# Patient Record
Sex: MALE | Race: WHITE | NOT HISPANIC OR LATINO | Employment: OTHER | URBAN - METROPOLITAN AREA
[De-identification: names, ages, dates, MRNs, and addresses within clinical notes are randomized per-mention and may not be internally consistent; named-entity substitution may affect disease eponyms.]

---

## 2017-01-03 ENCOUNTER — ANESTHESIA (OUTPATIENT)
Dept: SURGERY | Facility: CLINIC | Age: 72
End: 2017-01-03
Payer: MEDICARE

## 2017-01-03 PROCEDURE — 25000125 ZZHC RX 250: Performed by: ANESTHESIOLOGY

## 2017-01-03 PROCEDURE — 25800025 ZZH RX 258: Performed by: ANESTHESIOLOGY

## 2017-01-03 PROCEDURE — 25000125 ZZHC RX 250: Performed by: NEUROLOGICAL SURGERY

## 2017-01-03 PROCEDURE — 25000125 ZZHC RX 250: Performed by: NURSE ANESTHETIST, CERTIFIED REGISTERED

## 2017-01-03 RX ORDER — PROPOFOL 10 MG/ML
INJECTION, EMULSION INTRAVENOUS CONTINUOUS PRN
Status: DISCONTINUED | OUTPATIENT
Start: 2017-01-03 | End: 2017-01-03

## 2017-01-03 RX ORDER — FENTANYL CITRATE 50 UG/ML
INJECTION, SOLUTION INTRAMUSCULAR; INTRAVENOUS PRN
Status: DISCONTINUED | OUTPATIENT
Start: 2017-01-03 | End: 2017-01-03

## 2017-01-03 RX ORDER — ONDANSETRON 2 MG/ML
INJECTION INTRAMUSCULAR; INTRAVENOUS PRN
Status: DISCONTINUED | OUTPATIENT
Start: 2017-01-03 | End: 2017-01-03

## 2017-01-03 RX ORDER — PROPOFOL 10 MG/ML
INJECTION, EMULSION INTRAVENOUS PRN
Status: DISCONTINUED | OUTPATIENT
Start: 2017-01-03 | End: 2017-01-03

## 2017-01-03 RX ORDER — SODIUM CHLORIDE, SODIUM LACTATE, POTASSIUM CHLORIDE, CALCIUM CHLORIDE 600; 310; 30; 20 MG/100ML; MG/100ML; MG/100ML; MG/100ML
INJECTION, SOLUTION INTRAVENOUS CONTINUOUS PRN
Status: DISCONTINUED | OUTPATIENT
Start: 2017-01-03 | End: 2017-01-03

## 2017-01-03 RX ADMIN — ONDANSETRON 4 MG: 2 INJECTION INTRAMUSCULAR; INTRAVENOUS at 15:26

## 2017-01-03 RX ADMIN — SODIUM CHLORIDE, POTASSIUM CHLORIDE, SODIUM LACTATE AND CALCIUM CHLORIDE: 600; 310; 30; 20 INJECTION, SOLUTION INTRAVENOUS at 14:23

## 2017-01-03 RX ADMIN — PROPOFOL 30 MG: 10 INJECTION, EMULSION INTRAVENOUS at 14:43

## 2017-01-03 RX ADMIN — CEFAZOLIN SODIUM 2 G: 2 INJECTION, SOLUTION INTRAVENOUS at 14:41

## 2017-01-03 RX ADMIN — FENTANYL CITRATE 25 MCG: 50 INJECTION, SOLUTION INTRAMUSCULAR; INTRAVENOUS at 14:46

## 2017-01-03 RX ADMIN — PROPOFOL 75 MCG/KG/MIN: 10 INJECTION, EMULSION INTRAVENOUS at 14:38

## 2017-01-06 ENCOUNTER — CARE COORDINATION (OUTPATIENT)
Dept: NEUROSURGERY | Facility: CLINIC | Age: 72
End: 2017-01-06

## 2017-01-06 NOTE — PROGRESS NOTES
Called pt s/p DBS battery replacement on 1/3/2016 to check on his status. Left VM with my contact information, and asked him to return call at his earliest convenience. Also left the number to reach the neurosurgery resident on call should he have questions/issues over the weekend/after normal business hours.

## 2017-01-09 NOTE — PROGRESS NOTES
Neurosurgery Discharge Coordination Note     Attending physician: Dr. Fair  Surgery performed: DBS battery replacement  Date of Discharge: 1/3/2017  Discharge to: Home     Current status: Patient states he is feeling fine. Had minor discomfort after surgery but feels he is back to his baseline. Denies redness, swelling,increased tenderness or elevated temp. Reports Incision CDI without signs of infection.  Denies current bowel or bladder issues    Discharge instructions and medications reviewed with patient.  Follow up appointments/imaging/tests needed:  will call pt to schedule f/u wound check.     RN triage/on call number given: [phone]/ [phone]

## 2017-02-06 DIAGNOSIS — E78.00 HYPERCHOLESTEREMIA: Primary | ICD-10-CM

## 2017-02-06 RX ORDER — ATORVASTATIN CALCIUM 10 MG/1
10 TABLET, FILM COATED ORAL DAILY
Qty: 30 TABLET | Refills: 0 | Status: SHIPPED
Start: 2017-02-06 | End: 2017-02-28

## 2017-02-06 NOTE — Clinical Note
February 6, 2017      TO: Norberto Wu  [address]   [place] [address]         Dear Mr. Norberto Wu,  This letter is a reminder that you are overdue to see your Primary Care Provider for an Annual Visit and needed labs. You must be seen by your Primary Care Provider on a yearly basis and have appropriate labs drawn for continued care and prescription refills. Please call [phone] to schedule an appointment for an Annual Visit with  NORBERTO VIGIL MD.   LAST SEEN  8/10/15       You have been given a 30 day supply/refill of your  ATORVASTATIN 10 MG  while you get your clinic visit/labs completed.    Regards,  [facility]

## 2017-02-06 NOTE — TELEPHONE ENCOUNTER
atorvastatin (LIPITOR) 10 MG       Last Written Prescription Date: 11/14/16  Last Fill Quantity: 90,   # refills: 0  Last Office Visit : 8/10/15  Future Office visit:  NONE    OVER DUE MD APPT. REMINDER LETTER SENT + 30DAY RF

## 2017-02-28 DIAGNOSIS — E78.00 HYPERCHOLESTEREMIA: ICD-10-CM

## 2017-03-07 ENCOUNTER — TELEPHONE (OUTPATIENT)
Dept: INTERNAL MEDICINE | Facility: CLINIC | Age: 72
End: 2017-03-07

## 2017-03-07 DIAGNOSIS — E05.90 HYPERTHYROIDISM: ICD-10-CM

## 2017-03-07 DIAGNOSIS — C61 PROSTATE CANCER (H): ICD-10-CM

## 2017-03-07 DIAGNOSIS — E05.90 HYPERTHYROIDISM: Primary | ICD-10-CM

## 2017-03-07 DIAGNOSIS — E78.00 HIGH BLOOD CHOLESTEROL: ICD-10-CM

## 2017-03-07 LAB
ALBUMIN SERPL-MCNC: 3.7 G/DL (ref 3.4–5)
ALP SERPL-CCNC: 176 U/L (ref 40–150)
ALT SERPL W P-5'-P-CCNC: 11 U/L (ref 0–70)
ANION GAP SERPL CALCULATED.3IONS-SCNC: 5 MMOL/L (ref 3–14)
AST SERPL W P-5'-P-CCNC: 12 U/L (ref 0–45)
BASOPHILS # BLD AUTO: 0.1 10E9/L (ref 0–0.2)
BASOPHILS NFR BLD AUTO: 1 %
BILIRUB SERPL-MCNC: 0.6 MG/DL (ref 0.2–1.3)
BUN SERPL-MCNC: 16 MG/DL (ref 7–30)
CALCIUM SERPL-MCNC: 8.3 MG/DL (ref 8.5–10.1)
CHLORIDE SERPL-SCNC: 110 MMOL/L (ref 94–109)
CHOLEST SERPL-MCNC: 125 MG/DL
CO2 SERPL-SCNC: 28 MMOL/L (ref 20–32)
CREAT SERPL-MCNC: 0.6 MG/DL (ref 0.66–1.25)
DIFFERENTIAL METHOD BLD: NORMAL
EOSINOPHIL # BLD AUTO: 0.1 10E9/L (ref 0–0.7)
EOSINOPHIL NFR BLD AUTO: 2.1 %
ERYTHROCYTE [DISTWIDTH] IN BLOOD BY AUTOMATED COUNT: 13.2 % (ref 10–15)
GFR SERPL CREATININE-BSD FRML MDRD: ABNORMAL ML/MIN/1.7M2
GLUCOSE SERPL-MCNC: 111 MG/DL (ref 70–99)
HCT VFR BLD AUTO: 46.5 % (ref 40–53)
HDLC SERPL-MCNC: 53 MG/DL
HGB BLD-MCNC: 15.4 G/DL (ref 13.3–17.7)
IMM GRANULOCYTES # BLD: 0 10E9/L (ref 0–0.4)
IMM GRANULOCYTES NFR BLD: 0.4 %
LDLC SERPL CALC-MCNC: 59 MG/DL
LYMPHOCYTES # BLD AUTO: 2 10E9/L (ref 0.8–5.3)
LYMPHOCYTES NFR BLD AUTO: 28.7 %
MCH RBC QN AUTO: 29.5 PG (ref 26.5–33)
MCHC RBC AUTO-ENTMCNC: 33.1 G/DL (ref 31.5–36.5)
MCV RBC AUTO: 89 FL (ref 78–100)
MONOCYTES # BLD AUTO: 0.6 10E9/L (ref 0–1.3)
MONOCYTES NFR BLD AUTO: 8.5 %
NEUTROPHILS # BLD AUTO: 4 10E9/L (ref 1.6–8.3)
NEUTROPHILS NFR BLD AUTO: 59.3 %
NONHDLC SERPL-MCNC: 72 MG/DL
NRBC # BLD AUTO: 0 10*3/UL
NRBC BLD AUTO-RTO: 0 /100
PLATELET # BLD AUTO: 266 10E9/L (ref 150–450)
POTASSIUM SERPL-SCNC: 4.5 MMOL/L (ref 3.4–5.3)
PROT SERPL-MCNC: 7.1 G/DL (ref 6.8–8.8)
PSA SERPL-MCNC: 0.12 UG/L (ref 0–4)
RBC # BLD AUTO: 5.22 10E12/L (ref 4.4–5.9)
SODIUM SERPL-SCNC: 143 MMOL/L (ref 133–144)
TRIGL SERPL-MCNC: 65 MG/DL
WBC # BLD AUTO: 6.8 10E9/L (ref 4–11)

## 2017-03-08 ENCOUNTER — OFFICE VISIT (OUTPATIENT)
Dept: INTERNAL MEDICINE | Facility: CLINIC | Age: 72
End: 2017-03-08

## 2017-03-08 VITALS
SYSTOLIC BLOOD PRESSURE: 111 MMHG | WEIGHT: 202.3 LBS | DIASTOLIC BLOOD PRESSURE: 75 MMHG | HEART RATE: 72 BPM | HEIGHT: 72 IN | OXYGEN SATURATION: 98 % | BODY MASS INDEX: 27.4 KG/M2 | RESPIRATION RATE: 16 BRPM | TEMPERATURE: 97.7 F

## 2017-03-08 DIAGNOSIS — Z12.11 ENCOUNTER FOR SCREENING COLONOSCOPY: ICD-10-CM

## 2017-03-08 DIAGNOSIS — E78.00 HIGH CHOLESTEROL: Primary | ICD-10-CM

## 2017-03-08 DIAGNOSIS — R21 RASH AND NONSPECIFIC SKIN ERUPTION: ICD-10-CM

## 2017-03-08 LAB — CK SERPL-CCNC: 89 U/L (ref 30–300)

## 2017-03-08 RX ORDER — TRIAMCINOLONE ACETONIDE 1 MG/G
CREAM TOPICAL 2 TIMES DAILY
Qty: 15 G | Refills: 3 | Status: SHIPPED | OUTPATIENT
Start: 2017-03-08 | End: 2018-06-20

## 2017-03-08 ASSESSMENT — ENCOUNTER SYMPTOMS
DECREASED APPETITE: 0
WEIGHT GAIN: 0
POLYDIPSIA: 0
TASTE DISTURBANCE: 0
WEIGHT LOSS: 0
SINUS CONGESTION: 0
FATIGUE: 1
NIGHT SWEATS: 0
INCREASED ENERGY: 1
NAIL CHANGES: 0
TROUBLE SWALLOWING: 0
SINUS PAIN: 0
POLYPHAGIA: 0
SMELL DISTURBANCE: 0
POOR WOUND HEALING: 0
HOARSE VOICE: 0
CHILLS: 0
ALTERED TEMPERATURE REGULATION: 0
SKIN CHANGES: 0
NECK MASS: 0
SORE THROAT: 0
FEVER: 0
HALLUCINATIONS: 0

## 2017-03-08 ASSESSMENT — PAIN SCALES - GENERAL: PAINLEVEL: MILD PAIN (2)

## 2017-03-08 NOTE — PATIENT INSTRUCTIONS
[facility] Medication Refill Request Information:  * Please contact your pharmacy regarding ANY request for medication refills.  ** [facility] Prescription Fax = [phone]  * Please allow 3 business days for routine medication refills.  * Please allow 5 business days for controlled substance medication refills.     [facility] Test Result notification information:  *You will be notified with in 7-10 days of your appointment day regarding the results of your test.  If you are on MyChart you will be notified as soon as the provider has reviewed the results and signed off on them.    [facility] [phone]     Back Exercises: Back Release  Do this exercise on your hands and knees. Keep your knees under your hips and your hands under your shoulders.    Relax your abdominal and buttocks muscles, lift your head, and let your back sag. Be sure to keep your weight evenly distributed. Don t sit back on your hips.     Hold for 5 seconds.    Return to starting position.    Tuck your head and lift (arch) your back.    Hold for 5 seconds    Return to starting position.    Repeat 5 times.    9106-3421 [employer]. [address] [place] [address]. All rights reserved. This information is not intended as a substitute for professional medical care. Always follow your healthcare professional's instructions.        Back Exercises: Leg Pull        To start, lie on your back with your knees bent and feet flat on the floor. Don t press your neck or lower back to the floor. Breathe deeply. You should feel comfortable and relaxed in this position.    Pull one knee to your chest.    Hold for 30 to 60 seconds. Return to starting position.    Repeat 2 times.    Switch legs.    For a double leg pull, pull both legs to your chest at the same time. Repeat 2 times.  For your safety, check with your healthcare provider before starting an exercise program.     0134-7917 [employer] [address]. All rights reserved. This information is not intended as a substitute for professional medical care. Always follow your healthcare professional's instructions.        Back Exercises: Lower Back Rotation    To start, lie on your back with your knees bent and feet flat on the floor. Don t press your neck or lower back to the floor. Breathe deeply. You should feel comfortable and relaxed in this position.    Drop both knees to one side. Turn your head to the other side. Keep your shoulders flat on the floor.    Do not push through pain.    Hold for 20 seconds.    Slowly switch sides.    Repeat 2 to 5 times.    5019-7476 [employer]. [address] [place] [address]. All rights reserved. This information is not intended as a substitute for professional medical care. Always follow your healthcare professional's instructions.        Back Exercises: Lower Back Stretch                        To start, sit in a chair with your feet flat on the floor. Shift your weight slightly forward. Relax, and keep your ears, shoulders, and hips aligned.    Sit with your feet well apart.    Bend forward and touch the floor with the backs of your hands. Relax and let your body drop.    Hold for 20 seconds. Return to starting position.    Repeat 2 times.     5165-4014 [employer]. [address] [place] [address]. All rights reserved. This information is not intended as a substitute for professional medical care. Always follow your healthcare professional's instructions.        Back Exercises: Leg Reach        Do this exercise on your hands and knees. Keep your knees under your hips and your hands under your shoulders. Keep your spine in a neutral position (not arched or sagging). Be sure to maintain your neck s natural curve:    Extend one leg straight back. Don t arch your back or let your head or body sag.    Hold for 5 seconds. Return to starting  position.    Repeat 5 times.    Switch legs.     0692-0589 [employer]. [address] [place] [address]. All rights reserved. This information is not intended as a substitute for professional medical care. Always follow your healthcare professional's instructions.        Back Exercises: Back Extension with Elbow Press    To start, lie face down on your stomach, feet slightly apart, forehead on the floor. Breathe deeply. You should feel comfortable and relaxed in this position.    Press up on your forearms. Keep your stomach and hips on the floor. Stay within your painfree range.    Hold for 20 seconds. Lower slowly.    Repeat 2 times.    Return to starting position.    6172-8296 [employer]. [address] [place] [address]. All rights reserved. This information is not intended as a substitute for professional medical care. Always follow your healthcare professional's instructions.        Back Exercises: Pelvic Tilt    To start, lie on your back with your knees bent and feet flat on the floor. Don t press your neck or lower back to the floor. Breathe deeply. You should feel comfortable and relaxed in this position:    Tighten your stomach and buttocks, and press your lower back toward the floor. This should be a small, subtle movement. This should not increase your pain.    Hold for 5 to 15 seconds. Release.    Repeat 2 to 5 times.    7340-8888 [employer]. [address] [place] [address]. All rights reserved. This information is not intended as a substitute for professional medical care. Always follow your healthcare professional's instructions.

## 2017-03-08 NOTE — PROGRESS NOTES
"Chief complaint:  Norberto Wu is a 71 year old male presents for   Chief Complaint   Patient presents with     Physical     Here for annual physical        SUBJECTIVE:  Patient is here for a physical exam today. His other concerns area pruritic rash on right ankle, bilateral low back weakness/pain, and left sided hearing decrease. Patient has a dry pruritic rash on right medial ankle that has been present for 1 year. It is not painful, no drainage or erythema. He has tried lotions at home with some relief, thinks he has tried hydrocortisone without relief. No hx of eczema.     Patient has multi-year hx of bilateral back weakness/pain. It is not bothersome at rest. It is aggravated when he bends over or stands up. He has tried stretching with relief.  No hx of injury/trauma/MVA. Previous lumbar XR in 2015. Patient is on statin with no previous complications.    Patient also has extensive hx of left sided hearing decreasem associated with \"stuffiness\". He has seen ENT in the past who has placed a tube which helped. In addition they typically clean out his ear that provides relief. Has an appointment on 3/14 with Dr. Nissen in ENT.    Medications and allergies were reviewed by me today.     SocHx:   History   Smoking Status     Never Smoker   Smokeless Tobacco     Never Used        Patient Active Problem List   Diagnosis     Parkinson disease (H)     Peripheral neuropathy (H)     Somnolence     Family history of Parkinson's disease     Snores     REM sleep behavior disorder     Prostate cancer (H)     Wears glasses     Insomnia     Constipation     History of MRI of brain and brain stem     PFO (patent foramen ovale)     Cerebellar stroke syndrome     H/O echocardiogram     Anxiety     Restless leg syndrome     S/P brain surgery     Hamstring tendonitis at origin     Subclinical hyperthyroidism     Parkinson's disease (H)     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       Review Of Systems   5 point ROS completed " "and negative except noted above, including Gen, CV, Resp, GI, MS    PE:  /75 (BP Location: Right arm, Patient Position: Chair, Cuff Size: Adult Regular)  Pulse 72  Temp 97.7  F (36.5  C) (Oral)  Resp 16  Ht 1.816 m (5' 11.5\")  Wt 91.8 kg (202 lb 4.8 oz)  SpO2 98%  BMI 27.82 kg/m2  Gen: no distress, comfortable, pleasant   Eyes: anicteric, normal extra-ocular movements   Cardiovascular: regular rate and rhythm, normal S1 and S2, no murmurs, rubs or gallops, peripheral pulses full and symmetric   Respiratory: clear to auscultation, no wheezes or crackles, normal breath sounds   Gastrointestinal: positive bowel sounds, nontender,   Skin: Eczematous type rash on medial right LE, no jaundice   Psychological: appropriate mood       ASSESSMENT/PLAN:  High cholesterol  Bilateral muscular back pain on a statin.  - CK total  - CK total    Rash and nonspecific skin eruption  1 year of pruritic rash on medial right ankle. Tried lotion and hydrocortisone with mimimal relief.  -Triamcinolone 0.1% cream  -Dermatology Referral - if cream does not resolved rash    Encounter for screening colonoscopy  10 years since last colonoscopy    HM:  Colonoscopy    RTC: Follow-up as needed    Scribe Disclosure:   I, Thai Bronson, am serving as a scribe; to document services personally performed by Norberto Howe MD  - -based on data collection and the provider's statements to me.     Provider Disclosure:  I agree with above History, Review of Systems, Physical exam and Plan.  I have reviewed the content of the documentation and have edited it as needed. I have personally performed the services documented here and the documentation accurately represents those services and the decisions I have made.      Staff note;    I personally interviewed pt. And conducted physical examination    I agree with above assessment and plan.    MOHIT Howe MD    Total time spent 25 minutes.  More than 50% of the time spent with Mr. Wu on " counseling / coordinating his care

## 2017-03-08 NOTE — NURSING NOTE
Chief Complaint   Patient presents with     Physical     Here for annual physical     Christopher Tan CMA at 9:55 AM on 3/8/2017

## 2017-03-08 NOTE — MR AVS SNAPSHOT
After Visit Summary   3/8/2017    Norberto Wu    MRN: [id number]           Patient Information     Date Of Birth          1945        Visit Information        Provider Department      3/8/2017 10:05 AM Norberto Howe MD [facility]        Today's Diagnoses     High cholesterol    -  1      Care Instructions    [facility] Medication Refill Request Information:  * Please contact your pharmacy regarding ANY request for medication refills.  ** [facility] Prescription Fax = [phone]  * Please allow 3 business days for routine medication refills.  * Please allow 5 business days for controlled substance medication refills.     [facility] Test Result notification information:  *You will be notified with in 7-10 days of your appointment day regarding the results of your test.  If you are on MyChart you will be notified as soon as the provider has reviewed the results and signed off on them.    [facility] [phone]     Back Exercises: Back Release  Do this exercise on your hands and knees. Keep your knees under your hips and your hands under your shoulders.    Relax your abdominal and buttocks muscles, lift your head, and let your back sag. Be sure to keep your weight evenly distributed. Don t sit back on your hips.     Hold for 5 seconds.    Return to starting position.    Tuck your head and lift (arch) your back.    Hold for 5 seconds    Return to starting position.    Repeat 5 times.    9935-0021 [employer]. [address] [place] [address]. All rights reserved. This information is not intended as a substitute for professional medical care. Always follow your healthcare professional's instructions.        Back Exercises: Leg Pull        To start, lie on your back with your knees bent and feet flat on the floor. Don t press your neck or lower back to the floor. Breathe deeply. You should feel comfortable and relaxed in this  position.    Pull one knee to your chest.    Hold for 30 to 60 seconds. Return to starting position.    Repeat 2 times.    Switch legs.    For a double leg pull, pull both legs to your chest at the same time. Repeat 2 times.  For your safety, check with your healthcare provider before starting an exercise program.     6979-1934 [employer]. [address] [place] [address]. All rights reserved. This information is not intended as a substitute for professional medical care. Always follow your healthcare professional's instructions.        Back Exercises: Lower Back Rotation    To start, lie on your back with your knees bent and feet flat on the floor. Don t press your neck or lower back to the floor. Breathe deeply. You should feel comfortable and relaxed in this position.    Drop both knees to one side. Turn your head to the other side. Keep your shoulders flat on the floor.    Do not push through pain.    Hold for 20 seconds.    Slowly switch sides.    Repeat 2 to 5 times.    0548-9929 [employer]. [address] [place] [address]. All rights reserved. This information is not intended as a substitute for professional medical care. Always follow your healthcare professional's instructions.        Back Exercises: Lower Back Stretch                        To start, sit in a chair with your feet flat on the floor. Shift your weight slightly forward. Relax, and keep your ears, shoulders, and hips aligned.    Sit with your feet well apart.    Bend forward and touch the floor with the backs of your hands. Relax and let your body drop.    Hold for 20 seconds. Return to starting position.    Repeat 2 times.     9583-7413 [employer]. [address] [place] [address]. All rights reserved. This information is not intended as a substitute for professional medical care. Always follow your healthcare professional's instructions.        Back Exercises: Leg  Reach        Do this exercise on your hands and knees. Keep your knees under your hips and your hands under your shoulders. Keep your spine in a neutral position (not arched or sagging). Be sure to maintain your neck s natural curve:    Extend one leg straight back. Don t arch your back or let your head or body sag.    Hold for 5 seconds. Return to starting position.    Repeat 5 times.    Switch legs.     8648-8406 [employer]. [address] [place] [address]. All rights reserved. This information is not intended as a substitute for professional medical care. Always follow your healthcare professional's instructions.        Back Exercises: Back Extension with Elbow Press    To start, lie face down on your stomach, feet slightly apart, forehead on the floor. Breathe deeply. You should feel comfortable and relaxed in this position.    Press up on your forearms. Keep your stomach and hips on the floor. Stay within your painfree range.    Hold for 20 seconds. Lower slowly.    Repeat 2 times.    Return to starting position.    6675-7179 [employer]. [address] [place] [address]. All rights reserved. This information is not intended as a substitute for professional medical care. Always follow your healthcare professional's instructions.        Back Exercises: Pelvic Tilt    To start, lie on your back with your knees bent and feet flat on the floor. Don t press your neck or lower back to the floor. Breathe deeply. You should feel comfortable and relaxed in this position:    Tighten your stomach and buttocks, and press your lower back toward the floor. This should be a small, subtle movement. This should not increase your pain.    Hold for 5 to 15 seconds. Release.    Repeat 2 to 5 times.    0656-1741 [employer]. [address] [place] [address]. All rights reserved. This information is not intended as a substitute for professional medical care.  Always follow your healthcare professional's instructions.              Follow-ups after your visit        Your next 10 appointments already scheduled     Mar 14, 2017  8:00 AM CDT   Walk In From ENT with Araseli Marina [facility] Audiology ([facility])    [address] [place] [id number] [phone]            Mar 14, 2017  1:15 PM CDT   (Arrive by 1:00 PM)   Return Visit with Rick L Nissen, MD   [facility] Ear Nose and Throat ([facility])    [address] [place] [address] [id number] [phone]            Jun 14, 2017 12:30 PM CDT   (Arrive by 12:15 PM)   Return Movement Disorder with FRANKIE Brandon [facility] Neurology ([facility])    [address] [place] [address] [id number] [phone]            Jun 16, 2017 10:50 AM CDT   (Arrive by 10:35 AM)   RETURN ENDOCRINE with Brandin Mccarty MD   [facility] Endocrinology ([facility])    [address] [place] [address] [id number] [phone]              Future tests that were ordered for you today     Open Future Orders        Priority Expected Expires Ordered    CK total Routine 3/8/2017 3/8/2018 3/8/2017            Who to contact     Please call your clinic at [phone] to:    Ask questions about your health    Make or cancel appointments    Discuss your medicines    Learn about your test results    Speak to your doctor   If you have compliments or concerns about an experience at your clinic, or if you wish to file a complaint, please contact [facility] Patient Relations at [phone] or email us at [email or web address]         Additional Information About Your Visit        MyChart Information     [employer] gives you secure access to your electronic health record. If you see a primary care provider, you can also send  "messages to your care team and make appointments. If you have questions, please call your primary care clinic.  If you do not have a primary care provider, please call [phone] and they will assist you.      [employer] is an electronic gateway that provides easy, online access to your medical records. With [employer], you can request a clinic appointment, read your test results, renew a prescription or communicate with your care team.     To access your existing account, please contact your [facility] or call [phone] for assistance.        Care EveryWhere ID     This is your Care EveryWhere ID. This could be used by other organizations to access your [place] medical records  [id number]        Your Vitals Were     Pulse Temperature Respirations Height Pulse Oximetry BMI (Body Mass Index)    72 97.7  F (36.5  C) (Oral) 16 1.816 m (5' 11.5\") 98% 27.82 kg/m2       Blood Pressure from Last 3 Encounters:   03/08/17 111/75   01/03/17 138/86   12/26/16 115/68    Weight from Last 3 Encounters:   03/08/17 91.8 kg (202 lb 4.8 oz)   01/03/17 90.9 kg (200 lb 6.4 oz)   12/26/16 93.9 kg (207 lb)               Primary Care Provider Office Phone # Fax #    Norberto Howe [phone]       [address] [address]        Thank you!     Thank you for choosing [facility]  for your care. Our goal is always to provide you with excellent care. Hearing back from our patients is one way we can continue to improve our services. Please take a few minutes to complete the written survey that you may receive in the mail after your visit with us. Thank you!             Your Updated Medication List - Protect others around you: Learn how to safely use, store and throw away your medicines at www.disposemymeds.org.          This list is accurate as of: 3/8/17 10:49 AM.  Always use your most recent med list.                   Brand Name " Dispense Instructions for use    aspirin 81 MG tablet     30 tablet    Take 1 tablet (81 mg) by mouth daily       atorvastatin 10 MG tablet    LIPITOR    30 tablet    Take 1 tablet (10 mg) by mouth daily *MD APPT. NEEDED FOR RF       carbidopa-levodopa  MG per tablet    SINEMET    1080 tablet    TAKE 2 TABLETS 6 TIMES DAILY (3AM, 7AM, 11AM, 2PM, 6PM, AND 10PM)       cephalexin 250 MG capsule    KEFLEX    40 capsule    Take 1 capsule (250 mg) by mouth 4 times daily       fluticasone 50 MCG/ACT spray    FLONASE    3 Package    Spray 1-2 sprays into both nostrils daily       gabapentin 300 MG capsule    NEURONTIN    720 capsule    TAKE 2 CAPSULES 3 TIMES A DAY (TAKE LAST DOSE 2 HOURS BEFORE BEDTIME)       methimazole 5 MG tablet    TAPAZOLE    90 tablet    Take 1 tablet (5 mg) by mouth daily       polyethylene glycol Packet    MIRALAX/GLYCOLAX     Take by mouth At Bedtime       selegiline 5 MG capsule    ELDEPRYL    60 capsule    Take 1 capsule (5 mg) by mouth 2 times daily (before meals)

## 2017-03-09 ENCOUNTER — TELEPHONE (OUTPATIENT)
Dept: INTERNAL MEDICINE | Facility: CLINIC | Age: 72
End: 2017-03-09

## 2017-03-09 DIAGNOSIS — Z12.11 SPECIAL SCREENING FOR MALIGNANT NEOPLASMS, COLON: ICD-10-CM

## 2017-03-09 DIAGNOSIS — R94.5 ABNORMAL RESULTS OF LIVER FUNCTION STUDIES: ICD-10-CM

## 2017-03-09 DIAGNOSIS — C61 PROSTATE CANCER (H): ICD-10-CM

## 2017-03-09 DIAGNOSIS — M54.89 OTHER BACK PAIN: Primary | ICD-10-CM

## 2017-03-09 RX ORDER — ATORVASTATIN CALCIUM 10 MG/1
10 TABLET, FILM COATED ORAL DAILY
Qty: 90 TABLET | Refills: 3 | Status: SHIPPED | OUTPATIENT
Start: 2017-03-09 | End: 2018-03-28

## 2017-03-09 NOTE — TELEPHONE ENCOUNTER
atorvastatin (LIPITOR) 10 MG      Last Written Prescription Date:  2/6/17  Last Fill Quantity: 30,   # refills: 0  Last Office Visit : 3/8/17  Future Office visit:  none

## 2017-03-09 NOTE — TELEPHONE ENCOUNTER
Sent pt. Results note and separate letter regarding recommendation and orders today    MOHIT Howe           Dear Norberto;    I have reviewed your laboratory tests and I have some recommendations:    (1) One of your liver tests is slightly elevated and I recommend we repeat this in about 3 weeks and I have placed a future lab order for this today    (2) You PSA is stable but detectable. Given your history of prostate cancer, I recommend you see our Urology doctors. I have placed a referral today    (3) I recommend that you see our orthopedic doctors for your back pain and I placed a referral today    (4) I recommend you get a colonoscopy and I have ordered this today    (5) Finally, I would like to see you back in about 6 weeks to follow up on all the above issues.    You can call [phone] to schedule ALL these appointments.       MOHIT Howe MD

## 2017-03-13 DIAGNOSIS — H69.90 DYSFUNCTION OF EUSTACHIAN TUBE: Primary | ICD-10-CM

## 2017-03-14 ENCOUNTER — OFFICE VISIT (OUTPATIENT)
Dept: OTOLARYNGOLOGY | Facility: CLINIC | Age: 72
End: 2017-03-14

## 2017-03-14 ENCOUNTER — OFFICE VISIT (OUTPATIENT)
Dept: AUDIOLOGY | Facility: CLINIC | Age: 72
End: 2017-03-14

## 2017-03-14 VITALS — WEIGHT: 202 LBS | HEIGHT: 71 IN | BODY MASS INDEX: 28.28 KG/M2

## 2017-03-14 DIAGNOSIS — H90.12 CONDUCTIVE HEARING LOSS IN LEFT EAR: ICD-10-CM

## 2017-03-14 DIAGNOSIS — H90.72 MIXED HEARING LOSS OF LEFT EAR: ICD-10-CM

## 2017-03-14 DIAGNOSIS — H90.5 SENSORINEURAL HEARING LOSS OF RIGHT EAR: ICD-10-CM

## 2017-03-14 DIAGNOSIS — H61.21 EXCESSIVE CERUMEN IN RIGHT EAR CANAL: ICD-10-CM

## 2017-03-14 DIAGNOSIS — H69.92 DYSFUNCTION OF EUSTACHIAN TUBE, LEFT: Primary | ICD-10-CM

## 2017-03-14 RX ORDER — OFLOXACIN 3 MG/ML
SOLUTION AURICULAR (OTIC)
Qty: 10 ML | Refills: 0 | Status: SHIPPED | OUTPATIENT
Start: 2017-03-14 | End: 2018-05-09

## 2017-03-14 ASSESSMENT — PAIN SCALES - GENERAL: PAINLEVEL: NO PAIN (0)

## 2017-03-14 NOTE — MR AVS SNAPSHOT
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 It is easily accessible from virtually any point in the [facility] area, via [address] and [address].            Jun 14, 2017 12:30 PM CDT   (Arrive by 12:15 PM)   Return Movement Disorder with FRANKIE Brandon CNP   [facility] Neurology ([facility])    [address] [place] [address] [phone]            Jun 16, 2017 10:50 AM CDT   (Arrive by 10:35 AM)   RETURN ENDOCRINE with Brandin Mccarty MD   [facility] Endocrinology ([facility])    [address] [place] [address] [phone]              Who to contact     Please call your clinic at [phone] to:    Ask questions about your health    Make or cancel appointments    Discuss your medicines    Learn about your test results    Speak to your doctor   If you have compliments or concerns about an experience at your clinic, or if you wish to file a complaint, please contact [facility] Patient Relations at [phone] or email us at [email or web address]          "Additional Information About Your Visit        AppNetahart Information     [email or web address] gives you secure access to your electronic health record. If you see a primary care provider, you can also send messages to your care team and make appointments. If you have questions, please call your primary care clinic.  If you do not have a primary care provider, please call [phone] and they will assist you.      [email or web address] is an electronic gateway that provides easy, online access to your medical records. With [email or web address], you can request a clinic appointment, read your test results, renew a prescription or communicate with your care team.     To access your existing account, please contact your [facility] or call [phone] for assistance.        Care EveryWhere ID     This is your Care EveryWhere ID. This could be used by other organizations to access your [place] medical records  [id number]        Your Vitals Were     Height BMI (Body Mass Index)                1.816 m (5' 11.5\") 27.78 kg/m2           Blood Pressure from Last 3 Encounters:   03/08/17 111/75   01/03/17 138/86   12/26/16 115/68    Weight from Last 3 Encounters:   03/14/17 91.6 kg (202 lb)   03/08/17 91.8 kg (202 lb 4.8 oz)   01/03/17 90.9 kg (200 lb 6.4 oz)              We Performed the Following     BINOCULAR MICROSCOPY     NASOPHARYNGOSCOPY W/ ENDOSCOPE     TYMPANOSTOMY W LOCAL/TOPICAL ANESTH          Today's Medication Changes          These changes are accurate as of: 3/14/17 11:59 PM.  If you have any questions, ask your nurse or doctor.               Start taking these medicines.        Dose/Directions    ofloxacin 0.3 % otic solution   Commonly known as:  FLOXIN   Used for:  Dysfunction of eustachian tube, left   Started by:  Nissen, Rick L, MD        Place 3 gtts in left ear BID x 3 days   Quantity:  10 mL   Refills:  0            Where to get your medicines      These medications were sent to [id number] - " [place], [place] - [address]  [address], [facility] [address]     Phone:  [phone]     ofloxacin 0.3 % otic solution                Primary Care Provider Office Phone # Fax #    Norberto Howe [phone]       [facility] [address] [facility] [address]        Thank you!     Thank you for choosing [facility]  for your care. Our goal is always to provide you with excellent care. Hearing back from our patients is one way we can continue to improve our services. Please take a few minutes to complete the written survey that you may receive in the mail after your visit with us. Thank you!             Your Updated Medication List - Protect others around you: Learn how to safely use, store and throw away your medicines at www.disposemymeds.org.          This list is accurate as of: 3/14/17 11:59 PM.  Always use your most recent med list.                   Brand Name Dispense Instructions for use    aspirin 81 MG tablet     30 tablet    Take 1 tablet (81 mg) by mouth daily       atorvastatin 10 MG tablet    LIPITOR    90 tablet    Take 1 tablet (10 mg) by mouth daily       carbidopa-levodopa  MG per tablet    SINEMET    1080 tablet    TAKE 2 TABLETS 6 TIMES DAILY (3AM, 7AM, 11AM, 2PM, 6PM, AND 10PM)       cephalexin 250 MG capsule    KEFLEX    40 capsule    Take 1 capsule (250 mg) by mouth 4 times daily       fluticasone 50 MCG/ACT spray    FLONASE    3 Package    Spray 1-2 sprays into both nostrils daily       gabapentin 300 MG capsule    NEURONTIN    720 capsule    TAKE 2 CAPSULES 3 TIMES A DAY (TAKE LAST DOSE 2 HOURS BEFORE BEDTIME)       methimazole 5 MG tablet    TAPAZOLE    90 tablet    Take 1 tablet (5 mg) by mouth daily       ofloxacin 0.3 % otic solution    FLOXIN    10 mL    Place 3 gtts in left ear BID x 3 days       polyethylene glycol Packet    MIRALAX/GLYCOLAX     Take by mouth At Bedtime       selegiline 5 MG capsule    ELDEPRYL    60  capsule    Take 1 capsule (5 mg) by mouth 2 times daily (before meals)       triamcinolone 0.1 % cream    KENALOG    15 g    Apply topically 2 times daily

## 2017-03-14 NOTE — NURSING NOTE
Chief Complaint   Patient presents with     RECHECK     1 year f/u     Cristy Matamoros Medical Assistant

## 2017-03-14 NOTE — PROGRESS NOTES
AUDIOLOGY REPORT    SUMMARY: Audiology visit completed. See audiogram for results.      RECOMMENDATIONS: Follow-up with ENT.      Soila Brown, CCC-A  Licensed Audiologist  [place] #[id number]

## 2017-03-14 NOTE — MR AVS SNAPSHOT
After Visit Summary   3/14/2017    Norberto Wu    MRN: [id number]           Patient Information     Date Of Birth          1945        Visit Information        Provider Department      3/14/2017 8:00 AM Katerina Scales AuD M [facility] Aud[facility]        Today's Diagnoses     Sensorineural hearing loss of right ear        Mixed hearing loss of left ear           Follow-ups after your visit        Your next 10 appointments already scheduled     Mar 14, 2017  1:15 PM CDT   (Arrive by 1:00 PM)   Return Visit with Rick L Nissen, MD   [facility] ([facility] [place])    [address] [place] [address]   [phone]            Mar 23, 2017   Procedure with Salbador Mccracken MD   [facility] [place], Endoscopy ([facility])    [address] [facility] [address] [phone]           The [facility] is located on the corner of [address] [facility] on the [facility]. It is easily accessible from virtually any point in the [facility] area, via [address] and [address].            Jun 14, 2017 12:30 PM CDT   (Arrive by 12:15 PM)   Return Movement Disorder with FRANKIE Brandon CNP   [facility] Neu[facility] ([facility])    [address] [place] [id number] [phone]            Jun 16, 2017 10:50 AM CDT   (Arrive by 10:35 AM)   RETURN ENDOCRINE with Brandin Mccarty MD   [facility] Endocrinology ([facility])    [address] [place] [id number] [phone]              Who to contact     Please call your clinic at [phone] to:    Ask questions about your health    Make or cancel appointments    Discuss your medicines    Learn about your test results    Speak to your doctor   If you have compliments or concerns about an experience at your  clinic, or if you wish to file a complaint, please contact [facility] Patient Relations at [phone] or email us at [email or web address]         Additional Information About Your Visit        S[employer] Bear Studioshart Information     Vurbt gives you secure access to your electronic health record. If you see a primary care provider, you can also send messages to your care team and make appointments. If you have questions, please call your primary care clinic.  If you do not have a primary care provider, please call [phone] and they will assist you.      YiBai-shopping is an electronic gateway that provides easy, online access to your medical records. With YiBai-shopping, you can request a clinic appointment, read your test results, renew a prescription or communicate with your care team.     To access your existing account, please contact your [facility] Clinic or call [phone] for assistance.        Care EveryWhere ID     This is your Care EveryWhere ID. This could be used by other organizations to access your [place] medical records  [id number]         Blood Pressure from Last 3 Encounters:   03/08/17 111/75   01/03/17 138/86   12/26/16 115/68    Weight from Last 3 Encounters:   03/08/17 91.8 kg (202 lb 4.8 oz)   01/03/17 90.9 kg (200 lb 6.4 oz)   12/26/16 93.9 kg (207 lb)              We Performed the Following     AUDIOGRAM/TYMPANOGRAM - INTERFACE     [facility]og ([address])     Reduced 52 - Tymps / Reflex   ([address])        Primary Care Provider Office Phone # Fax #    Norberto Howe [phone]       [address] [address]        Thank you!     Thank you for choosing [facility] AUDIOLOGY  for your care. Our goal is always to provide you with excellent care. Hearing back from our patients is one way we can continue to improve our services. Please take a few minutes to complete the  written survey that you may receive in the mail after your visit with us. Thank you!             Your Updated Medication List - Protect others around you: Learn how to safely use, store and throw away your medicines at www.disposemymeds.org.          This list is accurate as of: 3/14/17  8:30 AM.  Always use your most recent med list.                   Brand Name Dispense Instructions for use    aspirin 81 MG tablet     30 tablet    Take 1 tablet (81 mg) by mouth daily       atorvastatin 10 MG tablet    LIPITOR    90 tablet    Take 1 tablet (10 mg) by mouth daily       carbidopa-levodopa  MG per tablet    SINEMET    1080 tablet    TAKE 2 TABLETS 6 TIMES DAILY (3AM, 7AM, 11AM, 2PM, 6PM, AND 10PM)       cephalexin 250 MG capsule    KEFLEX    40 capsule    Take 1 capsule (250 mg) by mouth 4 times daily       fluticasone 50 MCG/ACT spray    FLONASE    3 Package    Spray 1-2 sprays into both nostrils daily       gabapentin 300 MG capsule    NEURONTIN    720 capsule    TAKE 2 CAPSULES 3 TIMES A DAY (TAKE LAST DOSE 2 HOURS BEFORE BEDTIME)       methimazole 5 MG tablet    TAPAZOLE    90 tablet    Take 1 tablet (5 mg) by mouth daily       polyethylene glycol Packet    MIRALAX/GLYCOLAX     Take by mouth At Bedtime       selegiline 5 MG capsule    ELDEPRYL    60 capsule    Take 1 capsule (5 mg) by mouth 2 times daily (before meals)       triamcinolone 0.1 % cream    KENALOG    15 g    Apply topically 2 times daily

## 2017-03-14 NOTE — LETTER
"3/14/2017       RE: Norberto Wu  [address]   [place] [address]     Dear Colleague,    Thank you for referring your patient, Norberto Wu, to the [facility] at [facility]. Please see a copy of my visit note below.    Dear Norberto Barrientos:    I had the pleasure of seeing Norberto Wu in followup today at the [facility].    HISTORY OF PRESENT ILLNESS: The patient is a 71-year-old in today for followup.  Last visit was in March of 2016.  He had a left tube placed at that time.  Diagnosis has been for eustachian tube dysfunction.  He is doing well after that tube.  Left ear now has felt plugged for about a month.  There has been no pain or drainage.  Hearing is muffled.  He denies any dysphagia, hoarseness or facial paresthesias.         MEDICATIONS: Please refer to the detailed medication reconciliation performed by my nurse today, which I have reviewed and signed.     ALLERGIES:    Allergies   Allergen Reactions     Hydromorphone Nausea and Nausea and Vomiting     Used post DBS surgery.  Stayed in the hospital 3 days due to severe nausea & \"retching\" .      Comtan Diarrhea     Camphor      Camphor HELENA     Hydrocodone      Other reaction(s): Headache, Nausea Only     Hydrocodone-Acetaminophen Hives and Nausea     Melatonin Other (See Comments)     No benefit.     Ropinirole Fatigue     Fatigue no benefit.      Seroquel [Quetiapine]      Groggy and aggravated rls  No benefit     Adhesive Tape Rash     Camphor Rash     Other reaction(s): Rash     Liquid Adhesive Rash       HABITS:   Alcohol use Yes   Comment: Rare    History   Smoking Status     Never Smoker   Smokeless Tobacco     Never Used         PAST MEDICAL HISTORY:  Please see today's intake form (for the remainder of the PMH) which I reviewed and signed.  Past Medical History   Diagnosis Date     Cerebellar stroke syndrome 8/21/2013 "     Few small foci of chronic infarct in the right cerebellar hemisphere, unchanged as well as a new focus of now chronic infarct in the left cerebellar hemisphere since the last study.      Dyslipidemia      Hyperthyroidism      Insomnia 6/17/2011     Parkinson disease (H) 6/15/2011     Peripheral neuropathy (H) 6/15/2011     PFO (patent foramen ovale) 8/21/2013     Interpretation Summary 1. Normal LV size and systolic function. Normal diastolic function 2.  Normal RV size and function 3. No significant valvular abnormalities 4.  Small right-to-left shunt visualized with intravenous agitated saline  contrast study, suggestive of PFO. PatientHeight: 72 in PatientWeight: 205 lbs SystolicPressure: 100 mmHg DiastolicPressure: 63 mmHg BSA 2.2 m^2   Procedure Echoc     PONV (postoperative nausea and vomiting)      Prostate cancer (H) 6/17/2011     s/p radical prostectomy       FAMILY HISTORY/SOCIAL HISTORY:    Family History   Problem Relation Age of Onset     Parkinsonism Father      Skin Cancer No family hx of      no skin cancer    Social History     Social History     Marital status:      Spouse name: N/A     Number of children: N/A     Years of education: N/A     Occupational History     Not on file.     Social History Main Topics     Smoking status: Never Smoker     Smokeless tobacco: Never Used     Alcohol use Yes      Comment: Rare     Drug use: No     Sexual activity: No     Other Topics Concern     Not on file     Social History Narrative    Son lives in [place] and he has 2 kids    Wife Mayelin           REVIEW OF SYSTEMS: Please see today's intake form (for the remainder of the ROS) which I have reviewed and signed.    PHYSICIAL EXAMINATION:  Constitutional: The patient was well-groomed and in no acute distress.   Skin: Warm and pink.  Psychiatric: The patient's affect was calm, cooperative, and appropriate.   Respiratory: Breathing comfortably without stridor or exertion of accessory muscles.  Eyes:  Pupils were equal and reactive. Extraocular movement intact.   Head: Normocephalic and atraumatic. No lesions or scars.  Ears: Both ears are examined under the microscope with the finding of cerumen.  The right side was cleaned with curettes.  Following cleaning, tympanic membrane looks to be in good position with good color and air filled middle ear space.  The opposite ear was cleaned of cerumen and wax and there was a tube within the wax.  Following cleaning, tympanic membrane is intact.  It looks like he has a middle ear full of fluid.     Nasopharyngeal exam:  The left nostril was vasoconstricted and anesthetized with lidocaine.  The flexible endoscope was advanced through the left nostril into the nasopharynx.  The left eustachian tube orifice appeared open and free of any obstruction or masses.  No nasopharyngeal mass was found.   Nose: Sinuses were nontender. Anterior rhinoscopy revealed midline septum and absence of purulence or polyps.  Oral Cavity: Normal tongue, floor of moth, buccal mucosa, and palate. No abnormal lymph tissue in the oropharynx.   Neck: The parotid is soft without masses. Supple with normal laryngeal and tracheal landmarks.   Lymphatic: There is no palpable lymphadenopathy or other masses in the neck.   Neurologic: Alert and oriented x 3. Cranial nerves III-XI within normal limits. Voice quality normal.  Cerebellar Function Tests:  Grossly normal    Audiogram:  AUDIOGRAM:  Audiogram performed shows a bilateral high-frequency sensorineural hearing loss beginning above 1000 Hz.  Maintains discrimination at 88% on the right, 68% on the left.  On the left side, he has a moderate conductive loss through the speech frequencies as well.         IMPRESSION AND PLAN: I talked with the patient for some time and went over eustachian tubes dysfunction causing the fluid-filled left middle ear and hearing loss.  Again, his nasopharynx looks clear via endoscopic exam.  Discussed the option of placing  a tube or a trial with nasal steroid spray.  He preferred to just go ahead and proceed with a tube.  Discussed the T-tube  with slight risk of increased perforation after extrusion.  He desires to proceed with this.  T-tube was placed without incident.  He will use drops for three days and follow up in one year, sooner with problems or concerns.      PROCEDURE NOTE:  The patient was placed supine under the microscope.  Under high-powered magnification, a drop of phenol was placed on the left inferior quadrant of the tympanic membrane.  A myringotomy was made with a myringotomy blade.  A large amount of fluid was found and suctioned free.  A T-tube was cut to size, placed through the myringotomy site followed by drops.  The procedure was terminated.  The patient was released to his own care.           Thank you very much for the opportunity to participate in the care of your patient.    Rick L Nissen MD

## 2017-03-14 NOTE — PROGRESS NOTES
"Dear Norberto Barrientos:    I had the pleasure of seeing Norberto Wu in followup today at the [facility].    HISTORY OF PRESENT ILLNESS: The patient is a 71-year-old in today for followup.  Last visit was in March of 2016.  He had a left tube placed at that time.  Diagnosis has been for eustachian tube dysfunction.  He is doing well after that tube.  Left ear now has felt plugged for about a month.  There has been no pain or drainage.  Hearing is muffled.  He denies any dysphagia, hoarseness or facial paresthesias.         MEDICATIONS: Please refer to the detailed medication reconciliation performed by my nurse today, which I have reviewed and signed.     ALLERGIES:    Allergies   Allergen Reactions     Hydromorphone Nausea and Nausea and Vomiting     Used post DBS surgery.  Stayed in the hospital 3 days due to severe nausea & \"retching\" .      Comtan Diarrhea     Camphor      Camphor HELENA     Hydrocodone      Other reaction(s): Headache, Nausea Only     Hydrocodone-Acetaminophen Hives and Nausea     Melatonin Other (See Comments)     No benefit.     Ropinirole Fatigue     Fatigue no benefit.      Seroquel [Quetiapine]      Groggy and aggravated rls  No benefit     Adhesive Tape Rash     Camphor Rash     Other reaction(s): Rash     Liquid Adhesive Rash       HABITS:   Alcohol use Yes   Comment: Rare    History   Smoking Status     Never Smoker   Smokeless Tobacco     Never Used         PAST MEDICAL HISTORY:  Please see today's intake form (for the remainder of the PMH) which I reviewed and signed.  Past Medical History   Diagnosis Date     Cerebellar stroke syndrome 8/21/2013     Few small foci of chronic infarct in the right cerebellar hemisphere, unchanged as well as a new focus of now chronic infarct in the left cerebellar hemisphere since the last study.      Dyslipidemia      Hyperthyroidism      Insomnia 6/17/2011     Parkinson disease (H) 6/15/2011     Peripheral " neuropathy (H) 6/15/2011     PFO (patent foramen ovale) 8/21/2013     Interpretation Summary 1. Normal LV size and systolic function. Normal diastolic function 2.  Normal RV size and function 3. No significant valvular abnormalities 4.  Small right-to-left shunt visualized with intravenous agitated saline  contrast study, suggestive of PFO. PatientHeight: 72 in PatientWeight: 205 lbs SystolicPressure: 100 mmHg DiastolicPressure: 63 mmHg BSA 2.2 m^2   Procedure Echoc     PONV (postoperative nausea and vomiting)      Prostate cancer (H) 6/17/2011     s/p radical prostectomy       FAMILY HISTORY/SOCIAL HISTORY:    Family History   Problem Relation Age of Onset     Parkinsonism Father      Skin Cancer No family hx of      no skin cancer    Social History     Social History     Marital status:      Spouse name: N/A     Number of children: N/A     Years of education: N/A     Occupational History     Not on file.     Social History Main Topics     Smoking status: Never Smoker     Smokeless tobacco: Never Used     Alcohol use Yes      Comment: Rare     Drug use: No     Sexual activity: No     Other Topics Concern     Not on file     Social History Narrative    Son lives in [place] and he has 2 kids    Wife Mayelin           REVIEW OF SYSTEMS: Please see today's intake form (for the remainder of the ROS) which I have reviewed and signed.    PHYSICIAL EXAMINATION:  Constitutional: The patient was well-groomed and in no acute distress.   Skin: Warm and pink.  Psychiatric: The patient's affect was calm, cooperative, and appropriate.   Respiratory: Breathing comfortably without stridor or exertion of accessory muscles.  Eyes: Pupils were equal and reactive. Extraocular movement intact.   Head: Normocephalic and atraumatic. No lesions or scars.  Ears: Both ears are examined under the microscope with the finding of cerumen.  The right side was cleaned with curettes.  Following cleaning, tympanic membrane looks to be in good  position with good color and air filled middle ear space.  The opposite ear was cleaned of cerumen and wax and there was a tube within the wax.  Following cleaning, tympanic membrane is intact.  It looks like he has a middle ear full of fluid.     Nasopharyngeal exam:  The left nostril was vasoconstricted and anesthetized with lidocaine.  The flexible endoscope was advanced through the left nostril into the nasopharynx.  The left eustachian tube orifice appeared open and free of any obstruction or masses.  No nasopharyngeal mass was found.   Nose: Sinuses were nontender. Anterior rhinoscopy revealed midline septum and absence of purulence or polyps.  Oral Cavity: Normal tongue, floor of moth, buccal mucosa, and palate. No abnormal lymph tissue in the oropharynx.   Neck: The parotid is soft without masses. Supple with normal laryngeal and tracheal landmarks.   Lymphatic: There is no palpable lymphadenopathy or other masses in the neck.   Neurologic: Alert and oriented x 3. Cranial nerves III-XI within normal limits. Voice quality normal.  Cerebellar Function Tests:  Grossly normal    Audiogram:  AUDIOGRAM:  Audiogram performed shows a bilateral high-frequency sensorineural hearing loss beginning above 1000 Hz.  Maintains discrimination at 88% on the right, 68% on the left.  On the left side, he has a moderate conductive loss through the speech frequencies as well.         IMPRESSION AND PLAN: I talked with the patient for some time and went over eustachian tubes dysfunction causing the fluid-filled left middle ear and hearing loss.  Again, his nasopharynx looks clear via endoscopic exam.  Discussed the option of placing a tube or a trial with nasal steroid spray.  He preferred to just go ahead and proceed with a tube.  Discussed the T-tube  with slight risk of increased perforation after extrusion.  He desires to proceed with this.  T-tube was placed without incident.  He will use drops for three days and follow up  in one year, sooner with problems or concerns.      PROCEDURE NOTE:  The patient was placed supine under the microscope.  Under high-powered magnification, a drop of phenol was placed on the left inferior quadrant of the tympanic membrane.  A myringotomy was made with a myringotomy blade.  A large amount of fluid was found and suctioned free.  A T-tube was cut to size, placed through the myringotomy site followed by drops.  The procedure was terminated.  The patient was released to his own care.           Thank you very much for the opportunity to participate in the care of your patient.    Rick L Nissen MD

## 2017-03-20 ENCOUNTER — TELEPHONE (OUTPATIENT)
Dept: GASTROENTEROLOGY | Facility: CLINIC | Age: 72
End: 2017-03-20

## 2017-03-20 DIAGNOSIS — Z12.11 ENCOUNTER FOR SCREENING COLONOSCOPY: Primary | ICD-10-CM

## 2017-03-20 NOTE — TELEPHONE ENCOUNTER
Patient scheduled for Colonoscopy    Indication for procedure. Special screening for malignant neoplasms, colon     Referring Provider. Dr. Howe    ? Not Needed    Arrival time verified? Yes, 1100    Facility location verified? Yes, [address]     Instructions given regarding prep and procedure    Prep Type Golytely    Are you taking any anticoagulants or blood thinners? Aspirin    Instructions given? Stopped    Electronic implanted devices? Deep Brain Stimulator, Patient will bring device to turn off unit    Barriers to learning? No    Pre procedure teaching completed? Yes    Transportation from procedure? Wife, Wife will stay with patient after procedure    H&P / Pre op physical completed? N/A

## 2017-03-23 ENCOUNTER — HOSPITAL ENCOUNTER (OUTPATIENT)
Facility: CLINIC | Age: 72
Discharge: HOME | End: 2017-03-23
Attending: INTERNAL MEDICINE | Admitting: INTERNAL MEDICINE
Payer: MEDICARE

## 2017-03-23 ENCOUNTER — SURGERY (OUTPATIENT)
Age: 72
End: 2017-03-23

## 2017-03-23 VITALS
WEIGHT: 202 LBS | HEIGHT: 72 IN | RESPIRATION RATE: 20 BRPM | SYSTOLIC BLOOD PRESSURE: 135 MMHG | BODY MASS INDEX: 27.36 KG/M2 | DIASTOLIC BLOOD PRESSURE: 101 MMHG | OXYGEN SATURATION: 95 %

## 2017-03-23 LAB — COLONOSCOPY: NORMAL

## 2017-03-23 PROCEDURE — 25000125 ZZHC RX 250: Performed by: INTERNAL MEDICINE

## 2017-03-23 PROCEDURE — G0500 MOD SEDAT ENDO SERVICE >5YRS: HCPCS | Performed by: INTERNAL MEDICINE

## 2017-03-23 PROCEDURE — G0121 COLON CA SCRN NOT HI RSK IND: HCPCS | Performed by: INTERNAL MEDICINE

## 2017-03-23 PROCEDURE — 45378 DIAGNOSTIC COLONOSCOPY: CPT | Performed by: INTERNAL MEDICINE

## 2017-03-23 PROCEDURE — 99153 MOD SED SAME PHYS/QHP EA: CPT | Performed by: INTERNAL MEDICINE

## 2017-03-23 PROCEDURE — 25000128 H RX IP 250 OP 636: Performed by: INTERNAL MEDICINE

## 2017-03-23 RX ORDER — FENTANYL CITRATE 50 UG/ML
INJECTION, SOLUTION INTRAMUSCULAR; INTRAVENOUS PRN
Status: DISCONTINUED | OUTPATIENT
Start: 2017-03-23 | End: 2017-03-23 | Stop reason: HOSPADM

## 2017-03-23 RX ADMIN — MIDAZOLAM HYDROCHLORIDE 1 MG: 1 INJECTION, SOLUTION INTRAMUSCULAR; INTRAVENOUS at 12:10

## 2017-03-23 RX ADMIN — FENTANYL CITRATE 50 MCG: 50 INJECTION, SOLUTION INTRAMUSCULAR; INTRAVENOUS at 11:55

## 2017-03-23 RX ADMIN — MIDAZOLAM HYDROCHLORIDE 2 MG: 1 INJECTION, SOLUTION INTRAMUSCULAR; INTRAVENOUS at 11:48

## 2017-03-23 RX ADMIN — FENTANYL CITRATE 50 MCG: 50 INJECTION, SOLUTION INTRAMUSCULAR; INTRAVENOUS at 12:10

## 2017-03-23 RX ADMIN — FENTANYL CITRATE 100 MCG: 50 INJECTION, SOLUTION INTRAMUSCULAR; INTRAVENOUS at 11:48

## 2017-03-23 RX ADMIN — MIDAZOLAM HYDROCHLORIDE 1 MG: 1 INJECTION, SOLUTION INTRAMUSCULAR; INTRAVENOUS at 11:54

## 2017-03-23 NOTE — IP AVS SNAPSHOT
MRN:[id number]                      After Visit Summary   3/23/2017    Norberto Wu    MRN: [id number]           Thank you!     Thank you for choosing [facility] for your care. Our goal is always to provide you with excellent care. Hearing back from our patients is one way we can continue to improve our services. Please take a few minutes to complete the written survey that you may receive in the mail after you visit with us. Thank you!        Patient Information     Date Of Birth          1945        About your hospital stay     You were admitted on:  March 23, 2017 You last received care in the:  [facility], Endoscopy    You were discharged on:  March 23, 2017       Who to Call     For medical emergencies, please call 911.  For non-urgent questions about your medical care, please call your primary care provider or clinic, [phone]  For questions related to your surgery, please call your surgery clinic        Attending Provider     Provider Specialty    Salbador Paulson MD Gastroenterology       Primary Care Provider Office Phone # Fax #    Norberto Howe [phone]       [address] [address]        Your next 10 appointments already scheduled     Jun 14, 2017 12:30 PM CDT   (Arrive by 12:15 PM)   Return Movement Disorder with FRANKIE Brandon [facility] ([facility])    [address] [place] [id number] [phone]            Jun 16, 2017 10:50 AM CDT   (Arrive by 10:35 AM)   RETURN ENDOCRINE with Brandin Mccarty MD   [facility] Endocrinology ([facility])    [address] [place] [address] [phone]              Further instructions from your care team       Discharge Instructions after Colonoscopy  or Sigmoidoscopy    Today you had a __x__ Colonoscopy _  Activity and Diet  You  were given medicine for pain. You may be dizzy or sleepy.  For 24 hours:    Do not drive or use heavy equipment.    Do not make important decisions.    Do not drink any alcohol.  You may return to your normal diet and medicines.    Discomfort    Air was placed in your colon during the exam in order to see it. Walking helps to pass the air.     Follow-up  _      When to call:    Call right away if you have:    Unusual pain in belly or chest pain not relieved with passing air.    More than 1 to 2 Tablespoons of bleeding from your rectum.    Fever above 100.6  F (37.5  C).    If you have severe pain, bleeding, or shortness of breath, go to an emergency room.    If you have questions, call:  Monday to Friday, 7 a.m. to 4:30 p.m.  Endoscopy: [phone] (We may have to call you back)    After hours  Hospital: [phone] (Ask for the GI fellow on call)    Pending Results     No orders found from 3/21/2017 to 3/24/2017.            Admission Information     Date & Time Provider Department Dept. Phone    3/23/2017 Salbador Paulson MD [facility], [place], Endoscopy [phone]      Your Vitals Were     Blood Pressure Respirations Height Weight Pulse Oximetry BMI (Body Mass Index)    122/91 19 1.829 m (6') 91.6 kg (202 lb) 96% 27.4 kg/m2      MyChart Information     Secured Mail gives you secure access to your electronic health record. If you see a primary care provider, you can also send messages to your care team and make appointments. If you have questions, please call your primary care clinic.  If you do not have a primary care provider, please call [phone] and they will assist you.        Care EveryWhere ID     This is your Care EveryWhere ID. This could be used by other organizations to access your [place] medical records  [id number]           Review of your medicines      UNREVIEWED medicines. Ask your doctor about these medicines        Dose / Directions    aspirin 81 MG tablet        Dose:  81 mg   Take 1  tablet (81 mg) by mouth daily   Quantity:  30 tablet   Refills:  0       atorvastatin 10 MG tablet   Commonly known as:  LIPITOR   Used for:  Hypercholesteremia        Dose:  10 mg   Take 1 tablet (10 mg) by mouth daily   Quantity:  90 tablet   Refills:  3       carbidopa-levodopa  MG per tablet   Commonly known as:  SINEMET   Used for:  Parkinson disease (H)        TAKE 2 TABLETS 6 TIMES DAILY (3AM, 7AM, 11AM, 2PM, 6PM, AND 10PM)   Quantity:  1080 tablet   Refills:  3       fluticasone 50 MCG/ACT spray   Commonly known as:  FLONASE   Used for:  Rhinitis        Dose:  1-2 spray   Spray 1-2 sprays into both nostrils daily   Quantity:  3 Package   Refills:  3       gabapentin 300 MG capsule   Commonly known as:  NEURONTIN   Used for:  Restless legs syndrome (RLS)        TAKE 2 CAPSULES 3 TIMES A DAY (TAKE LAST DOSE 2 HOURS BEFORE BEDTIME)   Quantity:  720 capsule   Refills:  3       methimazole 5 MG tablet   Commonly known as:  TAPAZOLE   Used for:  Warm thyroid nodule, Subclinical hyperthyroidism        Dose:  5 mg   Take 1 tablet (5 mg) by mouth daily   Quantity:  90 tablet   Refills:  3       ofloxacin 0.3 % otic solution   Commonly known as:  FLOXIN   Used for:  Dysfunction of eustachian tube, left        Place 3 gtts in left ear BID x 3 days   Quantity:  10 mL   Refills:  0       polyethylene glycol Packet   Commonly known as:  MIRALAX/GLYCOLAX        Take by mouth At Bedtime   Refills:  0       selegiline 5 MG capsule   Commonly known as:  ELDEPRYL   Used for:  Parkinson disease (H)        Dose:  5 mg   Take 1 capsule (5 mg) by mouth 2 times daily (before meals)   Quantity:  60 capsule   Refills:  11       triamcinolone 0.1 % cream   Commonly known as:  KENALOG   Used for:  Rash and nonspecific skin eruption        Apply topically 2 times daily   Quantity:  15 g   Refills:  3                Protect others around you: Learn how to safely use, store and throw away your medicines at www.disposemymeds.org.              Medication List: This is a list of all your medications and when to take them. Check marks below indicate your daily home schedule. Keep this list as a reference.      Medications           Morning Afternoon Evening Bedtime As Needed    aspirin 81 MG tablet   Take 1 tablet (81 mg) by mouth daily                                atorvastatin 10 MG tablet   Commonly known as:  LIPITOR   Take 1 tablet (10 mg) by mouth daily                                carbidopa-levodopa  MG per tablet   Commonly known as:  SINEMET   TAKE 2 TABLETS 6 TIMES DAILY (3AM, 7AM, 11AM, 2PM, 6PM, AND 10PM)                                fluticasone 50 MCG/ACT spray   Commonly known as:  FLONASE   Spray 1-2 sprays into both nostrils daily                                gabapentin 300 MG capsule   Commonly known as:  NEURONTIN   TAKE 2 CAPSULES 3 TIMES A DAY (TAKE LAST DOSE 2 HOURS BEFORE BEDTIME)                                methimazole 5 MG tablet   Commonly known as:  TAPAZOLE   Take 1 tablet (5 mg) by mouth daily                                ofloxacin 0.3 % otic solution   Commonly known as:  FLOXIN   Place 3 gtts in left ear BID x 3 days                                polyethylene glycol Packet   Commonly known as:  MIRALAX/GLYCOLAX   Take by mouth At Bedtime                                selegiline 5 MG capsule   Commonly known as:  ELDEPRYL   Take 1 capsule (5 mg) by mouth 2 times daily (before meals)                                triamcinolone 0.1 % cream   Commonly known as:  KENALOG   Apply topically 2 times daily

## 2017-03-23 NOTE — DISCHARGE INSTRUCTIONS
Discharge Instructions after Colonoscopy  or Sigmoidoscopy    Today you had a __x__ Colonoscopy _  Activity and Diet  You were given medicine for pain. You may be dizzy or sleepy.  For 24 hours:    Do not drive or use heavy equipment.    Do not make important decisions.    Do not drink any alcohol.  You may return to your normal diet and medicines.    Discomfort    Air was placed in your colon during the exam in order to see it. Walking helps to pass the air.     Follow-up  _      When to call:    Call right away if you have:    Unusual pain in belly or chest pain not relieved with passing air.    More than 1 to 2 Tablespoons of bleeding from your rectum.    Fever above 100.6  F (37.5  C).    If you have severe pain, bleeding, or shortness of breath, go to an emergency room.    If you have questions, call:  Monday to Friday, 7 a.m. to 4:30 p.m.  Endoscopy: [phone] (We may have to call you back)    After hours  Hospital: [phone] (Ask for the GI fellow on call)

## 2017-03-23 NOTE — IP AVS SNAPSHOT
[facility], [place], Endoscopy    [address] [address]    Phone:  [phone]                                       After Visit Summary   3/23/2017    Norberto Wu    MRN: [id number]           After Visit Summary Signature Page     I have received my discharge instructions, and my questions have been answered. I have discussed any challenges I see with this plan with the nurse or doctor.    ..........................................................................................................................................  Patient/Patient Representative Signature      ..........................................................................................................................................  Patient Representative Print Name and Relationship to Patient    ..................................................               ................................................  Date                                            Time    ..........................................................................................................................................  Reviewed by Signature/Title    ...................................................              ..............................................  Date                                                            Time

## 2017-06-14 ENCOUNTER — OFFICE VISIT (OUTPATIENT)
Dept: NEUROLOGY | Facility: CLINIC | Age: 72
End: 2017-06-14

## 2017-06-14 VITALS
OXYGEN SATURATION: 100 % | HEIGHT: 72 IN | HEART RATE: 78 BPM | TEMPERATURE: 97.8 F | RESPIRATION RATE: 20 BRPM | DIASTOLIC BLOOD PRESSURE: 82 MMHG | BODY MASS INDEX: 28.04 KG/M2 | WEIGHT: 207 LBS | SYSTOLIC BLOOD PRESSURE: 144 MMHG

## 2017-06-14 DIAGNOSIS — G20.A1 PARKINSON'S DISEASE (H): Primary | ICD-10-CM

## 2017-06-14 RX ORDER — CARBIDOPA AND LEVODOPA 25; 100 MG/1; MG/1
TABLET ORAL
Qty: 1080 TABLET | Refills: 3 | Status: SHIPPED | OUTPATIENT
Start: 2017-06-14 | End: 2018-06-20

## 2017-06-14 RX ORDER — SELEGILINE HYDROCHLORIDE 5 MG/1
5 CAPSULE ORAL
Qty: 60 CAPSULE | Refills: 11 | Status: SHIPPED | OUTPATIENT
Start: 2017-06-14 | End: 2017-12-14

## 2017-06-14 ASSESSMENT — ENCOUNTER SYMPTOMS
DIARRHEA: 0
NAUSEA: 0
JAUNDICE: 0
ABDOMINAL PAIN: 0
RECTAL BLEEDING: 0
BLOATING: 1
CONSTIPATION: 1
BOWEL INCONTINENCE: 0
VOMITING: 0
HEARTBURN: 0

## 2017-06-14 ASSESSMENT — PAIN SCALES - GENERAL: PAINLEVEL: NO PAIN (0)

## 2017-06-14 NOTE — PATIENT INSTRUCTIONS
June 14, 2017    Dear  Norberto ARCHIBALD Bryce,    Thank you for coming today.  During your visit, we have discussed the following:     __  Consider going back PT & Speech Therapy.  If not, do the Big [employer] Loud Therapy exercises.   __  Stay on the same dose of Sinemet.   __  You may take Sinemet with carbonated beverage.   __  Your DBS is working well.   __  Return in 6 months. You may return sooner as needed.      For questions, you may send us a [employer] message or call [phone]    Fax number: [phone]    FRANKIE Ramos, CNP  [facility]

## 2017-06-14 NOTE — MR AVS SNAPSHOT
After Visit Summary   6/14/2017    Norberto Wu    MRN: [id number]           Patient Information     Date Of Birth          1945        Visit Information        Provider Department      6/14/2017 12:30 PM Cristal Becker APRN CNP [facility] Neurology        Care Instructions    June 14, 2017    Dear Mr. Norberto Wu,    Thank you for coming today.  During your visit, we have discussed the following:     __  Consider going back PT & Speech Therapy.  If not, do the Big & Loud Therapy exercises.   __  Stay on the same dose of Sinemet.   __  You may take Sinemet with carbonated beverage.   __  Your DBS is working well.   __  Return in 6 months. You may return sooner as needed.      For questions, you may send us a [employer] message or call [phone]    Fax number: [phone]    Lucy HOLLAND. FRANKIE Becker, CNP  [facility]            Follow-ups after your visit        Your next 10 appointments already scheduled     Jun 16, 2017 10:50 AM CDT   (Arrive by 10:35 AM)   RETURN ENDOCRINE with Brandin Mccarty MD   [facility] Endocrinology ([facility])    [address] [place] [address] [phone]            Dec 14, 2017 12:10 PM CST   (Arrive by 11:55 AM)   Return Movement Disorder with Blaine Boo MD   [facility] Neurology ([facility])    [address] [place] [address] [phone]              Who to contact     Please call your clinic at [phone] to:    Ask questions about your health    Make or cancel appointments    Discuss your medicines    Learn about your test results    Speak to your doctor   If you have compliments or concerns about an experience at your clinic, or if you wish to file a complaint, please contact [facility] Patient Relations at [phone] or email us at [email or web address]         Additional Information  "About Your Visit        [email or web address] Information     AdmitSee gives you secure access to your electronic health record. If you see a primary care provider, you can also send messages to your care team and make appointments. If you have questions, please call your primary care clinic.  If you do not have a primary care provider, please call [phone] and they will assist you.      AdmitSee is an electronic gateway that provides easy, online access to your medical records. With AdmitSee, you can request a clinic appointment, read your test results, renew a prescription or communicate with your care team.     To access your existing account, please contact your [facility] or call [phone] for assistance.        Care EveryWhere ID     This is your Care EveryWhere ID. This could be used by other organizations to access your [place] medical records  [id number]        Your Vitals Were     Pulse Temperature Respirations Height Pulse Oximetry BMI (Body Mass Index)    78 97.8  F (36.6  C) 20 1.816 m (5' 11.5\") 100% 28.47 kg/m2       Blood Pressure from Last 3 Encounters:   06/14/17 144/82   03/23/17 (!) 135/101   03/08/17 111/75    Weight from Last 3 Encounters:   06/14/17 93.9 kg (207 lb)   03/23/17 91.6 kg (202 lb)   03/14/17 91.6 kg (202 lb)              Today, you had the following     No orders found for display       Primary Care Provider Office Phone # Fax #    Norberto Howe [phone]       [address] [address]        Thank you!     Thank you for choosing [facility] NEUR[facility]  for your care. Our goal is always to provide you with excellent care. Hearing back from our patients is one way we can continue to improve our services. Please take a few minutes to complete the written survey that you may receive in the mail after your visit with us. Thank you!             Your Updated Medication List - Protect others around you: " Learn how to safely use, store and throw away your medicines at www.disposemymeds.org.          This list is accurate as of: 6/14/17  1:14 PM.  Always use your most recent med list.                   Brand Name Dispense Instructions for use    aspirin 81 MG tablet     30 tablet    Take 1 tablet (81 mg) by mouth daily       atorvastatin 10 MG tablet    LIPITOR    90 tablet    Take 1 tablet (10 mg) by mouth daily       carbidopa-levodopa  MG per tablet    SINEMET    1080 tablet    TAKE 2 TABLETS 6 TIMES DAILY (3AM, 7AM, 11AM, 2PM, 6PM, AND 10PM)       fluticasone 50 MCG/ACT spray    FLONASE    3 Package    Spray 1-2 sprays into both nostrils daily       gabapentin 300 MG capsule    NEURONTIN    720 capsule    TAKE 2 CAPSULES 3 TIMES A DAY (TAKE LAST DOSE 2 HOURS BEFORE BEDTIME)       methimazole 5 MG tablet    TAPAZOLE    90 tablet    Take 1 tablet (5 mg) by mouth daily       ofloxacin 0.3 % otic solution    FLOXIN    10 mL    Place 3 gtts in left ear BID x 3 days       polyethylene glycol Packet    MIRALAX/GLYCOLAX     Take by mouth At Bedtime       selegiline 5 MG capsule    ELDEPRYL    60 capsule    Take 1 capsule (5 mg) by mouth 2 times daily (before meals)       triamcinolone 0.1 % cream    KENALOG    15 g    Apply topically 2 times daily

## 2017-06-14 NOTE — PROGRESS NOTES
"PATIENT: Norberto Wu    : 1945    GAMA: 2017    REASON FOR VISIT: Parkinson's disease (PD) follow up & DBS interrogation & analysis.    HPI: Mr. Norberto Wu is a 71 year old  male who came to the [facility] neurology clinic accompanied by his wife for a follow up visit.  He was last seen in the clinic on 2016 by Dr. Boo for a routine PD f/u visit.    Since then, his DBS generator was replaced by Dr. Fair on .      He is exercises, but not as he did before.   He is doing back pain & does back exercises.     Constipation has been an issue but now stable.  He has cut down MiraLax from daily to 2 - 3 x a week due to diarrhea.     Freezing of gait is an issue when initiating gait & in tight spaces.  Freezing of gait can occur any time of the day regardless of his Levodopa dose.  Voice is \"disappearing.\"  He hasn't been doing the Big & Loud therapy exercises.  He reports more Off symptoms than ON.  He is taking Sinemet 25/100 mg 2 tabs every 3 - 4 x daily.  Today he forgot to take his 11 am dose & came to clinic & took it at 1 pm & is doing well.  At times his legs bother him & keep him from falling asleep.     He wanted to know if people die from PD & what it would look like; What to expect as the disease progresses; Asked about new treatments available . . .       Answers for HPI/ROS submitted by the patient on 2017   General Symptoms: No  Skin Symptoms: No  HENT Symptoms: No  EYE SYMPTOMS: No  HEART SYMPTOMS: No  LUNG SYMPTOMS: No  INTESTINAL SYMPTOMS: Yes  URINARY SYMPTOMS: No  REPRODUCTIVE SYMPTOMS: No  SKELETAL SYMPTOMS: No  BLOOD SYMPTOMS: No  NERVOUS SYSTEM SYMPTOMS: No  MENTAL HEALTH SYMPTOMS: No  Heart burn or indigestion: No  Nausea: No  Vomiting: No  Abdominal pain: No  Bloating: Yes  Constipation: Yes  Diarrhea: No  Black stools: No  Fecal incontinence: No  Rectal bleeding: No  Yellowing of skin or eyes: No  Vomit with blood: No  Change in stools: " "No    MEDICATIONS:   Medication Sig     ofloxacin (FLOXIN) 0.3 % otic solution Place 3 gtts in left ear BID x 3 days     atorvastatin (LIPITOR) 10 MG tablet Take 1 tablet (10 mg) by mouth daily     triamcinolone (KENALOG) 0.1 % cream Apply topically 2 times daily     aspirin 81 MG tablet Take 1 tablet (81 mg) by mouth daily     polyethylene glycol (MIRALAX/GLYCOLAX) Packet Take by mouth At Bedtime      carbidopa-levodopa (SINEMET)  MG per tablet TAKE 2 TABLETS 6 TIMES DAILY (3AM, 7AM, 11AM, 2PM, 6PM, AND 10PM)     methimazole (TAPAZOLE) 5 MG  Take 1 tablet (5 mg) by mouth daily     selegiline (ELDEPRYL) 5 MG capsule Take 1 capsule (5 mg) by mouth 2 times daily (before meals)     gabapentin (NEURONTIN) 300 MG capsule TAKE 2 CAPSULES 3 TIMES A DAY (TAKE LAST DOSE 2 HOURS BEFORE BEDTIME)     fluticasone (FLONASE) 50 MCG/ACT nasal spray Spray 1-2 sprays into both nostrils daily       ALLERGIES: Hydromorphone; Comtan; Camphor; Hydrocodone; Hydrocodone-acetaminophen; Melatonin; Ropinirole; Seroquel; Adhesive tape; Camphor; and Liquid adhesive.     PHYSICAL EXAM:    VITAL SIGNS:  Blood pressure 144/82, pulse 78, temperature 97.8  F (36.6  C), resp. rate 20, height 1.816 m (5' 11.5\"), weight 93.9 kg (207 lb), SpO2 100 %.  Body mass index is 28.47 kg/(m^2).    GENERAL:  Mr. Wu is a pleasant  male who is well-groomed and well-developed sitting comfortably in the exam room without any distress.  Affect is appropriate.    MOVEMENT DISORDERS ASSESSMENT: (Last Sinemet was about 6 hrs ago)  Speech: Slight loss of expression and volume.  Facial Expression: Slight, abnormal diminution of facial expression.  Rest Tremor: Absent.   Action/Postural Tremor: Absent.   Rigidity: Slight in RUE; Absent in all extremities.   Finger Taps: Mild reduction in amplitude bilaterally.  Hand opening & closing: Mild slowing and reduction in amplitude Rt hand; Moderately impaired; occasional arrests in movement in Lt.   Hand " pronation & supination: Mild slowing and reduction in amplitude Rt hand; Moderately impaired; occasional arrests in movement in Lt.   Leg Agility: Normal. and in Rt leg; Mild reduction in amplitude; occasional arrests in movement.  Arising from chair - arms folded across chest: Slow; or may need more than one attempt.  Posture: Slightly stooped posture.  Gait: Walks slowly, shuffles with short steps. No festination or propulsion.  Postural Stability: Retropulsion, but recovers unaided.  Body Bradykinesia: Mild degree of slowness and poverty of movement.     DBS Interrogation & Analysis:     Implanted generator: Right Activa-SC.  Lead placement: Right Globus Pallidus Internus (Gpi).     Impedance Check:      Therapy On: 100%  Battery Service Life: OK. 2.95 volts.     Right Gpi  C +, 1 -  Volts: 4.0 volts  PW: 90 msec  Rate: 185 Hz     Electrode Impedance Check: (Amplitude 3.0 volts: PW 80 msec: Rate 100 Hz)    Right Lead: No Problems Found.     See scanned report for impedance details.    ASSESSMENT/PLAN:    Parkinson's Disease:  He has a Hx of PD since 2008, s/p Right Gpi DBS therapy.  Tremors are controlled.  He has freezing of gait that is not responsive to Levodopa.  Voice is soft.    __  Discussing a refresher & going back to PT & Speech Therapy.  He opted to wait & promised to do the Big & Loud Therapy exercises at home.   __  Discussed progression of PD.   __  Will stay on the same dose of Sinemet.   __  Encouraged to take Sinemet with carbonated beverage.   __  DBS interrogation & analysis is normal.    __  Will return to our clinic in 6 months or sooner as needed.    The total amount of time spent with the patient was 45 minutes; 20 min in DBS interrogation & analysis and 25 min in addressing PD, Gait & balance problems, & speech issues.  50% of the time was spent in counseling & coordination of care.       FRANKIE Ramos,  CNP  [facility]

## 2017-06-14 NOTE — NURSING NOTE
Chief Complaint   Patient presents with     RECHECK     UMP- MOVEMENT DISORDER, 6 MONTHS F/U     Tab Bean, CMA

## 2017-06-16 ENCOUNTER — OFFICE VISIT (OUTPATIENT)
Dept: ENDOCRINOLOGY | Facility: CLINIC | Age: 72
End: 2017-06-16

## 2017-06-16 VITALS
HEIGHT: 72 IN | SYSTOLIC BLOOD PRESSURE: 134 MMHG | DIASTOLIC BLOOD PRESSURE: 84 MMHG | HEART RATE: 84 BPM | BODY MASS INDEX: 28.44 KG/M2 | WEIGHT: 210 LBS

## 2017-06-16 DIAGNOSIS — E05.90 SUBCLINICAL HYPERTHYROIDISM: Primary | ICD-10-CM

## 2017-06-16 ASSESSMENT — PAIN SCALES - GENERAL: PAINLEVEL: NO PAIN (0)

## 2017-06-16 NOTE — MR AVS SNAPSHOT
After Visit Summary   6/16/2017    Norberto Wu    MRN: [id number]           Patient Information     Date Of Birth          1945        Visit Information        Provider Department      6/16/2017 10:50 AM Brandin Mccarty MD [facility] Endocrinology        Today's Diagnoses     Subclinical hyperthyroidism    -  1       Follow-ups after your visit        Your next 10 appointments already scheduled     Jun 16, 2017 11:30 AM CDT   LAB with [facility] ([facility])    [address] [place] [id number] [phone]           Patient must bring picture ID.  Patient should be prepared to give a urine specimen  Please do not eat 10-12 hours before your appointment if you are coming in fasting for labs on lipids, cholesterol, or glucose (sugar).  Pregnant women should follow their Care Team instructions. Water with medications is okay. Do not drink coffee or other fluids.   If you have concerns about taking  your medications, please ask at office or if scheduling via [employer], send a message by clicking on Secure Messaging, Message Your Care Team.            Dec 14, 2017 11:00 AM CST   LAB with  LAB   [facility] ([facility])    [address] [place] [id number] [phone]           Patient must bring picture ID.  Patient should be prepared to give a urine specimen  Please do not eat 10-12 hours before your appointment if you are coming in fasting for labs on lipids, cholesterol, or glucose (sugar).  Pregnant women should follow their Care Team instructions. Water with medications is okay. Do not drink coffee or other fluids.   If you have concerns about taking  your medications, please ask at office or if scheduling via [employer], send a message by clicking on Secure Messaging, Message Your Care Team.            Dec 14, 2017 12:10 PM CST   (Arrive by 11:55 AM)   Return Movement  Disorder with Blaine Boo MD   [facility] Neurology ([facility])    [address]  [address] [place] [facility] [address] [phone]            Dec 15, 2017  9:20 AM CST   (Arrive by 9:05 AM)   RETURN ENDOCRINE with Brandin Mccarty MD   [facility] Endocrinology ([facility])    [address] [id number] [phone]              Future tests that were ordered for you today     Open Future Orders        Priority Expected Expires Ordered    T4 free Routine 6/16/2017 6/16/2018 6/16/2017    TSH Routine 6/16/2017 6/16/2018 6/16/2017            Who to contact     Please call your clinic at [phone] to:    Ask questions about your health    Make or cancel appointments    Discuss your medicines    Learn about your test results    Speak to your doctor   If you have compliments or concerns about an experience at your clinic, or if you wish to file a complaint, please contact [facility] Patient Relations at [phone] or email us at [email or web address]         Additional Information About Your Visit        [employer] Information     [employer] gives you secure access to your electronic health record. If you see a primary care provider, you can also send messages to your care team and make appointments. If you have questions, please call your primary care clinic.  If you do not have a primary care provider, please call [phone] and they will assist you.      [employer] is an electronic gateway that provides easy, online access to your medical records. With [employer], you can request a clinic appointment, read your test results, renew a prescription or communicate with your care team.     To access your existing account, please contact your [facility] Clinic or call [phone] for assistance.        Care EveryWhere ID     This is your Care EveryWhere ID. This could  "be used by other organizations to access your [place] medical records  [id number]        Your Vitals Were     Pulse Height BMI (Body Mass Index)             84 1.816 m (5' 11.5\") 28.88 kg/m2          Blood Pressure from Last 3 Encounters:   06/16/17 134/84   06/14/17 144/82   03/23/17 (!) 135/101    Weight from Last 3 Encounters:   06/16/17 95.3 kg (210 lb)   06/14/17 93.9 kg (207 lb)   03/23/17 91.6 kg (202 lb)               Primary Care Provider Office Phone # Fax #    Norberto Howe [phone]       [address] [address]        Thank you!     Thank you for choosing [facility]  for your care. Our goal is always to provide you with excellent care. Hearing back from our patients is one way we can continue to improve our services. Please take a few minutes to complete the written survey that you may receive in the mail after your visit with us. Thank you!             Your Updated Medication List - Protect others around you: Learn how to safely use, store and throw away your medicines at www.disposemymeds.org.          This list is accurate as of: 6/16/17 11:15 AM.  Always use your most recent med list.                   Brand Name Dispense Instructions for use    aspirin 81 MG tablet     30 tablet    Take 1 tablet (81 mg) by mouth daily       atorvastatin 10 MG tablet    LIPITOR    90 tablet    Take 1 tablet (10 mg) by mouth daily       carbidopa-levodopa  MG per tablet    SINEMET    1080 tablet    TAKE 2 TABLETS 6 TIMES DAILY (3AM, 7AM, 11AM, 2PM, 6PM, AND 10PM)       fluticasone 50 MCG/ACT spray    FLONASE    3 Package    Spray 1-2 sprays into both nostrils daily       gabapentin 300 MG capsule    NEURONTIN    720 capsule    TAKE 2 CAPSULES 3 TIMES A DAY (TAKE LAST DOSE 2 HOURS BEFORE BEDTIME)       methimazole 5 MG tablet    TAPAZOLE    90 tablet    Take 1 tablet (5 mg) by mouth daily       ofloxacin 0.3 % otic solution    FLOXIN    " 10 mL    Place 3 gtts in left ear BID x 3 days       polyethylene glycol Packet    MIRALAX/GLYCOLAX     Take by mouth At Bedtime       selegiline 5 MG capsule    ELDEPRYL    60 capsule    Take 1 capsule (5 mg) by mouth 2 times daily (before meals)       triamcinolone 0.1 % cream    KENALOG    15 g    Apply topically 2 times daily

## 2017-06-16 NOTE — PROGRESS NOTES
Endocrinology Note    Chief Complaint: Subclinical Hyperthyroidism follow-up   Information obtained from:Patient    HPI: Norberto Wu is a 71 year old year old man with history of Parkinson's disease s/p DBS who presents for follow-up of subclinical hyperthyroidism. He has been taking methimazole 5mg daily without side effects. He continues to feel well.    He has recently had deep brain stimulation generator exchange. Parkinson's disease seems to be stable. He has intermittent difficulty walking when muscles freezing up in his legs when he tries to ambulate.     Mr. Wu has chronic constipation. He also notes warm feet, but no paresthesias, burning sensations, or general heat intolerance. He has gained several pounds over the past year. He does not sleep well and wakes up often at night. He does not endorse any new neck swelling, dysphagia, palpitations, dyspnea, chest pain, diaphoresis, abdominal pain, diarrhea, irritability/agitation.    Mr. Wu exercises daily with treadmill. He has not had any recent changes in functional activity.       Past Medical History:   Diagnosis Date     Cerebellar stroke syndrome 8/21/2013    Few small foci of chronic infarct in the right cerebellar hemisphere, unchanged as well as a new focus of now chronic infarct in the left cerebellar hemisphere since the last study.      Dyslipidemia      Hyperthyroidism      Insomnia 6/17/2011     Parkinson disease (H) 6/15/2011     Peripheral neuropathy (H) 6/15/2011     PFO (patent foramen ovale) 8/21/2013    Interpretation Summary 1. Normal LV size and systolic function. Normal diastolic function 2.  Normal RV size and function 3. No significant valvular abnormalities 4.  Small right-to-left shunt visualized with intravenous agitated saline  contrast study, suggestive of PFO. PatientHeight: 72 in PatientWeight: 205 lbs SystolicPressure: 100 mmHg DiastolicPressure: 63 mmHg BSA 2.2 m^2   Procedure Echoc     PONV (postoperative nausea  "and vomiting)      Prostate cancer (H) 6/17/2011    s/p radical prostectomy       Past Surgical History:   Procedure Laterality Date     COLONOSCOPY N/A 3/23/2017    Procedure: COLONOSCOPY;  Surgeon: Salbador Paulson MD;  Location: [facility] GI     PROSTATE SURGERY  3 yrs ago    prostate cancer; Dr. Valladares     REPLACE DEEP BRAIN STIMULATION GENERATOR / BATTERY Right 1/3/2017    Procedure: REPLACE DEEP BRAIN STIMULATION GENERATOR / BATTERY;  Surgeon: Anupam Fair MD;  Location: [facility] OR     Right Gpi DBS Lead Implantation  5/20/2014     Rt Chest DBS Generator Placement Activa SC  5/29/2014       Current Outpatient Prescriptions   Medication Sig Dispense Refill     carbidopa-levodopa (SINEMET)  MG per tablet TAKE 2 TABLETS 6 TIMES DAILY (3AM, 7AM, 11AM, 2PM, 6PM, AND 10PM) 1080 tablet 3     selegiline (ELDEPRYL) 5 MG capsule Take 1 capsule (5 mg) by mouth 2 times daily (before meals) 60 capsule 11     ofloxacin (FLOXIN) 0.3 % otic solution Place 3 gtts in left ear BID x 3 days 10 mL 0     atorvastatin (LIPITOR) 10 MG tablet Take 1 tablet (10 mg) by mouth daily 90 tablet 3     triamcinolone (KENALOG) 0.1 % cream Apply topically 2 times daily 15 g 3     aspirin 81 MG tablet Take 1 tablet (81 mg) by mouth daily 30 tablet 0     polyethylene glycol (MIRALAX/GLYCOLAX) Packet Take by mouth At Bedtime        methimazole (TAPAZOLE) 5 MG tablet Take 1 tablet (5 mg) by mouth daily 90 tablet 3     gabapentin (NEURONTIN) 300 MG capsule TAKE 2 CAPSULES 3 TIMES A DAY (TAKE LAST DOSE 2 HOURS BEFORE BEDTIME) 720 capsule 3     fluticasone (FLONASE) 50 MCG/ACT nasal spray Spray 1-2 sprays into both nostrils daily 3 Package 3          Allergies   Allergen Reactions     Hydromorphone Nausea and Nausea and Vomiting     Used post DBS surgery.  Stayed in the hospital 3 days due to severe nausea & \"retching\" .      Comtan Diarrhea     Camphor      Camphor HELENA     Hydrocodone      Other reaction(s): Headache, Nausea Only     " "Hydrocodone-Acetaminophen Hives and Nausea     Melatonin Other (See Comments)     No benefit.     Ropinirole Fatigue     Fatigue no benefit.      Seroquel [Quetiapine]      Groggy and aggravated rls  No benefit     Adhesive Tape Rash     Camphor Rash     Other reaction(s): Rash     Liquid Adhesive Rash       Family History   Problem Relation Age of Onset     Parkinsonism Father      Skin Cancer No family hx of      no skin cancer       Social History     Social History     Marital Status:      Spouse Name: N/A     Number of Children: N/A     Years of Education: N/A     Social History Main Topics     Smoking status: Never Smoker      Smokeless tobacco: Never Used     Alcohol Use: Yes      Comment: Rare     Drug Use: No     Sexual Activity: Not Asked     Other Topics Concern     None     Social History Narrative    Son lives in [place] and he has 2 kids    Wife Mayelin           Review of Systems   Constitutional: no fevers, night sweats, or chills; no weight or appetite changes; no recent illnesses, increased diaphoresis, or fatigue  HEENT: no headaches, hearing changes, or vision changes; no voice hoarseness    Neck: no new neck swelling or masses  Cardiac: no chest pain, palpitations, lightheadedness, or exertional dyspnea  Resp: no dyspnea  GI: +constipation; no dysphagia, nausea, vomiting, diarrhea, or abdominal pain  : no difficulty urinating or dysuria  MSK: no new myalgias or arthralgias  Extremities: +feet feel warm  Endo: no polydipsia or polyuria; no heat or cold intolerance intolerance  Neuro: some difficulty with walking when starting to ambulate, numbness; no paresthesias of extremities  Derm: no skin or nail changes; no hair loss or changes  Psych: +slowed/foggy cognitive function; no depressed mood or increased irritibility    Objective:   /84  Pulse 84  Ht 1.816 m (5' 11.5\")  Wt 95.3 kg (210 lb)  BMI 28.88 kg/m2    Physical Exam  General: pt appears mildly slowed but well; no acute " distress  HEENT: hair appears normal; mucous membranes moist; speech is clear; normocephalic   Neck:no thyroid enlargement, no tenderness to palpation  Cardiac: normal S1/S2 and rhythm; no S3/S4, murmurs, clicks, or rubs  Resp: appropriate effort with air entry to lung bases; no increased work of breathing; breath sounds are symmetric; no crackles, wheezes, or rhonchi - lungs clear to auscultation bilaterally   Extremities: no edema or finger clubbing   Derm: no rash, skin lesions, or dryness on face, neck, hands or lower legs  Neuro: symmetric +2/4 biceps reflexes, +1 symmetric patellar reflexes; +minimal facial expression; short narrow shuffling gait   Psych: pt is appropriately conversational with restricted affect    Lab    Assessment and Plan:    Norberto Wu is a 71 year old year old man with a PMHx of Parkinson's disease s/p DBS who presents for follow-up of subclinical hyperthyroidism.     #Subclinical Hyperthyroidism: diagnosed in 2015 with TSH suppression with normal free T4 with negative anti-TPO and TSI. Previous thyroid uptake and scan showed warm nodule on left thyroid. -   - pt has been consistent with methimazole 5 mg daily and TSH was normalized   - last thyroid test last year was normal.  - no symptoms of overt hyperthyroidism   Plan:  Repeat TSH and free T4 and will contact patient with result.      #Parkinson's Disease  - pt appears to be doing well on selegiline, helping visual symtoms  - minimal tremor with DBS  Continue to follow-up with neurologist      Brandin Mccarty MD    Division of Diabetes and Endocrinology  Department of Medicine  [phone]

## 2017-06-16 NOTE — LETTER
6/16/2017       RE: Norberto Wu  [address]     [place] [address]     Dear Colleague,    Thank you for referring your patient, Norberto Wu, to the [facility] ENDOCRINOLOGY at [facility]. Please see a copy of my visit note below.    Endocrinology Note    Chief Complaint: Subclinical Hyperthyroidism follow-up   Information obtained from:Patient    HPI: Norberto Wu is a 71 year old year old man with history of Parkinson's disease s/p DBS who presents for follow-up of subclinical hyperthyroidism. He has been taking methimazole 5mg daily without side effects. He continues to feel well.    He has recently had deep brain stimulation generator exchange. Parkinson's disease seems to be stable. He has intermittent difficulty walking when muscles freezing up in his legs when he tries to ambulate.     Mr. Wu has chronic constipation. He also notes warm feet, but no paresthesias, burning sensations, or general heat intolerance. He has gained several pounds over the past year. He does not sleep well and wakes up often at night. He does not endorse any new neck swelling, dysphagia, palpitations, dyspnea, chest pain, diaphoresis, abdominal pain, diarrhea, irritability/agitation.    Mr. Wu exercises daily with treadmill. He has not had any recent changes in functional activity.       Past Medical History:   Diagnosis Date     Cerebellar stroke syndrome 8/21/2013    Few small foci of chronic infarct in the right cerebellar hemisphere, unchanged as well as a new focus of now chronic infarct in the left cerebellar hemisphere since the last study.      Dyslipidemia      Hyperthyroidism      Insomnia 6/17/2011     Parkinson disease (H) 6/15/2011     Peripheral neuropathy (H) 6/15/2011     PFO (patent foramen ovale) 8/21/2013    Interpretation Summary 1. Normal LV size and systolic function. Normal diastolic function 2.  Normal RV size and function 3.  No significant valvular abnormalities 4.  Small right-to-left shunt visualized with intravenous agitated saline  contrast study, suggestive of PFO. PatientHeight: 72 in PatientWeight: 205 lbs SystolicPressure: 100 mmHg DiastolicPressure: 63 mmHg BSA 2.2 m^2   Procedure Echoc     PONV (postoperative nausea and vomiting)      Prostate cancer (H) 6/17/2011    s/p radical prostectomy       Past Surgical History:   Procedure Laterality Date     COLONOSCOPY N/A 3/23/2017    Procedure: COLONOSCOPY;  Surgeon: Salbador Paulson MD;  Location: [facility]     PROSTATE SURGERY  3 yrs ago    prostate cancer; Dr. Valladares     REPLACE DEEP BRAIN STIMULATION GENERATOR / BATTERY Right 1/3/2017    Procedure: REPLACE DEEP BRAIN STIMULATION GENERATOR / BATTERY;  Surgeon: Anupam Fair MD;  Location: [facility] OR     Right Gpi DBS Lead Implantation  5/20/2014     Rt Chest DBS Generator Placement Activa SC  5/29/2014       Current Outpatient Prescriptions   Medication Sig Dispense Refill     carbidopa-levodopa (SINEMET)  MG per tablet TAKE 2 TABLETS 6 TIMES DAILY (3AM, 7AM, 11AM, 2PM, 6PM, AND 10PM) 1080 tablet 3     selegiline (ELDEPRYL) 5 MG capsule Take 1 capsule (5 mg) by mouth 2 times daily (before meals) 60 capsule 11     ofloxacin (FLOXIN) 0.3 % otic solution Place 3 gtts in left ear BID x 3 days 10 mL 0     atorvastatin (LIPITOR) 10 MG tablet Take 1 tablet (10 mg) by mouth daily 90 tablet 3     triamcinolone (KENALOG) 0.1 % cream Apply topically 2 times daily 15 g 3     aspirin 81 MG tablet Take 1 tablet (81 mg) by mouth daily 30 tablet 0     polyethylene glycol (MIRALAX/GLYCOLAX) Packet Take by mouth At Bedtime        methimazole (TAPAZOLE) 5 MG tablet Take 1 tablet (5 mg) by mouth daily 90 tablet 3     gabapentin (NEURONTIN) 300 MG capsule TAKE 2 CAPSULES 3 TIMES A DAY (TAKE LAST DOSE 2 HOURS BEFORE BEDTIME) 720 capsule 3     fluticasone (FLONASE) 50 MCG/ACT nasal spray Spray 1-2 sprays into both nostrils daily 3 Package  "3          Allergies   Allergen Reactions     Hydromorphone Nausea and Nausea and Vomiting     Used post DBS surgery.  Stayed in the hospital 3 days due to severe nausea & \"retching\" .      Comtan Diarrhea     Camphor      Camphor HELENA     Hydrocodone      Other reaction(s): Headache, Nausea Only     Hydrocodone-Acetaminophen Hives and Nausea     Melatonin Other (See Comments)     No benefit.     Ropinirole Fatigue     Fatigue no benefit.      Seroquel [Quetiapine]      Groggy and aggravated rls  No benefit     Adhesive Tape Rash     Camphor Rash     Other reaction(s): Rash     Liquid Adhesive Rash       Family History   Problem Relation Age of Onset     Parkinsonism Father      Skin Cancer No family hx of      no skin cancer       Social History     Social History     Marital Status:      Spouse Name: N/A     Number of Children: N/A     Years of Education: N/A     Social History Main Topics     Smoking status: Never Smoker      Smokeless tobacco: Never Used     Alcohol Use: Yes      Comment: Rare     Drug Use: No     Sexual Activity: Not Asked     Other Topics Concern     None     Social History Narrative    Son lives in [place] and he has 2 kids    Wife Mayelin           Review of Systems   Constitutional: no fevers, night sweats, or chills; no weight or appetite changes; no recent illnesses, increased diaphoresis, or fatigue  HEENT: no headaches, hearing changes, or vision changes; no voice hoarseness    Neck: no new neck swelling or masses  Cardiac: no chest pain, palpitations, lightheadedness, or exertional dyspnea  Resp: no dyspnea  GI: +constipation; no dysphagia, nausea, vomiting, diarrhea, or abdominal pain  : no difficulty urinating or dysuria  MSK: no new myalgias or arthralgias  Extremities: +feet feel warm  Endo: no polydipsia or polyuria; no heat or cold intolerance intolerance  Neuro: some difficulty with walking when starting to ambulate, numbness; no paresthesias of extremities  Derm: no skin " "or nail changes; no hair loss or changes  Psych: +slowed/foggy cognitive function; no depressed mood or increased irritibility    Objective:   /84  Pulse 84  Ht 1.816 m (5' 11.5\")  Wt 95.3 kg (210 lb)  BMI 28.88 kg/m2    Physical Exam  General: pt appears mildly slowed but well; no acute distress  HEENT: hair appears normal; mucous membranes moist; speech is clear; normocephalic   Neck:no thyroid enlargement, no tenderness to palpation  Cardiac: normal S1/S2 and rhythm; no S3/S4, murmurs, clicks, or rubs  Resp: appropriate effort with air entry to lung bases; no increased work of breathing; breath sounds are symmetric; no crackles, wheezes, or rhonchi - lungs clear to auscultation bilaterally   Extremities: no edema or finger clubbing   Derm: no rash, skin lesions, or dryness on face, neck, hands or lower legs  Neuro: symmetric +2/4 biceps reflexes, +1 symmetric patellar reflexes; +minimal facial expression; short narrow shuffling gait   Psych: pt is appropriately conversational with restricted affect    Lab    Assessment and Plan:    Norberto Wu is a 71 year old year old man with a PMHx of Parkinson's disease s/p DBS who presents for follow-up of subclinical hyperthyroidism.     #Subclinical Hyperthyroidism: diagnosed in 2015 with TSH suppression with normal free T4 with negative anti-TPO and TSI. Previous thyroid uptake and scan showed warm nodule on left thyroid. -   - pt has been consistent with methimazole 5 mg daily and TSH was normalized   - last thyroid test last year was normal.  - no symptoms of overt hyperthyroidism   Plan:  Repeat TSH and free T4 and will contact patient with result.      #Parkinson's Disease  - pt appears to be doing well on selegiline, helping visual symtoms  - minimal tremor with DBS  Continue to follow-up with neurologist      Brandin Mccarty MD    Division of Diabetes and Endocrinology  Department of Medicine  [phone]          "

## 2017-06-29 ENCOUNTER — TELEPHONE (OUTPATIENT)
Dept: ENDOCRINOLOGY | Facility: CLINIC | Age: 72
End: 2017-06-29

## 2017-06-29 DIAGNOSIS — E05.90 SUBCLINICAL HYPERTHYROIDISM: ICD-10-CM

## 2017-06-29 LAB
T4 FREE SERPL-MCNC: 0.96 NG/DL (ref 0.76–1.46)
TSH SERPL DL<=0.05 MIU/L-ACNC: 0.86 MU/L (ref 0.4–4)

## 2017-06-29 NOTE — TELEPHONE ENCOUNTER
ENDO THYROID LABS-[facility] Latest Ref Rng & Units 6/29/2017   TSH 0.40 - 4.00 mU/L 0.86   T4 FREE 0.76 - 1.46 ng/dL 0.96     Left message and letter sent  Will continue with methimazole 5 mg daily    Brandin Mccarty MD    Division of Diabetes and Endocrinology  Department of Medicine  [phone]

## 2017-09-01 DIAGNOSIS — G25.81 RESTLESS LEGS SYNDROME (RLS): ICD-10-CM

## 2017-09-01 RX ORDER — GABAPENTIN 300 MG/1
CAPSULE ORAL
Qty: 630 CAPSULE | Refills: 3 | Status: SHIPPED | OUTPATIENT
Start: 2017-09-01 | End: 2017-12-14

## 2017-09-05 ENCOUNTER — TELEPHONE (OUTPATIENT)
Dept: INTERNAL MEDICINE | Facility: CLINIC | Age: 72
End: 2017-09-05

## 2017-09-05 DIAGNOSIS — R74.01 TRANSAMINITIS: Primary | ICD-10-CM

## 2017-09-05 NOTE — TELEPHONE ENCOUNTER
Telephone Encounter    Caller: Patient.     Reason for Call/Caller's Request: Patient was told to repeat labs - specifically liver tests. Patient has an upcoming appointment on the 11th and would like to have the lab order for the appointment.     Nursing Action or Plan: Chart reviewed. Verified need. Order placed.

## 2017-09-07 DIAGNOSIS — R74.01 TRANSAMINITIS: ICD-10-CM

## 2017-09-07 LAB
ALBUMIN SERPL-MCNC: 3.6 G/DL (ref 3.4–5)
ALP SERPL-CCNC: 135 U/L (ref 40–150)
ALT SERPL W P-5'-P-CCNC: 9 U/L (ref 0–70)
AST SERPL W P-5'-P-CCNC: 11 U/L (ref 0–45)
BILIRUB DIRECT SERPL-MCNC: 0.2 MG/DL (ref 0–0.2)
BILIRUB SERPL-MCNC: 0.9 MG/DL (ref 0.2–1.3)
PROT SERPL-MCNC: 7.1 G/DL (ref 6.8–8.8)

## 2017-09-11 ENCOUNTER — OFFICE VISIT (OUTPATIENT)
Dept: INTERNAL MEDICINE | Facility: CLINIC | Age: 72
End: 2017-09-11

## 2017-09-11 VITALS
WEIGHT: 209 LBS | SYSTOLIC BLOOD PRESSURE: 158 MMHG | HEART RATE: 93 BPM | RESPIRATION RATE: 16 BRPM | BODY MASS INDEX: 28.74 KG/M2 | DIASTOLIC BLOOD PRESSURE: 95 MMHG

## 2017-09-11 DIAGNOSIS — E78.00 HIGH BLOOD CHOLESTEROL: ICD-10-CM

## 2017-09-11 DIAGNOSIS — Z12.5 ENCOUNTER FOR SCREENING FOR MALIGNANT NEOPLASM OF PROSTATE: ICD-10-CM

## 2017-09-11 DIAGNOSIS — Z11.59 NEED FOR HEPATITIS C SCREENING TEST: Primary | ICD-10-CM

## 2017-09-11 DIAGNOSIS — I10 BENIGN ESSENTIAL HYPERTENSION: ICD-10-CM

## 2017-09-11 DIAGNOSIS — C61 MALIGNANT NEOPLASM OF PROSTATE (H): ICD-10-CM

## 2017-09-11 DIAGNOSIS — R03.0 ELEVATED BLOOD PRESSURE READING WITHOUT DIAGNOSIS OF HYPERTENSION: ICD-10-CM

## 2017-09-11 DIAGNOSIS — Z12.83 SKIN CANCER SCREENING: ICD-10-CM

## 2017-09-11 ASSESSMENT — PAIN SCALES - GENERAL: PAINLEVEL: NO PAIN (0)

## 2017-09-11 NOTE — MR AVS SNAPSHOT
After Visit Summary   9/11/2017    Norberto Wu    MRN: [id number]           Patient Information     Date Of Birth          1945        Visit Information        Provider Department      9/11/2017 12:35 PM Norberto Howe MD [facility]        Today's Diagnoses     Need for hepatitis C screening test    -  1    Malignant neoplasm of prostate (H)        Encounter for screening for malignant neoplasm of prostate         High blood cholesterol        Skin cancer screening        Benign essential hypertension        Elevated blood pressure reading without diagnosis of hypertension           Care Instructions    Primary Care Center Medication Refill Request Information:  * Please contact your pharmacy regarding ANY request for medication refills.  ** [facility] Prescription Fax = [phone]  * Please allow 3 business days for routine medication refills.  * Please allow 5 business days for controlled substance medication refills.     Primary Care Center Test Result notification information:  *You will be notified with in 7-10 days of your appointment day regarding the results of your test.  If you are on MyChart you will be notified as soon as the provider has reviewed the results and signed off on them.    Primary Care Center [phone]             Follow-ups after your visit        Additional Services     DERMATOLOGY REFERRAL       Skin screen                  Your next 10 appointments already scheduled     Oct 09, 2017  9:30 AM CDT   LAB with  LAB    Health Lab ([facility])    [address] [place] [id number] [phone]           Patient must bring picture ID. Patient should be prepared to give a urine specimen  Please do not eat 10-12 hours before your appointment if you are coming in fasting for labs on lipids, cholesterol, or glucose (sugar). Pregnant women should follow their Care Team instructions. Water with  medications is okay. Do not drink coffee or other fluids. If you have concerns about taking  your medications, please ask at office or if scheduling via GÃ¼venRehberi, send a message by clicking on Secure Messaging, Message Your Care Team.            Oct 09, 2017 10:00 AM CDT   24 Hour Blood Pressure Monitor with [facility]   [facility] ([facility] [place])    [address]  [place] [address]               Oct 11, 2017 12:35 PM CDT   (Arrive by 12:20 PM)   Return Visit with Norberto Howe MD   [facility] ([facility])    [address] [place] [address]  4th [facility] [id number] [phone]            Dec 14, 2017 11:00 AM CST   LAB with  LAB   [facility] Lab ([facility])    [address] [place] [address]  1st [facility] [id number] [phone]           Patient must bring picture ID. Patient should be prepared to give a urine specimen  Please do not eat 10-12 hours before your appointment if you are coming in fasting for labs on lipids, cholesterol, or glucose (sugar). Pregnant women should follow their Care Team instructions. Water with medications is okay. Do not drink coffee or other fluids. If you have concerns about taking  your medications, please ask at office or if scheduling via GÃ¼venRehberi, send a message by clicking on Secure Messaging, Message Your Care Team.            Dec 14, 2017 12:10 PM CST   (Arrive by 11:55 AM)   Return Movement Disorder with Blaine Boo MD   [facility] Neurology ([facility])    [address] [place] [address]  3rd [facility] [id number] [phone]            Dec 22, 2017  9:20 AM CST   (Arrive by 9:05 AM)   RETURN ENDOCRINE with Brandin Mccarty MD   [facility] Endocrinology ([facility])    [address] [place] [id number] [phone]              Future tests that were ordered  for you today     Open Future Orders        Priority Expected Expires Ordered    24 Hour Blood Pressure Monitor - Adult Routine  10/26/2017 9/11/2017    Lipid panel reflex to direct LDL Routine  9/11/2018 9/11/2017    Comprehensive metabolic panel Routine 9/11/2017 9/11/2018 9/11/2017    PSA screen Routine 9/11/2017 9/11/2018 9/11/2017    CBC with platelets differential Routine 9/11/2017 9/25/2017 9/11/2017    Hepatitis C Screen Reflex to HCV RNA Quant and Genotype Routine 9/11/2017 9/11/2018 9/11/2017            Who to contact     Please call your clinic at [phone] to:    Ask questions about your health    Make or cancel appointments    Discuss your medicines    Learn about your test results    Speak to your doctor   If you have compliments or concerns about an experience at your clinic, or if you wish to file a complaint, please contact [facility] Patient Relations at [phone] or email us at [email or web address]         Additional Information About Your Visit        GemharDelivered Information     DramaFever gives you secure access to your electronic health record. If you see a primary care provider, you can also send messages to your care team and make appointments. If you have questions, please call your primary care clinic.  If you do not have a primary care provider, please call [phone] and they will assist you.      DramaFever is an electronic gateway that provides easy, online access to your medical records. With DramaFever, you can request a clinic appointment, read your test results, renew a prescription or communicate with your care team.     To access your existing account, please contact your [facility] or call [phone] for assistance.        Care EveryWhere ID     This is your Care EveryWhere ID. This could be used by other organizations to access your [facility] medical records  [id number]        Your Vitals Were     Pulse  Respirations BMI (Body Mass Index)             93 16 28.74 kg/m2          Blood Pressure from Last 3 Encounters:   09/11/17 (!) 158/95   06/16/17 134/84   06/14/17 144/82    Weight from Last 3 Encounters:   09/11/17 94.8 kg (209 lb)   06/16/17 95.3 kg (210 lb)   06/14/17 93.9 kg (207 lb)              We Performed the Following     DERMATOLOGY REFERRAL        Primary Care Provider Office Phone # Fax #    Norberto Howe [phone] [address] [address]        Equal Access to Services     [facility]: Hadii anita Bose, wajosé miguelda bryanna, qaybta kaalmada brandon, bharti cazares. So [facility] [phone].    ATENCIÓN: Si habla español, tiene a benito disposición servicios gratuitos de asistencia lingüística. [facility] [phone].    We comply with applicable federal civil rights laws and [place] laws. We do not discriminate on the basis of race, color, national origin, age, disability sex, sexual orientation or gender identity.            Thank you!     Thank you for choosing [facility]  for your care. Our goal is always to provide you with excellent care. Hearing back from our patients is one way we can continue to improve our services. Please take a few minutes to complete the written survey that you may receive in the mail after your visit with us. Thank you!             Your Updated Medication List - Protect others around you: Learn how to safely use, store and throw away your medicines at www.disposemymeds.org.          This list is accurate as of: 9/11/17 12:37 PM.  Always use your most recent med list.                   Brand Name Dispense Instructions for use Diagnosis    aspirin 81 MG tablet     30 tablet    Take 1 tablet (81 mg) by mouth daily        atorvastatin 10 MG tablet    LIPITOR    90 tablet    Take 1 tablet (10 mg) by mouth daily    Hypercholesteremia       carbidopa-levodopa  MG per tablet     SINEMET    1080 tablet    TAKE 2 TABLETS 6 TIMES DAILY (3AM, 7AM, 11AM, 2PM, 6PM, AND 10PM)    Parkinson's disease (H)       fluticasone 50 MCG/ACT spray    FLONASE    3 Package    Spray 1-2 sprays into both nostrils daily    Rhinitis       gabapentin 300 MG capsule    NEURONTIN    630 capsule    Take 2 cap in am & noon; and 3 cap 2 hrs before HS= 7 cap/day.    Restless legs syndrome (RLS)       methimazole 5 MG tablet    TAPAZOLE    90 tablet    Take 1 tablet (5 mg) by mouth daily    Warm thyroid nodule, Subclinical hyperthyroidism       ofloxacin 0.3 % otic solution    FLOXIN    10 mL    Place 3 gtts in left ear BID x 3 days    Dysfunction of eustachian tube, left       polyethylene glycol Packet    MIRALAX/GLYCOLAX     Take by mouth At Bedtime        selegiline 5 MG capsule    ELDEPRYL    60 capsule    Take 1 capsule (5 mg) by mouth 2 times daily (before meals)    Parkinson's disease (H)       triamcinolone 0.1 % cream    KENALOG    15 g    Apply topically 2 times daily    Rash and nonspecific skin eruption

## 2017-09-11 NOTE — NURSING NOTE
Chief Complaint   Patient presents with     Recheck Medication     Patient is here to follow up with medication     Magali Rocha CMA 12:02 PM on 9/11/2017.

## 2017-09-11 NOTE — PROGRESS NOTES
HPI:    Pt. Comes in for follow up today. He has followed with Dr. Boo, Neurology for Parkinson's disease. He has h/o prostate CA and is followed at [facility].  Colonoscopy 3/17 possibly repeat in 5 years. No other HEENT, cardiopulmonary, abdominal, , neurological, systemic complaints. He does follow with outside dermatology. He has followed up with Dr. Claire ENT 9/29/14 for his ear complaint and has follow up with Dr. Luna 4/21/15 for with  PE tube placement. He is also following in Sleep Medicine. He states he had this in about 2006 and got injections. His dx. Prostate cancer was about 2008. He states gabapentin and Tramadol help his restless legs. He was seen by earline Cast 1/29/15 and recommend PT. He also stated gabapentin helps with his fatigue sxs. He was seen in endo by Dr. Mccarty 6/17 for abnormal thyroid tests and is on methimazole.    PMH:    1. Parkinson's followed by Dr. Boo  2. Prostate cancer followed at [facility]; s/p prostatectomy 2008; but possible slight increase in PSA; followed every 6 months.  3. Increased cholesterol  4. Deep Brain Stimulator; see neurology note from 7/7/14.  5. Restless leg sxs.  6. PFO see Dr. Vela's cardiology note from 9/13     PE:    Vitals noted gen nad cooperative alert, HEENT; Oropharynx clear no exudate neck supple nl rom no adenopathy, LCTA, B, normal resp. System exam, RRR, S1, S2, no MRG, abdomen, nt, nd, resting tremor in hands.  No Paraspinous tenderness to palpation, no skin rash.      A/P:    1. Parkinson's he follows here in Neurology and was last seen 6/17.He has sleep issues and will discuss with Dr. Boo.  2. Up to date on immunizations and he will get hep C screening today.  3. Increased cholesterol; will check fasting lipids and liver tests; he remains on Lipitor.  4. He will continue to follow outside Urology for h/o prostate CA. Will check PSA today and follow.   5. He can follow up in Health Psychology for anxiety  issues.   6. Low TSH checked 9/14; not on replacement checked  TPO and TSI labs done. He has followed up with Dr. Mccarty 6/17.  7. Placed future dermatology referral for skin check today 9/11/17.  8. Increased BP; labs today and 24 hr BP monitor and follow up    Total time spent 25  minutes.  More than 50% of the time spent with Mr. Wu on counseling / coordinating his care

## 2017-09-11 NOTE — PATIENT INSTRUCTIONS
[facility] Medication Refill Request Information:  * Please contact your pharmacy regarding ANY request for medication refills.  ** [facility] Prescription Fax = [phone]  * Please allow 3 business days for routine medication refills.  * Please allow 5 business days for controlled substance medication refills.     [facility] Test Result notification information:  *You will be notified with in 7-10 days of your appointment day regarding the results of your test.  If you are on MyChart you will be notified as soon as the provider has reviewed the results and signed off on them.    [facility] [phone]

## 2017-09-22 DIAGNOSIS — E05.90 SUBCLINICAL HYPERTHYROIDISM: ICD-10-CM

## 2017-09-22 DIAGNOSIS — E04.1 WARM THYROID NODULE: ICD-10-CM

## 2017-09-22 RX ORDER — METHIMAZOLE 5 MG/1
5 TABLET ORAL DAILY
Qty: 90 TABLET | Refills: 1 | Status: SHIPPED | OUTPATIENT
Start: 2017-09-22 | End: 2018-03-28

## 2017-09-22 NOTE — TELEPHONE ENCOUNTER
methimazole      Last Written Prescription Date:  8/24/16  Last Fill Quantity: 90,   # refills: 3  Last Office Visit : 6/16/17  Future Office visit:  12/22/17    Routing refill request to provider for review/approval because:  Drug not on the [facility], P or [facility] refill protocol or controlled substance

## 2017-10-09 ENCOUNTER — HOSPITAL ENCOUNTER (OUTPATIENT)
Dept: CARDIOLOGY | Facility: CLINIC | Age: 72
Discharge: HOME | End: 2017-10-09
Attending: INTERNAL MEDICINE | Admitting: INTERNAL MEDICINE
Payer: MEDICARE

## 2017-10-09 DIAGNOSIS — G20.A1 PARALYSIS AGITANS (H): ICD-10-CM

## 2017-10-09 DIAGNOSIS — G20.A1 PARALYSIS AGITANS (H): Primary | ICD-10-CM

## 2017-10-09 DIAGNOSIS — E78.00 HIGH BLOOD CHOLESTEROL: ICD-10-CM

## 2017-10-09 DIAGNOSIS — R03.0 ELEVATED BLOOD PRESSURE READING WITHOUT DIAGNOSIS OF HYPERTENSION: ICD-10-CM

## 2017-10-09 DIAGNOSIS — C61 MALIGNANT NEOPLASM OF PROSTATE (H): ICD-10-CM

## 2017-10-09 DIAGNOSIS — Z11.59 NEED FOR HEPATITIS C SCREENING TEST: ICD-10-CM

## 2017-10-09 DIAGNOSIS — Z12.5 ENCOUNTER FOR SCREENING FOR MALIGNANT NEOPLASM OF PROSTATE: ICD-10-CM

## 2017-10-09 DIAGNOSIS — I10 BENIGN ESSENTIAL HYPERTENSION: ICD-10-CM

## 2017-10-09 LAB
ALBUMIN SERPL-MCNC: 3.7 G/DL (ref 3.4–5)
ALP SERPL-CCNC: 139 U/L (ref 40–150)
ALT SERPL W P-5'-P-CCNC: 8 U/L (ref 0–70)
ANION GAP SERPL CALCULATED.3IONS-SCNC: 5 MMOL/L (ref 3–14)
AST SERPL W P-5'-P-CCNC: 12 U/L (ref 0–45)
BASOPHILS # BLD AUTO: 0.1 10E9/L (ref 0–0.2)
BASOPHILS NFR BLD AUTO: 1.4 %
BILIRUB SERPL-MCNC: 1 MG/DL (ref 0.2–1.3)
BUN SERPL-MCNC: 23 MG/DL (ref 7–30)
CALCIUM SERPL-MCNC: 8.5 MG/DL (ref 8.5–10.1)
CHLORIDE SERPL-SCNC: 106 MMOL/L (ref 94–109)
CHOLEST SERPL-MCNC: 136 MG/DL
CO2 SERPL-SCNC: 27 MMOL/L (ref 20–32)
CREAT SERPL-MCNC: 0.71 MG/DL (ref 0.66–1.25)
DIFFERENTIAL METHOD BLD: NORMAL
EOSINOPHIL # BLD AUTO: 0.2 10E9/L (ref 0–0.7)
EOSINOPHIL NFR BLD AUTO: 2.7 %
ERYTHROCYTE [DISTWIDTH] IN BLOOD BY AUTOMATED COUNT: 13.4 % (ref 10–15)
GFR SERPL CREATININE-BSD FRML MDRD: >90 ML/MIN/1.7M2
GLUCOSE SERPL-MCNC: 95 MG/DL (ref 70–99)
HCT VFR BLD AUTO: 47.6 % (ref 40–53)
HCV AB SERPL QL IA: NONREACTIVE
HDLC SERPL-MCNC: 59 MG/DL
HGB BLD-MCNC: 15.5 G/DL (ref 13.3–17.7)
IMM GRANULOCYTES # BLD: 0 10E9/L (ref 0–0.4)
IMM GRANULOCYTES NFR BLD: 0.3 %
LDLC SERPL CALC-MCNC: 65 MG/DL
LYMPHOCYTES # BLD AUTO: 1.8 10E9/L (ref 0.8–5.3)
LYMPHOCYTES NFR BLD AUTO: 26.5 %
MCH RBC QN AUTO: 29.1 PG (ref 26.5–33)
MCHC RBC AUTO-ENTMCNC: 32.6 G/DL (ref 31.5–36.5)
MCV RBC AUTO: 90 FL (ref 78–100)
MONOCYTES # BLD AUTO: 0.6 10E9/L (ref 0–1.3)
MONOCYTES NFR BLD AUTO: 8.6 %
NEUTROPHILS # BLD AUTO: 4 10E9/L (ref 1.6–8.3)
NEUTROPHILS NFR BLD AUTO: 60.5 %
NONHDLC SERPL-MCNC: 77 MG/DL
NRBC # BLD AUTO: 0 10*3/UL
NRBC BLD AUTO-RTO: 0 /100
PLATELET # BLD AUTO: 205 10E9/L (ref 150–450)
POTASSIUM SERPL-SCNC: 4.8 MMOL/L (ref 3.4–5.3)
PROT SERPL-MCNC: 7.2 G/DL (ref 6.8–8.8)
PSA SERPL-ACNC: 0.13 UG/L (ref 0–4)
RBC # BLD AUTO: 5.32 10E12/L (ref 4.4–5.9)
SODIUM SERPL-SCNC: 139 MMOL/L (ref 133–144)
TRIGL SERPL-MCNC: 62 MG/DL
WBC # BLD AUTO: 6.6 10E9/L (ref 4–11)

## 2017-10-09 PROCEDURE — 93790 AMBL BP MNTR W/SW I&R: CPT | Performed by: INTERNAL MEDICINE

## 2017-10-09 PROCEDURE — 93788 AMBL BP MNTR W/SW A/R: CPT

## 2017-10-11 ENCOUNTER — OFFICE VISIT (OUTPATIENT)
Dept: INTERNAL MEDICINE | Facility: CLINIC | Age: 72
End: 2017-10-11

## 2017-10-11 VITALS
SYSTOLIC BLOOD PRESSURE: 111 MMHG | BODY MASS INDEX: 28.84 KG/M2 | WEIGHT: 209.7 LBS | DIASTOLIC BLOOD PRESSURE: 74 MMHG | HEART RATE: 90 BPM

## 2017-10-11 DIAGNOSIS — Z23 NEED FOR PROPHYLACTIC VACCINATION AND INOCULATION AGAINST INFLUENZA: Primary | ICD-10-CM

## 2017-10-11 ASSESSMENT — PAIN SCALES - GENERAL: PAINLEVEL: NO PAIN (0)

## 2017-10-11 NOTE — MR AVS SNAPSHOT
After Visit Summary   10/11/2017    Norberto Wu    MRN: [id number]           Patient Information     Date Of Birth          1945        Visit Information        Provider Department      10/11/2017 12:35 PM Norberto Howe MD [facility]         Follow-ups after your visit        Your next 10 appointments already scheduled     Dec 14, 2017 11:00 AM CST   LAB with  LAB   [facility] Lab ([facility])    [address] [place] [id number] [phone]           Patient must bring picture ID. Patient should be prepared to give a urine specimen  Please do not eat 10-12 hours before your appointment if you are coming in fasting for labs on lipids, cholesterol, or glucose (sugar). Pregnant women should follow their Care Team instructions. Water with medications is okay. Do not drink coffee or other fluids. If you have concerns about taking  your medications, please ask at office or if scheduling via First30Dayshart, send a message by clicking on Secure Messaging, Message Your Care Team.            Dec 14, 2017 12:10 PM CST   (Arrive by 11:55 AM)   Return Movement Disorder with Blaine Boo MD   [facility] Neurology ([facility])    [address] [place] [address] [id number] [phone]            Dec 22, 2017  9:20 AM CST   (Arrive by 9:05 AM)   RETURN ENDOCRINE with Brandin Mccarty MD   [facility] Endocrinology ([facility])    [address] [place] [address] [id number] [phone]              Who to contact     Please call your clinic at [phone] to:    Ask questions about your health    Make or cancel appointments    Discuss your medicines    Learn about your test results    Speak to your doctor   If you have compliments or concerns about an experience at your clinic, or if you wish to file a complaint, please contact [facility]  [place] [facility] Patient Relations at [phone] or email us at [email or web address]         Additional Information About Your Visit        MyChart Information     roomlinxhart gives you secure access to your electronic health record. If you see a primary care provider, you can also send messages to your care team and make appointments. If you have questions, please call your primary care clinic.  If you do not have a primary care provider, please call [phone] and they will assist you.      FreedomPop is an electronic gateway that provides easy, online access to your medical records. With FreedomPop, you can request a clinic appointment, read your test results, renew a prescription or communicate with your care team.     To access your existing account, please contact your [facility] or call [phone] for assistance.        Care EveryWhere ID     This is your Care EveryWhere ID. This could be used by other organizations to access your [place] medical records  [id number]        Your Vitals Were     Pulse BMI (Body Mass Index)                90 28.84 kg/m2           Blood Pressure from Last 3 Encounters:   10/11/17 111/74   09/11/17 (!) 158/95   06/16/17 134/84    Weight from Last 3 Encounters:   10/11/17 95.1 kg (209 lb 11.2 oz)   09/11/17 94.8 kg (209 lb)   06/16/17 95.3 kg (210 lb)              Today, you had the following     No orders found for display       Primary Care Provider Office Phone # Fax #    Norberto Howe [phone]       [address] [address]        Equal Access to Services     [facility] SHRUTHI : Hadii aad ku hadasho Soomaali, waaxda luqadaha, qaybta kaalmada adeegyaerika, bharti cazares. So [facility] [phone].    ATENCIÓN: Si habla español, tiene a benito disposición servicios gratuitos de asistencia lingüística. Llame al [phone].    We comply with applicable federal civil rights laws  and [place] laws. We do not discriminate on the basis of race, color, national origin, age, disability, sex, sexual orientation, or gender identity.            Thank you!     Thank you for choosing [facility]  for your care. Our goal is always to provide you with excellent care. Hearing back from our patients is one way we can continue to improve our services. Please take a few minutes to complete the written survey that you may receive in the mail after your visit with us. Thank you!             Your Updated Medication List - Protect others around you: Learn how to safely use, store and throw away your medicines at www.disposemymeds.org.          This list is accurate as of: 10/11/17 12:43 PM.  Always use your most recent med list.                   Brand Name Dispense Instructions for use Diagnosis    aspirin 81 MG tablet     30 tablet    Take 1 tablet (81 mg) by mouth daily        atorvastatin 10 MG tablet    LIPITOR    90 tablet    Take 1 tablet (10 mg) by mouth daily    Hypercholesteremia       carbidopa-levodopa  MG per tablet    SINEMET    1080 tablet    TAKE 2 TABLETS 6 TIMES DAILY (3AM, 7AM, 11AM, 2PM, 6PM, AND 10PM)    Parkinson's disease (H)       fluticasone 50 MCG/ACT spray    FLONASE    3 Package    Spray 1-2 sprays into both nostrils daily    Rhinitis       gabapentin 300 MG capsule    NEURONTIN    630 capsule    Take 2 cap in am & noon; and 3 cap 2 hrs before HS= 7 cap/day.    Restless legs syndrome (RLS)       methimazole 5 MG tablet    TAPAZOLE    90 tablet    Take 1 tablet (5 mg) by mouth daily    Warm thyroid nodule, Subclinical hyperthyroidism       ofloxacin 0.3 % otic solution    FLOXIN    10 mL    Place 3 gtts in left ear BID x 3 days    Dysfunction of Eustachian tube, left       polyethylene glycol Packet    MIRALAX/GLYCOLAX     Take by mouth At Bedtime        selegiline 5 MG capsule    ELDEPRYL    60 capsule    Take 1 capsule (5 mg) by mouth 2 times daily (before  meals)    Parkinson's disease (H)       triamcinolone 0.1 % cream    KENALOG    15 g    Apply topically 2 times daily    Rash and nonspecific skin eruption

## 2017-10-11 NOTE — PROGRESS NOTES
HPI:    Pt. Comes in for follow up today. He has followed with Dr. Boo, Neurology for Parkinson's disease. He has h/o prostate CA and is followed at [facility].  Colonoscopy 3/17 possibly repeat in 5 years. No other HEENT, cardiopulmonary, abdominal, , neurological, systemic complaints. He does follow with outside dermatology. He has followed up with Dr. Claire ENT 9/29/14 for his ear complaint and has follow up with Dr. Luna 4/21/15 for with  PE tube placement. He is also following in Sleep Medicine. He states he had this in about 2006 and got injections. His dx. Prostate cancer was about 2008. He states gabapentin and Tramadol help his restless legs. He was seen by earline Cast 1/29/15 and recommend PT. He also stated gabapentin helps with his fatigue sxs. He was seen in endo by Dr. Mccarty 6/17 for abnormal thyroid tests and is on methimazole.    PMH:    1. Parkinson's followed by Dr. Boo  2. Prostate cancer followed at [facility]; s/p prostatectomy 2008; but possible slight increase in PSA; followed every 6 months.  3. Increased cholesterol  4. Deep Brain Stimulator; see neurology note from 7/7/14.  5. Restless leg sxs.  6. PFO see Dr. Vela's cardiology note from 9/13     PE:    Vitals noted gen nad cooperative alert, HEENT; Oropharynx clear no exudate neck supple nl rom no adenopathy, LCTA, B, normal resp. System exam, RRR, S1, S2, no MRG, abdomen, nt, nd, resting tremor in hands.  No Paraspinous tenderness to palpation, no skin rash.      A/P:    1. Parkinson's he follows here in Neurology and was last seen 6/17. He has sleep issues and will discuss with Dr. Boo and has appt. 12/17  2. Flu shot today  3. Increased cholesterol;  fasting lipids and liver tests done 10/9/17 and stable; he remains on Lipitor.  4. He will continue to follow outside Urology for h/o prostate CA. Will check PSA checked 10/9/17 and 0.13; value 3/7/17 was 0.12. He states he follows with Dr. Valladares and  that is PSA usually is in the 0.1 range and stable.   5. He can follow up in Health Psychology for anxiety issues.   6. Low TSH checked 9/14; not on replacement checked  TPO and TSI labs done. He has followed up with Dr. Mccarty 6/17.  7. Placed future dermatology referral for skin check 9/11/17.  8. Increased BP; labs today and 24 hr BP monitor and follow up. He turned in the Monitor yesterday. His BP is quite low today.     Total time spent 25  minutes.  More than 50% of the time spent with Mr. Wu on counseling / coordinating his care

## 2017-10-11 NOTE — NURSING NOTE
"Injectable Influenza Immunization Documentation    1.  Has the patient received the information for the injectable influenza vaccine? YES     2. Is the patient 6 months of age or older? YES     3. Does the patient have any of the following contraindications?         Severe allergy to eggs? No     Severe allergic reaction to previous influenza vaccines? No   Severe allergy to latex? No       History of Guillain-Westerlo syndrome? No     Currently have a temperature greater than 100.4F? No        4.  Severely egg allergic patients should have flu vaccine eligibility assessed by an MD, RN, or pharmacist, and those who received flu vaccine should be observed for 15 min by an MD, RN, Pharmacist, Medical Technician, or member of clinic staff.\": YES    5. Latex-allergic patients should be given latex-free influenza vaccine N/A. Please reference the Vaccine latex table to determine if your clinic s product is latex-containing.       Vaccination given by Jeimy Mendoza LPN at 1:23 PM on 10/11/2017.          "

## 2017-10-11 NOTE — NURSING NOTE
Chief Complaint   Patient presents with     Results     Patient here to go over results.     Jeimy Mendoza LPN at 12:20 PM on 10/11/2017.

## 2017-12-08 PROBLEM — G62.9 PERIPHERAL NERVE DISEASE: Status: ACTIVE | Noted: 2017-12-08

## 2017-12-08 PROBLEM — C61 MALIGNANT NEOPLASM OF PROSTATE (H): Status: ACTIVE | Noted: 2017-12-08

## 2017-12-12 DIAGNOSIS — E05.90 SUBCLINICAL HYPERTHYROIDISM: Primary | ICD-10-CM

## 2017-12-14 ENCOUNTER — OFFICE VISIT (OUTPATIENT)
Dept: NEUROLOGY | Facility: CLINIC | Age: 72
End: 2017-12-14
Payer: COMMERCIAL

## 2017-12-14 VITALS
HEART RATE: 82 BPM | WEIGHT: 208 LBS | SYSTOLIC BLOOD PRESSURE: 133 MMHG | HEIGHT: 72 IN | BODY MASS INDEX: 28.17 KG/M2 | DIASTOLIC BLOOD PRESSURE: 83 MMHG

## 2017-12-14 DIAGNOSIS — E05.90 SUBCLINICAL HYPERTHYROIDISM: ICD-10-CM

## 2017-12-14 DIAGNOSIS — G20.A1 PARKINSON'S DISEASE (H): Primary | ICD-10-CM

## 2017-12-14 DIAGNOSIS — G25.81 RESTLESS LEGS SYNDROME (RLS): ICD-10-CM

## 2017-12-14 LAB
T4 FREE SERPL-MCNC: 1.05 NG/DL (ref 0.76–1.46)
TSH SERPL DL<=0.005 MIU/L-ACNC: 0.64 MU/L (ref 0.4–4)

## 2017-12-14 RX ORDER — GABAPENTIN 300 MG/1
CAPSULE ORAL
Qty: 630 CAPSULE | Refills: 3 | Status: SHIPPED | OUTPATIENT
Start: 2017-12-14 | End: 2018-08-08

## 2017-12-14 ASSESSMENT — ENCOUNTER SYMPTOMS
SEIZURES: 0
HEMOPTYSIS: 0
DIZZINESS: 1
LOSS OF CONSCIOUSNESS: 0
COUGH DISTURBING SLEEP: 0
NECK MASS: 0
DISTURBANCES IN COORDINATION: 0
SORE THROAT: 0
SNORES LOUDLY: 0
TASTE DISTURBANCE: 0
WEAKNESS: 1
SINUS CONGESTION: 0
SMELL DISTURBANCE: 0
SPEECH CHANGE: 1
MEMORY LOSS: 0
HOARSE VOICE: 0
SINUS PAIN: 0
TINGLING: 0
WHEEZING: 0
PARALYSIS: 0
TROUBLE SWALLOWING: 0
NUMBNESS: 0
COUGH: 0
TREMORS: 0
HEADACHES: 0
POSTURAL DYSPNEA: 0
SHORTNESS OF BREATH: 1
SPUTUM PRODUCTION: 0
DYSPNEA ON EXERTION: 0

## 2017-12-14 ASSESSMENT — PAIN SCALES - GENERAL: PAINLEVEL: EXTREME PAIN (9)

## 2017-12-14 NOTE — MR AVS SNAPSHOT
After Visit Summary   2017    Norberto Wu    MRN: [id number]           Patient Information     Date Of Birth          1945        Visit Information        Provider Department      2017 12:10 PM Blaine Boo MD [facility] Neurology        Today's Diagnoses     Parkinson's disease (H)    -  1    Restless legs syndrome (RLS)          Care Instructions    dorita  72 year old male     : 1945    GAMA: 2017    Parkinson    DBS - 2.93 VOLTS AND IS RIGHT GPI AND NO OPEN CIRCUITS    Aspirin 81mg  Atorvastatin 10mg  Sinemet 25/100 2 tabs 6 times per day 3am, 7am, 11am, 2pm, 6pm, 10pm  fluticaseon  Gabapentin neurontin 300mg 2 in the am and noon and 3 caps at night  Methimazole tapazole 5mg  Ofloxacin floxin  Polyethylene floxin  Polyethylene glycol  Selegiline eldepryl 5mg 2/day - STOPPED THIS MEDICATION   triamcinolone    Has more heaviness or fogginess in his head when medications are wearing off.   Mayelin is hospital [facility]nia [facility].    He is having deep dull pain in his legs when he is off.   He may need happy pill  He shakes his legs to relax.  He has symptoms at 5-6 pm and ? Night time    He is napping mid morning and mid afternoon  He has terrible sleep habits    He has been on requip - had fatigue and no benefit.     He is doing weights and exercising  He has not done boxing  He has some freezing  He has more problems in small spaces.      Medications     3am 7am 11am 2pm 6pm 10pm    Sinemet 25/100 2 2 2 2 2 2    Gabapentin 300mg 2 2   3     Selegiline 5mg Not taking         Atorvastatin lipitor 10mg  1        Aspirin 81mg  1        Methimazole (tapazole) 5mg  1                      Over 50% of this visit was spent in patient care and care coordination.     History obtained from patient    Total visit time was 25 minutes    In summary he appears to have fatigue but not clearly depression  He may be having wearing off leg pain but he needs to track his  "dosing and when he has symptoms  He has been on requip but has not been on a low dose rotigotine patch.    PLAN  Discussed pros and cons of gabapentin (sedating and may help pain)  Reduce from 2 to 1 tabs @ 3am and 7am and continue with 3 tabs @ 6pm    Medications     3am 7am 11am 2pm 6pm 10pm    Sinemet 25/100 2 2 2 2 2 2    Gabapentin 300mg 1 1   3     Selegiline 5mg Not taking         Atorvastatin lipitor 10mg  1        Aspirin 81mg  1        Methimazole (tapazole) 5mg  1                    Next option would be to try the rotigotine neupro 1 or 2mg patch    Would recommend therapy    Return in 1-2 months to see a movement disorders fellow to review his function.           Blaine Boo MD               Follow-ups after your visit        Additional Services     PHYSICAL THERAPY REFERRAL       *This therapy referral will be filtered to a centralized scheduling office at [facility] and the patient will receive a call to schedule an appointment at a [facility] location most convenient for them. *     [facility] provides Physical Therapy evaluation and treatment and many specialty services across the [facility] system.  If requesting a specialty program, please choose from the list below.    If you have not heard from the scheduling office within 2 business days, please call [phone] for all locations, with the exception of [place], please call [phone].  Treatment: Evaluation & Treatment  Special Instructions/Modalities: evaluate and treat  Special Programs: None    Please be aware that coverage of these services is subject to the terms and limitations of your health insurance plan.  Call member services at your health plan with any benefit or coverage questions.      **Note to Provider:  If you are referring outside of [facility] for the therapy appointment, please list the name of the location in the \"special instructions\" above, print the referral and give to the patient to " schedule the appointment.                  Follow-up notes from your care team     Return in about 8 weeks (around 2/8/2018).      Your next 10 appointments already scheduled     Dec 22, 2017  9:20 AM CST   (Arrive by 9:05 AM)   RETURN ENDOCRINE with Brandin Mccarty MD   [facility] Endocrinology ([facility])    [address] [place] [id number] [phone]            Feb 21, 2018  2:00 PM CST   (Arrive by 1:45 PM)   New Movement Disorder with  MOVEMENT DISORDER FELLOW   [facility] Neurology ([facility])    [address] [place] [address] [id number] [phone]              Who to contact     Please call your clinic at [phone] to:    Ask questions about your health    Make or cancel appointments    Discuss your medicines    Learn about your test results    Speak to your doctor   If you have compliments or concerns about an experience at your clinic, or if you wish to file a complaint, please contact [facility] Patient Relations at [phone] or email us at [email or web address]         Additional Information About Your Visit        Chemo BeaniesharCutefund Information     Jukelyt gives you secure access to your electronic health record. If you see a primary care provider, you can also send messages to your care team and make appointments. If you have questions, please call your primary care clinic.  If you do not have a primary care provider, please call [phone] and they will assist you.      Groxis is an electronic gateway that provides easy, online access to your medical records. With Groxis, you can request a clinic appointment, read your test results, renew a prescription or communicate with your care team.     To access your existing account, please contact your [facility] or call [phone] for assistance.        Care EveryWhere ID     This is  "your Care EveryWhere ID. This could be used by other organizations to access your [place] medical records  [id number]        Your Vitals Were     Pulse Height BMI (Body Mass Index)             82 1.816 m (5' 11.5\") 28.61 kg/m2          Blood Pressure from Last 3 Encounters:   12/14/17 133/83   10/11/17 111/74   09/11/17 (!) 158/95    Weight from Last 3 Encounters:   12/14/17 94.3 kg (208 lb)   10/11/17 95.1 kg (209 lb 11.2 oz)   09/11/17 94.8 kg (209 lb)              We Performed the Following     PHYSICAL THERAPY REFERRAL          Today's Medication Changes          These changes are accurate as of: 12/14/17 12:42 PM.  If you have any questions, ask your nurse or doctor.               These medicines have changed or have updated prescriptions.        Dose/Directions    gabapentin 300 MG capsule   Commonly known as:  NEURONTIN   This may have changed:  additional instructions   Used for:  Restless legs syndrome (RLS)   Changed by:  Blaine Boo MD        1-2 caps @ 3am and 7am and 3 caps @ 6pm  -- 5-7 cap/day.   Quantity:  630 capsule   Refills:  3         Stop taking these medicines if you haven't already. Please contact your care team if you have questions.     selegiline 5 MG capsule   Commonly known as:  ELDEPRYL   Stopped by:  Blaine Boo MD                Where to get your medicines      These medications were sent to [id number] [place], [place] - [address]  [address] [facility] [address]     Phone:  [phone]     gabapentin 300 MG capsule                Primary Care Provider Office Phone # Fax #    Norberto Howe [phone]       [address] [address]        Equal Access to Services     NICOLE HAWKINS AH: Stefan Bose, miguel gould, samantha taylor, bharti cazares. So [facility] [phone].    ATENCIÓN: Si habla español, tiene a benito disposición servicios gratuitos de asistencia " lingüística. Hemant al [phone].    We comply with applicable federal civil rights laws and [place] laws. We do not discriminate on the basis of race, color, national origin, age, disability, sex, sexual orientation, or gender identity.            Thank you!     Thank you for choosing [facility] NEUROLOGY  for your care. Our goal is always to provide you with excellent care. Hearing back from our patients is one way we can continue to improve our services. Please take a few minutes to complete the written survey that you may receive in the mail after your visit with us. Thank you!             Your Updated Medication List - Protect others around you: Learn how to safely use, store and throw away your medicines at www.disposemymeds.org.          This list is accurate as of: 12/14/17 12:42 PM.  Always use your most recent med list.                   Brand Name Dispense Instructions for use Diagnosis    aspirin 81 MG tablet     30 tablet    Take 1 tablet (81 mg) by mouth daily        atorvastatin 10 MG tablet    LIPITOR    90 tablet    Take 1 tablet (10 mg) by mouth daily    Hypercholesteremia       carbidopa-levodopa  MG per tablet    SINEMET    1080 tablet    TAKE 2 TABLETS 6 TIMES DAILY (3AM, 7AM, 11AM, 2PM, 6PM, AND 10PM)    Parkinson's disease (H)       fluticasone 50 MCG/ACT spray    FLONASE    3 Package    Spray 1-2 sprays into both nostrils daily    Rhinitis       gabapentin 300 MG capsule    NEURONTIN    630 capsule    1-2 caps @ 3am and 7am and 3 caps @ 6pm  -- 5-7 cap/day.    Restless legs syndrome (RLS)       methimazole 5 MG tablet    TAPAZOLE    90 tablet    Take 1 tablet (5 mg) by mouth daily    Warm thyroid nodule, Subclinical hyperthyroidism       ofloxacin 0.3 % otic solution    FLOXIN    10 mL    Place 3 gtts in left ear BID x 3 days    Dysfunction of Eustachian tube, left       polyethylene glycol Packet    MIRALAX/GLYCOLAX     Take by mouth At Bedtime        triamcinolone 0.1 % cream     KENALOG    15 g    Apply topically 2 times daily    Rash and nonspecific skin eruption

## 2017-12-14 NOTE — NURSING NOTE
"[facility]: Nurse (RN) Visit Note  SITUATION/BACKGROUND                                                      Norberto Wu is a 72 year old male, who presents to clinic for follow up of his Parkinson's disease.    Current Status:  He complains of increased freezing when going through small, confined spaces, a foggy/tired feeling at times during the day. He was unable to be specific as to the timing of the \"wearing off\" he was experiencing. One of the questions is whether this \"wearing off\" he describes is wearing off his Parkinson's disease medications or a grogginess from the gabapentin.    ASSESSMENT      Vital Signs:  /83  Pulse 82  Ht 1.816 m (5' 11.5\")  Wt 94.3 kg (208 lb)  BMI 28.61 kg/m2     Exam:  NEURO: mentation intact and speech normal however, at times it was festinating.    Interrogated patient's DBS Activa SC battery. Patient has RGPi. All impedances were checked at 3.0 Volts and were WNL. See [employer] Neuromodulation sheets scanned. Patient informed.    Therapy impedance = 1015 ohms, 4.002 mA    Using contacts = C+/1-    Model 29095    Battery voltage 2.93    RECOMMENDATION/PLAN                                                      Medical Plan:  MEDICATIONS:       - Decrease his gabapentin from 2 tabs at 3 am and 7 am  to 1 tab at 3 am (if he wakes) and 7 am, keep the 3 tabs at 6 pm.  Follow up with Fellow in 1-2 months    Today's visit was:  Completed within the RN scope of practice         "

## 2017-12-14 NOTE — PROGRESS NOTES
Diagnosis/Summary/Recommendations:    PATIENT: Norberto Wu  72 year old male     : 1945    GAMA: 2017    Parkinson    DBS - 2.93 VOLTS AND IS RIGHT GPI AND NO OPEN CIRCUITS    Aspirin 81mg  Atorvastatin 10mg  Sinemet 25/100 2 tabs 6 times per day 3am, 7am, 11am, 2pm, 6pm, 10pm  fluticaseon  Gabapentin neurontin 300mg 2 in the am and noon and 3 caps at night  Methimazole tapazole 5mg  Ofloxacin floxin  Polyethylene floxin  Polyethylene glycol  Selegiline eldepryl 5mg 2/day - STOPPED THIS MEDICATION   triamcinolone    Has more heaviness or fogginess in his head when medications are wearing off.   Mayelin is hospital for hernia surgery at [facility].    He is having deep dull pain in his legs when he is off.   He may need happy pill  He shakes his legs to relax.  He has symptoms at 5-6 pm and ? Night time    He is napping mid morning and mid afternoon  He has terrible sleep habits    He has been on requip - had fatigue and no benefit.     He is doing weights and exercising  He has not done boxing  He has some freezing  He has more problems in small spaces.      Medications     3am 7am 11am 2pm 6pm 10pm    Sinemet 25/100 2 2 2 2 2 2    Gabapentin 300mg 2 2   3     Selegiline 5mg Not taking         Atorvastatin lipitor 10mg  1        Aspirin 81mg  1        Methimazole (tapazole) 5mg  1                      Over 50% of this visit was spent in patient care and care coordination.     History obtained from patient    Total visit time was 25 minutes    In summary he appears to have fatigue but not clearly depression  He may be having wearing off leg pain but he needs to track his dosing and when he has symptoms  He has been on requip but has not been on a low dose rotigotine patch.    PLAN  Discussed pros and cons of gabapentin (sedating and may help pain)  Reduce from 2 to 1 tabs @ 3am and 7am and continue with 3 tabs @ 6pm    Medications     3am 7am 11am 2pm 6pm 10pm    Sinemet 25/100 2 2 2 2 2 2    Gabapentin  300mg 1 1   3     Selegiline 5mg Not taking         Atorvastatin lipitor 10mg  1        Aspirin 81mg  1        Methimazole (tapazole) 5mg  1                    Next option would be to try the rotigotine neupro 1 or 2mg patch    Would recommend therapy    Return in 1-2 months to see a movement disorders fellow to review his function.           Blaine Boo MD     ______________________________________    Last visit date and details:      Parkinson  Needs new ipg  He has had problems with his speech: slurring and finding words  He has had more freezing in small spaces.  This appears to be on related freezing.  He has less wearing off.   Has had problems with problem solving.      Medications reviewed.   He feels better on gabapentin 2 capsules 3 times per day   He is on selegiline 5mg twice per day  He is on sinemet 25/100: 2 tabs 6 times per day      Was tired on requip in the past  Was groggy on seroquel.      He has had a delayed response when driving. He does not brake till he is cued.   He has had some tension in the right leg when he is driving. It happens when he puts his foot on the gas.   His freezing could be both sides of his body.      He has had dyskinesias with dystonic extension of the right arm.   When he is talking he is using large gestures - arm movements.      He is still active on programming on his computer.      He is able to exercise with treadmill and weights.      PLAN  1. New ipg: had device placed 2014 - 2.5 yrs ago. Wondering about rechargeable.   2. Therapy ordered evaluate and treat. Big and loud therapy.   3. Discussed boxing   4. Wait on repeat neuropsychology.   5. Return to see terence in 6 months     Blaine boo mD      Answers for HPI/ROS submitted by the patient on 12/14/2017   General Symptoms: No  Skin Symptoms: No  HENT Symptoms: Yes  EYE SYMPTOMS: No  HEART SYMPTOMS: No  LUNG SYMPTOMS: Yes  INTESTINAL SYMPTOMS: No  URINARY SYMPTOMS: No  REPRODUCTIVE SYMPTOMS: No  SKELETAL SYMPTOMS:  "No  BLOOD SYMPTOMS: No  NERVOUS SYSTEM SYMPTOMS: Yes  MENTAL HEALTH SYMPTOMS: No  Ear pain: No  Ear discharge: Yes  Hearing loss: No  Tinnitus: No  Nosebleeds: No  Congestion: No  Sinus pain: No  Trouble swallowing: No   Voice hoarseness: No  Mouth sores: No  Sore throat: No  Tooth pain: No  Gum tenderness: No  Bleeding gums: No  Change in taste: No  Change in sense of smell: No  Dry mouth: No  Hearing aid used: No  Neck lump: No  Cough: No  Sputum or phlegm: No  Coughing up blood: No  Difficulty breating or shortness of breath: Yes  Snoring: No  Wheezing: No  Difficulty breathing on exertion: No  Nighttime Cough: No  Difficulty breathing when lying flat: No  Trouble with coordination: No  Dizziness or trouble with balance: Yes  Fainting or black-out spells: No  Memory loss: No  Headache: No  Seizures: No  Speech problems: Yes  Tingling: No  Tremor: No  Weakness: Yes  Difficulty walking: Yes  Paralysis: No  Numbness: No      PATIENT: Norberto Wu     : 1945     GAMA: 2017     REASON FOR VISIT: Parkinson's disease (PD) follow up & DBS interrogation & analysis.     HPI: Mr. Norberto Wu is a 71 year old  male who came to the [facility] neurology clinic accompanied by his wife for a follow up visit.  He was last seen in the clinic on 2016 by Dr. Boo for a routine PD f/u visit.     Since then, his DBS generator was replaced by Dr. Fair on .       He is exercises, but not as he did before.   He is doing back pain & does back exercises.      Constipation has been an issue but now stable.  He has cut down MiraLax from daily to 2 - 3 x a week due to diarrhea.      Freezing of gait is an issue when initiating gait & in tight spaces.  Freezing of gait can occur any time of the day regardless of his Levodopa dose.  Voice is \"disappearing.\"  He hasn't been doing the Big & Loud therapy exercises.  He reports more Off symptoms than ON.  He is taking Sinemet 25/100 mg 2 tabs every 3 - 4 x " daily.  Today he forgot to take his 11 am dose & came to clinic & took it at 1 pm & is doing well.  At times his legs bother him & keep him from falling asleep.      He wanted to know if people die from PD & what it would look like; What to expect as the disease progresses; Asked about new treatments available . . .         Answers for HPI/ROS submitted by the patient on 6/14/2017   General Symptoms: No  Skin Symptoms: No  HENT Symptoms: No  EYE SYMPTOMS: No  HEART SYMPTOMS: No  LUNG SYMPTOMS: No  INTESTINAL SYMPTOMS: Yes  URINARY SYMPTOMS: No  REPRODUCTIVE SYMPTOMS: No  SKELETAL SYMPTOMS: No  BLOOD SYMPTOMS: No  NERVOUS SYSTEM SYMPTOMS: No  MENTAL HEALTH SYMPTOMS: No  Heart burn or indigestion: No  Nausea: No  Vomiting: No  Abdominal pain: No  Bloating: Yes  Constipation: Yes  Diarrhea: No  Black stools: No  Fecal incontinence: No  Rectal bleeding: No  Yellowing of skin or eyes: No  Vomit with blood: No  Change in stools: No     MEDICATIONS:        Medication Sig     ofloxacin (FLOXIN) 0.3 % otic solution Place 3 gtts in left ear BID x 3 days     atorvastatin (LIPITOR) 10 MG tablet Take 1 tablet (10 mg) by mouth daily     triamcinolone (KENALOG) 0.1 % cream Apply topically 2 times daily     aspirin 81 MG tablet Take 1 tablet (81 mg) by mouth daily     polyethylene glycol (MIRALAX/GLYCOLAX) Packet Take by mouth At Bedtime      carbidopa-levodopa (SINEMET)  MG per tablet TAKE 2 TABLETS 6 TIMES DAILY (3AM, 7AM, 11AM, 2PM, 6PM, AND 10PM)     methimazole (TAPAZOLE) 5 MG  Take 1 tablet (5 mg) by mouth daily     selegiline (ELDEPRYL) 5 MG capsule Take 1 capsule (5 mg) by mouth 2 times daily (before meals)     gabapentin (NEURONTIN) 300 MG capsule TAKE 2 CAPSULES 3 TIMES A DAY (TAKE LAST DOSE 2 HOURS BEFORE BEDTIME)     fluticasone (FLONASE) 50 MCG/ACT nasal spray Spray 1-2 sprays into both nostrils daily         ALLERGIES: Hydromorphone; Comtan; Camphor; Hydrocodone; Hydrocodone-acetaminophen; Melatonin; Ropinirole;  "Seroquel; Adhesive tape; Camphor; and Liquid adhesive.      PHYSICAL EXAM:     VITAL SIGNS:  Blood pressure 144/82, pulse 78, temperature 97.8  F (36.6  C), resp. rate 20, height 1.816 m (5' 11.5\"), weight 93.9 kg (207 lb), SpO2 100 %.  Body mass index is 28.47 kg/(m^2).     GENERAL:  Mr. Wu is a pleasant  male who is well-groomed and well-developed sitting comfortably in the exam room without any distress.  Affect is appropriate.     MOVEMENT DISORDERS ASSESSMENT: (Last Sinemet was about 6 hrs ago)  Speech: Slight loss of expression and volume.  Facial Expression: Slight, abnormal diminution of facial expression.  Rest Tremor: Absent.   Action/Postural Tremor: Absent.   Rigidity: Slight in RUE; Absent in all extremities.   Finger Taps: Mild reduction in amplitude bilaterally.  Hand opening & closing: Mild slowing and reduction in amplitude Rt hand; Moderately impaired; occasional arrests in movement in Lt.   Hand pronation & supination: Mild slowing and reduction in amplitude Rt hand; Moderately impaired; occasional arrests in movement in Lt.   Leg Agility: Normal. and in Rt leg; Mild reduction in amplitude; occasional arrests in movement.  Arising from chair - arms folded across chest: Slow; or may need more than one attempt.  Posture: Slightly stooped posture.  Gait: Walks slowly, shuffles with short steps. No festination or propulsion.  Postural Stability: Retropulsion, but recovers unaided.  Body Bradykinesia: Mild degree of slowness and poverty of movement.      DBS Interrogation & Analysis:      Implanted generator: Right Activa-SC.  Lead placement: Right Globus Pallidus Internus (Gpi).      Impedance Check:       Therapy On: 100%  Battery Service Life: OK. 2.95 volts.      Right Gpi  C +, 1 -  Volts: 4.0 volts  PW: 90 msec  Rate: 185 Hz      Electrode Impedance Check: (Amplitude 3.0 volts: PW 80 msec: Rate 100 Hz)    Right Lead: No Problems Found.      See scanned report for impedance " details.     ASSESSMENT/PLAN:     Parkinson's Disease:  He has a Hx of PD since 2008, s/p Right Gpi DBS therapy.  Tremors are controlled.  He has freezing of gait that is not responsive to Levodopa.  Voice is soft.     __  Discussing a refresher & going back to PT & Speech Therapy.  He opted to wait & promised to do the Big & Loud Therapy exercises at home.   __  Discussed progression of PD.   __  Will stay on the same dose of Sinemet.   __  Encouraged to take Sinemet with carbonated beverage.   __  DBS interrogation & analysis is normal.    __  Will return to our clinic in 6 months or sooner as needed.     The total amount of time spent with the patient was 45 minutes; 20 min in DBS interrogation & analysis and 25 min in addressing PD, Gait & balance problems, & speech issues.  50% of the time was spent in counseling & coordination of care.         FRANKIE Ramos,  CNP  [facility]        ______________________________________      Patient was asked about 14 Review of systems including changes in vision (dry eyes, double vision), hearing, heart, lungs, musculoskeletal, depression, anxiety, snoring, RBD, insomnia, urinary frequency, urinary urgency, constipation, swallowing problems, hematological, ID, allergies, skin problems: seborrhea, endocrinological: thyroid, diabetes, cholesterol; balance, weight changes, and other neurological problems and these were not significant at this time except for   Patient Active Problem List   Diagnosis     Parkinson disease (H)     Peripheral neuropathy     Somnolence     Family history of Parkinson's disease     Snores     REM sleep behavior disorder     Prostate cancer (H)     Wears glasses     Insomnia     Constipation     History of MRI of brain and brain stem     PFO (patent foramen ovale)     Cerebellar stroke syndrome     H/O echocardiogram     Restless leg syndrome     S/P brain surgery     Hamstring tendonitis at origin     Subclinical hyperthyroidism      "Parkinson's disease (H)     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  Stayed in the hospital 3 days due to severe nausea & \"retching\" .      Comtan Diarrhea     Camphor      Camphor HELENA     Hydrocodone      Other reaction(s): Headache, Nausea Only     Hydrocodone-Acetaminophen Hives and Nausea     Melatonin Other (See Comments)     No benefit.     Ropinirole Fatigue     Fatigue no benefit.      Seroquel [Quetiapine]      Groggy and aggravated rls  No benefit     Adhesive Tape Rash     Camphor Rash     Other reaction(s): Rash     Liquid Adhesive Rash     Past Surgical History:   Procedure Laterality Date     COLONOSCOPY N/A 3/23/2017    Procedure: COLONOSCOPY;  Surgeon: Salbador Paulson MD;  Location: [facility]     PROSTATE SURGERY  3 yrs ago    prostate cancer; Dr. Valladares     REPLACE DEEP BRAIN STIMULATION GENERATOR / BATTERY Right 1/3/2017    Procedure: REPLACE DEEP BRAIN STIMULATION GENERATOR / BATTERY;  Surgeon: Anupam Fair MD;  Location: [facility] OR     Right Gpi DBS Lead Implantation  5/20/2014     Rt Chest DBS Generator Placement Activa SC  5/29/2014     Past Medical History:   Diagnosis Date     Cerebellar stroke syndrome 8/21/2013    Few small foci of chronic infarct in the right cerebellar hemisphere, unchanged as well as a new focus of now chronic infarct in the left cerebellar hemisphere since the last study.      Dyslipidemia      Hyperthyroidism      Insomnia 6/17/2011     Parkinson disease (H) 6/15/2011     Peripheral neuropathy 6/15/2011     PFO (patent foramen ovale) 8/21/2013    Interpretation Summary 1. Normal LV size and systolic function. Normal diastolic " function 2.  Normal RV size and function 3. No significant valvular abnormalities 4.  Small right-to-left shunt visualized with intravenous agitated saline  contrast study, suggestive of PFO. PatientHeight: 72 in PatientWeight: 205 lbs SystolicPressure: 100 mmHg DiastolicPressure: 63 mmHg BSA 2.2 m^2   Procedure Echoc     PONV (postoperative nausea and vomiting)      Prostate cancer (H) 6/17/2011    s/p radical prostectomy     Social History     Social History     Marital status:      Spouse name: N/A     Number of children: N/A     Years of education: N/A     Occupational History     Not on file.     Social History Main Topics     Smoking status: Never Smoker     Smokeless tobacco: Never Used     Alcohol use 0.6 oz/week     1 Glasses of wine per week      Comment: EVERY OTHER DAY     Drug use: No     Sexual activity: No     Other Topics Concern     Not on file     Social History Narrative    Son lives in [place] and he has 2 kids    Wife Mayelin           Drug and lactation database from the [place] [employer]:  http://toxnet.nlm.nih.gov/cgi-bin/sis/htmlgen?LACT                  B/P: Data Unavailable, T: Data Unavailable, P: Data Unavailable, R: Data Unavailable 0 lbs 0 oz  There were no vitals taken for this visit., There is no height or weight on file to calculate BMI.  Medications and Vitals not listed above were documented in the cart and reviewed by me.     Current Outpatient Prescriptions   Medication Sig Dispense Refill     methimazole (TAPAZOLE) 5 MG tablet Take 1 tablet (5 mg) by mouth daily 90 tablet 1     gabapentin (NEURONTIN) 300 MG capsule Take 2 cap in am & noon; and 3 cap 2 hrs before HS= 7 cap/day. 630 capsule 3     carbidopa-levodopa (SINEMET)  MG per tablet TAKE 2 TABLETS 6 TIMES DAILY (3AM, 7AM, 11AM, 2PM, 6PM, AND 10PM) 1080 tablet 3     selegiline (ELDEPRYL) 5 MG capsule Take 1 capsule (5 mg) by mouth 2 times daily (before meals) 60 capsule 11     ofloxacin  (FLOXIN) 0.3 % otic solution Place 3 gtts in left ear BID x 3 days 10 mL 0     atorvastatin (LIPITOR) 10 MG tablet Take 1 tablet (10 mg) by mouth daily 90 tablet 3     triamcinolone (KENALOG) 0.1 % cream Apply topically 2 times daily 15 g 3     aspirin 81 MG tablet Take 1 tablet (81 mg) by mouth daily 30 tablet 0     polyethylene glycol (MIRALAX/GLYCOLAX) Packet Take by mouth At Bedtime        fluticasone (FLONASE) 50 MCG/ACT nasal spray Spray 1-2 sprays into both nostrils daily 3 Package 3         Blaine Boo MD

## 2017-12-14 NOTE — NURSING NOTE
Chief Complaint   Patient presents with     RECHECK     Movement Disorder     Edson Monterroso CMA

## 2017-12-14 NOTE — LETTER
2017      RE: Norberto Wu  [address] [address]   [facility] [id number]       Diagnosis/Summary/Recommendations:    PATIENT: Norberto Wu  72 year old male     : 1945    GAMA: 2017    Parkinson    DBS - 2.93 VOLTS AND IS RIGHT GPI AND NO OPEN CIRCUITS    Aspirin 81mg  Atorvastatin 10mg  Sinemet 25/100 2 tabs 6 times per day 3am, 7am, 11am, 2pm, 6pm, 10pm  fluticaseon  Gabapentin neurontin 300mg 2 in the am and noon and 3 caps at night  Methimazole tapazole 5mg  Ofloxacin floxin  Polyethylene floxin  Polyethylene glycol  Selegiline eldepryl 5mg 2/day - STOPPED THIS MEDICATION   triamcinolone    Has more heaviness or fogginess in his head when medications are wearing off.   Mayelin is hospital for hernia surgery at [facility].    He is having deep dull pain in his legs when he is off.   He may need happy pill  He shakes his legs to relax.  He has symptoms at 5-6 pm and ? Night time    He is napping mid morning and mid afternoon  He has terrible sleep habits    He has been on requip - had fatigue and no benefit.     He is doing weights and exercising  He has not done boxing  He has some freezing  He has more problems in small spaces.      Medications     3am 7am 11am 2pm 6pm 10pm    Sinemet 25/100 2 2 2 2 2 2    Gabapentin 300mg 2 2   3     Selegiline 5mg Not taking         Atorvastatin lipitor 10mg  1        Aspirin 81mg  1        Methimazole (tapazole) 5mg  1                      Over 50% of this visit was spent in patient care and care coordination.     History obtained from patient    Total visit time was 25 minutes    In summary he appears to have fatigue but not clearly depression  He may be having wearing off leg pain but he needs to track his dosing and when he has symptoms  He has been on requip but has not been on a low dose rotigotine patch.    PLAN  Discussed pros and cons of gabapentin (sedating and may help pain)  Reduce from 2 to 1 tabs @ 3am and 7am and continue with 3  tabs @ 6pm    Medications     3am 7am 11am 2pm 6pm 10pm    Sinemet 25/100 2 2 2 2 2 2    Gabapentin 300mg 1 1   3     Selegiline 5mg Not taking         Atorvastatin lipitor 10mg  1        Aspirin 81mg  1        Methimazole (tapazole) 5mg  1                    Next option would be to try the rotigotine neupro 1 or 2mg patch    Would recommend therapy    Return in 1-2 months to see a movement disorders fellow to review his function.           Blaine Boo MD     ______________________________________    Last visit date and details:      Parkinson  Needs new ipg  He has had problems with his speech: slurring and finding words  He has had more freezing in small spaces.  This appears to be on related freezing.  He has less wearing off.   Has had problems with problem solving.      Medications reviewed.   He feels better on gabapentin 2 capsules 3 times per day   He is on selegiline 5mg twice per day  He is on sinemet 25/100: 2 tabs 6 times per day      Was tired on requip in the past  Was groggy on seroquel.      He has had a delayed response when driving. He does not brake till he is cued.   He has had some tension in the right leg when he is driving. It happens when he puts his foot on the gas.   His freezing could be both sides of his body.      He has had dyskinesias with dystonic extension of the right arm.   When he is talking he is using large gestures - arm movements.      He is still active on programming on his computer.      He is able to exercise with treadmill and weights.      PLAN  1. New ipg: had device placed 2014 - 2.5 yrs ago. Wondering about rechargeable.   2. Therapy ordered evaluate and treat. Big and loud therapy.   3. Discussed boxing   4. Wait on repeat neuropsychology.   5. Return to see terence in 6 months     Blaine boo mD      Answers for HPI/ROS submitted by the patient on 12/14/2017   General Symptoms: No  Skin Symptoms: No  HENT Symptoms: Yes  EYE SYMPTOMS: No  HEART SYMPTOMS: No  LUNG  "SYMPTOMS: Yes  INTESTINAL SYMPTOMS: No  URINARY SYMPTOMS: No  REPRODUCTIVE SYMPTOMS: No  SKELETAL SYMPTOMS: No  BLOOD SYMPTOMS: No  NERVOUS SYSTEM SYMPTOMS: Yes  MENTAL HEALTH SYMPTOMS: No  Ear pain: No  Ear discharge: Yes  Hearing loss: No  Tinnitus: No  Nosebleeds: No  Congestion: No  Sinus pain: No  Trouble swallowing: No   Voice hoarseness: No  Mouth sores: No  Sore throat: No  Tooth pain: No  Gum tenderness: No  Bleeding gums: No  Change in taste: No  Change in sense of smell: No  Dry mouth: No  Hearing aid used: No  Neck lump: No  Cough: No  Sputum or phlegm: No  Coughing up blood: No  Difficulty breating or shortness of breath: Yes  Snoring: No  Wheezing: No  Difficulty breathing on exertion: No  Nighttime Cough: No  Difficulty breathing when lying flat: No  Trouble with coordination: No  Dizziness or trouble with balance: Yes  Fainting or black-out spells: No  Memory loss: No  Headache: No  Seizures: No  Speech problems: Yes  Tingling: No  Tremor: No  Weakness: Yes  Difficulty walking: Yes  Paralysis: No  Numbness: No      PATIENT: Norberto Wu     : 1945     GAMA: 2017     REASON FOR VISIT: Parkinson's disease (PD) follow up & DBS interrogation & analysis.     HPI: Mr. Norberto Wu is a 71 year old  male who came to the [facility] neurology clinic accompanied by his wife for a follow up visit.  He was last seen in the clinic on 2016 by Dr. Boo for a routine PD f/u visit.     Since then, his DBS generator was replaced by Dr. Fair on .       He is exercises, but not as he did before.   He is doing back pain & does back exercises.      Constipation has been an issue but now stable.  He has cut down MiraLax from daily to 2 - 3 x a week due to diarrhea.      Freezing of gait is an issue when initiating gait & in tight spaces.  Freezing of gait can occur any time of the day regardless of his Levodopa dose.  Voice is \"disappearing.\"  He hasn't been doing the Big & Loud " therapy exercises.  He reports more Off symptoms than ON.  He is taking Sinemet 25/100 mg 2 tabs every 3 - 4 x daily.  Today he forgot to take his 11 am dose & came to clinic & took it at 1 pm & is doing well.  At times his legs bother him & keep him from falling asleep.      He wanted to know if people die from PD & what it would look like; What to expect as the disease progresses; Asked about new treatments available . . .         Answers for HPI/ROS submitted by the patient on 6/14/2017   General Symptoms: No  Skin Symptoms: No  HENT Symptoms: No  EYE SYMPTOMS: No  HEART SYMPTOMS: No  LUNG SYMPTOMS: No  INTESTINAL SYMPTOMS: Yes  URINARY SYMPTOMS: No  REPRODUCTIVE SYMPTOMS: No  SKELETAL SYMPTOMS: No  BLOOD SYMPTOMS: No  NERVOUS SYSTEM SYMPTOMS: No  MENTAL HEALTH SYMPTOMS: No  Heart burn or indigestion: No  Nausea: No  Vomiting: No  Abdominal pain: No  Bloating: Yes  Constipation: Yes  Diarrhea: No  Black stools: No  Fecal incontinence: No  Rectal bleeding: No  Yellowing of skin or eyes: No  Vomit with blood: No  Change in stools: No     MEDICATIONS:        Medication Sig     ofloxacin (FLOXIN) 0.3 % otic solution Place 3 gtts in left ear BID x 3 days     atorvastatin (LIPITOR) 10 MG tablet Take 1 tablet (10 mg) by mouth daily     triamcinolone (KENALOG) 0.1 % cream Apply topically 2 times daily     aspirin 81 MG tablet Take 1 tablet (81 mg) by mouth daily     polyethylene glycol (MIRALAX/GLYCOLAX) Packet Take by mouth At Bedtime      carbidopa-levodopa (SINEMET)  MG per tablet TAKE 2 TABLETS 6 TIMES DAILY (3AM, 7AM, 11AM, 2PM, 6PM, AND 10PM)     methimazole (TAPAZOLE) 5 MG  Take 1 tablet (5 mg) by mouth daily     selegiline (ELDEPRYL) 5 MG capsule Take 1 capsule (5 mg) by mouth 2 times daily (before meals)     gabapentin (NEURONTIN) 300 MG capsule TAKE 2 CAPSULES 3 TIMES A DAY (TAKE LAST DOSE 2 HOURS BEFORE BEDTIME)     fluticasone (FLONASE) 50 MCG/ACT nasal spray Spray 1-2 sprays into both nostrils daily  "        ALLERGIES: Hydromorphone; Comtan; Camphor; Hydrocodone; Hydrocodone-acetaminophen; Melatonin; Ropinirole; Seroquel; Adhesive tape; Camphor; and Liquid adhesive.      PHYSICAL EXAM:     VITAL SIGNS:  Blood pressure 144/82, pulse 78, temperature 97.8  F (36.6  C), resp. rate 20, height 1.816 m (5' 11.5\"), weight 93.9 kg (207 lb), SpO2 100 %.  Body mass index is 28.47 kg/(m^2).     GENERAL:  Mr. Wu is a pleasant  male who is well-groomed and well-developed sitting comfortably in the exam room without any distress.  Affect is appropriate.     MOVEMENT DISORDERS ASSESSMENT: (Last Sinemet was about 6 hrs ago)  Speech: Slight loss of expression and volume.  Facial Expression: Slight, abnormal diminution of facial expression.  Rest Tremor: Absent.   Action/Postural Tremor: Absent.   Rigidity: Slight in RUE; Absent in all extremities.   Finger Taps: Mild reduction in amplitude bilaterally.  Hand opening & closing: Mild slowing and reduction in amplitude Rt hand; Moderately impaired; occasional arrests in movement in Lt.   Hand pronation & supination: Mild slowing and reduction in amplitude Rt hand; Moderately impaired; occasional arrests in movement in Lt.   Leg Agility: Normal. and in Rt leg; Mild reduction in amplitude; occasional arrests in movement.  Arising from chair - arms folded across chest: Slow; or may need more than one attempt.  Posture: Slightly stooped posture.  Gait: Walks slowly, shuffles with short steps. No festination or propulsion.  Postural Stability: Retropulsion, but recovers unaided.  Body Bradykinesia: Mild degree of slowness and poverty of movement.      DBS Interrogation & Analysis:      Implanted generator: Right Activa-SC.  Lead placement: Right Globus Pallidus Internus (Gpi).      Impedance Check:       Therapy On: 100%  Battery Service Life: OK. 2.95 volts.      Right Gpi  C +, 1 -  Volts: 4.0 volts  PW: 90 msec  Rate: 185 Hz      Electrode Impedance Check: (Amplitude 3.0 " volts: PW 80 msec: Rate 100 Hz)    Right Lead: No Problems Found.      See scanned report for impedance details.     ASSESSMENT/PLAN:     Parkinson's Disease:  He has a Hx of PD since 2008, s/p Right Gpi DBS therapy.  Tremors are controlled.  He has freezing of gait that is not responsive to Levodopa.  Voice is soft.     __  Discussing a refresher & going back to PT & Speech Therapy.  He opted to wait & promised to do the Big & Loud Therapy exercises at home.   __  Discussed progression of PD.   __  Will stay on the same dose of Sinemet.   __  Encouraged to take Sinemet with carbonated beverage.   __  DBS interrogation & analysis is normal.    __  Will return to our clinic in 6 months or sooner as needed.     The total amount of time spent with the patient was 45 minutes; 20 min in DBS interrogation & analysis and 25 min in addressing PD, Gait & balance problems, & speech issues.  50% of the time was spent in counseling & coordination of care.         Lucy Becker APRN,  CNP  [facility]        ______________________________________      Patient was asked about 14 Review of systems including changes in vision (dry eyes, double vision), hearing, heart, lungs, musculoskeletal, depression, anxiety, snoring, RBD, insomnia, urinary frequency, urinary urgency, constipation, swallowing problems, hematological, ID, allergies, skin problems: seborrhea, endocrinological: thyroid, diabetes, cholesterol; balance, weight changes, and other neurological problems and these were not significant at this time except for   Patient Active Problem List   Diagnosis     Parkinson disease (H)     Peripheral neuropathy     Somnolence     Family history of Parkinson's disease     Snores     REM sleep behavior disorder     Prostate cancer (H)     Wears glasses     Insomnia     Constipation     History of MRI of brain and brain stem     PFO (patent foramen ovale)     Cerebellar stroke syndrome     H/O echocardiogram     Restless  "leg syndrome     S/P brain surgery     Hamstring tendonitis at origin     Subclinical hyperthyroidism     Parkinson's disease (H)     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  Stayed in the hospital 3 days due to severe nausea & \"retching\" .      Comtan Diarrhea     Camphor      Camphor HELENA     Hydrocodone      Other reaction(s): Headache, Nausea Only     Hydrocodone-Acetaminophen Hives and Nausea     Melatonin Other (See Comments)     No benefit.     Ropinirole Fatigue     Fatigue no benefit.      Seroquel [Quetiapine]      Groggy and aggravated rls  No benefit     Adhesive Tape Rash     Camphor Rash     Other reaction(s): Rash     Liquid Adhesive Rash     Past Surgical History:   Procedure Laterality Date     COLONOSCOPY N/A 3/23/2017    Procedure: COLONOSCOPY;  Surgeon: Salbador Paulson MD;  Location: [facility]     PROSTATE SURGERY  3 yrs ago    prostate cancer; Dr. Valladares     REPLACE DEEP BRAIN STIMULATION GENERATOR / BATTERY Right 1/3/2017    Procedure: REPLACE DEEP BRAIN STIMULATION GENERATOR / BATTERY;  Surgeon: Anupam Fair MD;  Location: [facility] OR     Right Gpi DBS Lead Implantation  5/20/2014     Rt Chest DBS Generator Placement Activa SC  5/29/2014     Past Medical History:   Diagnosis Date     Cerebellar stroke syndrome 8/21/2013    Few small foci of chronic infarct in the right cerebellar hemisphere, unchanged as well as a new focus of now chronic infarct in the left cerebellar hemisphere since the last study.      Dyslipidemia      Hyperthyroidism      Insomnia 6/17/2011     Parkinson disease (H) 6/15/2011     Peripheral neuropathy 6/15/2011     PFO (patent foramen " bakari) 8/21/2013    Interpretation Summary 1. Normal LV size and systolic function. Normal diastolic function 2.  Normal RV size and function 3. No significant valvular abnormalities 4.  Small right-to-left shunt visualized with intravenous agitated saline  contrast study, suggestive of PFO. PatientHeight: 72 in PatientWeight: 205 lbs SystolicPressure: 100 mmHg DiastolicPressure: 63 mmHg BSA 2.2 m^2   Procedure Echoc     PONV (postoperative nausea and vomiting)      Prostate cancer (H) 6/17/2011    s/p radical prostectomy     Social History     Social History     Marital status:      Spouse name: N/A     Number of children: N/A     Years of education: N/A     Occupational History     Not on file.     Social History Main Topics     Smoking status: Never Smoker     Smokeless tobacco: Never Used     Alcohol use 0.6 oz/week     1 Glasses of wine per week      Comment: EVERY OTHER DAY     Drug use: No     Sexual activity: No     Other Topics Concern     Not on file     Social History Narrative    Son lives in [place] and he has 2 kids    Wife Mayelin           Drug and lactation database from the [place] [employer] of [employer]:  http://toxnet.nlm.nih.gov/cgi-bin/sis/htmlgen?LACT                  B/P: Data Unavailable, T: Data Unavailable, P: Data Unavailable, R: Data Unavailable 0 lbs 0 oz  There were no vitals taken for this visit., There is no height or weight on file to calculate BMI.  Medications and Vitals not listed above were documented in the cart and reviewed by me.     Current Outpatient Prescriptions   Medication Sig Dispense Refill     methimazole (TAPAZOLE) 5 MG tablet Take 1 tablet (5 mg) by mouth daily 90 tablet 1     gabapentin (NEURONTIN) 300 MG capsule Take 2 cap in am & noon; and 3 cap 2 hrs before HS= 7 cap/day. 630 capsule 3     carbidopa-levodopa (SINEMET)  MG per tablet TAKE 2 TABLETS 6 TIMES DAILY (3AM, 7AM, 11AM, 2PM, 6PM, AND 10PM) 1080 tablet 3     selegiline (ELDEPRYL) 5  MG capsule Take 1 capsule (5 mg) by mouth 2 times daily (before meals) 60 capsule 11     ofloxacin (FLOXIN) 0.3 % otic solution Place 3 gtts in left ear BID x 3 days 10 mL 0     atorvastatin (LIPITOR) 10 MG tablet Take 1 tablet (10 mg) by mouth daily 90 tablet 3     triamcinolone (KENALOG) 0.1 % cream Apply topically 2 times daily 15 g 3     aspirin 81 MG tablet Take 1 tablet (81 mg) by mouth daily 30 tablet 0     polyethylene glycol (MIRALAX/GLYCOLAX) Packet Take by mouth At Bedtime        fluticasone (FLONASE) 50 MCG/ACT nasal spray Spray 1-2 sprays into both nostrils daily 3 Package 3         Blaine Boo MD

## 2017-12-14 NOTE — PATIENT INSTRUCTIONS
dorita  72 year old male     : 1945    GAMA: 2017    Parkinson    DBS - 2.93 VOLTS AND IS RIGHT GPI AND NO OPEN CIRCUITS    Aspirin 81mg  Atorvastatin 10mg  Sinemet 25/100 2 tabs 6 times per day 3am, 7am, 11am, 2pm, 6pm, 10pm  fluticaseon  Gabapentin neurontin 300mg 2 in the am and noon and 3 caps at night  Methimazole tapazole 5mg  Ofloxacin floxin  Polyethylene floxin  Polyethylene glycol  Selegiline eldepryl 5mg 2/day - STOPPED THIS MEDICATION   triamcinolone    Has more heaviness or fogginess in his head when medications are wearing off.   Mayelin is hospital for hernia surgery at [facility].    He is having deep dull pain in his legs when he is off.   He may need happy pill  He shakes his legs to relax.  He has symptoms at 5-6 pm and ? Night time    He is napping mid morning and mid afternoon  He has terrible sleep habits    He has been on requip - had fatigue and no benefit.     He is doing weights and exercising  He has not done boxing  He has some freezing  He has more problems in small spaces.      Medications     3am 7am 11am 2pm 6pm 10pm    Sinemet 25/100 2 2 2 2 2 2    Gabapentin 300mg 2 2   3     Selegiline 5mg Not taking         Atorvastatin lipitor 10mg  1        Aspirin 81mg  1        Methimazole (tapazole) 5mg  1                      Over 50% of this visit was spent in patient care and care coordination.     History obtained from patient    Total visit time was 25 minutes    In summary he appears to have fatigue but not clearly depression  He may be having wearing off leg pain but he needs to track his dosing and when he has symptoms  He has been on requip but has not been on a low dose rotigotine patch.    PLAN  Discussed pros and cons of gabapentin (sedating and may help pain)  Reduce from 2 to 1 tabs @ 3am and 7am and continue with 3 tabs @ 6pm    Medications     3am 7am 11am 2pm 6pm 10pm    Sinemet 25/100 2 2 2 2 2 2    Gabapentin 300mg 1 1   3     Selegiline 5mg Not taking          Atorvastatin lipitor 10mg  1        Aspirin 81mg  1        Methimazole (tapazole) 5mg  1                    Next option would be to try the rotigotine neupro 1 or 2mg patch    Would recommend therapy    Return in 1-2 months to see a movement disorders fellow to review his function.           Blaine Boo MD

## 2017-12-14 NOTE — Clinical Note
2017       RE: Norberto Wu  [address]   [place] [facility] [address]     Dear Colleague,    Thank you for referring your patient, Norberto Wu, to the [facility] NEUROLOGY at [facility]. Please see a copy of my visit note below.    Diagnosis/Summary/Recommendations:    PATIENT: Norberto Wu  72 year old male     : 1945    GAMA: 2017    parkinson    Aspirin 81mg  Atorvastatin 10mg  Sinemet 25/100 2 tabs 6 times per day 3am, 7am, 11am, 2pm, 6pm, 10pm  fluticaseon  Gabapentin neurontin 300mg 2 in the am and noon and 3 caps at night  Methimazole tapazole 5mg  Ofloxacin floxin  Polyethylene floxin  Polyethylene glycol  Selegiline eldepryl 5mg 2/day  triamcinolone    Medications     3am 7am 11am 2pm 6pm 10pm    Sinemet 25/100 2 2 2 2 2 2    Gabapentin 300mg 2  2   3    Selegiline 5mg  1 1                     Over 50% of this visit was spent in patient care and care coordination.     History obtained from patient    Total visit time was 25 minutes      Blaine Boo MD     ______________________________________    Last visit date and details:      Parkinson  Needs new ipg  He has had problems with his speech: slurring and finding words  He has had more freezing in small spaces.  This appears to be on related freezing.  He has less wearing off.   Has had problems with problem solving.      Medications reviewed.   He feels better on gabapentin 2 capsules 3 times per day   He is on selegiline 5mg twice per day  He is on sinemet 25/100: 2 tabs 6 times per day      Was tired on requip in the past  Was groggy on seroquel.      He has had a delayed response when driving. He does not brake till he is cued.   He has had some tension in the right leg when he is driving. It happens when he puts his foot on the gas.   His freezing could be both sides of his body.      He has had dyskinesias with dystonic extension of the right arm.   When he is talking  "he is using large gestures - arm movements.      He is still active on programming on his computer.      He is able to exercise with treadmill and weights.      PLAN  1. New ipg: had device placed 2014 - 2.5 yrs ago. Wondering about rechargeable.   2. Therapy ordered evaluate and treat. Big and loud therapy.   3. Discussed boxing   4. Wait on repeat neuropsychology.   5. Return to see terence in 6 months     Blaine boo mD          PATIENT: Norberto Wu     : 1945     GAMA: 2017     REASON FOR VISIT: Parkinson's disease (PD) follow up & DBS interrogation & analysis.     HPI: Mr. Norberto Wu is a 71 year old  male who came to the [facility] neurology clinic accompanied by his wife for a follow up visit.  He was last seen in the clinic on 2016 by Dr. Boo for a routine PD f/u visit.     Since then, his DBS generator was replaced by Dr. Fair on .       He is exercises, but not as he did before.   He is doing back pain & does back exercises.      Constipation has been an issue but now stable.  He has cut down MiraLax from daily to 2 - 3 x a week due to diarrhea.      Freezing of gait is an issue when initiating gait & in tight spaces.  Freezing of gait can occur any time of the day regardless of his Levodopa dose.  Voice is \"disappearing.\"  He hasn't been doing the Big & Loud therapy exercises.  He reports more Off symptoms than ON.  He is taking Sinemet 25/100 mg 2 tabs every 3 - 4 x daily.  Today he forgot to take his 11 am dose & came to clinic & took it at 1 pm & is doing well.  At times his legs bother him & keep him from falling asleep.      He wanted to know if people die from PD & what it would look like; What to expect as the disease progresses; Asked about new treatments available . . .         Answers for HPI/ROS submitted by the patient on 2017   General Symptoms: No  Skin Symptoms: No  HENT Symptoms: No  EYE SYMPTOMS: No  HEART SYMPTOMS: No  LUNG SYMPTOMS: " "No  INTESTINAL SYMPTOMS: Yes  URINARY SYMPTOMS: No  REPRODUCTIVE SYMPTOMS: No  SKELETAL SYMPTOMS: No  BLOOD SYMPTOMS: No  NERVOUS SYSTEM SYMPTOMS: No  MENTAL HEALTH SYMPTOMS: No  Heart burn or indigestion: No  Nausea: No  Vomiting: No  Abdominal pain: No  Bloating: Yes  Constipation: Yes  Diarrhea: No  Black stools: No  Fecal incontinence: No  Rectal bleeding: No  Yellowing of skin or eyes: No  Vomit with blood: No  Change in stools: No     MEDICATIONS:        Medication Sig     ofloxacin (FLOXIN) 0.3 % otic solution Place 3 gtts in left ear BID x 3 days     atorvastatin (LIPITOR) 10 MG tablet Take 1 tablet (10 mg) by mouth daily     triamcinolone (KENALOG) 0.1 % cream Apply topically 2 times daily     aspirin 81 MG tablet Take 1 tablet (81 mg) by mouth daily     polyethylene glycol (MIRALAX/GLYCOLAX) Packet Take by mouth At Bedtime      carbidopa-levodopa (SINEMET)  MG per tablet TAKE 2 TABLETS 6 TIMES DAILY (3AM, 7AM, 11AM, 2PM, 6PM, AND 10PM)     methimazole (TAPAZOLE) 5 MG  Take 1 tablet (5 mg) by mouth daily     selegiline (ELDEPRYL) 5 MG capsule Take 1 capsule (5 mg) by mouth 2 times daily (before meals)     gabapentin (NEURONTIN) 300 MG capsule TAKE 2 CAPSULES 3 TIMES A DAY (TAKE LAST DOSE 2 HOURS BEFORE BEDTIME)     fluticasone (FLONASE) 50 MCG/ACT nasal spray Spray 1-2 sprays into both nostrils daily         ALLERGIES: Hydromorphone; Comtan; Camphor; Hydrocodone; Hydrocodone-acetaminophen; Melatonin; Ropinirole; Seroquel; Adhesive tape; Camphor; and Liquid adhesive.      PHYSICAL EXAM:     VITAL SIGNS:  Blood pressure 144/82, pulse 78, temperature 97.8  F (36.6  C), resp. rate 20, height 1.816 m (5' 11.5\"), weight 93.9 kg (207 lb), SpO2 100 %.  Body mass index is 28.47 kg/(m^2).     GENERAL:  Mr. Wu is a pleasant  male who is well-groomed and well-developed sitting comfortably in the exam room without any distress.  Affect is appropriate.     MOVEMENT DISORDERS ASSESSMENT: (Last Sinemet " was about 6 hrs ago)  Speech: Slight loss of expression and volume.  Facial Expression: Slight, abnormal diminution of facial expression.  Rest Tremor: Absent.   Action/Postural Tremor: Absent.   Rigidity: Slight in RUE; Absent in all extremities.   Finger Taps: Mild reduction in amplitude bilaterally.  Hand opening & closing: Mild slowing and reduction in amplitude Rt hand; Moderately impaired; occasional arrests in movement in Lt.   Hand pronation & supination: Mild slowing and reduction in amplitude Rt hand; Moderately impaired; occasional arrests in movement in Lt.   Leg Agility: Normal. and in Rt leg; Mild reduction in amplitude; occasional arrests in movement.  Arising from chair - arms folded across chest: Slow; or may need more than one attempt.  Posture: Slightly stooped posture.  Gait: Walks slowly, shuffles with short steps. No festination or propulsion.  Postural Stability: Retropulsion, but recovers unaided.  Body Bradykinesia: Mild degree of slowness and poverty of movement.      DBS Interrogation & Analysis:      Implanted generator: Right Activa-SC.  Lead placement: Right Globus Pallidus Internus (Gpi).      Impedance Check:       Therapy On: 100%  Battery Service Life: OK. 2.95 volts.      Right Gpi  C +, 1 -  Volts: 4.0 volts  PW: 90 msec  Rate: 185 Hz      Electrode Impedance Check: (Amplitude 3.0 volts: PW 80 msec: Rate 100 Hz)    Right Lead: No Problems Found.      See scanned report for impedance details.     ASSESSMENT/PLAN:     Parkinson's Disease:  He has a Hx of PD since 2008, s/p Right Gpi DBS therapy.  Tremors are controlled.  He has freezing of gait that is not responsive to Levodopa.  Voice is soft.     __  Discussing a refresher & going back to PT & Speech Therapy.  He opted to wait & promised to do the Big & Loud Therapy exercises at home.   __  Discussed progression of PD.   __  Will stay on the same dose of Sinemet.   __  Encouraged to take Sinemet with carbonated beverage.   __   "DBS interrogation & analysis is normal.    __  Will return to our clinic in 6 months or sooner as needed.     The total amount of time spent with the patient was 45 minutes; 20 min in DBS interrogation & analysis and 25 min in addressing PD, Gait & balance problems, & speech issues.  50% of the time was spent in counseling & coordination of care.         Lucy Becker APRN,  CNP  [facility]        ______________________________________      Patient was asked about 14 Review of systems including changes in vision (dry eyes, double vision), hearing, heart, lungs, musculoskeletal, depression, anxiety, snoring, RBD, insomnia, urinary frequency, urinary urgency, constipation, swallowing problems, hematological, ID, allergies, skin problems: seborrhea, endocrinological: thyroid, diabetes, cholesterol; balance, weight changes, and other neurological problems and these were not significant at this time except for   Patient Active Problem List   Diagnosis     Parkinson disease (H)     Peripheral neuropathy     Somnolence     Family history of Parkinson's disease     Snores     REM sleep behavior disorder     Prostate cancer (H)     Wears glasses     Insomnia     Constipation     History of MRI of brain and brain stem     PFO (patent foramen ovale)     Cerebellar stroke syndrome     H/O echocardiogram     Restless leg syndrome     S/P brain surgery     Hamstring tendonitis at origin     Subclinical hyperthyroidism     Parkinson's disease (H)     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  Stayed in the hospital 3 days due to severe nausea & \"retching\" .      Comtan Diarrhea     Camphor     "  Camphor HELENA     Hydrocodone      Other reaction(s): Headache, Nausea Only     Hydrocodone-Acetaminophen Hives and Nausea     Melatonin Other (See Comments)     No benefit.     Ropinirole Fatigue     Fatigue no benefit.      Seroquel [Quetiapine]      Groggy and aggravated rls  No benefit     Adhesive Tape Rash     Camphor Rash     Other reaction(s): Rash     Liquid Adhesive Rash     Past Surgical History:   Procedure Laterality Date     COLONOSCOPY N/A 3/23/2017    Procedure: COLONOSCOPY;  Surgeon: Salbador Paulson MD;  Location: [facility]     PROSTATE SURGERY  3 yrs ago    prostate cancer; Dr. Valladares     REPLACE DEEP BRAIN STIMULATION GENERATOR / BATTERY Right 1/3/2017    Procedure: REPLACE DEEP BRAIN STIMULATION GENERATOR / BATTERY;  Surgeon: Anupam Fair MD;  Location: [facility] OR     Right Gpi DBS Lead Implantation  5/20/2014     Rt Chest DBS Generator Placement Activa SC  5/29/2014     Past Medical History:   Diagnosis Date     Cerebellar stroke syndrome 8/21/2013    Few small foci of chronic infarct in the right cerebellar hemisphere, unchanged as well as a new focus of now chronic infarct in the left cerebellar hemisphere since the last study.      Dyslipidemia      Hyperthyroidism      Insomnia 6/17/2011     Parkinson disease (H) 6/15/2011     Peripheral neuropathy 6/15/2011     PFO (patent foramen ovale) 8/21/2013    Interpretation Summary 1. Normal LV size and systolic function. Normal diastolic function 2.  Normal RV size and function 3. No significant valvular abnormalities 4.  Small right-to-left shunt visualized with intravenous agitated saline  contrast study, suggestive of PFO. PatientHeight: 72 in PatientWeight: 205 lbs SystolicPressure: 100 mmHg DiastolicPressure: 63 mmHg BSA 2.2 m^2   Procedure Echoc     PONV (postoperative nausea and vomiting)      Prostate cancer (H) 6/17/2011    s/p radical prostectomy     Social History     Social History     Marital status:      Spouse name:  N/A     Number of children: N/A     Years of education: N/A     Occupational History     Not on file.     Social History Main Topics     Smoking status: Never Smoker     Smokeless tobacco: Never Used     Alcohol use 0.6 oz/week     1 Glasses of wine per week      Comment: EVERY OTHER DAY     Drug use: No     Sexual activity: No     Other Topics Concern     Not on file     Social History Narrative    Son lives in [place] and he has 2 kids    Wife Mayelin           Drug and lactation database from the [place] [employer]:  http://toxnet.nlm.nih.gov/cgi-bin/sis/htmlgen?LACT                  B/P: Data Unavailable, T: Data Unavailable, P: Data Unavailable, R: Data Unavailable 0 lbs 0 oz  There were no vitals taken for this visit., There is no height or weight on file to calculate BMI.  Medications and Vitals not listed above were documented in the cart and reviewed by me.     Current Outpatient Prescriptions   Medication Sig Dispense Refill     methimazole (TAPAZOLE) 5 MG tablet Take 1 tablet (5 mg) by mouth daily 90 tablet 1     gabapentin (NEURONTIN) 300 MG capsule Take 2 cap in am & noon; and 3 cap 2 hrs before HS= 7 cap/day. 630 capsule 3     carbidopa-levodopa (SINEMET)  MG per tablet TAKE 2 TABLETS 6 TIMES DAILY (3AM, 7AM, 11AM, 2PM, 6PM, AND 10PM) 1080 tablet 3     selegiline (ELDEPRYL) 5 MG capsule Take 1 capsule (5 mg) by mouth 2 times daily (before meals) 60 capsule 11     ofloxacin (FLOXIN) 0.3 % otic solution Place 3 gtts in left ear BID x 3 days 10 mL 0     atorvastatin (LIPITOR) 10 MG tablet Take 1 tablet (10 mg) by mouth daily 90 tablet 3     triamcinolone (KENALOG) 0.1 % cream Apply topically 2 times daily 15 g 3     aspirin 81 MG tablet Take 1 tablet (81 mg) by mouth daily 30 tablet 0     polyethylene glycol (MIRALAX/GLYCOLAX) Packet Take by mouth At Bedtime        fluticasone (FLONASE) 50 MCG/ACT nasal spray Spray 1-2 sprays into both nostrils daily 3 Package 3         Blaine  Ema ASHLEY    Diagnosis/Summary/Recommendations:    PATIENT: Norberto Wu  72 year old male     : 1945    GAMA: 2017    Parkinson    DBS - 2.93 VOLTS AND IS RIGHT GPI AND NO OPEN CIRCUITS    Aspirin 81mg  Atorvastatin 10mg  Sinemet 25/100 2 tabs 6 times per day 3am, 7am, 11am, 2pm, 6pm, 10pm  fluticaseon  Gabapentin neurontin 300mg 2 in the am and noon and 3 caps at night  Methimazole tapazole 5mg  Ofloxacin floxin  Polyethylene floxin  Polyethylene glycol  Selegiline eldepryl 5mg 2/day - STOPPED THIS MEDICATION   triamcinolone    Has more heaviness or fogginess in his head when medications are wearing off.   Mayelin is hospital for hernia surgery at [facility].    He is having deep dull pain in his legs when he is off.   He may need happy pill  He shakes his legs to relax.  He has symptoms at 5-6 pm and ? Night time    He is napping mid morning and mid afternoon  He has terrible sleep habits    He has been on requp - had fatigue and no     Medications     3am 7am 11am 2pm 6pm 10pm    Sinemet 25/100 2 2 2 2 2 2    Gabapentin 300mg 2 2   3     Selegiline 5mg Not taking                       Over 50% of this visit was spent in patient care and care coordination.     History obtained from patient    Total visit time was 25 minutes      Blaine Boo MD     ______________________________________    Last visit date and details:      Parkinson  Needs new ipg  He has had problems with his speech: slurring and finding words  He has had more freezing in small spaces.  This appears to be on related freezing.  He has less wearing off.   Has had problems with problem solving.      Medications reviewed.   He feels better on gabapentin 2 capsules 3 times per day   He is on selegiline 5mg twice per day  He is on sinemet 25/100: 2 tabs 6 times per day      Was tired on requip in the past  Was groggy on seroquel.      He has had a delayed response when driving. He does not brake till he is cued.   He has had some tension in  the right leg when he is driving. It happens when he puts his foot on the gas.   His freezing could be both sides of his body.      He has had dyskinesias with dystonic extension of the right arm.   When he is talking he is using large gestures - arm movements.      He is still active on programming on his computer.      He is able to exercise with treadmill and weights.      PLAN  1. New ipg: had device placed 2014 - 2.5 yrs ago. Wondering about rechargeable.   2. Therapy ordered evaluate and treat. Big and loud therapy.   3. Discussed boxing   4. Wait on repeat neuropsychology.   5. Return to see terence in 6 months     Blaine skinner mD      Answers for HPI/ROS submitted by the patient on 2017   General Symptoms: No  Skin Symptoms: No  HENT Symptoms: Yes  EYE SYMPTOMS: No  HEART SYMPTOMS: No  LUNG SYMPTOMS: Yes  INTESTINAL SYMPTOMS: No  URINARY SYMPTOMS: No  REPRODUCTIVE SYMPTOMS: No  SKELETAL SYMPTOMS: No  BLOOD SYMPTOMS: No  NERVOUS SYSTEM SYMPTOMS: Yes  MENTAL HEALTH SYMPTOMS: No  Ear pain: No  Ear discharge: Yes  Hearing loss: No  Tinnitus: No  Nosebleeds: No  Congestion: No  Sinus pain: No  Trouble swallowing: No   Voice hoarseness: No  Mouth sores: No  Sore throat: No  Tooth pain: No  Gum tenderness: No  Bleeding gums: No  Change in taste: No  Change in sense of smell: No  Dry mouth: No  Hearing aid used: No  Neck lump: No  Cough: No  Sputum or phlegm: No  Coughing up blood: No  Difficulty breating or shortness of breath: Yes  Snoring: No  Wheezing: No  Difficulty breathing on exertion: No  Nighttime Cough: No  Difficulty breathing when lying flat: No  Trouble with coordination: No  Dizziness or trouble with balance: Yes  Fainting or black-out spells: No  Memory loss: No  Headache: No  Seizures: No  Speech problems: Yes  Tingling: No  Tremor: No  Weakness: Yes  Difficulty walking: Yes  Paralysis: No  Numbness: No      PATIENT: Norberto Wu     : 1945     GAMA: 2017     REASON FOR VISIT:  "Parkinson's disease (PD) follow up & DBS interrogation & analysis.     HPI: Mr. Norberto Wu is a 71 year old  male who came to the [facility] neurology clinic accompanied by his wife for a follow up visit.  He was last seen in the clinic on 12/13/2016 by Dr. Boo for a routine PD f/u visit.     Since then, his DBS generator was replaced by Dr. Fair on Jan 3rd.       He is exercises, but not as he did before.   He is doing back pain & does back exercises.      Constipation has been an issue but now stable.  He has cut down MiraLax from daily to 2 - 3 x a week due to diarrhea.      Freezing of gait is an issue when initiating gait & in tight spaces.  Freezing of gait can occur any time of the day regardless of his Levodopa dose.  Voice is \"disappearing.\"  He hasn't been doing the Big & Loud therapy exercises.  He reports more Off symptoms than ON.  He is taking Sinemet 25/100 mg 2 tabs every 3 - 4 x daily.  Today he forgot to take his 11 am dose & came to clinic & took it at 1 pm & is doing well.  At times his legs bother him & keep him from falling asleep.      He wanted to know if people die from PD & what it would look like; What to expect as the disease progresses; Asked about new treatments available . . .         Answers for HPI/ROS submitted by the patient on 6/14/2017   General Symptoms: No  Skin Symptoms: No  HENT Symptoms: No  EYE SYMPTOMS: No  HEART SYMPTOMS: No  LUNG SYMPTOMS: No  INTESTINAL SYMPTOMS: Yes  URINARY SYMPTOMS: No  REPRODUCTIVE SYMPTOMS: No  SKELETAL SYMPTOMS: No  BLOOD SYMPTOMS: No  NERVOUS SYSTEM SYMPTOMS: No  MENTAL HEALTH SYMPTOMS: No  Heart burn or indigestion: No  Nausea: No  Vomiting: No  Abdominal pain: No  Bloating: Yes  Constipation: Yes  Diarrhea: No  Black stools: No  Fecal incontinence: No  Rectal bleeding: No  Yellowing of skin or eyes: No  Vomit with blood: No  Change in stools: No     MEDICATIONS:        Medication Sig     ofloxacin (FLOXIN) 0.3 % otic solution Place 3 " "gtts in left ear BID x 3 days     atorvastatin (LIPITOR) 10 MG tablet Take 1 tablet (10 mg) by mouth daily     triamcinolone (KENALOG) 0.1 % cream Apply topically 2 times daily     aspirin 81 MG tablet Take 1 tablet (81 mg) by mouth daily     polyethylene glycol (MIRALAX/GLYCOLAX) Packet Take by mouth At Bedtime      carbidopa-levodopa (SINEMET)  MG per tablet TAKE 2 TABLETS 6 TIMES DAILY (3AM, 7AM, 11AM, 2PM, 6PM, AND 10PM)     methimazole (TAPAZOLE) 5 MG  Take 1 tablet (5 mg) by mouth daily     selegiline (ELDEPRYL) 5 MG capsule Take 1 capsule (5 mg) by mouth 2 times daily (before meals)     gabapentin (NEURONTIN) 300 MG capsule TAKE 2 CAPSULES 3 TIMES A DAY (TAKE LAST DOSE 2 HOURS BEFORE BEDTIME)     fluticasone (FLONASE) 50 MCG/ACT nasal spray Spray 1-2 sprays into both nostrils daily         ALLERGIES: Hydromorphone; Comtan; Camphor; Hydrocodone; Hydrocodone-acetaminophen; Melatonin; Ropinirole; Seroquel; Adhesive tape; Camphor; and Liquid adhesive.      PHYSICAL EXAM:     VITAL SIGNS:  Blood pressure 144/82, pulse 78, temperature 97.8  F (36.6  C), resp. rate 20, height 1.816 m (5' 11.5\"), weight 93.9 kg (207 lb), SpO2 100 %.  Body mass index is 28.47 kg/(m^2).     GENERAL:  Mr. Wu is a pleasant  male who is well-groomed and well-developed sitting comfortably in the exam room without any distress.  Affect is appropriate.     MOVEMENT DISORDERS ASSESSMENT: (Last Sinemet was about 6 hrs ago)  Speech: Slight loss of expression and volume.  Facial Expression: Slight, abnormal diminution of facial expression.  Rest Tremor: Absent.   Action/Postural Tremor: Absent.   Rigidity: Slight in RUE; Absent in all extremities.   Finger Taps: Mild reduction in amplitude bilaterally.  Hand opening & closing: Mild slowing and reduction in amplitude Rt hand; Moderately impaired; occasional arrests in movement in Lt.   Hand pronation & supination: Mild slowing and reduction in amplitude Rt hand; Moderately " impaired; occasional arrests in movement in Lt.   Leg Agility: Normal. and in Rt leg; Mild reduction in amplitude; occasional arrests in movement.  Arising from chair - arms folded across chest: Slow; or may need more than one attempt.  Posture: Slightly stooped posture.  Gait: Walks slowly, shuffles with short steps. No festination or propulsion.  Postural Stability: Retropulsion, but recovers unaided.  Body Bradykinesia: Mild degree of slowness and poverty of movement.      DBS Interrogation & Analysis:      Implanted generator: Right Activa-SC.  Lead placement: Right Globus Pallidus Internus (Gpi).      Impedance Check:       Therapy On: 100%  Battery Service Life: OK. 2.95 volts.      Right Gpi  C +, 1 -  Volts: 4.0 volts  PW: 90 msec  Rate: 185 Hz      Electrode Impedance Check: (Amplitude 3.0 volts: PW 80 msec: Rate 100 Hz)    Right Lead: No Problems Found.      See scanned report for impedance details.     ASSESSMENT/PLAN:     Parkinson's Disease:  He has a Hx of PD since 2008, s/p Right Gpi DBS therapy.  Tremors are controlled.  He has freezing of gait that is not responsive to Levodopa.  Voice is soft.     __  Discussing a refresher & going back to PT & Speech Therapy.  He opted to wait & promised to do the Big & Loud Therapy exercises at home.   __  Discussed progression of PD.   __  Will stay on the same dose of Sinemet.   __  Encouraged to take Sinemet with carbonated beverage.   __  DBS interrogation & analysis is normal.    __  Will return to our clinic in 6 months or sooner as needed.     The total amount of time spent with the patient was 45 minutes; 20 min in DBS interrogation & analysis and 25 min in addressing PD, Gait & balance problems, & speech issues.  50% of the time was spent in counseling & coordination of care.         FRANKIE Ramos,  CNP  [facility]        ______________________________________      Patient was asked about 14 Review of systems including changes in vision  "(dry eyes, double vision), hearing, heart, lungs, musculoskeletal, depression, anxiety, snoring, RBD, insomnia, urinary frequency, urinary urgency, constipation, swallowing problems, hematological, ID, allergies, skin problems: seborrhea, endocrinological: thyroid, diabetes, cholesterol; balance, weight changes, and other neurological problems and these were not significant at this time except for   Patient Active Problem List   Diagnosis     Parkinson disease (H)     Peripheral neuropathy     Somnolence     Family history of Parkinson's disease     Snores     REM sleep behavior disorder     Prostate cancer (H)     Wears glasses     Insomnia     Constipation     History of MRI of brain and brain stem     PFO (patent foramen ovale)     Cerebellar stroke syndrome     H/O echocardiogram     Restless leg syndrome     S/P brain surgery     Hamstring tendonitis at origin     Subclinical hyperthyroidism     Parkinson's disease (H)     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  Stayed in the hospital 3 days due to severe nausea & \"retching\" .      Comtan Diarrhea     Camphor      Camphor HELENA     Hydrocodone      Other reaction(s): Headache, Nausea Only     Hydrocodone-Acetaminophen Hives and Nausea     Melatonin Other (See Comments)     No benefit.     Ropinirole Fatigue     Fatigue no benefit.      Seroquel [Quetiapine]      Groggy and aggravated rls  No benefit     Adhesive Tape Rash     Camphor Rash     Other reaction(s): Rash     Liquid Adhesive Rash     Past Surgical History:   Procedure Laterality Date     COLONOSCOPY N/A 3/23/2017    Procedure: COLONOSCOPY;  Surgeon: Salbador Paulson, " MD;  Location: [facility]     PROSTATE SURGERY  3 yrs ago    prostate cancer; Dr. Valladares     REPLACE DEEP BRAIN STIMULATION GENERATOR / BATTERY Right 1/3/2017    Procedure: REPLACE DEEP BRAIN STIMULATION GENERATOR / BATTERY;  Surgeon: Anupam Fair MD;  Location: [facility]     Right Gpi DBS Lead Implantation  5/20/2014     Rt Chest DBS Generator Placement Activa SC  5/29/2014     Past Medical History:   Diagnosis Date     Cerebellar stroke syndrome 8/21/2013    Few small foci of chronic infarct in the right cerebellar hemisphere, unchanged as well as a new focus of now chronic infarct in the left cerebellar hemisphere since the last study.      Dyslipidemia      Hyperthyroidism      Insomnia 6/17/2011     Parkinson disease (H) 6/15/2011     Peripheral neuropathy 6/15/2011     PFO (patent foramen ovale) 8/21/2013    Interpretation Summary 1. Normal LV size and systolic function. Normal diastolic function 2.  Normal RV size and function 3. No significant valvular abnormalities 4.  Small right-to-left shunt visualized with intravenous agitated saline  contrast study, suggestive of PFO. PatientHeight: 72 in PatientWeight: 205 lbs SystolicPressure: 100 mmHg DiastolicPressure: 63 mmHg BSA 2.2 m^2   Procedure Echoc     PONV (postoperative nausea and vomiting)      Prostate cancer (H) 6/17/2011    s/p radical prostectomy     Social History     Social History     Marital status:      Spouse name: N/A     Number of children: N/A     Years of education: N/A     Occupational History     Not on file.     Social History Main Topics     Smoking status: Never Smoker     Smokeless tobacco: Never Used     Alcohol use 0.6 oz/week     1 Glasses of wine per week      Comment: EVERY OTHER DAY     Drug use: No     Sexual activity: No     Other Topics Concern     Not on file     Social History Narrative    Son lives in [place] and he has 2 kids    Wife Mayelin           Drug and lactation database from the [place] National  Library of Medicine:  http://toxnet.nlm.nih.gov/cgi-bin/sis/htmlgen?LACT                  B/P: Data Unavailable, T: Data Unavailable, P: Data Unavailable, R: Data Unavailable 0 lbs 0 oz  There were no vitals taken for this visit., There is no height or weight on file to calculate BMI.  Medications and Vitals not listed above were documented in the cart and reviewed by me.     Current Outpatient Prescriptions   Medication Sig Dispense Refill     methimazole (TAPAZOLE) 5 MG tablet Take 1 tablet (5 mg) by mouth daily 90 tablet 1     gabapentin (NEURONTIN) 300 MG capsule Take 2 cap in am & noon; and 3 cap 2 hrs before HS= 7 cap/day. 630 capsule 3     carbidopa-levodopa (SINEMET)  MG per tablet TAKE 2 TABLETS 6 TIMES DAILY (3AM, 7AM, 11AM, 2PM, 6PM, AND 10PM) 1080 tablet 3     selegiline (ELDEPRYL) 5 MG capsule Take 1 capsule (5 mg) by mouth 2 times daily (before meals) 60 capsule 11     ofloxacin (FLOXIN) 0.3 % otic solution Place 3 gtts in left ear BID x 3 days 10 mL 0     atorvastatin (LIPITOR) 10 MG tablet Take 1 tablet (10 mg) by mouth daily 90 tablet 3     triamcinolone (KENALOG) 0.1 % cream Apply topically 2 times daily 15 g 3     aspirin 81 MG tablet Take 1 tablet (81 mg) by mouth daily 30 tablet 0     polyethylene glycol (MIRALAX/GLYCOLAX) Packet Take by mouth At Bedtime        fluticasone (FLONASE) 50 MCG/ACT nasal spray Spray 1-2 sprays into both nostrils daily 3 Package 3         Blaine Boo MD    Again, thank you for allowing me to participate in the care of your patient.      Sincerely,    Blaine Boo MD

## 2017-12-18 ENCOUNTER — TELEPHONE (OUTPATIENT)
Dept: ENDOCRINOLOGY | Facility: CLINIC | Age: 72
End: 2017-12-18

## 2018-02-12 ENCOUNTER — MYC MEDICAL ADVICE (OUTPATIENT)
Dept: INTERNAL MEDICINE | Facility: CLINIC | Age: 73
End: 2018-02-12

## 2018-02-12 NOTE — TELEPHONE ENCOUNTER
OK for dulcolax suppositories; however, I recommend that I see him if sxs. Do not resolve or get worse.    MOHIT Howe

## 2018-03-28 DIAGNOSIS — E78.00 HYPERCHOLESTEREMIA: ICD-10-CM

## 2018-03-28 DIAGNOSIS — E04.1 WARM THYROID NODULE: ICD-10-CM

## 2018-03-28 DIAGNOSIS — E05.90 SUBCLINICAL HYPERTHYROIDISM: ICD-10-CM

## 2018-03-29 RX ORDER — ATORVASTATIN CALCIUM 10 MG/1
10 TABLET, FILM COATED ORAL DAILY
Qty: 90 TABLET | Refills: 2 | Status: SHIPPED | OUTPATIENT
Start: 2018-03-29 | End: 2018-10-23

## 2018-03-29 RX ORDER — METHIMAZOLE 5 MG/1
5 TABLET ORAL DAILY
Qty: 90 TABLET | Refills: 0 | Status: SHIPPED | OUTPATIENT
Start: 2018-03-29 | End: 2018-06-21

## 2018-03-29 NOTE — TELEPHONE ENCOUNTER
methimazole (TAPAZOLE) 5 MG tablet  Last Written Prescription Date:  9/22/17  Last Fill Quantity: 90,   # refills: 1  Last Office Visit : 10/11/17  Future Office visit: 6/8/18      Routing  Because: not on protocol

## 2018-05-09 ENCOUNTER — OFFICE VISIT (OUTPATIENT)
Dept: INTERNAL MEDICINE | Facility: CLINIC | Age: 73
End: 2018-05-09
Payer: COMMERCIAL

## 2018-05-09 VITALS
HEIGHT: 71 IN | SYSTOLIC BLOOD PRESSURE: 177 MMHG | DIASTOLIC BLOOD PRESSURE: 99 MMHG | BODY MASS INDEX: 28 KG/M2 | HEART RATE: 61 BPM | WEIGHT: 200 LBS

## 2018-05-09 DIAGNOSIS — R53.83 OTHER FATIGUE: Primary | ICD-10-CM

## 2018-05-09 DIAGNOSIS — R53.83 OTHER FATIGUE: ICD-10-CM

## 2018-05-09 LAB
ALBUMIN SERPL-MCNC: 3.9 G/DL (ref 3.4–5)
ALP SERPL-CCNC: 122 U/L (ref 40–150)
ALT SERPL W P-5'-P-CCNC: <6 U/L (ref 0–70)
ANION GAP SERPL CALCULATED.3IONS-SCNC: 7 MMOL/L (ref 3–14)
AST SERPL W P-5'-P-CCNC: 13 U/L (ref 0–45)
BASOPHILS # BLD AUTO: 0.1 10E9/L (ref 0–0.2)
BASOPHILS NFR BLD AUTO: 1.1 %
BILIRUB SERPL-MCNC: 1.3 MG/DL (ref 0.2–1.3)
BUN SERPL-MCNC: 18 MG/DL (ref 7–30)
CALCIUM SERPL-MCNC: 8.6 MG/DL (ref 8.5–10.1)
CHLORIDE SERPL-SCNC: 107 MMOL/L (ref 94–109)
CO2 SERPL-SCNC: 26 MMOL/L (ref 20–32)
CREAT SERPL-MCNC: 0.7 MG/DL (ref 0.66–1.25)
DIFFERENTIAL METHOD BLD: NORMAL
EOSINOPHIL # BLD AUTO: 0.1 10E9/L (ref 0–0.7)
EOSINOPHIL NFR BLD AUTO: 0.8 %
ERYTHROCYTE [DISTWIDTH] IN BLOOD BY AUTOMATED COUNT: 13.1 % (ref 10–15)
GFR SERPL CREATININE-BSD FRML MDRD: >90 ML/MIN/1.7M2
GLUCOSE SERPL-MCNC: 95 MG/DL (ref 70–99)
HCT VFR BLD AUTO: 47.8 % (ref 40–53)
HGB BLD-MCNC: 15.8 G/DL (ref 13.3–17.7)
IMM GRANULOCYTES # BLD: 0 10E9/L (ref 0–0.4)
IMM GRANULOCYTES NFR BLD: 0.3 %
LYMPHOCYTES # BLD AUTO: 1.6 10E9/L (ref 0.8–5.3)
LYMPHOCYTES NFR BLD AUTO: 24.2 %
MCH RBC QN AUTO: 29.3 PG (ref 26.5–33)
MCHC RBC AUTO-ENTMCNC: 33.1 G/DL (ref 31.5–36.5)
MCV RBC AUTO: 89 FL (ref 78–100)
MONOCYTES # BLD AUTO: 0.4 10E9/L (ref 0–1.3)
MONOCYTES NFR BLD AUTO: 6.8 %
NEUTROPHILS # BLD AUTO: 4.4 10E9/L (ref 1.6–8.3)
NEUTROPHILS NFR BLD AUTO: 66.8 %
NRBC # BLD AUTO: 0 10*3/UL
NRBC BLD AUTO-RTO: 0 /100
PLATELET # BLD AUTO: 203 10E9/L (ref 150–450)
POTASSIUM SERPL-SCNC: 4.2 MMOL/L (ref 3.4–5.3)
PROT SERPL-MCNC: 7.2 G/DL (ref 6.8–8.8)
RBC # BLD AUTO: 5.4 10E12/L (ref 4.4–5.9)
SODIUM SERPL-SCNC: 140 MMOL/L (ref 133–144)
TSH SERPL DL<=0.005 MIU/L-ACNC: 1.83 MU/L (ref 0.4–4)
WBC # BLD AUTO: 6.5 10E9/L (ref 4–11)

## 2018-05-09 RX ORDER — BISACODYL 10 MG
10 SUPPOSITORY, RECTAL RECTAL DAILY PRN
COMMUNITY
End: 2018-10-23

## 2018-05-09 ASSESSMENT — ENCOUNTER SYMPTOMS
TINGLING: 0
DIZZINESS: 0
MYALGIAS: 1
ABDOMINAL PAIN: 0
WEAKNESS: 1
NAUSEA: 0
INCREASED ENERGY: 1
BLOATING: 0
EYE REDNESS: 1
MUSCLE WEAKNESS: 1
NUMBNESS: 1
SEIZURES: 0
LOSS OF CONSCIOUSNESS: 0
DOUBLE VISION: 0
MUSCLE CRAMPS: 0
EYE PAIN: 0
POLYDIPSIA: 0
ARTHRALGIAS: 0
RECTAL PAIN: 0
MEMORY LOSS: 0
FATIGUE: 1
HALLUCINATIONS: 0
ALTERED TEMPERATURE REGULATION: 0
CHILLS: 1
BOWEL INCONTINENCE: 0
DIARRHEA: 0
BACK PAIN: 1
VOMITING: 0
HEARTBURN: 0
SPEECH CHANGE: 1
EYE IRRITATION: 1
CONSTIPATION: 1
JOINT SWELLING: 0
EYE WATERING: 1
HEADACHES: 0
NECK PAIN: 0
STIFFNESS: 1

## 2018-05-09 ASSESSMENT — PAIN SCALES - GENERAL: PAINLEVEL: NO PAIN (0)

## 2018-05-09 NOTE — PROGRESS NOTES
HPI:    Pt. Comes in for follow up today. He has followed with Dr. Boo, Neurology for Parkinson's disease. He has h/o prostate CA and is followed at [facility].  Colonoscopy 3/17 possibly repeat in 5 years. No other HEENT, cardiopulmonary, abdominal, , neurological, systemic complaints. He does follow with outside dermatology. He has followed up with Dr. Claire ENT 9/29/14 for his ear complaint and has follow up with Dr. Luna 4/21/15 for with  PE tube placement. He is also following in Sleep Medicine. He states he had this in about 2006 and got injections. His dx. Prostate cancer was about 2008. He states gabapentin and Tramadol help his restless legs. He was seen by earline Cast 1/29/15 and recommend PT. He also stated gabapentin helps with his fatigue sxs. He was seen in endo by Dr. Mccarty 6/17 for abnormal thyroid tests and is on methimazole.    PMH:    1. Parkinson's followed by Dr. Boo  2. Prostate cancer followed at [facility]; s/p prostatectomy 2008; but possible slight increase in PSA; followed every 6 months.  3. Increased cholesterol  4. Deep Brain Stimulator; see neurology note from 7/7/14.  5. Restless leg sxs.  6. PFO see Dr. Vela's cardiology note from 9/13     PE:    Vitals noted gen nad cooperative alert, HEENT; Oropharynx clear no exudate neck supple nl rom no adenopathy, LCTA, B, normal resp. System exam, RRR, S1, S2, no MRG, abdomen, nt, nd, resting tremor in hands.  No Paraspinous tenderness to palpation, no skin rash.      A/P:    1. Parkinson's he follows here in Neurology and was last seen 12/2017.  Dr. Boo He has follow up 6/2018.   2. Check with insurance for new Shingles vaccine  3. Increased cholesterol;  fasting lipids and liver tests done 10/9/17 and stable; he remains on Lipitor.  4. He will continue to follow outside Urology for h/o prostate CA. Will check PSA checked 10/9/17 and 0.13; value 3/7/17 was 0.12. He states he follows with Dr. Valladares and  that is PSA usually is in the 0.1 range and stable.   5. He can follow up in Health Psychology for anxiety issues.   6. Low TSH checked 9/14; not on replacement checked  TPO and TSI labs done. He has followed up with Dr. Mccarty 6/17. He has follow up 6/8/2018.   He is on methimazole.   7. Placed future dermatology referral for skin check 9/11/17.  8. Colonoscopy done 3/2017 repeat in 5 years.   9. BP monitor from 10/2017 normal BP  10. Constipation; he states last two weeks with Dulcolax is almost back to normal. He states Miralax became ineffective. Recommend more fiber diet (benefiber, etc.) and fluids. Exercise is difficult. Recent colonoscopy as above.     I will see him back in 3 months.     Total time spent 25  minutes.  More than 50% of the time spent with Mr. Wu on counseling / coordinating his care

## 2018-05-09 NOTE — MR AVS SNAPSHOT
After Visit Summary   5/9/2018    Norberto Wu    MRN: [id number]           Patient Information     Date Of Birth          1945        Visit Information        Provider Department      5/9/2018 9:35 AM Norberto Howe MD [facility]        Today's Diagnoses     Other fatigue    -  1      Care Instructions    Please go to the lab on the first floor before you leave today.             Follow-ups after your visit        Your next 10 appointments already scheduled     May 09, 2018  9:35 AM CDT   (Arrive by 9:20 AM)   PHYSICAL with Norberto Howe MD   [facility] ([facility])    [address] [place] [id number]   [phone]            May 09, 2018 10:15 AM CDT   LAB with  LAB   [facility] ([facility])    [address] [place] [id number] [phone]           Please do not eat 10-12 hours before your appointment if you are coming in fasting for labs on lipids, cholesterol, or glucose (sugar). This does not apply to pregnant women. Water, hot tea and black coffee (with nothing added) are okay. Do not drink other fluids, diet soda or chew gum.            Jun 08, 2018 10:50 AM CDT   (Arrive by 10:35 AM)   RETURN ENDOCRINE with Brandin Mccarty MD   [facility] ([facility])    [address] [place] [id number]   [phone]            Jun 20, 2018  1:00 PM CDT   (Arrive by 12:45 PM)   New Movement Disorder with  MOVEMENT DISORDER FELLOW   [facility] ([facility])    [address] [place] [address]   [phone]            Aug 08, 2018 10:35 AM CDT   (Arrive by 10:20 AM)   Return Visit with Norberto Howe MD   [facility] ([facility])    [address] [place] [address]  "[phone]   [phone]              Future tests that were ordered for you today     Open Future Orders        Priority Expected Expires Ordered    TSH with free T4 reflex Routine 5/9/2018 5/9/2019 5/9/2018    CBC with platelets differential Routine 5/9/2018 5/23/2018 5/9/2018    Comprehensive metabolic panel Routine 5/9/2018 5/23/2018 5/9/2018            Who to contact     Please call your clinic at [phone] to:    Ask questions about your health    Make or cancel appointments    Discuss your medicines    Learn about your test results    Speak to your doctor            Additional Information About Your Visit        [facility] Information     [facility] gives you secure access to your electronic health record. If you see a primary care provider, you can also send messages to your care team and make appointments. If you have questions, please call your primary care clinic.  If you do not have a primary care provider, please call [phone] and they will assist you.      [facility] is an electronic gateway that provides easy, online access to your medical records. With [facility], you can request a clinic appointment, read your test results, renew a prescription or communicate with your care team.     To access your existing account, please contact your [facility] or call [phone] for assistance.        Care EveryWhere ID     This is your Care EveryWhere ID. This could be used by other organizations to access your [facility] medical records  [id number]        Your Vitals Were     Pulse Height BMI (Body Mass Index)             61 1.791 m (5' 10.5\") 28.29 kg/m2          Blood Pressure from Last 3 Encounters:   05/09/18 (!) 177/99   12/14/17 133/83   10/11/17 111/74    Weight from Last 3 Encounters:   05/09/18 90.7 kg (200 lb)   12/14/17 94.3 kg (208 lb)   10/11/17 95.1 kg (209 lb 11.2 oz)                 Today's Medication Changes          These changes are accurate as of 5/9/18  9:34 AM. "  If you have any questions, ask your nurse or doctor.               These medicines have changed or have updated prescriptions.        Dose/Directions    triamcinolone 0.1 % cream   Commonly known as:  KENALOG   This may have changed:    - when to take this  - reasons to take this   Used for:  Rash and nonspecific skin eruption        Apply topically 2 times daily   Quantity:  15 g   Refills:  3         Stop taking these medicines if you haven't already. Please contact your care team if you have questions.     ofloxacin 0.3 % otic solution   Commonly known as:  FLOXIN   Stopped by:  Norberto Howe MD           polyethylene glycol Packet   Commonly known as:  MIRALAX/GLYCOLAX   Stopped by:  Norberto Howe MD                    Primary Care Provider Office Phone # Fax #    Norberto Howe [phone] [address] [address]        Equal Access to Services     [facility]: Hadii aad ku hadasho Soomaali, waaxda luqadaha, qaybta kaalmada adeegyada, waxay idiin hayrodrigon stoney lópez . So [place] [phone].    ATENCIÓN: Si habla español, tiene a benito disposición servicios gratuitos de asistencia lingüística. LlFulton County Health Center [phone].    We comply with applicable federal civil rights laws and [place] laws. We do not discriminate on the basis of race, color, national origin, age, disability, sex, sexual orientation, or gender identity.            Thank you!     Thank you for choosing [facility]  for your care. Our goal is always to provide you with excellent care. Hearing back from our patients is one way we can continue to improve our services. Please take a few minutes to complete the written survey that you may receive in the mail after your visit with us. Thank you!             Your Updated Medication List - Protect others around you: Learn how to safely use, store and throw away your medicines at www.disposemymeds.org.          This list is  accurate as of 5/9/18  9:34 AM.  Always use your most recent med list.                   Brand Name Dispense Instructions for use Diagnosis    aspirin 81 MG tablet     30 tablet    Take 1 tablet (81 mg) by mouth daily        atorvastatin 10 MG tablet    LIPITOR    90 tablet    Take 1 tablet (10 mg) by mouth daily    Hypercholesteremia       carbidopa-levodopa  MG per tablet    SINEMET    1080 tablet    TAKE 2 TABLETS 6 TIMES DAILY (3AM, 7AM, 11AM, 2PM, 6PM, AND 10PM)    Parkinson's disease (H)       DULCOLAX 10 MG Suppository   Generic drug:  bisacodyl      Place 10 mg rectally daily as needed for constipation    Other fatigue       fluticasone 50 MCG/ACT spray    FLONASE    3 Package    Spray 1-2 sprays into both nostrils daily    Rhinitis       gabapentin 300 MG capsule    NEURONTIN    630 capsule    1-2 caps @ 3am and 7am and 3 caps @ 6pm  -- 5-7 cap/day.    Restless legs syndrome (RLS)       methimazole 5 MG tablet    TAPAZOLE    90 tablet    Take 1 tablet (5 mg) by mouth daily    Warm thyroid nodule, Subclinical hyperthyroidism       triamcinolone 0.1 % cream    KENALOG    15 g    Apply topically 2 times daily    Rash and nonspecific skin eruption

## 2018-05-09 NOTE — NURSING NOTE
Chief Complaint   Patient presents with     Physical     Hoarse     Loosing voice.      Rooming Note  Blood Pressure   BP Readings from Last 1 Encounters:   05/09/18 (!) 180/96    Single BP recheck started, 9:01 AM (2 minutes)

## 2018-05-18 ENCOUNTER — TELEPHONE (OUTPATIENT)
Dept: OTOLARYNGOLOGY | Facility: CLINIC | Age: 73
End: 2018-05-18

## 2018-05-28 ENCOUNTER — HEALTH MAINTENANCE LETTER (OUTPATIENT)
Age: 73
End: 2018-05-28

## 2018-06-08 ENCOUNTER — OFFICE VISIT (OUTPATIENT)
Dept: ENDOCRINOLOGY | Facility: CLINIC | Age: 73
End: 2018-06-08
Payer: COMMERCIAL

## 2018-06-08 VITALS
BODY MASS INDEX: 28.35 KG/M2 | HEIGHT: 71 IN | SYSTOLIC BLOOD PRESSURE: 152 MMHG | HEART RATE: 67 BPM | WEIGHT: 202.5 LBS | DIASTOLIC BLOOD PRESSURE: 68 MMHG

## 2018-06-08 DIAGNOSIS — E05.90 SUBCLINICAL HYPERTHYROIDISM: Primary | ICD-10-CM

## 2018-06-08 NOTE — PROGRESS NOTES
Endocrinology Note    Chief Complaint: follow up subclinical Hyperthyroidism    HPI: Norberto Wu is a 72 year old year old man with history of Parkinson's disease s/p DBS who presents for follow-up of subclinical hyperthyroidism.     He has been taking methimazole 5mg daily without side effects. He continues to feel well from thyroid standpoint.    His Parkinson's disease seems to be getting worse particularly chronic constipation, frozen feet and deepening of his voice. He said that all of stool softener does not work anymore. He has more frequent difficulty walking when muscles freezing up in his legs when he tries to ambulate. He has no paresthesias, burning sensations, or heat intolerance.    He does not sleep well and wakes up often at night due to leg discomfort. He does not endorse any new neck swelling, dysphagia, palpitations, dyspnea, chest pain, diaphoresis, abdominal pain, diarrhea, irritability/agitation.    Mr. Wu exercises daily with treadmill.     Past Medical History:   Diagnosis Date     Cerebellar stroke syndrome 8/21/2013    Few small foci of chronic infarct in the right cerebellar hemisphere, unchanged as well as a new focus of now chronic infarct in the left cerebellar hemisphere since the last study.      Dyslipidemia      Hyperthyroidism      Insomnia 6/17/2011     Parkinson disease (H) 6/15/2011     Peripheral neuropathy 6/15/2011     PFO (patent foramen ovale) 8/21/2013    Interpretation Summary 1. Normal LV size and systolic function. Normal diastolic function 2.  Normal RV size and function 3. No significant valvular abnormalities 4.  Small right-to-left shunt visualized with intravenous agitated saline  contrast study, suggestive of PFO. PatientHeight: 72 in PatientWeight: 205 lbs SystolicPressure: 100 mmHg DiastolicPressure: 63 mmHg BSA 2.2 m^2   Procedure Echoc     PONV (postoperative nausea and vomiting)      Prostate cancer (H) 6/17/2011    s/p radical prostectomy       Past  "Surgical History:   Procedure Laterality Date     COLONOSCOPY N/A 3/23/2017    Procedure: COLONOSCOPY;  Surgeon: Salbador Paulson MD;  Location: [facility]     PROSTATE SURGERY  3 yrs ago    prostate cancer; Dr. Valladares     REPLACE DEEP BRAIN STIMULATION GENERATOR / BATTERY Right 1/3/2017    Procedure: REPLACE DEEP BRAIN STIMULATION GENERATOR / BATTERY;  Surgeon: Anupam Fair MD;  Location: [facility]     Right Gpi DBS Lead Implantation  5/20/2014     Rt Chest DBS Generator Placement Activa SC  5/29/2014       Current Outpatient Prescriptions   Medication Sig Dispense Refill     aspirin 81 MG tablet Take 1 tablet (81 mg) by mouth daily 30 tablet 0     atorvastatin (LIPITOR) 10 MG tablet Take 1 tablet (10 mg) by mouth daily 90 tablet 2     bisacodyl (DULCOLAX) 10 MG Suppository Place 10 mg rectally daily as needed for constipation       carbidopa-levodopa (SINEMET)  MG per tablet TAKE 2 TABLETS 6 TIMES DAILY (3AM, 7AM, 11AM, 2PM, 6PM, AND 10PM) 1080 tablet 3     fluticasone (FLONASE) 50 MCG/ACT nasal spray Spray 1-2 sprays into both nostrils daily 3 Package 3     gabapentin (NEURONTIN) 300 MG capsule 1-2 caps @ 3am and 7am and 3 caps @ 6pm  -- 5-7 cap/day. 630 capsule 3     methimazole (TAPAZOLE) 5 MG tablet Take 1 tablet (5 mg) by mouth daily 90 tablet 0     triamcinolone (KENALOG) 0.1 % cream Apply topically 2 times daily (Patient not taking: Reported on 6/8/2018) 15 g 3          Allergies   Allergen Reactions     Hydromorphone Nausea and Nausea and Vomiting     Used post DBS surgery.  Stayed in the hospital 3 days due to severe nausea & \"retching\" .      Comtan Diarrhea     Camphor      Camphor HELENA     Hydrocodone      Other reaction(s): Headache, Nausea Only     Hydrocodone-Acetaminophen Hives and Nausea     Melatonin Other (See Comments)     No benefit.     Ropinirole Fatigue     Fatigue no benefit.      Seroquel [Quetiapine]      Groggy and aggravated rls  No benefit     Adhesive Tape Rash     " "Camphor Rash     Other reaction(s): Rash     Liquid Adhesive Rash       Family History   Problem Relation Age of Onset     Parkinsonism Father      Skin Cancer No family hx of      no skin cancer       Social History     Social History     Marital Status:      Spouse Name: N/A     Number of Children: N/A     Years of Education: N/A     Social History Main Topics     Smoking status: Never Smoker      Smokeless tobacco: Never Used     Alcohol Use: Yes      Comment: Rare     Drug Use: No     Sexual Activity: Not Asked     Other Topics Concern     None     Social History Narrative    Son lives in [place] and he has 2 kids    Wife Mayelin           Review of Systems   Constitutional: no fevers, night sweats, or chills; no weight or appetite changes; no recent illnesses, increased diaphoresis, or fatigue  HEENT: no headaches, hearing changes, +deepening of the voice    Neck: no new neck swelling or masses  Cardiac: no chest pain, palpitations, lightheadedness, or exertional dyspnea  Resp: no dyspnea  GI: +severe constipation; no dysphagia, nausea, vomiting, diarrhea, or abdominal pain  : no difficulty urinating or dysuria  MSK: no new myalgias or arthralgias  Extremities: +frozen feet  Endo: no polydipsia or polyuria; no heat or cold intolerance intolerance  Neuro: some difficulty with walking when starting to ambulate, numbness; no paresthesias of extremities  Derm: no skin or nail changes; no hair loss or changes  Psych: +slow cognitive function; no depressed mood or increased irritibility    Objective:   /68  Pulse 67  Ht 1.791 m (5' 10.5\")  Wt 91.9 kg (202 lb 8 oz)  BMI 28.65 kg/m2    Physical Exam  General: pt appears mildly slowed but well; no acute distress  HEENT: hair appears normal; mucous membranes moist; speech is clear; normocephalic   Neck:no thyroid enlargement, no tenderness to palpation  Cardiac: normal S1/S2 and rhythm; no S3/S4, murmurs, clicks, or rubs  Resp: appropriate effort with " air entry to lung bases; no increased work of breathing; breath sounds are symmetric; no crackles, wheezes, or rhonchi - lungs clear to auscultation bilaterally   Extremities: no edema or finger clubbing   Derm: no rash, skin lesions, or dryness on face, neck, hands or lower legs  Neuro: symmetric +2/4 biceps reflexes, +1 symmetric patellar reflexes; +minimal facial expression; short narrow shuffling gait   Psych: pt is appropriately conversational with restricted affect    Lab      Assessment and Plan:    Norberto Wu is a 72 year old year old man with a PMHx of Parkinson's disease s/p DBS who presents for follow-up of subclinical hyperthyroidism.     #Subclinical Hyperthyroidism: diagnosed in 2015 with TSH suppression with normal free T4 with negative anti-TPO and TSI. Previous thyroid uptake and scan showed warm nodule on left thyroid. -   - pt has been consistent with methimazole 5 mg daily and TSH was normalized   - recent lab is normal  - no symptoms of overt hyperthyroidism   Plan:  Continue with methimazole 5 mg and repeat lab in 6-8 months.    #Parkinson's Disease s/p deep brain stimulation  - appears to suffer from constipation, frozen feet and deepening voice  - Continue to follow-up with neurologist    RTC 6-8 months for subclinical hyperthyroidism    Brandin Mccarty MD    Division of Diabetes and Endocrinology  Department of Medicine  [phone]

## 2018-06-08 NOTE — PROGRESS NOTES
Endocrinology Note    Chief Complaint: Subclinical Hyperthyroidism follow-up   Information obtained from:Patient    HPI: Norberto Wu is a 71 year old year old man with history of Parkinson's disease s/p DBS who presents for follow-up of subclinical hyperthyroidism. He has been taking methimazole 5mg daily without side effects. He continues to feel well from thyroid standpoint.    Parkinson's disease seems to be stable except chronic  He has intermittent difficulty walking when muscles freezing up in his legs when he tries to ambulate.     Mr. Wu has chronic constipation. He also notes warm feet, but no paresthesias, burning sensations, or general heat intolerance. He has gained several pounds over the past year. He does not sleep well and wakes up often at night. He does not endorse any new neck swelling, dysphagia, palpitations, dyspnea, chest pain, diaphoresis, abdominal pain, diarrhea, irritability/agitation.    Mr. Wu exercises daily with treadmill. He has not had any recent changes in functional activity.       Past Medical History:   Diagnosis Date     Cerebellar stroke syndrome 8/21/2013    Few small foci of chronic infarct in the right cerebellar hemisphere, unchanged as well as a new focus of now chronic infarct in the left cerebellar hemisphere since the last study.      Dyslipidemia      Hyperthyroidism      Insomnia 6/17/2011     Parkinson disease (H) 6/15/2011     Peripheral neuropathy 6/15/2011     PFO (patent foramen ovale) 8/21/2013    Interpretation Summary 1. Normal LV size and systolic function. Normal diastolic function 2.  Normal RV size and function 3. No significant valvular abnormalities 4.  Small right-to-left shunt visualized with intravenous agitated saline  contrast study, suggestive of PFO. PatientHeight: 72 in PatientWeight: 205 lbs SystolicPressure: 100 mmHg DiastolicPressure: 63 mmHg BSA 2.2 m^2   Procedure Echoc     PONV (postoperative nausea and vomiting)      Prostate  "cancer (H) 6/17/2011    s/p radical prostectomy       Past Surgical History:   Procedure Laterality Date     COLONOSCOPY N/A 3/23/2017    Procedure: COLONOSCOPY;  Surgeon: Salbador Paulson MD;  Location: [facility]     PROSTATE SURGERY  3 yrs ago    prostate cancer; Dr. Valladares     REPLACE DEEP BRAIN STIMULATION GENERATOR / BATTERY Right 1/3/2017    Procedure: REPLACE DEEP BRAIN STIMULATION GENERATOR / BATTERY;  Surgeon: Anupam Fair MD;  Location: [facility]     Right Gpi DBS Lead Implantation  5/20/2014     Rt Chest DBS Generator Placement Activa SC  5/29/2014       Current Outpatient Prescriptions   Medication Sig Dispense Refill     aspirin 81 MG tablet Take 1 tablet (81 mg) by mouth daily 30 tablet 0     atorvastatin (LIPITOR) 10 MG tablet Take 1 tablet (10 mg) by mouth daily 90 tablet 2     bisacodyl (DULCOLAX) 10 MG Suppository Place 10 mg rectally daily as needed for constipation       carbidopa-levodopa (SINEMET)  MG per tablet TAKE 2 TABLETS 6 TIMES DAILY (3AM, 7AM, 11AM, 2PM, 6PM, AND 10PM) 1080 tablet 3     fluticasone (FLONASE) 50 MCG/ACT nasal spray Spray 1-2 sprays into both nostrils daily 3 Package 3     gabapentin (NEURONTIN) 300 MG capsule 1-2 caps @ 3am and 7am and 3 caps @ 6pm  -- 5-7 cap/day. 630 capsule 3     methimazole (TAPAZOLE) 5 MG tablet Take 1 tablet (5 mg) by mouth daily 90 tablet 0     triamcinolone (KENALOG) 0.1 % cream Apply topically 2 times daily (Patient not taking: Reported on 6/8/2018) 15 g 3          Allergies   Allergen Reactions     Hydromorphone Nausea and Nausea and Vomiting     Used post DBS surgery.  Stayed in the hospital 3 days due to severe nausea & \"retching\" .      Comtan Diarrhea     Camphor      Camphor HELENA     Hydrocodone      Other reaction(s): Headache, Nausea Only     Hydrocodone-Acetaminophen Hives and Nausea     Melatonin Other (See Comments)     No benefit.     Ropinirole Fatigue     Fatigue no benefit.      Seroquel [Quetiapine]      Groggy and " "aggravated rls  No benefit     Adhesive Tape Rash     Camphor Rash     Other reaction(s): Rash     Liquid Adhesive Rash       Family History   Problem Relation Age of Onset     Parkinsonism Father      Skin Cancer No family hx of      no skin cancer       Social History     Social History     Marital Status:      Spouse Name: N/A     Number of Children: N/A     Years of Education: N/A     Social History Main Topics     Smoking status: Never Smoker      Smokeless tobacco: Never Used     Alcohol Use: Yes      Comment: Rare     Drug Use: No     Sexual Activity: Not Asked     Other Topics Concern     None     Social History Narrative    Son lives in [place] and he has 2 kids    Wife Mayelin           Review of Systems   Constitutional: no fevers, night sweats, or chills; no weight or appetite changes; no recent illnesses, increased diaphoresis, or fatigue  HEENT: no headaches, hearing changes, or vision changes; no voice hoarseness    Neck: no new neck swelling or masses  Cardiac: no chest pain, palpitations, lightheadedness, or exertional dyspnea  Resp: no dyspnea  GI: +constipation; no dysphagia, nausea, vomiting, diarrhea, or abdominal pain  : no difficulty urinating or dysuria  MSK: no new myalgias or arthralgias  Extremities: +feet feel warm  Endo: no polydipsia or polyuria; no heat or cold intolerance intolerance  Neuro: some difficulty with walking when starting to ambulate, numbness; no paresthesias of extremities  Derm: no skin or nail changes; no hair loss or changes  Psych: +slowed/foggy cognitive function; no depressed mood or increased irritibility    Objective:   /68  Pulse 67  Ht 1.791 m (5' 10.5\")  Wt 91.9 kg (202 lb 8 oz)  BMI 28.65 kg/m2    Physical Exam  General: pt appears mildly slowed but well; no acute distress  HEENT: hair appears normal; mucous membranes moist; speech is clear; normocephalic   Neck:no thyroid enlargement, no tenderness to palpation  Cardiac: normal S1/S2 and " rhythm; no S3/S4, murmurs, clicks, or rubs  Resp: appropriate effort with air entry to lung bases; no increased work of breathing; breath sounds are symmetric; no crackles, wheezes, or rhonchi - lungs clear to auscultation bilaterally   Extremities: no edema or finger clubbing   Derm: no rash, skin lesions, or dryness on face, neck, hands or lower legs  Neuro: symmetric +2/4 biceps reflexes, +1 symmetric patellar reflexes; +minimal facial expression; short narrow shuffling gait   Psych: pt is appropriately conversational with restricted affect    Lab    Assessment and Plan:    Norberto Wu is a 71 year old year old man with a PMHx of Parkinson's disease s/p DBS who presents for follow-up of subclinical hyperthyroidism.     #Subclinical Hyperthyroidism: diagnosed in 2015 with TSH suppression with normal free T4 with negative anti-TPO and TSI. Previous thyroid uptake and scan showed warm nodule on left thyroid. -   - pt has been consistent with methimazole 5 mg daily and TSH was normalized   - last thyroid test last year was normal.  - no symptoms of overt hyperthyroidism   Plan:  Repeat TSH and free T4 and will contact patient with result.      #Parkinson's Disease  - pt appears to be doing well on selegiline, helping visual symtoms  - minimal tremor with DBS  Continue to follow-up with neurologist      Brandin Mccarty MD    Division of Diabetes and Endocrinology  Department of Medicine  [phone]

## 2018-06-08 NOTE — NURSING NOTE
Chief Complaint   Patient presents with     RECHECK     Abnormal thyroid f/u      Ramona Ocampo, CMA

## 2018-06-08 NOTE — LETTER
6/8/2018       RE: Norberto Wu  [address]  [place] [address]     Dear Colleague,    Thank you for referring your patient, Norberto Wu, to the [facility] ENDOCRINOLOGY at [facility]. Please see a copy of my visit note below.    Endocrinology Note    Chief Complaint: follow up subclinical Hyperthyroidism    HPI: Norberto Wu is a 72 year old year old man with history of Parkinson's disease s/p DBS who presents for follow-up of subclinical hyperthyroidism.     He has been taking methimazole 5mg daily without side effects. He continues to feel well from thyroid standpoint.    His Parkinson's disease seems to be getting worse particularly chronic constipation, frozen feet and deepening of his voice. He said that all of stool softener does not work anymore. He has more frequent difficulty walking when muscles freezing up in his legs when he tries to ambulate. He has no paresthesias, burning sensations, or heat intolerance.    He does not sleep well and wakes up often at night due to leg discomfort. He does not endorse any new neck swelling, dysphagia, palpitations, dyspnea, chest pain, diaphoresis, abdominal pain, diarrhea, irritability/agitation.    Mr. Wu exercises daily with treadmill.     Past Medical History:   Diagnosis Date     Cerebellar stroke syndrome 8/21/2013    Few small foci of chronic infarct in the right cerebellar hemisphere, unchanged as well as a new focus of now chronic infarct in the left cerebellar hemisphere since the last study.      Dyslipidemia      Hyperthyroidism      Insomnia 6/17/2011     Parkinson disease (H) 6/15/2011     Peripheral neuropathy 6/15/2011     PFO (patent foramen ovale) 8/21/2013    Interpretation Summary 1. Normal LV size and systolic function. Normal diastolic function 2.  Normal RV size and function 3. No significant valvular abnormalities 4.  Small right-to-left shunt visualized with intravenous  "agitated saline  contrast study, suggestive of PFO. PatientHeight: 72 in PatientWeight: 205 lbs SystolicPressure: 100 mmHg DiastolicPressure: 63 mmHg BSA 2.2 m^2   Procedure Echoc     PONV (postoperative nausea and vomiting)      Prostate cancer (H) 6/17/2011    s/p radical prostectomy       Past Surgical History:   Procedure Laterality Date     COLONOSCOPY N/A 3/23/2017    Procedure: COLONOSCOPY;  Surgeon: Salbador Paulson MD;  Location: [facility]     PROSTATE SURGERY  3 yrs ago    prostate cancer; Dr. Valladares     REPLACE DEEP BRAIN STIMULATION GENERATOR / BATTERY Right 1/3/2017    Procedure: REPLACE DEEP BRAIN STIMULATION GENERATOR / BATTERY;  Surgeon: Anupam Fair MD;  Location: [facility]     Right Gpi DBS Lead Implantation  5/20/2014     Rt Chest DBS Generator Placement Activa SC  5/29/2014       Current Outpatient Prescriptions   Medication Sig Dispense Refill     aspirin 81 MG tablet Take 1 tablet (81 mg) by mouth daily 30 tablet 0     atorvastatin (LIPITOR) 10 MG tablet Take 1 tablet (10 mg) by mouth daily 90 tablet 2     bisacodyl (DULCOLAX) 10 MG Suppository Place 10 mg rectally daily as needed for constipation       carbidopa-levodopa (SINEMET)  MG per tablet TAKE 2 TABLETS 6 TIMES DAILY (3AM, 7AM, 11AM, 2PM, 6PM, AND 10PM) 1080 tablet 3     fluticasone (FLONASE) 50 MCG/ACT nasal spray Spray 1-2 sprays into both nostrils daily 3 Package 3     gabapentin (NEURONTIN) 300 MG capsule 1-2 caps @ 3am and 7am and 3 caps @ 6pm  -- 5-7 cap/day. 630 capsule 3     methimazole (TAPAZOLE) 5 MG tablet Take 1 tablet (5 mg) by mouth daily 90 tablet 0     triamcinolone (KENALOG) 0.1 % cream Apply topically 2 times daily (Patient not taking: Reported on 6/8/2018) 15 g 3          Allergies   Allergen Reactions     Hydromorphone Nausea and Nausea and Vomiting     Used post DBS surgery.  Stayed in the hospital 3 days due to severe nausea & \"retching\" .      Comtan Diarrhea     Camphor      Camphor HELENA     " "Hydrocodone      Other reaction(s): Headache, Nausea Only     Hydrocodone-Acetaminophen Hives and Nausea     Melatonin Other (See Comments)     No benefit.     Ropinirole Fatigue     Fatigue no benefit.      Seroquel [Quetiapine]      Groggy and aggravated rls  No benefit     Adhesive Tape Rash     Camphor Rash     Other reaction(s): Rash     Liquid Adhesive Rash       Family History   Problem Relation Age of Onset     Parkinsonism Father      Skin Cancer No family hx of      no skin cancer       Social History     Social History     Marital Status:      Spouse Name: N/A     Number of Children: N/A     Years of Education: N/A     Social History Main Topics     Smoking status: Never Smoker      Smokeless tobacco: Never Used     Alcohol Use: Yes      Comment: Rare     Drug Use: No     Sexual Activity: Not Asked     Other Topics Concern     None     Social History Narrative    Son lives in [place] and he has 2 kids    Wife Mayelin           Review of Systems   Constitutional: no fevers, night sweats, or chills; no weight or appetite changes; no recent illnesses, increased diaphoresis, or fatigue  HEENT: no headaches, hearing changes, +deepening of the voice    Neck: no new neck swelling or masses  Cardiac: no chest pain, palpitations, lightheadedness, or exertional dyspnea  Resp: no dyspnea  GI: +severe constipation; no dysphagia, nausea, vomiting, diarrhea, or abdominal pain  : no difficulty urinating or dysuria  MSK: no new myalgias or arthralgias  Extremities: +frozen feet  Endo: no polydipsia or polyuria; no heat or cold intolerance intolerance  Neuro: some difficulty with walking when starting to ambulate, numbness; no paresthesias of extremities  Derm: no skin or nail changes; no hair loss or changes  Psych: +slow cognitive function; no depressed mood or increased irritibility    Objective:   /68  Pulse 67  Ht 1.791 m (5' 10.5\")  Wt 91.9 kg (202 lb 8 oz)  BMI 28.65 kg/m2    Physical " Exam  General: pt appears mildly slowed but well; no acute distress  HEENT: hair appears normal; mucous membranes moist; speech is clear; normocephalic   Neck:no thyroid enlargement, no tenderness to palpation  Cardiac: normal S1/S2 and rhythm; no S3/S4, murmurs, clicks, or rubs  Resp: appropriate effort with air entry to lung bases; no increased work of breathing; breath sounds are symmetric; no crackles, wheezes, or rhonchi - lungs clear to auscultation bilaterally   Extremities: no edema or finger clubbing   Derm: no rash, skin lesions, or dryness on face, neck, hands or lower legs  Neuro: symmetric +2/4 biceps reflexes, +1 symmetric patellar reflexes; +minimal facial expression; short narrow shuffling gait   Psych: pt is appropriately conversational with restricted affect    Lab      Assessment and Plan:    Norberto Wu is a 72 year old year old man with a PMHx of Parkinson's disease s/p DBS who presents for follow-up of subclinical hyperthyroidism.     #Subclinical Hyperthyroidism: diagnosed in 2015 with TSH suppression with normal free T4 with negative anti-TPO and TSI. Previous thyroid uptake and scan showed warm nodule on left thyroid. -   - pt has been consistent with methimazole 5 mg daily and TSH was normalized   - recent lab is normal  - no symptoms of overt hyperthyroidism   Plan:  Continue with methimazole 5 mg and repeat lab in 6-8 months.    #Parkinson's Disease s/p deep brain stimulation  - appears to suffer from constipation, frozen feet and deepening voice  - Continue to follow-up with neurologist    RTC 6-8 months for subclinical hyperthyroidism    Brandin Mccarty MD    Division of Diabetes and Endocrinology  Department of Medicine  [phone]

## 2018-06-08 NOTE — MR AVS SNAPSHOT
After Visit Summary   6/8/2018    Norberto Wu    MRN: [id number]           Patient Information     Date Of Birth          1945        Visit Information        Provider Department      6/8/2018 10:50 AM Brandin Mccarty MD [facility]         Follow-ups after your visit        Your next 10 appointments already scheduled     Jun 20, 2018  1:00 PM CDT   (Arrive by 12:45 PM)   New Movement Disorder with  MOVEMENT DISORDER FELLOW   [facility] ([facility])    [address]  [address] [facility] [address]   [phone]            Jul 03, 2018 11:00 AM CDT   (Arrive by 10:45 AM)   New Patient Visit with Madonna Angel MD   [facility] ([facility])    [address] [place] [address]  [address]  [facility] [address]   [phone]            Aug 08, 2018 10:35 AM CDT   (Arrive by 10:20 AM)   Return Visit with Norberto Howe MD   [facility] ([facility])    [address] [place] [address]  [address] [facility] [address]   [phone]            Dec 14, 2018  7:50 AM CST   (Arrive by 7:35 AM)   RETURN ENDOCRINE with Brandin Mccarty MD   [facility] ([facility])    [address] [place] [address]  [address] [facility] [address] [phone]              Who to contact     Please call your clinic at [phone] to:    Ask questions about your health    Make or cancel appointments    Discuss your medicines    Learn about your test results    Speak to your doctor            Additional Information About Your Visit        MyChart Information     [employer] gives you secure access to your electronic health record. If you see a primary care provider, you can also send messages to your care team and make appointments. If you have questions, please call your primary care clinic.  If you do not have a primary care provider,  "please call [phone] and they will assist you.      [employer] is an electronic gateway that provides easy, online access to your medical records. With [employer], you can request a clinic appointment, read your test results, renew a prescription or communicate with your care team.     To access your existing account, please contact your [facility] or call [phone] for assistance.        Care EveryWhere ID     This is your Care EveryWhere ID. This could be used by other organizations to access your [facility] medical records  [id number]        Your Vitals Were     Pulse Height BMI (Body Mass Index)             67 1.791 m (5' 10.5\") 28.65 kg/m2          Blood Pressure from Last 3 Encounters:   06/08/18 152/68   05/09/18 (!) 177/99   12/14/17 133/83    Weight from Last 3 Encounters:   06/08/18 91.9 kg (202 lb 8 oz)   05/09/18 90.7 kg (200 lb)   12/14/17 94.3 kg (208 lb)              Today, you had the following     No orders found for display       Primary Care Provider Office Phone # Fax #    Norberto Howe [phone]       [address] [address]        Equal Access to Services     NICOLE HAWKINS : Hadii aad ku hadasho Soomaali, waaxda luqadaha, qaybta kaalmada adeegyada, waxay idiin hayrodrigon stoney carr lasantiago cazares. So [facility] [phone].    ATENCIÓN: Si habla español, tiene a benito disposición servicios gratuitos de asistencia lingüística. Llame al [phone].    We comply with applicable federal civil rights laws and [place] laws. We do not discriminate on the basis of race, color, national origin, age, disability, sex, sexual orientation, or gender identity.            Thank you!     Thank you for choosing [facility]  for your care. Our goal is always to provide you with excellent care. Hearing back from our patients is one way we can continue to improve our services. Please take a few minutes to complete the written survey that " you may receive in the mail after your visit with us. Thank you!             Your Updated Medication List - Protect others around you: Learn how to safely use, store and throw away your medicines at www.disposemymeds.org.          This list is accurate as of 6/8/18 11:14 AM.  Always use your most recent med list.                   Brand Name Dispense Instructions for use Diagnosis    aspirin 81 MG tablet     30 tablet    Take 1 tablet (81 mg) by mouth daily        atorvastatin 10 MG tablet    LIPITOR    90 tablet    Take 1 tablet (10 mg) by mouth daily    Hypercholesteremia       carbidopa-levodopa  MG per tablet    SINEMET    1080 tablet    TAKE 2 TABLETS 6 TIMES DAILY (3AM, 7AM, 11AM, 2PM, 6PM, AND 10PM)    Parkinson's disease (H)       DULCOLAX 10 MG Suppository   Generic drug:  bisacodyl      Place 10 mg rectally daily as needed for constipation    Other fatigue       fluticasone 50 MCG/ACT spray    FLONASE    3 Package    Spray 1-2 sprays into both nostrils daily    Rhinitis       gabapentin 300 MG capsule    NEURONTIN    630 capsule    1-2 caps @ 3am and 7am and 3 caps @ 6pm  -- 5-7 cap/day.    Restless legs syndrome (RLS)       methimazole 5 MG tablet    TAPAZOLE    90 tablet    Take 1 tablet (5 mg) by mouth daily    Warm thyroid nodule, Subclinical hyperthyroidism       triamcinolone 0.1 % cream    KENALOG    15 g    Apply topically 2 times daily    Rash and nonspecific skin eruption

## 2018-06-14 NOTE — TELEPHONE ENCOUNTER
FUTURE VISIT INFORMATION      FUTURE VISIT INFORMATION:    Date: 06/20/2018    Time:     Location:   REFERRAL INFORMATION:    Referring provider:  Blaine Boo,     Referring providers clinic:      Reason for visit/diagnosis          RECORDS STATUS      Internal Records; Epic, Care Everywhere and PACS.

## 2018-06-20 ENCOUNTER — OFFICE VISIT (OUTPATIENT)
Dept: NEUROLOGY | Facility: CLINIC | Age: 73
End: 2018-06-20
Payer: COMMERCIAL

## 2018-06-20 ENCOUNTER — PRE VISIT (OUTPATIENT)
Dept: NEUROLOGY | Facility: CLINIC | Age: 73
End: 2018-06-20

## 2018-06-20 VITALS
RESPIRATION RATE: 20 BRPM | WEIGHT: 199.3 LBS | TEMPERATURE: 98 F | HEART RATE: 82 BPM | OXYGEN SATURATION: 98 % | SYSTOLIC BLOOD PRESSURE: 164 MMHG | HEIGHT: 71 IN | BODY MASS INDEX: 27.9 KG/M2 | DIASTOLIC BLOOD PRESSURE: 83 MMHG

## 2018-06-20 DIAGNOSIS — G20.A1 PARKINSON'S DISEASE (H): ICD-10-CM

## 2018-06-20 RX ORDER — CARBIDOPA AND LEVODOPA 25; 100 MG/1; MG/1
TABLET ORAL
Qty: 82 TABLET | Refills: 5 | Status: SHIPPED | OUTPATIENT
Start: 2018-06-20 | End: 2018-06-21

## 2018-06-20 ASSESSMENT — PAIN SCALES - GENERAL: PAINLEVEL: NO PAIN (0)

## 2018-06-20 NOTE — NURSING NOTE
Chief Complaint   Patient presents with     Consult     P NEW - MOVEMENT DISORDER     Cristiane Webb MA

## 2018-06-20 NOTE — PROGRESS NOTES
"[facility] Movement Disorders [facility] Follow-up    CC: PD follow-up    HPI: Norberto Wu is a 72 year-old male with a history of PD s/p R GPI DBS (5/2014) who presents in follow-up. He was last seen by Dr. Boo 12/14/17. At that time it was unknown if he was experiencing wearing-off or grogginess from gabapentin, but he did not decrease this.    He repots he is wearing-off early, and \"deeper.\" When wearing-off symptoms are discomfort in legs/aching that improves with ambulation and movement. Also has some generalized stiffness. Tends to be more before 11am or 6pm doses. Does not always take meds apart from meals.     He also reports grogginess and difficulty sleeping due to leg symptoms. Gabapentin helps leg symptoms so does not want to decrease further. He takes 1-2 naps per day, sometiems sleeps the whole day.    No dyskinesias, orthostasis, hallucinations. His wife reports he has difficulty locating the correct object when it is right in front of him in a cabinet, for example. Will also incorrectly note colors of things.    Tried Requip before and had fatigue. Tried Neupro and was ineffective and too expensive.    Also reports his speech is getting worse. Constipation is worse. Freezing is worse. No falls. No clear timing of freezing in relation to wearing-off and medication doses.      PD Medications                   3 7 11 2 6 10   Carbidopa/levodopa 25/100                                 2 2 2 2 2 2    gabapentin 300mg                         2 2  3      Medications:  Current Outpatient Prescriptions   Medication     aspirin 81 MG tablet     atorvastatin (LIPITOR) 10 MG tablet     bisacodyl (DULCOLAX) 10 MG Suppository     carbidopa-levodopa (SINEMET)  MG per tablet     fluticasone (FLONASE) 50 MCG/ACT nasal spray     gabapentin (NEURONTIN) 300 MG capsule     methimazole (TAPAZOLE) 5 MG tablet     No current facility-administered medications for this visit.        Exam:  Ht 1.791 m (5' " "10.5\")  Wt 90.4 kg (199 lb 4.8 oz)  BMI 28.19 kg/m2    Neurological Examination (R/L):  Mental Status: Awake, alert. Fluent. Affect normal.  Cranial Nerves: Versions full. Saccades intact. Mild/moderate hypomimia. Mild/moderate hypophonia. No torticollis.  Motor: Mild bradykinesia with finger tapping left more than right. Also with grasps. Moderate bradykinesia with left foot toe tapping. Mild on right foot. Tone mildly increased in right arm, slight in left arm. Normal in legs. Rare right arm tremor, moderately frequent right leg tremor.  Coordination: Finger to nose without dysmetria.  Gait: Slow to rise from chair. Freezing on gait initiation and going through doorway. Otherwise gait with good stride length. Retropulsion normal.    DBS  Activa SC  2.90V  Electrode impedances OK    RGPI  C+1-  4V 90us 185Hz  Therapeutic impedance: 1068 ohms, 3.808mA    Assessment: 72 year-old male with a history of PD s/p R GPI DBS (5/2014). Currently reporting frequent wearing-off, possibly resulting in symptoms of RLS. We discussed that his grogginess is likely due to gabapentin which he reports is helpful for these symptoms and he does not want reduced. Will thus increase levodopa frequency. Have also instructed him to keep a strict diary of meds, meals, wearing-off symptoms. If freezing improves with this it may suggest that there is some levodopa responsiveness.    Plan:   -change Sinemet to q3 hour dosing  -keep symptom/medication diary  -neuro-ophthalmology referral  -will send [employer] message in 1-2 weeks    Follow-up 3 months with Dr. Ema Tillman MD  Movement Disorders Fellow    I saw the patient with Dr. Tillman, a senior movement disorders fellow, and I was present for key portions of his history and physical examination; however, I took an additional history from the patient, performed my own movement-disorders examination, and supervised Dr. Tillman's analysis of the patient's IPG parameters.  I agree " with Dr. Tillman's assessment and treatment plan.    I spent 20 minutes with the patient as described above.    KARLA Guidry MD  Professor of Neurology and [facility] Physician

## 2018-06-20 NOTE — MR AVS SNAPSHOT
After Visit Summary   6/20/2018    Norberto Wu    MRN: [id number]           Patient Information     Date Of Birth          1945        Visit Information        Provider Department      6/20/2018 1:00 PM  MOVEMENT DISORDER FELLOW [facility] Neurology        Today's Diagnoses     Parkinson's disease (H)          Care Instructions    Keep a thorough diary that includes:  1) Time of medication doses  2) Time of meals  3) Time of wearing off    After one week increase your dose frequency to every three hours:  3, 6, 9, 12, 3, 6, 9      Also pay attention to whether you are freezing more or less frequently when you are wearing-off and if the higher dose frequency helps.    Do your exercises from LOUD therapy.          Follow-ups after your visit        Additional Services     OPHTHALMOLOGY ADULT REFERRAL       Your provider has referred you to: [facility]: Eye Clinic - [place] [phone]   [email or web address]  Referral to see Dr. Lepe in neuro-ophthalmology  Having difficulty recognizing colors and objects    Please be aware that coverage of these services is subject to the terms and limitations of your health insurance plan.  Call member services at your health plan with any benefit or coverage questions.      Please bring the following with you to your appointment:    (1) Any X-Rays, CTs or MRIs which have been performed.  Contact the facility where they were done to arrange for  prior to your scheduled appointment.    (2) List of current medications  (3) This referral request   (4) Any documents/labs given to you for this referral                  Your next 10 appointments already scheduled     Jul 10, 2018  8:30 AM CDT   NEW NEURO with Anupam Lepe MD   Eye Clinic ([facility])    [place] [address]  [facility] [address]   [phone]            Oct 23, 2018 10:10 AM CDT   (Arrive by 9:55 AM)  "  Return Movement Disorder with Blaine Boo MD   [facility] Neurology ([facility])    [address] [address] [phone]            Dec 14, 2018  7:50 AM CST   (Arrive by 7:35 AM)   RETURN ENDOCRINE with Brandin Mccarty MD   [facility] Endocrinology ([facility])    [address] [address] [phone]              Who to contact     Please call your clinic at [phone] to:    Ask questions about your health    Make or cancel appointments    Discuss your medicines    Learn about your test results    Speak to your doctor            Additional Information About Your Visit        [employer] Information     [employer] gives you secure access to your electronic health record. If you see a primary care provider, you can also send messages to your care team and make appointments. If you have questions, please call your primary care clinic.  If you do not have a primary care provider, please call [phone] and they will assist you.      [employer] is an electronic gateway that provides easy, online access to your medical records. With [employer], you can request a clinic appointment, read your test results, renew a prescription or communicate with your care team.     To access your existing account, please contact your [facility] or call [phone] for assistance.        Care EveryWhere ID     This is your Care EveryWhere ID. This could be used by other organizations to access your [facility] medical records  [id number]        Your Vitals Were     Pulse Temperature Respirations Height Pulse Oximetry BMI (Body Mass Index)    82 98  F (36.7  C) (Oral) 20 1.791 m (5' 10.5\") 98% 28.19 kg/m2       Blood Pressure from Last 3 Encounters:   06/20/18 164/83   06/08/18 152/68   05/09/18 (!) 177/99    Weight from Last 3 Encounters:   06/20/18 90.4 kg (199 lb 4.8 oz) "   06/08/18 91.9 kg (202 lb 8 oz)   05/09/18 90.7 kg (200 lb)              We Performed the Following     OPHTHALMOLOGY ADULT REFERRAL          Today's Medication Changes          These changes are accurate as of 6/20/18  2:02 PM.  If you have any questions, ask your nurse or doctor.               These medicines have changed or have updated prescriptions.        Dose/Directions    carbidopa-levodopa  MG per tablet   Commonly known as:  SINEMET   This may have changed:  additional instructions   Used for:  Parkinson's disease (H)        TAKE 2 TABLETS 7 TIMES DAILY (3AM, 6AM, 9AM, noon, 3PM, AND 6PM)   Quantity:  82 tablet   Refills:  5            Where to get your medicines      These medications were sent to [id number] - [place], [place] - [address]  [address] [facility] [facility] [address]     Phone:  [phone]     carbidopa-levodopa  MG per tablet                Primary Care Provider Office Phone # Fax #    Norberto Howe [phone]       [address] [address]        Equal Access to Services     [facility]: Hadii anita ku hadasho Soyolie, waaxda luqadaha, qaybta kaalmaerika taylor, bharti cazares. So [facility] [phone].    ATENCIÓN: Si habla español, tiene a benito disposición servicios gratuitos de asistencia lingüística. [facility] [phone].    We comply with applicable federal civil rights laws and [place] laws. We do not discriminate on the basis of race, color, national origin, age, disability, sex, sexual orientation, or gender identity.            Thank you!     Thank you for choosing [facility] NEUROLOGY  for your care. Our goal is always to provide you with excellent care. Hearing back from our patients is one way we can continue to improve our services. Please take a few minutes to complete the written survey that you may receive in the mail after your visit with us. Thank you!             Your Updated Medication  List - Protect others around you: Learn how to safely use, store and throw away your medicines at www.disposemymeds.org.          This list is accurate as of 6/20/18  2:02 PM.  Always use your most recent med list.                   Brand Name Dispense Instructions for use Diagnosis    aspirin 81 MG tablet     30 tablet    Take 1 tablet (81 mg) by mouth daily        atorvastatin 10 MG tablet    LIPITOR    90 tablet    Take 1 tablet (10 mg) by mouth daily    Hypercholesteremia       carbidopa-levodopa  MG per tablet    SINEMET    82 tablet    TAKE 2 TABLETS 7 TIMES DAILY (3AM, 6AM, 9AM, noon, 3PM, AND 6PM)    Parkinson's disease (H)       DULCOLAX 10 MG Suppository   Generic drug:  bisacodyl      Place 10 mg rectally daily as needed for constipation    Other fatigue       fluticasone 50 MCG/ACT spray    FLONASE    3 Package    Spray 1-2 sprays into both nostrils daily    Rhinitis       gabapentin 300 MG capsule    NEURONTIN    630 capsule    1-2 caps @ 3am and 7am and 3 caps @ 6pm  -- 5-7 cap/day.    Restless legs syndrome (RLS)       methimazole 5 MG tablet    TAPAZOLE    90 tablet    Take 1 tablet (5 mg) by mouth daily    Warm thyroid nodule, Subclinical hyperthyroidism

## 2018-06-20 NOTE — PATIENT INSTRUCTIONS
Keep a thorough diary that includes:  1) Time of medication doses  2) Time of meals  3) Time of wearing off    After one week increase your dose frequency to every three hours:  3, 6, 9, 12, 3, 6, 9      Also pay attention to whether you are freezing more or less frequently when you are wearing-off and if the higher dose frequency helps.    Do your exercises from LOUD therapy.

## 2018-06-21 DIAGNOSIS — E05.90 SUBCLINICAL HYPERTHYROIDISM: ICD-10-CM

## 2018-06-21 DIAGNOSIS — E04.1 WARM THYROID NODULE: ICD-10-CM

## 2018-06-21 DIAGNOSIS — G20.A1 PARKINSON'S DISEASE (H): ICD-10-CM

## 2018-06-21 RX ORDER — CARBIDOPA AND LEVODOPA 25; 100 MG/1; MG/1
TABLET ORAL
Qty: 420 TABLET | Refills: 3 | Status: SHIPPED | OUTPATIENT
Start: 2018-06-21 | End: 2018-10-23

## 2018-06-21 NOTE — TELEPHONE ENCOUNTER
Received fax stating patient's Carbidopa-Levodopa prescription had an incorrect quantity.  I recent an order for quantity of #420 rather than #82.

## 2018-06-24 NOTE — TELEPHONE ENCOUNTER
methimazole (TAPAZOLE) 5 MG tablet  Last Written Prescription Date:  3/29/18  Last Fill Quantity: 90,   # refills: 0  Last Office Visit : 6/8/18  Future Office visit: 12/14/18    Routing refill request to provider for review/approval because:   Not on protocol

## 2018-06-25 RX ORDER — METHIMAZOLE 5 MG/1
5 TABLET ORAL DAILY
Qty: 90 TABLET | Refills: 0 | Status: SHIPPED | OUTPATIENT
Start: 2018-06-25 | End: 2018-10-23

## 2018-07-05 DIAGNOSIS — H53.10 SUBJECTIVE VISUAL DISTURBANCE: Primary | ICD-10-CM

## 2018-07-10 ENCOUNTER — OFFICE VISIT (OUTPATIENT)
Dept: OPHTHALMOLOGY | Facility: CLINIC | Age: 73
End: 2018-07-10
Attending: OPHTHALMOLOGY
Payer: MEDICARE

## 2018-07-10 DIAGNOSIS — H04.123 INSUFFICIENCY OF TEAR FILM OF BOTH EYES: ICD-10-CM

## 2018-07-10 DIAGNOSIS — H53.10 SUBJECTIVE VISUAL DISTURBANCE: ICD-10-CM

## 2018-07-10 DIAGNOSIS — H51.9 CONVERGENCE INSUFFICIENCY OR PALSY IN BINOCULAR EYE MOVEMENT: Primary | ICD-10-CM

## 2018-07-10 DIAGNOSIS — H53.40 VISUAL FIELD DEFECT: ICD-10-CM

## 2018-07-10 PROCEDURE — G0463 HOSPITAL OUTPT CLINIC VISIT: HCPCS | Mod: 25,ZF

## 2018-07-10 PROCEDURE — 92133 CPTRZD OPH DX IMG PST SGM ON: CPT | Mod: ZF | Performed by: OPHTHALMOLOGY

## 2018-07-10 PROCEDURE — 92083 EXTENDED VISUAL FIELD XM: CPT | Mod: ZF | Performed by: OPHTHALMOLOGY

## 2018-07-10 PROCEDURE — 92060 SENSORIMOTOR EXAMINATION: CPT | Mod: ZF | Performed by: OPHTHALMOLOGY

## 2018-07-10 ASSESSMENT — CONF VISUAL FIELD
OS_NORMAL: 1
OD_NORMAL: 1

## 2018-07-10 ASSESSMENT — VISUAL ACUITY
OS_PH_SC: 20/25
OS_SC: 20/50
METHOD: SNELLEN - LINEAR
OS_SC+: -2
OD_SC: 20/30
OD_PH_SC: 20/25

## 2018-07-10 ASSESSMENT — REFRACTION_FINALRX
OS_HBASE: IN
OS_HPRISM: 3
OD_HBASE: IN
OD_HPRISM: 3

## 2018-07-10 ASSESSMENT — CUP TO DISC RATIO
OD_RATIO: 0.3
OS_RATIO: 0.2

## 2018-07-10 ASSESSMENT — TONOMETRY
IOP_METHOD: TONOPEN
OD_IOP_MMHG: 12
OS_IOP_MMHG: 10

## 2018-07-10 ASSESSMENT — EXTERNAL EXAM - RIGHT EYE: OD_EXAM: NORMAL

## 2018-07-10 ASSESSMENT — EXTERNAL EXAM - LEFT EYE: OS_EXAM: NORMAL

## 2018-07-10 NOTE — PROGRESS NOTES
Assessment & Plan     Norberto Wu is a 72 year old male with the following diagnoses:   1. Convergence insufficiency or palsy in binocular eye movement    2. Subjective visual disturbance    3. Insufficiency of tear film of both eyes       A 73 yo male with history of parkinson's disease diagnosed around 10 years ago currently on Sinemet Q3 hours and gabapentin 2100 mg per day (morning, lunch, and dinner). Over the past 1-2 years, he has noticed more difficulty with his vision, both in terms of blurry vision and in terms of distinguishing similarly colored objects at near. He notes normal vision at distance. He denies any ocular history. He wears glasses for reading +2.50. He denies diplopia. He is able to read with readers.     His visual acuity pinholes to 20/25 in both eyes. His pupillary exam is normal. His color vision, confrontation fields, and extraocular movements are decreased in upgaze. His strabismus exam is notable for 14 prism diopter intermittent exotropia at near. On worth four dot at near, he sees 5 dots.  His near point of convergence is about 6 inches. He has interrupted gaze pursuit. His anterior exam is notable for blepharitis and decreased blink related to Parkinson's.  Cornea with some inferior PEK both eyes. His posterior exam shows normal optic nerves. OCT of the optic nerve head was normal in both eyes. His visual fields had very high false negatives and showed nonspecific defects in both eyes. His macula OCT was grossly normal in both eyes.  The abnormal visual fields are likely artifact.     It is my impression that he has convergence insufficiency likely related to parkinson's disease.  I would recommend pencil push ups to strengthen his musles of convergence, or we can try prisms in his reading glasses for near.  Will give him a 3 BI prism in reading glasses only.      It is my impression that patient's intermittent blurred vision is related to dry eyes.  I asked the patient  to use artificial tears as well as warm compresses to the eyelid margin  on a regular basis.  I asked the patient to take fish oil capsules 2x/day for a month and then 1x/d thereafter.   If that is not beneficial we could consider punctal plugs, corticosteroid eye drops and Restasis.           Attending Physician Attestation:  Complete documentation of historical and exam elements from today's encounter can be found in the full encounter summary report (not reduplicated in this progress note).  I personally obtained the chief complaint(s) and history of present illness.  I confirmed and edited as necessary the review of systems, past medical/surgical history, family history, social history, and examination findings as documented by others; and I examined the patient myself.  I personally reviewed the relevant tests, images, and reports as documented above.  I formulated and edited as necessary the assessment and plan and discussed the findings and management plan with the patient and family. - Anupam Molina MD  PGY-3 Ophthalmology

## 2018-07-10 NOTE — LETTER
7/10/2018         RE:  :  MRN: Norberto Wu  1945  [id number]     Dear Dr Howe,    Thank you for asking me to see your very pleasant patient, Norberto Wu, in neuro-ophthalmic consultation.  I would like to thank you for sending your records and I have summarized them in the history of present illness. He presented with his spouse who provided additional history.  My assessment and plan are below.  For further details, please see my attached clinic note.      Assessment & Plan   Norberto Wu is a 72 year old male with the following diagnoses:   1. Convergence insufficiency or palsy in binocular eye movement    2. Subjective visual disturbance    3. Insufficiency of tear film of both eyes       A 73 yo male with history of parkinson's disease diagnosed around 10 years ago currently on Sinemet Q3 hours and gabapentin 2100 mg per day (morning, lunch, and dinner). Over the past 1-2 years, he has noticed more difficulty with his vision, both in terms of blurry vision and in terms of distinguishing similarly colored objects at near. He notes normal vision at distance. He denies any ocular history. He wears glasses for reading +2.50. He denies diplopia. He is able to read with readers.     His visual acuity pinholes to 20/25 in both eyes. His pupillary exam is normal. His color vision, confrontation fields, and extraocular movements are decreased in upgaze. His strabismus exam is notable for 14 prism diopter intermittent exotropia at near. On worth four dot at near, he sees 5 dots.  His near point of convergence is about 6 inches. He has interrupted gaze pursuit. His anterior exam is notable for blepharitis and decreased blink related to Parkinson's.  Cornea with some inferior PEK both eyes. His posterior exam shows normal optic nerves. OCT of the optic nerve head was normal in both eyes. His visual fields had very high false negatives and showed nonspecific defects in both eyes. His macula OCT was  grossly normal in both eyes.  The abnormal visual fields are likely artifact.     It is my impression that he has convergence insufficiency likely related to parkinson's disease.  I would recommend pencil push ups to strengthen his musles of convergence, or we can try prisms in his reading glasses for near.  Will give him a 3 BI prism in reading glasses only.      It is my impression that patient's intermittent blurred vision is related to dry eyes.  I asked the patient to use artificial tears as well as warm compresses to the eyelid margin  on a regular basis.  I asked the patient to take fish oil capsules 2x/day for a month and then 1x/d thereafter. If that is not beneficial we could consider punctal plugs, corticosteroid eye drops and Restasis.    Again, thank you for allowing me to participate in the care of your patient.      Sincerely,    nAupam Lepe MD  Professor, Neuro-Ophthalmology  Department of Ophthalmology and Visual Neurosciences  [facility]    CC: Norberto Hoew MD  VIA In [employer]    Jose Armando Valladares MD  [facility]  [address]  [facility] [address]  VIA Facsimile: [phone]     Christina Dinh RN  VIA In Basket     Litzy Quiñones, PhD LP  VIA In Basket     Francesca Gómez, APRN CNP  VIA In Basket     Brandin Mccarty MD  VIA In Basket     Blaine Boo MD  VIA In Basket     Norberto Guidry MD  VIA In Basket

## 2018-07-10 NOTE — MR AVS SNAPSHOT
After Visit Summary   7/10/2018    Norberto Wu    MRN: [id number]           Patient Information     Date Of Birth          1945        Visit Information        Provider Department      7/10/2018 8:30 AM Anupam Lepe [facility]        Today's Diagnoses     Convergence insufficiency or palsy in binocular eye movement    -  1    Subjective visual disturbance        Insufficiency of tear film of both eyes          Care Instructions    You have been diagnosed with Dry eye syndrome.  Symptoms of Dry eye syndrome can include double vision, eye pain, blurry vision, stinging, burning, tearing, eye redness.      Some useful instructions are listed below:     1.  Use artificial tears on a regular basis.  If you use artificial tear drops with preservative, you can use them up to 4-6 times per day. If you use artificial tear drops without preservatives, then you can use them more often.      2.  Wash your eyelashes with hot water.  Do not make the water so hot that you burn yourself, but the water should be more than just warm.  Often patients will wash their eyelashes in the shower under the hot water.  You should rub gently along the base of your eyelashes.      3.  Taking fish oil capsules twice a day for a month and then once a day after that can help improve Dry eye symptoms.      4.  Drink a lot of water.  Hydration can be helpful.      5.  Humidify your environment.      6.  If this is not beneficial, other treatments can include punctal plugs or cautery, corticosteroid eye drops, Restasis, Lipiflow, or autologous serum.  If you are still struggling with dry eye symptoms, call Dr. Lepe's office for other therapies or a referral to a dry eye specialist.                 Follow-ups after your visit        Your next 10 appointments already scheduled     Oct 23, 2018 10:10 AM CDT   (Arrive by 9:55 AM)   Return Movement Disorder with Blaine Boo MD   [facility] Neurology ([facility]  [facility])    [address] [id number] [phone]            Dec 14, 2018  7:50 AM CST   (Arrive by 7:35 AM)   RETURN ENDOCRINE with Brandin Mccarty MD   [facility] Endocrinology ([facility])    [address] [place] [id number] [phone]              Who to contact     Please call your clinic at [phone] to:    Ask questions about your health    Make or cancel appointments    Discuss your medicines    Learn about your test results    Speak to your doctor            Additional Information About Your Visit        [employer] Information     [employer] gives you secure access to your electronic health record. If you see a primary care provider, you can also send messages to your care team and make appointments. If you have questions, please call your primary care clinic.  If you do not have a primary care provider, please call [phone] and they will assist you.      [employer] is an electronic gateway that provides easy, online access to your medical records. With [employer], you can request a clinic appointment, read your test results, renew a prescription or communicate with your care team.     To access your existing account, please contact your [facility] or call [phone] for assistance.        Care EveryWhere ID     This is your Care EveryWhere ID. This could be used by other organizations to access your [facility] medical records  [id number]         Blood Pressure from Last 3 Encounters:   06/20/18 164/83   06/08/18 152/68   05/09/18 (!) 177/99    Weight from Last 3 Encounters:   06/20/18 90.4 kg (199 lb 4.8 oz)   06/08/18 91.9 kg (202 lb 8 oz)   05/09/18 90.7 kg (200 lb)              We Performed the Following     IOP Measurement     Low Vision Central OU     OCT Optic Nerve RNFL Spectralis OU (both eyes)     Sensorimotor        Primary Care Provider Office Phone # Xpt  #    Norberto W MD Shyam [phone]       [address] [address]        Equal Access to Services     NICOLE HAWKINS : Stefan anita magana jose c Bose, wajosé miguelda luqadaha, qajose angelta kaalmada adehellen, bharti neillatha sage. So [facility] [phone].    ATENCIÓN: Si habla español, tiene a benito disposición servicios gratuitos de asistencia lingüística. Llame al [phone].    We comply with applicable federal civil rights laws and [place] laws. We do not discriminate on the basis of race, color, national origin, age, disability, sex, sexual orientation, or gender identity.            Thank you!     Thank you for choosing [facility]  for your care. Our goal is always to provide you with excellent care. Hearing back from our patients is one way we can continue to improve our services. Please take a few minutes to complete the written survey that you may receive in the mail after your visit with us. Thank you!             Your Updated Medication List - Protect others around you: Learn how to safely use, store and throw away your medicines at www.disposemymeds.org.          This list is accurate as of 7/10/18 11:00 AM.  Always use your most recent med list.                   Brand Name Dispense Instructions for use Diagnosis    aspirin 81 MG tablet     30 tablet    Take 1 tablet (81 mg) by mouth daily        atorvastatin 10 MG tablet    LIPITOR    90 tablet    Take 1 tablet (10 mg) by mouth daily    Hypercholesteremia       carbidopa-levodopa  MG per tablet    SINEMET    420 tablet    TAKE 2 TABLETS 7 TIMES DAILY (3AM, 6AM, 9AM, noon, 3PM, AND 6PM)    Parkinson's disease (H)       DULCOLAX 10 MG Suppository   Generic drug:  bisacodyl      Place 10 mg rectally daily as needed for constipation    Other fatigue       fluticasone 50 MCG/ACT spray    FLONASE    3 Package    Spray 1-2 sprays into both nostrils daily    Rhinitis       gabapentin 300 MG capsule    NEURONTIN     630 capsule    1-2 caps @ 3am and 7am and 3 caps @ 6pm  -- 5-7 cap/day.    Restless legs syndrome (RLS)       methimazole 5 MG tablet    TAPAZOLE    90 tablet    Take 1 tablet (5 mg) by mouth daily    Warm thyroid nodule, Subclinical hyperthyroidism

## 2018-07-10 NOTE — NURSING NOTE
Chief Complaints and History of Present Illnesses   Patient presents with     Neurologic Problem     PARKINSON'S     HPI    Symptoms:              Comments:  Norberto is a 72 year old male presenting with a history of:    1. Parkinson's disease.   Diagnosed 10 years ago.   Currently on carbidopa-levodopa  C/o blurred vision, but no diplopia.   No trouble reading.  Recently had eye prescription (two weeks ago). Given 2.50 readers. Declined mrx today.     Hoda BERRY 9:08 AM July 10, 2018

## 2018-08-08 DIAGNOSIS — G25.81 RESTLESS LEGS SYNDROME (RLS): ICD-10-CM

## 2018-08-10 RX ORDER — GABAPENTIN 300 MG/1
CAPSULE ORAL
Qty: 630 CAPSULE | Refills: 0 | Status: SHIPPED | OUTPATIENT
Start: 2018-08-10 | End: 2018-10-23

## 2018-10-04 ENCOUNTER — PATIENT OUTREACH (OUTPATIENT)
Dept: CARE COORDINATION | Facility: CLINIC | Age: 73
End: 2018-10-04

## 2018-10-05 ENCOUNTER — OFFICE VISIT (OUTPATIENT)
Dept: INTERNAL MEDICINE | Facility: CLINIC | Age: 73
End: 2018-10-05
Payer: COMMERCIAL

## 2018-10-05 VITALS
WEIGHT: 185.1 LBS | SYSTOLIC BLOOD PRESSURE: 93 MMHG | DIASTOLIC BLOOD PRESSURE: 61 MMHG | BODY MASS INDEX: 26.18 KG/M2 | HEART RATE: 81 BPM

## 2018-10-05 DIAGNOSIS — Z23 NEED FOR PROPHYLACTIC VACCINATION AND INOCULATION AGAINST INFLUENZA: ICD-10-CM

## 2018-10-05 DIAGNOSIS — R94.6 ABNORMAL FINDING ON THYROID FUNCTION TEST: Primary | ICD-10-CM

## 2018-10-05 DIAGNOSIS — R94.6 ABNORMAL FINDING ON THYROID FUNCTION TEST: ICD-10-CM

## 2018-10-05 LAB
ALBUMIN SERPL-MCNC: 3.7 G/DL (ref 3.4–5)
ALP SERPL-CCNC: 116 U/L (ref 40–150)
ALT SERPL W P-5'-P-CCNC: 9 U/L (ref 0–70)
ANION GAP SERPL CALCULATED.3IONS-SCNC: 6 MMOL/L (ref 3–14)
AST SERPL W P-5'-P-CCNC: 12 U/L (ref 0–45)
BASOPHILS # BLD AUTO: 0 10E9/L (ref 0–0.2)
BASOPHILS NFR BLD AUTO: 0.5 %
BILIRUB SERPL-MCNC: 1.2 MG/DL (ref 0.2–1.3)
BUN SERPL-MCNC: 16 MG/DL (ref 7–30)
CALCIUM SERPL-MCNC: 8.3 MG/DL (ref 8.5–10.1)
CHLORIDE SERPL-SCNC: 106 MMOL/L (ref 94–109)
CO2 SERPL-SCNC: 28 MMOL/L (ref 20–32)
CREAT SERPL-MCNC: 0.72 MG/DL (ref 0.66–1.25)
DIFFERENTIAL METHOD BLD: NORMAL
EOSINOPHIL # BLD AUTO: 0.1 10E9/L (ref 0–0.7)
EOSINOPHIL NFR BLD AUTO: 0.9 %
ERYTHROCYTE [DISTWIDTH] IN BLOOD BY AUTOMATED COUNT: 13.8 % (ref 10–15)
GFR SERPL CREATININE-BSD FRML MDRD: >90 ML/MIN/1.7M2
GLUCOSE SERPL-MCNC: 100 MG/DL (ref 70–99)
HCT VFR BLD AUTO: 45.3 % (ref 40–53)
HGB BLD-MCNC: 14.9 G/DL (ref 13.3–17.7)
IMM GRANULOCYTES # BLD: 0 10E9/L (ref 0–0.4)
IMM GRANULOCYTES NFR BLD: 0.3 %
LYMPHOCYTES # BLD AUTO: 1.5 10E9/L (ref 0.8–5.3)
LYMPHOCYTES NFR BLD AUTO: 19.5 %
MAGNESIUM SERPL-MCNC: 2.4 MG/DL (ref 1.6–2.3)
MCH RBC QN AUTO: 29.9 PG (ref 26.5–33)
MCHC RBC AUTO-ENTMCNC: 32.9 G/DL (ref 31.5–36.5)
MCV RBC AUTO: 91 FL (ref 78–100)
MONOCYTES # BLD AUTO: 0.7 10E9/L (ref 0–1.3)
MONOCYTES NFR BLD AUTO: 9.1 %
NEUTROPHILS # BLD AUTO: 5.2 10E9/L (ref 1.6–8.3)
NEUTROPHILS NFR BLD AUTO: 69.7 %
NRBC # BLD AUTO: 0 10*3/UL
NRBC BLD AUTO-RTO: 0 /100
PLATELET # BLD AUTO: 233 10E9/L (ref 150–450)
POTASSIUM SERPL-SCNC: 3.9 MMOL/L (ref 3.4–5.3)
PROT SERPL-MCNC: 7 G/DL (ref 6.8–8.8)
RBC # BLD AUTO: 4.98 10E12/L (ref 4.4–5.9)
SODIUM SERPL-SCNC: 140 MMOL/L (ref 133–144)
TSH SERPL DL<=0.005 MIU/L-ACNC: 0.95 MU/L (ref 0.4–4)
WBC # BLD AUTO: 7.5 10E9/L (ref 4–11)

## 2018-10-05 ASSESSMENT — PAIN SCALES - GENERAL: PAINLEVEL: NO PAIN (0)

## 2018-10-05 NOTE — MR AVS SNAPSHOT
After Visit Summary   10/5/2018    Norberto Wu    MRN: [id number]           Patient Information     Date Of Birth          1945        Visit Information        Provider Department      10/5/2018 11:35 AM Norberto Howe MD [facility]        Today's Diagnoses     Abnormal finding on thyroid function test    -  1    Need for prophylactic vaccination and inoculation against influenza          Care Instructions    Primary Care Center Medication Refill Request Information:  * Please contact your pharmacy regarding ANY request for medication refills.  ** [facility] Prescription Fax = [phone]  * Please allow 3 business days for routine medication refills.  * Please allow 5 business days for controlled substance medication refills.     Primary Care Center Test Result notification information:  *You will be notified with in 7-10 days of your appointment day regarding the results of your test.  If you are on MyChart you will be notified as soon as the provider has reviewed the results and signed off on them.    [facility]: [phone]             Follow-ups after your visit        Your next 10 appointments already scheduled     Oct 23, 2018 10:10 AM CDT   (Arrive by 9:55 AM)   Return Movement Disorder with Blaine Boo MD   [facility] Neurology ([facility])    [address] [place] [id number] [phone]            Dec 14, 2018  7:50 AM CST   (Arrive by 7:35 AM)   RETURN ENDOCRINE with Brandin Mccarty MD   [facility] Endocrinology ([facility])    [address] [place] [id number] [phone]              Future tests that were ordered for you today     Open Future Orders        Priority Expected Expires Ordered    Magnesium Routine 10/5/2018 10/19/2018 10/5/2018    CBC with platelets differential Routine 10/5/2018 10/19/2018 10/5/2018    Comprehensive metabolic  panel Routine 10/5/2018 10/19/2018 10/5/2018    TSH with free T4 reflex Routine 10/5/2018 10/5/2019 10/5/2018            Who to contact     Please call your clinic at [phone] to:    Ask questions about your health    Make or cancel appointments    Discuss your medicines    Learn about your test results    Speak to your doctor            Additional Information About Your Visit        [employer] Information     [employer] gives you secure access to your electronic health record. If you see a primary care provider, you can also send messages to your care team and make appointments. If you have questions, please call your primary care clinic.  If you do not have a primary care provider, please call [phone] and they will assist you.      [employer] is an electronic gateway that provides easy, online access to your medical records. With [employer], you can request a clinic appointment, read your test results, renew a prescription or communicate with your care team.     To access your existing account, please contact your [facility] or call [phone] for assistance.        Care EveryWhere ID     This is your Care EveryWhere ID. This could be used by other organizations to access your [facility] medical records  [id number]        Your Vitals Were     Pulse BMI (Body Mass Index)                81 26.18 kg/m2           Blood Pressure from Last 3 Encounters:   10/05/18 93/61   06/20/18 164/83   06/08/18 152/68    Weight from Last 3 Encounters:   10/05/18 84 kg (185 lb 1.6 oz)   06/20/18 90.4 kg (199 lb 4.8 oz)   06/08/18 91.9 kg (202 lb 8 oz)              We Performed the Following     FLU VACCINE, INCREASED ANTIGEN, PRESV FREE, AGE 65+ [17274]        Primary Care Provider Office Phone # Fax #    Norberto Howe [phone]        [address] [address]        Equal Access to Services     NICOLE HAWKINS : miguel Costa,  bharti jenkins ah. So [facility] [phone].    ATENCIÓN: Si sanjuana renteria, tiene a benito disposición servicios gratuitos de asistencia lingüística. Hemant al [phone].    We comply with applicable federal civil rights laws and [place] laws. We do not discriminate on the basis of race, color, national origin, age, disability, sex, sexual orientation, or gender identity.            Thank you!     Thank you for choosing [facility]  for your care. Our goal is always to provide you with excellent care. Hearing back from our patients is one way we can continue to improve our services. Please take a few minutes to complete the written survey that you may receive in the mail after your visit with us. Thank you!             Your Updated Medication List - Protect others around you: Learn how to safely use, store and throw away your medicines at www.disposemymeds.org.          This list is accurate as of 10/5/18 12:36 PM.  Always use your most recent med list.                   Brand Name Dispense Instructions for use Diagnosis    aspirin 81 MG tablet     30 tablet    Take 1 tablet (81 mg) by mouth daily        atorvastatin 10 MG tablet    LIPITOR    90 tablet    Take 1 tablet (10 mg) by mouth daily    Hypercholesteremia       carbidopa-levodopa  MG per tablet    SINEMET    420 tablet    TAKE 2 TABLETS 7 TIMES DAILY (3AM, 6AM, 9AM, noon, 3PM, AND 6PM)    Parkinson's disease (H)       DULCOLAX 10 MG Suppository   Generic drug:  bisacodyl      Place 10 mg rectally daily as needed for constipation    Other fatigue       fluticasone 50 MCG/ACT spray    FLONASE    3 Package    Spray 1-2 sprays into both nostrils daily    Rhinitis       gabapentin 300 MG capsule    NEURONTIN    630 capsule    TAKE 2 CAPSULES IN THE MORNING & NOON AND 3 CAPSULES 2 HOURS BEFORE BEDTIME    Restless legs syndrome (RLS)       methimazole 5 MG tablet    TAPAZOLE    90 tablet    Take  1 tablet (5 mg) by mouth daily    Warm thyroid nodule, Subclinical hyperthyroidism

## 2018-10-05 NOTE — PATIENT INSTRUCTIONS
[facility] Medication Refill Request Information:  * Please contact your pharmacy regarding ANY request for medication refills.  ** [facility] Prescription Fax = [phone]  * Please allow 3 business days for routine medication refills.  * Please allow 5 business days for controlled substance medication refills.     [facility] Test Result notification information:  *You will be notified with in 7-10 days of your appointment day regarding the results of your test.  If you are on MyChart you will be notified as soon as the provider has reviewed the results and signed off on them.    [facility]: [phone]

## 2018-10-05 NOTE — PROGRESS NOTES
HPI:    Pt. Comes in for follow up today. He has followed with Dr. Boo, Neurology for Parkinson's disease. He has h/o prostate CA and is followed at [facility] [place].  Colonoscopy 3/17 possibly repeat in 5 years. He is with his son and daughter-in-law today. His wife Mayelin is now in Hospice. He will be moving to new Care Facility in the next few weeks.  No other HEENT, cardiopulmonary, abdominal, , neurological, systemic complaints. He does follow with outside dermatology.      PMH:    1. Parkinson's followed by Dr. Boo  2. Prostate cancer followed at [facility]; s/p prostatectomy 2008; but possible slight increase in PSA; followed every 6 months.  3. Increased cholesterol  4. Deep Brain Stimulator; see neurology note from 7/7/14.  5. Restless leg sxs.  6. PFO see Dr. Vela's cardiology note from 9/13     PE:    Vitals noted gen nad cooperative alert, HEENT; Oropharynx clear no exudate neck supple nl rom no adenopathy, LCTA, B, normal resp. System exam, RRR, S1, S2, no MRG, abdomen, nt, nd, resting tremor in hands.  No Paraspinous tenderness to palpation, no skin rash.      A/P:    1. Parkinson's he follows here in Neurology and was last seen 12/2017.  Dr. Boo He has follow up  10/23/2018  2. Check with insurance for new Shingles vaccine  3. Increased cholesterol;  fasting lipids and liver tests done 10/9/17 and stable; he remains on Lipitor. For leg complaints he will stop for about 2 weeks  4. He will continue to follow outside Urology for h/o prostate CA. Will check PSA checked 10/9/17 and 0.13; value 3/7/17 was 0.12. He states he follows with Dr. Valladares and that is PSA usually is in the 0.1 range and stable.   5. He can follow up in Health Psychology for anxiety issues.   6. Low TSH checked 9/14; not on replacement checked  TPO and TSI labs done. He has followed up with Dr. Mccarty 6/17 and  6/8/2018.   He is on methimazole.   7. Placed future dermatology referral for skin check 9/11/17.  8.  Colonoscopy done 3/2017 repeat in 5 years. He will try Miralax for constipation.   9. BP monitor from 10/2017 normal BP  10. Melatonin for sleep     Total time spent 25  minutes.  More than 50% of the time spent with Mr. Wu on counseling / coordinating his care

## 2018-10-05 NOTE — NURSING NOTE
Chief Complaint   Patient presents with     Recheck Medication     follow up per patient       Slade Scott, EMT 11:35 AM on 10/5/2018.

## 2018-10-05 NOTE — NURSING NOTE
Norberto Wu      1.  Has the patient received the information for the influenza vaccine? YES    2.  Does the patient have any of the following contraindications?     Allergy to eggs? No     Allergic reaction to previous influenza vaccines? No     Any other problems to previous influenza vaccines? No     Paralyzed by Guillain-[place] syndrome? No     Currently pregnant? NO     Current moderate or severe illness? No     Allergy to contact lens solution? No    3.  The vaccine has been administered in the usual fashion and the patient was instructed to wait 20 minutes before leaving the building in the event of an allergic reaction: YES    Vaccination given by Slade Scott, EMT 11:39 AM on 10/5/2018.    Recorded by Slade Scott

## 2018-10-07 ENCOUNTER — MEDICAL CORRESPONDENCE (OUTPATIENT)
Dept: HEALTH INFORMATION MANAGEMENT | Facility: CLINIC | Age: 73
End: 2018-10-07

## 2018-10-09 NOTE — PROGRESS NOTES
Diagnosis/Summary/Recommendations:    PATIENT: Norberto Wu  73 year old male     : 1945    GAMA: 2018    Parkinson  dbs    Wife Mayelin is on hospice for metastatic breast cancer  Lottie Wu daughter in law and Son Caleb Wu.   Mayelin is in [facility] for [facility] care  This is not a permanent address.     Wondering about taking aspirin     Generally his medications are working well    Will be seeing Dr. Howe today and will review his blood pressure and discuss his other medications such as the atorvastatin which he reportedly has stopped and to review his thyroid medication.     He is having some falls and associated freezing that is causing the falls.     2.83 voltage            Medications     3am 6-7am 11am-12noon 3pm 6pm 9pm midnight   Aspirin 81mg  1        Atorvastatin lipitor 10mg  stopped        Bisacodyl dulcolax 10mg  stopped        Carbidopa/levodopa sinemet 25/100 2 2 2 2 2 2 2   Fluticasone flonase 50 mcg/act nasal spray  1 spray        Gabapentin/neurontin 300mg  2 2  3     Methimazole tapazole 5mg   1        Polyethylene glycol miralax     Daily @ 6-7pm                                                                                         History obtained from patient     PLAN  Given the stress ongoing with his wife being in hospice this has impacted his health overall.   He has on related freezing despite medications and his dbs system working well.   He has had cognitive decline in the past few weeks.     He is temporarily living with his wife in her facility and he will need a new living situation where he gets additional care    He is on the waiting list of [facility] on [address]  - assisted living, enhanced care and memory unit.   [email or web address]    We discussed parkinson speciality care facility in [place]    [email or web address]    Senior linkage line.     He could benefit from   consultation with JAKE Blood.    He could benefit from physical and occupational therapy consultation and possibly a U - step walker     Additional resources provided below    [email or web address]    [email or web address]    Discussion about medications for wearing off - if needed; he has the ability to remember to take his sinemet every 3 hours or so and therefore he does not need rytary at this point    Given his cognitive decline, it will be worth discussing the use of memory enhancing medications such as donepezil (aricept) or/and rivastigmine (excelon) which may also help his gait/freezing. In particular he has on related freezing.     He should return back in 1-3 months.     May consider pharmacy consultation with ngozi Mena Pharm D down the road to help with medication management ie the above cognitive medications and/or review the rytary option and decision about whether we should continue the gabapentin at the present dose or possibly reduce further but it is probably helping his RLS symptoms.                 Documentation of a Face-to-Face Physician Encounter October 23, 2018    Norberto Wu  1945  [id number]    I certify that this patient is under my care and that I, or a nurse practitioner or physician's assistant working with me, had a face-to-face encounter that meets the physician face-to-face encounter requirements with this patient on: 10/23/2018.    The encounter with the patient was in whole, or in part, for the following medical condition, which is the primary reason for home health care:  Patient Active Problem List   Diagnosis     Parkinson disease (H)     Peripheral neuropathy     Somnolence     Family history of Parkinson's disease     Snores     REM sleep behavior disorder     Prostate cancer (H)     Wears glasses     Insomnia     Constipation     History of MRI of brain and brain stem     PFO (patent foramen ovale)     Cerebellar stroke syndrome     H/O  echocardiogram     Restless leg syndrome     S/P brain surgery     Hamstring tendonitis at origin     Parkinson's disease (H)     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     Further, I certify that my clinical findings support that this patient is homebound (i.e. absences from home require considerable and taxing effort and are for medical reasons or [facility] services or infrequently or of short duration when for other reasons) because: parkinson      Physician signature ________Blaine Boo MD___________________________   October 23, 2018  Physician name: Blaine Boo MD    Fax ([phone]) or scan/email ([email or web address]) this completed document to [facility] within 24 hours of the referral date.  Questions: [phone].    Blaine boo md  December 13, 2018          Coding statement:   Duration of  Services: patient care and care coordination was 25 minutes  Greater than 50% of this visit was spent in counseling and coordination of care.     Blaine Boo MD     ______________________________________    Last visit date and details:      Toi Tillman MD (Resident)     Student in organized health care education/training program      [facility] Follow-up     CC: PD follow-up     HPI: Norberto Wu is a 72 year-old male with a history of PD s/p R GPI DBS (5/2014) who presents in follow-up. He was last seen by Dr. Boo 12/14/17. At that time it was unknown if he was experiencing wearing-off or grogginess from  "gabapentin, but he did not decrease this.     He repots he is wearing-off early, and \"deeper.\" When wearing-off symptoms are discomfort in legs/aching that improves with ambulation and movement. Also has some generalized stiffness. Tends to be more before 11am or 6pm doses. Does not always take meds apart from meals.      He also reports grogginess and difficulty sleeping due to leg symptoms. Gabapentin helps leg symptoms so does not want to decrease further. He takes 1-2 naps per day, sometiems sleeps the whole day.     No dyskinesias, orthostasis, hallucinations. His wife reports he has difficulty locating the correct object when it is right in front of him in a cabinet, for example. Will also incorrectly note colors of things.     Tried Requip before and had fatigue. Tried Neupro and was ineffective and too expensive.     Also reports his speech is getting worse. Constipation is worse. Freezing is worse. No falls. No clear timing of freezing in relation to wearing-off and medication doses.        PD Medications                   3 7 11 2 6 10   Carbidopa/levodopa 25/100                                 2 2 2 2 2 2    gabapentin 300mg                          2 2   3        Medications:      Current Outpatient Prescriptions   Medication     aspirin 81 MG tablet     atorvastatin (LIPITOR) 10 MG tablet     bisacodyl (DULCOLAX) 10 MG Suppository     carbidopa-levodopa (SINEMET)  MG per tablet     fluticasone (FLONASE) 50 MCG/ACT nasal spray     gabapentin (NEURONTIN) 300 MG capsule     methimazole (TAPAZOLE) 5 MG tablet      No current facility-administered medications for this visit.          Exam:  Ht 1.791 m (5' 10.5\")  Wt 90.4 kg (199 lb 4.8 oz)  BMI 28.19 kg/m2     Neurological Examination (R/L):  Mental Status: Awake, alert. Fluent. Affect normal.  Cranial Nerves: Versions full. Saccades intact. Mild/moderate hypomimia. Mild/moderate hypophonia. No torticollis.  Motor: Mild bradykinesia with finger " tapping left more than right. Also with grasps. Moderate bradykinesia with left foot toe tapping. Mild on right foot. Tone mildly increased in right arm, slight in left arm. Normal in legs. Rare right arm tremor, moderately frequent right leg tremor.  Coordination: Finger to nose without dysmetria.  Gait: Slow to rise from chair. Freezing on gait initiation and going through doorway. Otherwise gait with good stride length. Retropulsion normal.     DBS  Activa SC  2.90V  Electrode impedances OK     RGPI  C+1-  4V 90us 185Hz  Therapeutic impedance: 1068 ohms, 3.808mA     Assessment: 72 year-old male with a history of PD s/p R GPI DBS (5/2014). Currently reporting frequent wearing-off, possibly resulting in symptoms of RLS. We discussed that his grogginess is likely due to gabapentin which he reports is helpful for these symptoms and he does not want reduced. Will thus increase levodopa frequency. Have also instructed him to keep a strict diary of meds, meals, wearing-off symptoms. If freezing improves with this it may suggest that there is some levodopa responsiveness.     Plan:   -change Sinemet to q3 hour dosing  -keep symptom/medication diary  -neuro-ophthalmology referral  -will send [employer] message in 1-2 weeks     Follow-up 3 months with Dr. Ema Tillman MD  Movement Disorders Fellow     I saw the patient with Dr. Tillman, a senior movement disorders fellow, and I was present for key portions of his history and physical examination; however, I took an additional history from the patient, performed my own movement-disorders examination, and supervised Dr. Tillman's analysis of the patient's IPG parameters.  I agree with Dr. Tillman's assessment and treatment plan.     I spent 20 minutes with the patient as described above.     KARLA Guidry MD  Professor of Neurology and [facility] Physician              [facility]: Nurse (RN) Visit Note  SITUATION/BACKGROUND         Norberto Wu  "is a 72 year old male, who presents to clinic for follow up of his Parkinson's disease.     Current Status:  He complains of increased freezing when going through small, confined spaces, a foggy/tired feeling at times during the day. He was unable to be specific as to the timing of the \"wearing off\" he was experiencing. One of the questions is whether this \"wearing off\" he describes is wearing off his Parkinson's disease medications or a grogginess from the gabapentin.     ASSESSMENT        Vital Signs:  /83  Pulse 82  Ht 1.816 m (5' 11.5\")  Wt 94.3 kg (208 lb)  BMI 28.61 kg/m2      Exam:  NEURO: mentation intact and speech normal however, at times it was festinating.     Interrogated patient's DBS Activa SC battery. Patient has RGPi. All impedances were checked at 3.0 Volts and were WNL. See [employer] Neuromodulation sheets scanned. Patient informed.     Therapy impedance = 1015 ohms, 4.002 mA     Using contacts = C+/1-     Model 14623     Battery voltage 2.93     RECOMMENDATION/PLAN         Medical Plan:  MEDICATIONS:       - Decrease his gabapentin from 2 tabs at 3 am and 7 am  to 1 tab at 3 am (if he wakes) and 7 am, keep the 3 tabs at 6 pm.  Follow up with Fellow in 1-2 months     Today's visit was:  Completed within the RN scope of practice                Nursing Note   Edson Monterroso (Medical Assistant)           Chief Complaint   Patient presents with     RECHECK       Movement Disorder     Edson Monterroso CMA         Progress Notes   Blaine Boo MD (Physician)     Neurology   Expand All Collapse All    Diagnosis/Summary/Recommendations:     PATIENT: Norberto Wu  72 year old male      : 1945     GAMA: 2017     Parkinson     DBS - 2.93 VOLTS AND IS RIGHT GPI AND NO OPEN CIRCUITS     Aspirin 81mg  Atorvastatin 10mg  Sinemet 25/100 2 tabs 6 times per day 3am, 7am, 11am, 2pm, 6pm, 10pm  fluticaseon  Gabapentin neurontin 300mg 2 in the am and noon and 3 caps at " night  Methimazole tapazole 5mg  Ofloxacin floxin  Polyethylene floxin  Polyethylene glycol  Selegiline eldepryl 5mg 2/day - STOPPED THIS MEDICATION   triamcinolone     Has more heaviness or fogginess in his head when medications are wearing off.   Mayelin is hospital for hernia surgery at [facility].     He is having deep dull pain in his legs when he is off.   He may need happy pill  He shakes his legs to relax.  He has symptoms at 5-6 pm and ? Night time     He is napping mid morning and mid afternoon  He has terrible sleep habits     He has been on requip - had fatigue and no benefit.      He is doing weights and exercising  He has not done boxing  He has some freezing  He has more problems in small spaces.        Medications     3am 7am 11am 2pm 6pm 10pm     Sinemet 25/100 2 2 2 2 2 2     Gabapentin 300mg 2 2     3       Selegiline 5mg Not taking               Atorvastatin lipitor 10mg   1             Aspirin 81mg   1             Methimazole (tapazole) 5mg   1                                     Over 50% of this visit was spent in patient care and care coordination.      History obtained from patient     Total visit time was 25 minutes     In summary he appears to have fatigue but not clearly depression  He may be having wearing off leg pain but he needs to track his dosing and when he has symptoms  He has been on requip but has not been on a low dose rotigotine patch.     PLAN  Discussed pros and cons of gabapentin (sedating and may help pain)  Reduce from 2 to 1 tabs @ 3am and 7am and continue with 3 tabs @ 6pm     Medications     3am 7am 11am 2pm 6pm 10pm     Sinemet 25/100 2 2 2 2 2 2     Gabapentin 300mg 1 1     3       Selegiline 5mg Not taking               Atorvastatin lipitor 10mg   1             Aspirin 81mg   1             Methimazole (tapazole) 5mg   1                                  Next option would be to try the rotigotine neupro 1 or 2mg patch     Would recommend therapy     Return in 1-2 months to see a  "movement disorders fellow to review his function.                            ______________________________________      Patient was asked about 14 Review of systems including changes in vision (dry eyes, double vision), hearing, heart, lungs, musculoskeletal, depression, anxiety, snoring, RBD, insomnia, urinary frequency, urinary urgency, constipation, swallowing problems, hematological, ID, allergies, skin problems: seborrhea, endocrinological: thyroid, diabetes, cholesterol; balance, weight changes, and other neurological problems and these were not significant at this time except for   Patient Active Problem List   Diagnosis     Parkinson disease (H)     Peripheral neuropathy     Somnolence     Family history of Parkinson's disease     Snores     REM sleep behavior disorder     Prostate cancer (H)     Wears glasses     Insomnia     Constipation     History of MRI of brain and brain stem     PFO (patent foramen ovale)     Cerebellar stroke syndrome     H/O echocardiogram     Restless leg syndrome     S/P brain surgery     Hamstring tendonitis at origin     Parkinson's disease (H)     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  Stayed in the hospital 3 days due to severe nausea & \"retching\" .      Comtan Diarrhea     Camphor      Camphor HELENA     Hydrocodone      Other reaction(s): Headache, Nausea Only     Hydrocodone-Acetaminophen Hives and Nausea     Melatonin Other (See Comments)     No benefit.     Ropinirole Fatigue     Fatigue no benefit.      Seroquel [Quetiapine]      Groggy and aggravated rls  No benefit     Adhesive Tape Rash     Camphor Rash     Other reaction(s): Rash     " Liquid Adhesive Rash     Past Surgical History:   Procedure Laterality Date     COLONOSCOPY N/A 3/23/2017    Procedure: COLONOSCOPY;  Surgeon: Salbador Paulson MD;  Location: [facility] GI     PROSTATE SURGERY  3 yrs ago    prostate cancer; Dr. Valladares     REPLACE DEEP BRAIN STIMULATION GENERATOR / BATTERY Right 1/3/2017    Procedure: REPLACE DEEP BRAIN STIMULATION GENERATOR / BATTERY;  Surgeon: Anupam Fair MD;  Location: [facility] OR     Right Gpi DBS Lead Implantation  5/20/2014     Rt Chest DBS Generator Placement Activa SC  5/29/2014     Past Medical History:   Diagnosis Date     Cerebellar stroke syndrome 8/21/2013    Few small foci of chronic infarct in the right cerebellar hemisphere, unchanged as well as a new focus of now chronic infarct in the left cerebellar hemisphere since the last study.      Dyslipidemia      Hyperthyroidism      Insomnia 6/17/2011     Parkinson disease (H) 6/15/2011     Peripheral neuropathy 6/15/2011     PFO (patent foramen ovale) 8/21/2013    Interpretation Summary 1. Normal LV size and systolic function. Normal diastolic function 2.  Normal RV size and function 3. No significant valvular abnormalities 4.  Small right-to-left shunt visualized with intravenous agitated saline  contrast study, suggestive of PFO. PatientHeight: 72 in PatientWeight: 205 lbs SystolicPressure: 100 mmHg DiastolicPressure: 63 mmHg BSA 2.2 m^2   Procedure Echoc     PONV (postoperative nausea and vomiting)      Prostate cancer (H) 6/17/2011    s/p radical prostectomy     Social History     Social History     Marital status:      Spouse name: N/A     Number of children: N/A     Years of education: N/A     Occupational History     Not on file.     Social History Main Topics     Smoking status: Never Smoker     Smokeless tobacco: Never Used     Alcohol use 0.6 oz/week      Comment: EVERY OTHER DAY     Drug use: No     Sexual activity: No     Other Topics Concern     Parent/Sibling W/ Cabg, Mi Or  Angioplasty Before 65f 55m? No     Social History Narrative    Son lives in [place] and he has 2 kids    Wife Mayelin           Drug and lactation database from the [place] National [employer] of Medicine:  http://toxnet.nlm.nih.gov/cgi-bin/sis/htmlgen?LACT      B/P: Data Unavailable, T: Data Unavailable, P: Data Unavailable, R: Data Unavailable 0 lbs 0 oz  There were no vitals taken for this visit., There is no height or weight on file to calculate BMI.  Medications and Vitals not listed above were documented in the cart and reviewed by me.     Current Outpatient Prescriptions   Medication Sig Dispense Refill     aspirin 81 MG tablet Take 1 tablet (81 mg) by mouth daily 30 tablet 0     atorvastatin (LIPITOR) 10 MG tablet Take 1 tablet (10 mg) by mouth daily 90 tablet 2     bisacodyl (DULCOLAX) 10 MG Suppository Place 10 mg rectally daily as needed for constipation       carbidopa-levodopa (SINEMET)  MG per tablet TAKE 2 TABLETS 7 TIMES DAILY (3AM, 6AM, 9AM, noon, 3PM, AND 6PM) 420 tablet 3     fluticasone (FLONASE) 50 MCG/ACT nasal spray Spray 1-2 sprays into both nostrils daily 3 Package 3     gabapentin (NEURONTIN) 300 MG capsule TAKE 2 CAPSULES IN THE MORNING & NOON AND 3 CAPSULES 2 HOURS BEFORE BEDTIME 630 capsule 0     methimazole (TAPAZOLE) 5 MG tablet Take 1 tablet (5 mg) by mouth daily 90 tablet 0         Blaine Boo MD

## 2018-10-23 ENCOUNTER — OFFICE VISIT (OUTPATIENT)
Dept: INTERNAL MEDICINE | Facility: CLINIC | Age: 73
End: 2018-10-23
Payer: COMMERCIAL

## 2018-10-23 ENCOUNTER — OFFICE VISIT (OUTPATIENT)
Dept: NEUROLOGY | Facility: CLINIC | Age: 73
End: 2018-10-23
Payer: COMMERCIAL

## 2018-10-23 VITALS
SYSTOLIC BLOOD PRESSURE: 172 MMHG | DIASTOLIC BLOOD PRESSURE: 101 MMHG | RESPIRATION RATE: 16 BRPM | TEMPERATURE: 98.2 F | OXYGEN SATURATION: 98 % | HEART RATE: 75 BPM | BODY MASS INDEX: 25.9 KG/M2 | WEIGHT: 185 LBS | HEIGHT: 71 IN

## 2018-10-23 VITALS
HEART RATE: 64 BPM | BODY MASS INDEX: 26.17 KG/M2 | SYSTOLIC BLOOD PRESSURE: 150 MMHG | DIASTOLIC BLOOD PRESSURE: 90 MMHG | WEIGHT: 185 LBS

## 2018-10-23 DIAGNOSIS — F80.0 ARTICULATION DISORDER: ICD-10-CM

## 2018-10-23 DIAGNOSIS — G25.81 RESTLESS LEGS SYNDROME (RLS): ICD-10-CM

## 2018-10-23 DIAGNOSIS — E04.1 WARM THYROID NODULE: ICD-10-CM

## 2018-10-23 DIAGNOSIS — E05.90 SUBCLINICAL HYPERTHYROIDISM: ICD-10-CM

## 2018-10-23 DIAGNOSIS — R13.11 ORAL PHASE DYSPHAGIA: ICD-10-CM

## 2018-10-23 DIAGNOSIS — G20.A1 PARKINSON'S DISEASE (H): Primary | ICD-10-CM

## 2018-10-23 DIAGNOSIS — J00 ACUTE RHINITIS: ICD-10-CM

## 2018-10-23 DIAGNOSIS — G47.9 SLEEP DISORDER: Primary | ICD-10-CM

## 2018-10-23 RX ORDER — CARBIDOPA AND LEVODOPA 25; 100 MG/1; MG/1
TABLET ORAL
Qty: 1260 TABLET | Refills: 3 | Status: SHIPPED | OUTPATIENT
Start: 2018-10-23 | End: 2019-01-29

## 2018-10-23 RX ORDER — POLYETHYLENE GLYCOL 3350 17 G/17G
POWDER, FOR SOLUTION ORAL
Qty: 7 PACKET | Refills: 11 | COMMUNITY
Start: 2018-10-23 | End: 2019-11-05

## 2018-10-23 RX ORDER — POLYETHYLENE GLYCOL 3350 17 G/17G
1 POWDER, FOR SOLUTION ORAL DAILY
COMMUNITY
End: 2018-10-23

## 2018-10-23 RX ORDER — METHIMAZOLE 5 MG/1
TABLET ORAL
Qty: 90 TABLET | Refills: 3 | COMMUNITY
Start: 2018-10-23 | End: 2019-01-29

## 2018-10-23 RX ORDER — FLUTICASONE PROPIONATE 50 MCG
SPRAY, SUSPENSION (ML) NASAL
Qty: 1 BOTTLE | Refills: 11 | COMMUNITY
Start: 2018-10-23 | End: 2019-11-26

## 2018-10-23 RX ORDER — GABAPENTIN 300 MG/1
CAPSULE ORAL
Qty: 630 CAPSULE | Refills: 3 | Status: SHIPPED | OUTPATIENT
Start: 2018-10-23 | End: 2019-01-29

## 2018-10-23 ASSESSMENT — PAIN SCALES - GENERAL
PAINLEVEL: EXTREME PAIN (8)
PAINLEVEL: EXTREME PAIN (8)

## 2018-10-23 NOTE — NURSING NOTE
Chief Complaint   Patient presents with     Results     Patient is here to follow up on results     Parkinson     Also here to follow up on Parkinson       Christopher Tan CMA ([facility]) at 11:16 AM on 10/23/2018

## 2018-10-23 NOTE — LETTER
10/23/2018      RE: Norberto Wu  [address]  [place] [address]       Diagnosis/Summary/Recommendations:    PATIENT: Norberto Wu  73 year old male     : 1945    GAMA: 2018    Parkinson  dbs    Wife Mayelin is on hospice for metastatic breast cancer  Lottie Wu daughter in law and Son Caleb Wu.   Mayelin is in [facility] for [facility]  This is not a permanent address.     Wondering about taking aspirin     Generally his medications are working well    Will be seeing Dr. Howe today and will review his blood pressure and discuss his other medications such as the atorvastatin which he reportedly has stopped and to review his thyroid medication.     He is having some falls and associated freezing that is causing the falls.     2.83 voltage            Medications     3am 6-7am 11am-12noon 3pm 6pm 9pm midnight   Aspirin 81mg  1        Atorvastatin lipitor 10mg  stopped        Bisacodyl dulcolax 10mg  stopped        Carbidopa/levodopa sinemet 25/100 2 2 2 2 2 2 2   Fluticasone flonase 50 mcg/act nasal spray  1 spray        Gabapentin/neurontin 300mg  2 2  3     Methimazole tapazole 5mg   1        Polyethylene glycol miralax     Daily @ 6-7pm                                                                                         History obtained from patient     PLAN  Given the stress ongoing with his wife being in hospice this has impacted his health overall.   He has on related freezing despite medications and his dbs system working well.   He has had cognitive decline in the past few weeks.     He is temporarily living with his wife in her facility and he will need a new living situation where he gets additional care    He is on the waiting list of [facility] [address]  - assisted living, enhanced care and memory unit.   [email or web address]    We discussed parkinson speciality care facility in  "[place]    [email or web address]    Senior linkage line.     He could benefit from  consultation with JAKE Blood.    He could benefit from physical and occupational therapy consultation and possibly a U - step walker     Additional resources provided below    [email or web address]    [email or web address]    Discussion about medications for wearing off - if needed; he has the ability to remember to take his sinemet every 3 hours or so and therefore he does not need rytary at this point    Given his cognitive decline, it will be worth discussing the use of memory enhancing medications such as donepezil (aricept) or/and rivastigmine (excelon) which may also help his gait/freezing. In particular he has on related freezing.     He should return back in 1-3 months.     May consider pharmacy consultation with ngozi Mena, Pharm D down the road to help with medication management ie the above cognitive medications and/or review the rytary option and decision about whether we should continue the gabapentin at the present dose or possibly reduce further but it is probably helping his RLS symptoms.       Coding statement:   Duration of  Services: patient care and care coordination was 25 minutes  Greater than 50% of this visit was spent in counseling and coordination of care.     Blaine Boo MD     ______________________________________    Last visit date and details:      Toi Tillman MD (Resident)     Student in organized health care education/training program      [facility] Follow-up     CC: PD follow-up     HPI: Norberto Wu is a 72 year-old male with a history of PD s/p R GPI DBS (5/2014) who presents in follow-up. He was last seen by Dr. Boo 12/14/17. At that time it was unknown if he was experiencing wearing-off or grogginess from gabapentin, but he did not decrease this.     He repots he is wearing-off early, and \"deeper.\" When " "wearing-off symptoms are discomfort in legs/aching that improves with ambulation and movement. Also has some generalized stiffness. Tends to be more before 11am or 6pm doses. Does not always take meds apart from meals.      He also reports grogginess and difficulty sleeping due to leg symptoms. Gabapentin helps leg symptoms so does not want to decrease further. He takes 1-2 naps per day, sometiems sleeps the whole day.     No dyskinesias, orthostasis, hallucinations. His wife reports he has difficulty locating the correct object when it is right in front of him in a cabinet, for example. Will also incorrectly note colors of things.     Tried Requip before and had fatigue. Tried Neupro and was ineffective and too expensive.     Also reports his speech is getting worse. Constipation is worse. Freezing is worse. No falls. No clear timing of freezing in relation to wearing-off and medication doses.        PD Medications                   3 7 11 2 6 10   Carbidopa/levodopa 25/100                                 2 2 2 2 2 2    gabapentin 300mg                          2 2   3        Medications:      Current Outpatient Prescriptions   Medication     aspirin 81 MG tablet     atorvastatin (LIPITOR) 10 MG tablet     bisacodyl (DULCOLAX) 10 MG Suppository     carbidopa-levodopa (SINEMET)  MG per tablet     fluticasone (FLONASE) 50 MCG/ACT nasal spray     gabapentin (NEURONTIN) 300 MG capsule     methimazole (TAPAZOLE) 5 MG tablet      No current facility-administered medications for this visit.          Exam:  Ht 1.791 m (5' 10.5\")  Wt 90.4 kg (199 lb 4.8 oz)  BMI 28.19 kg/m2     Neurological Examination (R/L):  Mental Status: Awake, alert. Fluent. Affect normal.  Cranial Nerves: Versions full. Saccades intact. Mild/moderate hypomimia. Mild/moderate hypophonia. No torticollis.  Motor: Mild bradykinesia with finger tapping left more than right. Also with grasps. Moderate bradykinesia with left foot toe tapping. Mild on " right foot. Tone mildly increased in right arm, slight in left arm. Normal in legs. Rare right arm tremor, moderately frequent right leg tremor.  Coordination: Finger to nose without dysmetria.  Gait: Slow to rise from chair. Freezing on gait initiation and going through doorway. Otherwise gait with good stride length. Retropulsion normal.     DBS  Activa SC  2.90V  Electrode impedances OK     RGPI  C+1-  4V 90us 185Hz  Therapeutic impedance: 1068 ohms, 3.808mA     Assessment: 72 year-old male with a history of PD s/p R GPI DBS (5/2014). Currently reporting frequent wearing-off, possibly resulting in symptoms of RLS. We discussed that his grogginess is likely due to gabapentin which he reports is helpful for these symptoms and he does not want reduced. Will thus increase levodopa frequency. Have also instructed him to keep a strict diary of meds, meals, wearing-off symptoms. If freezing improves with this it may suggest that there is some levodopa responsiveness.     Plan:   -change Sinemet to q3 hour dosing  -keep symptom/medication diary  -neuro-ophthalmology referral  -will send [employer] message in 1-2 weeks     Follow-up 3 months with Dr. Ema Tillman MD  Movement Disorders Fellow     I saw the patient with Dr. Tillman, a senior movement disorders fellow, and I was present for key portions of his history and physical examination; however, I took an additional history from the patient, performed my own movement-disorders examination, and supervised Dr. Tillman's analysis of the patient's IPG parameters.  I agree with Dr. Tillman's assessment and treatment plan.     I spent 20 minutes with the patient as described above.     KARLA Guidry MD  Professor of Neurology and [facility] Physician              [facility]: Nurse (RN) Visit Note  SITUATION/BACKGROUND         Norberto Wu is a 72 year old male, who presents to clinic for follow up of his Parkinson's disease.     Current  "Status:  He complains of increased freezing when going through small, confined spaces, a foggy/tired feeling at times during the day. He was unable to be specific as to the timing of the \"wearing off\" he was experiencing. One of the questions is whether this \"wearing off\" he describes is wearing off his Parkinson's disease medications or a grogginess from the gabapentin.     ASSESSMENT        Vital Signs:  /83  Pulse 82  Ht 1.816 m (5' 11.5\")  Wt 94.3 kg (208 lb)  BMI 28.61 kg/m2      Exam:  NEURO: mentation intact and speech normal however, at times it was festinating.     Interrogated patient's DBS Activa SC battery. Patient has RGPi. All impedances were checked at 3.0 Volts and were WNL. See [employer] Neuromodulation sheets scanned. Patient informed.     Therapy impedance = 1015 ohms, 4.002 mA     Using contacts = C+/1-     Model 84807     Battery voltage 2.93     RECOMMENDATION/PLAN         Medical Plan:  MEDICATIONS:       - Decrease his gabapentin from 2 tabs at 3 am and 7 am  to 1 tab at 3 am (if he wakes) and 7 am, keep the 3 tabs at 6 pm.  Follow up with Fellow in 1-2 months     Today's visit was:  Completed within the RN scope of practice                Nursing Note   Edson Monterroso (Medical Assistant)           Chief Complaint   Patient presents with     RECHECK       Movement Disorder     Edson Monterroso CMA         Progress Notes   Blaine Boo MD (Physician)     Neurology   Expand All Collapse All    Diagnosis/Summary/Recommendations:     PATIENT: Norberto Wu  72 year old male      : 1945     GAMA: 2017     Parkinson     DBS - 2.93 VOLTS AND IS RIGHT GPI AND NO OPEN CIRCUITS     Aspirin 81mg  Atorvastatin 10mg  Sinemet 25/100 2 tabs 6 times per day 3am, 7am, 11am, 2pm, 6pm, 10pm  fluticaseon  Gabapentin neurontin 300mg 2 in the am and noon and 3 caps at night  Methimazole tapazole 5mg  Ofloxacin floxin  Polyethylene floxin  Polyethylene glycol  Selegiline " eldepryl 5mg 2/day - STOPPED THIS MEDICATION   triamcinolone     Has more heaviness or fogginess in his head when medications are wearing off.   Mayelin is hospital for hernia surgery at [facility].     He is having deep dull pain in his legs when he is off.   He may need happy pill  He shakes his legs to relax.  He has symptoms at 5-6 pm and ? Night time     He is napping mid morning and mid afternoon  He has terrible sleep habits     He has been on requip - had fatigue and no benefit.      He is doing weights and exercising  He has not done boxing  He has some freezing  He has more problems in small spaces.        Medications     3am 7am 11am 2pm 6pm 10pm     Sinemet 25/100 2 2 2 2 2 2     Gabapentin 300mg 2 2     3       Selegiline 5mg Not taking               Atorvastatin lipitor 10mg   1             Aspirin 81mg   1             Methimazole (tapazole) 5mg   1                                     Over 50% of this visit was spent in patient care and care coordination.      History obtained from patient     Total visit time was 25 minutes     In summary he appears to have fatigue but not clearly depression  He may be having wearing off leg pain but he needs to track his dosing and when he has symptoms  He has been on requip but has not been on a low dose rotigotine patch.     PLAN  Discussed pros and cons of gabapentin (sedating and may help pain)  Reduce from 2 to 1 tabs @ 3am and 7am and continue with 3 tabs @ 6pm     Medications     3am 7am 11am 2pm 6pm 10pm     Sinemet 25/100 2 2 2 2 2 2     Gabapentin 300mg 1 1     3       Selegiline 5mg Not taking               Atorvastatin lipitor 10mg   1             Aspirin 81mg   1             Methimazole (tapazole) 5mg   1                                  Next option would be to try the rotigotine neupro 1 or 2mg patch     Would recommend therapy     Return in 1-2 months to see a movement disorders fellow to review his function.               "              ______________________________________      Patient was asked about 14 Review of systems including changes in vision (dry eyes, double vision), hearing, heart, lungs, musculoskeletal, depression, anxiety, snoring, RBD, insomnia, urinary frequency, urinary urgency, constipation, swallowing problems, hematological, ID, allergies, skin problems: seborrhea, endocrinological: thyroid, diabetes, cholesterol; balance, weight changes, and other neurological problems and these were not significant at this time except for   Patient Active Problem List   Diagnosis     Parkinson disease (H)     Peripheral neuropathy     Somnolence     Family history of Parkinson's disease     Snores     REM sleep behavior disorder     Prostate cancer (H)     Wears glasses     Insomnia     Constipation     History of MRI of brain and brain stem     PFO (patent foramen ovale)     Cerebellar stroke syndrome     H/O echocardiogram     Restless leg syndrome     S/P brain surgery     Hamstring tendonitis at origin     Parkinson's disease (H)     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  Stayed in the hospital 3 days due to severe nausea & \"retching\" .      Comtan Diarrhea     Camphor      Camphor HELENA     Hydrocodone      Other reaction(s): Headache, Nausea Only     Hydrocodone-Acetaminophen Hives and Nausea     Melatonin Other (See Comments)     No benefit.     Ropinirole Fatigue     Fatigue no benefit.      Seroquel [Quetiapine]      Groggy and aggravated rls  No benefit     Adhesive Tape Rash     Camphor Rash     Other reaction(s): Rash     Liquid Adhesive Rash     Past Surgical History:   Procedure " Laterality Date     COLONOSCOPY N/A 3/23/2017    Procedure: COLONOSCOPY;  Surgeon: Salbador Paulson MD;  Location: [facility]     PROSTATE SURGERY  3 yrs ago    prostate cancer; Dr. Valladares     REPLACE DEEP BRAIN STIMULATION GENERATOR / BATTERY Right 1/3/2017    Procedure: REPLACE DEEP BRAIN STIMULATION GENERATOR / BATTERY;  Surgeon: Anupam Fair MD;  Location: [facility] OR     Right Gpi DBS Lead Implantation  5/20/2014     Rt Chest DBS Generator Placement Activa SC  5/29/2014     Past Medical History:   Diagnosis Date     Cerebellar stroke syndrome 8/21/2013    Few small foci of chronic infarct in the right cerebellar hemisphere, unchanged as well as a new focus of now chronic infarct in the left cerebellar hemisphere since the last study.      Dyslipidemia      Hyperthyroidism      Insomnia 6/17/2011     Parkinson disease (H) 6/15/2011     Peripheral neuropathy 6/15/2011     PFO (patent foramen ovale) 8/21/2013    Interpretation Summary 1. Normal LV size and systolic function. Normal diastolic function 2.  Normal RV size and function 3. No significant valvular abnormalities 4.  Small right-to-left shunt visualized with intravenous agitated saline  contrast study, suggestive of PFO. PatientHeight: 72 in PatientWeight: 205 lbs SystolicPressure: 100 mmHg DiastolicPressure: 63 mmHg BSA 2.2 m^2   Procedure Echoc     PONV (postoperative nausea and vomiting)      Prostate cancer (H) 6/17/2011    s/p radical prostectomy     Social History     Social History     Marital status:      Spouse name: N/A     Number of children: N/A     Years of education: N/A     Occupational History     Not on file.     Social History Main Topics     Smoking status: Never Smoker     Smokeless tobacco: Never Used     Alcohol use 0.6 oz/week      Comment: EVERY OTHER DAY     Drug use: No     Sexual activity: No     Other Topics Concern     Parent/Sibling W/ Cabg, Mi Or Angioplasty Before 65f 55m? No     Social History Narrative    Son  lives in [place] and he has 2 kids    Wife Mayelin           Drug and lactation database from the [place] [employer] of [employer]:  http://toxnet.nlm.nih.gov/cgi-bin/sis/htmlgen?LACT      B/P: Data Unavailable, T: Data Unavailable, P: Data Unavailable, R: Data Unavailable 0 lbs 0 oz  There were no vitals taken for this visit., There is no height or weight on file to calculate BMI.  Medications and Vitals not listed above were documented in the cart and reviewed by me.     Current Outpatient Prescriptions   Medication Sig Dispense Refill     aspirin 81 MG tablet Take 1 tablet (81 mg) by mouth daily 30 tablet 0     atorvastatin (LIPITOR) 10 MG tablet Take 1 tablet (10 mg) by mouth daily 90 tablet 2     bisacodyl (DULCOLAX) 10 MG Suppository Place 10 mg rectally daily as needed for constipation       carbidopa-levodopa (SINEMET)  MG per tablet TAKE 2 TABLETS 7 TIMES DAILY (3AM, 6AM, 9AM, noon, 3PM, AND 6PM) 420 tablet 3     fluticasone (FLONASE) 50 MCG/ACT nasal spray Spray 1-2 sprays into both nostrils daily 3 Package 3     gabapentin (NEURONTIN) 300 MG capsule TAKE 2 CAPSULES IN THE MORNING & NOON AND 3 CAPSULES 2 HOURS BEFORE BEDTIME 630 capsule 0     methimazole (TAPAZOLE) 5 MG tablet Take 1 tablet (5 mg) by mouth daily 90 tablet 0         Blaine Boo MD

## 2018-10-23 NOTE — MR AVS SNAPSHOT
After Visit Summary   10/23/2018    Norberto Wu    MRN: [id number]           Patient Information     Date Of Birth          1945        Visit Information        Provider Department      10/23/2018 10:10 AM Blaine Boo MD [facility] Neurology        Today's Diagnoses     Parkinson's disease (H)    -  1    Restless legs syndrome (RLS)        Warm thyroid nodule        Subclinical hyperthyroidism        Acute rhinitis          Care Instructions      GAMA: October 23, 2018    Parkinson  dbs    Wife Mayelin is on hospice for metastatic breast cancer  Lottie Wu daughter in law and Son Caleb Wu.   Mayelin is in [facility]  This is not a permanent address.     Wondering about taking aspirin     Generally his medications are working well    Will be seeing Dr. Howe today and will review his blood pressure and discuss his other medications such as the atorvastatin which he reportedly has stopped and to review his thyroid medication.     He is having some falls and associated freezing that is causing the falls.     2.83 voltage            Medications     3am 6-7am 11am-12noon 3pm 6pm 9pm midnight   Aspirin 81mg  1        Atorvastatin lipitor 10mg  stopped        Bisacodyl dulcolax 10mg  stopped        Carbidopa/levodopa sinemet 25/100 2 2 2 2 2 2 2   Fluticasone flonase 50 mcg/act nasal spray  1 spray        Gabapentin/neurontin 300mg  2 2  3     Methimazole tapazole 5mg   1        Polyethylene glycol miralax     Daily @ 6-7pm                                                                                         History obtained from patient     PLAN  Given the stress ongoing with his wife being in hospice this has impacted his health overall.   He has on related freezing despite medications and his dbs system working well.   He has had cognitive decline in the past few weeks.     He is temporarily living with his wife in her facility and he will need a new living situation  where he gets additional care    He is on the waiting list of [facility] [address]  - assisted living, enhanced care and memory unit.   [email or web address]    We discussed parkinson speciality care facility in [place]    [email or web address]    Senior linkage line.     He could benefit from  consultation with JAKE Blood.    He could benefit from physical and occupational therapy consultation and possibly a U - step walker     Additional resources provided below    [email or web address]    [email or web address]    Discussion about medications for wearing off - if needed; he has the ability to remember to take his sinemet every 3 hours or so and therefore he does not need rytary at this point    Given his cognitive decline, it will be worth discussing the use of memory enhancing medications such as donepezil (aricept) or/and rivastigmine (excelon) which may also help his gait/freezing. In particular he has on related freezing.     He should return back in 1-3 months.     May consider pharmacy consultation with ngozi Wang, Pharm D down the road to help with medication management ie the above cognitive medications and/or review the rytary option and decision about whether we should continue the gabapentin at the present dose or possibly reduce further but it is probably helping his RLS symptoms.             Follow-ups after your visit        Additional Services     MED THERAPY MANAGE REFERRAL       Your provider has referred you to: ngozi wang  Reason for Referral: Parkinson    The [facility] Management department will contact you to schedule an appointment.  You may also schedule the appointment by calling [phone].  For [place] patients, please call [phone] to confirm/schedule your appointment on the next business day.    This service is designed to help you get the most from  your medications.  A specially trained Pharmacist will work closely with you and your providers to solve any questions, concerns, issues or problems related to your medications.    Please bring all of your prescription and non-prescription medications (such as vitamins, over-the-counter medications, and herbals) or a detailed medication list to your appointment.    If you have a glucose meter or other home monitoring information, please also bring this to your appointment (i.e. blood glucose log, blood pressure log, pain log, etc.).            OCCUPATIONAL THERAPY REFERRAL       If you have not heard from the scheduling office within 2 business days, please call [phone] for all locations, with the exception of [place], please call [phone] and [place], please call [phone].    Please be aware that coverage of these services is subject to the terms and limitations of your health insurance plan.  Call member services at your health plan with any benefit or coverage questions.            PHYSICAL THERAPY REFERRAL       ustep walker? For freezing    If you have not heard from the scheduling office within 2 business days, please call [phone] for all locations, with the exception of [place], please call [phone] and [place], please call [phone].    Please be aware that coverage of these services is subject to the terms and limitations of your health insurance plan.  Call member services at your health plan with any benefit or coverage questions.             REFERRAL       Your provider has referred you to:     Please be aware that coverage of these services is subject to the terms and limitations of your health insurance plan.  Call member services at your health plan with any benefit or coverage questions.      Please bring the following with you to your appointment:    (1) Any X-Rays, CTs or MRIs which have been performed.  Contact the facility where they  were done to arrange for  prior to your scheduled appointment.   (2) List of current medications   (3) This referral request   (4) Any documents/labs given to you for this referral            Walker       Order for: Walker; USTEP walker if appropriate                  Follow-up notes from your care team     Return in about 3 months (around 1/23/2019).      Your next 10 appointments already scheduled     Oct 23, 2018 12:05 PM CDT   (Arrive by 11:50 AM)   Return Visit with Norberto Howe MD   [facility] ([facility])    [address] [place] [address] [id number] [phone]            Dec 14, 2018  7:50 AM CST   (Arrive by 7:35 AM)   RETURN ENDOCRINE with Brandin Mccarty MD   [facility] Endocrinology ([facility])    [address] [place] [address] [id number] [phone]            Jan 24, 2019  1:40 PM CST   (Arrive by 1:25 PM)   Return Movement Disorder with Blaine Boo MD   [facility] Neurology ([facility])    [address] [place] [address] [id number] [phone]              Future tests that were ordered for you today     Open Future Orders        Priority Expected Expires Ordered    PHYSICAL THERAPY REFERRAL Routine  10/23/2019 10/23/2018    OCCUPATIONAL THERAPY REFERRAL Routine  10/23/2019 10/23/2018            Who to contact     Please call your clinic at [phone] to:    Ask questions about your health    Make or cancel appointments    Discuss your medicines    Learn about your test results    Speak to your doctor            Additional Information About Your Visit        [employer] Information     [employer] gives you secure access to your electronic health record. If you see a primary care provider, you can also send messages to your care team and make appointments. If you have questions, please call your primary care clinic.  If you do not have  "a primary care provider, please call [phone] and they will assist you.      [employer] is an electronic gateway that provides easy, online access to your medical records. With [employer], you can request a clinic appointment, read your test results, renew a prescription or communicate with your care team.     To access your existing account, please contact your [facility] or call [phone] for assistance.        Care EveryWhere ID     This is your Care EveryWhere ID. This could be used by other organizations to access your [facility] medical records  [id number]        Your Vitals Were     Pulse Temperature Respirations Height Pulse Oximetry BMI (Body Mass Index)    75 98.2  F (36.8  C) (Oral) 16 1.791 m (5' 10.5\") 98% 26.17 kg/m2       Blood Pressure from Last 3 Encounters:   10/23/18 (!) 172/101   10/05/18 93/61   06/20/18 164/83    Weight from Last 3 Encounters:   10/23/18 83.9 kg (185 lb)   10/05/18 84 kg (185 lb 1.6 oz)   06/20/18 90.4 kg (199 lb 4.8 oz)              We Performed the Following     MED THERAPY MANAGE REFERRAL      REFERRAL     Walker          Today's Medication Changes          These changes are accurate as of 10/23/18 10:44 AM.  If you have any questions, ask your nurse or doctor.               These medicines have changed or have updated prescriptions.        Dose/Directions    carbidopa-levodopa  MG per tablet   Commonly known as:  SINEMET   This may have changed:  additional instructions   Used for:  Parkinson's disease (H)   Changed by:  Blaine Boo MD        2 TABLETS by mouth @ 3AM, 6AM, 9AM, noon, 3PM, 6PM and 12 midnight = 14/day   Quantity:  1260 tablet   Refills:  3       FLONASE 50 MCG/ACT spray   This may have changed:    - how much to take  - how to take this  - when to take this  - additional instructions   Used for:  Acute rhinitis   Generic drug:  fluticasone   Changed by:  Blaine Boo MD        1-2 sprays in " both nostril daily @ 7am   Quantity:  1 Bottle   Refills:  11       gabapentin 300 MG capsule   Commonly known as:  NEURONTIN   This may have changed:  See the new instructions.   Used for:  Restless legs syndrome (RLS)   Changed by:  Blaine Boo MD        2 CAPSULES by mouth @ 7am and 11am/noon and 3 capsules by mouth @ 5-6pm   = 7/day   Quantity:  630 capsule   Refills:  3       methimazole 5 MG tablet   Commonly known as:  TAPAZOLE   This may have changed:    - how much to take  - how to take this  - when to take this  - additional instructions   Used for:  Warm thyroid nodule, Subclinical hyperthyroidism   Changed by:  Blaine Boo MD        5mg tab by mouth daily @ 7am   Quantity:  90 tablet   Refills:  3       polyethylene glycol Packet   Commonly known as:  MIRALAX/GLYCOLAX   This may have changed:    - how much to take  - how to take this  - when to take this  - additional instructions   Changed by:  Blaine Boo MD        1 packet by mouth in liquid daily @ 6-7pm   Quantity:  7 packet   Refills:  11         Stop taking these medicines if you haven't already. Please contact your care team if you have questions.     atorvastatin 10 MG tablet   Commonly known as:  LIPITOR   Stopped by:  Blaine Boo MD           DULCOLAX 10 MG Suppository   Generic drug:  bisacodyl   Stopped by:  Blaine Boo MD                Where to get your medicines      These medications were sent to [id number] - [place], [place] - [address]  [facility] [address]     Phone:  [phone]     carbidopa-levodopa  MG per tablet    gabapentin 300 MG capsule                Primary Care Provider Office Phone # Fax #    Norberto Howe [phone]       [address] [address]        Equal Access to Services     NICOLE HAWKINS : Stefan Bose, miguel gould, samantha taylor, bharti carr  lasantiago cazares. So [facility] [phone].    ATENCIÓN: Si habla myra, tiene a benito disposición servicios gratuitos de asistencia lingüística. Hemant chan [phone].    We comply with applicable federal civil rights laws and [place] laws. We do not discriminate on the basis of race, color, national origin, age, disability, sex, sexual orientation, or gender identity.            Thank you!     Thank you for choosing [facility] NEUROLOGY  for your care. Our goal is always to provide you with excellent care. Hearing back from our patients is one way we can continue to improve our services. Please take a few minutes to complete the written survey that you may receive in the mail after your visit with us. Thank you!             Your Updated Medication List - Protect others around you: Learn how to safely use, store and throw away your medicines at www.disposemymeds.org.          This list is accurate as of 10/23/18 10:44 AM.  Always use your most recent med list.                   Brand Name Dispense Instructions for use Diagnosis    aspirin 81 MG tablet     30 tablet    Take 1 tablet (81 mg) by mouth daily        carbidopa-levodopa  MG per tablet    SINEMET    1260 tablet    2 TABLETS by mouth @ 3AM, 6AM, 9AM, noon, 3PM, 6PM and 12 midnight = 14/day    Parkinson's disease (H)       FLONASE 50 MCG/ACT spray   Generic drug:  fluticasone     1 Bottle    1-2 sprays in both nostril daily @ 7am    Acute rhinitis       gabapentin 300 MG capsule    NEURONTIN    630 capsule    2 CAPSULES by mouth @ 7am and 11am/noon and 3 capsules by mouth @ 5-6pm   = 7/day    Restless legs syndrome (RLS)       methimazole 5 MG tablet    TAPAZOLE    90 tablet    5mg tab by mouth daily @ 7am    Warm thyroid nodule, Subclinical hyperthyroidism       polyethylene glycol Packet    MIRALAX/GLYCOLAX    7 packet    1 packet by mouth in liquid daily @ 6-7pm

## 2018-10-23 NOTE — MR AVS SNAPSHOT
After Visit Summary   10/23/2018    Norberto Wu    MRN: [id number]           Patient Information     Date Of Birth          1945        Visit Information        Provider Department      10/23/2018 12:05 PM Norberto Howe MD [facility]         Follow-ups after your visit        Your next 10 appointments already scheduled     Oct 23, 2018 12:05 PM CDT   (Arrive by 11:50 AM)   Return Visit with Norberto Howe MD   [facility] ([facility])    [address] [place] [id number] [phone]            Dec 14, 2018  7:50 AM CST   (Arrive by 7:35 AM)   RETURN ENDOCRINE with Brandin Mccarty MD   [facility] Endocrinology ([facility])    [address] [place] [address] [phone] [phone]            Jan 29, 2019  8:10 AM CST   (Arrive by 7:55 AM)   Return Movement Disorder with Blaine Boo MD   [facility] Neurology ([facility])    [address] [place] [address] [id number] [phone]            Jan 29, 2019 11:35 AM CST   (Arrive by 11:20 AM)   Return Visit with Norberto Howe MD   [facility] ([facility])    [address] [place] [id number] [phone]              Future tests that were ordered for you today     Open Future Orders        Priority Expected Expires Ordered    PHYSICAL THERAPY REFERRAL Routine  10/23/2019 10/23/2018    OCCUPATIONAL THERAPY REFERRAL Routine  10/23/2019 10/23/2018            Who to contact     Please call your clinic at [phone] to:    Ask questions about your health    Make or cancel appointments    Discuss your medicines    Learn about your test results    Speak to your doctor            Additional Information About Your Visit        MyChart Information     MyChart gives you secure access to your  electronic health record. If you see a primary care provider, you can also send messages to your care team and make appointments. If you have questions, please call your primary care clinic.  If you do not have a primary care provider, please call [phone] and they will assist you.      [employer] is an electronic gateway that provides easy, online access to your medical records. With [employer], you can request a clinic appointment, read your test results, renew a prescription or communicate with your care team.     To access your existing account, please contact your [facility] or call [phone] for assistance.        Care EveryWhere ID     This is your Care EveryWhere ID. This could be used by other organizations to access your [facility] medical records  [id number]        Your Vitals Were     Pulse BMI (Body Mass Index)                64 26.17 kg/m2           Blood Pressure from Last 3 Encounters:   10/23/18 150/90   10/23/18 (!) 172/101   10/05/18 93/61    Weight from Last 3 Encounters:   10/23/18 83.9 kg (185 lb)   10/23/18 83.9 kg (185 lb)   10/05/18 84 kg (185 lb 1.6 oz)              Today, you had the following     No orders found for display         Today's Medication Changes          These changes are accurate as of 10/23/18 11:46 AM.  If you have any questions, ask your nurse or doctor.               These medicines have changed or have updated prescriptions.        Dose/Directions    carbidopa-levodopa  MG per tablet   Commonly known as:  SINEMET   This may have changed:  additional instructions   Used for:  Parkinson's disease (H)   Changed by:  Blaine Boo MD        2 TABLETS by mouth @ 3AM, 6AM, 9AM, noon, 3PM, 6PM and 12 midnight = 14/day   Quantity:  1260 tablet   Refills:  3       FLONASE 50 MCG/ACT spray   This may have changed:    - how much to take  - how to take this  - when to take this  - additional instructions   Used for:  Acute rhinitis    Generic drug:  fluticasone   Changed by:  Blaine Boo MD        1-2 sprays in both nostril daily @ 7am   Quantity:  1 Bottle   Refills:  11       gabapentin 300 MG capsule   Commonly known as:  NEURONTIN   This may have changed:  See the new instructions.   Used for:  Restless legs syndrome (RLS)   Changed by:  Blaine Boo MD        2 CAPSULES by mouth @ 7am and 11am/noon and 3 capsules by mouth @ 5-6pm   = 7/day   Quantity:  630 capsule   Refills:  3       methimazole 5 MG tablet   Commonly known as:  TAPAZOLE   This may have changed:    - how much to take  - how to take this  - when to take this  - additional instructions   Used for:  Warm thyroid nodule, Subclinical hyperthyroidism   Changed by:  Blaine Boo MD        5mg tab by mouth daily @ 7am   Quantity:  90 tablet   Refills:  3       polyethylene glycol Packet   Commonly known as:  MIRALAX/GLYCOLAX   This may have changed:    - how much to take  - how to take this  - when to take this  - additional instructions   Changed by:  Blaine Boo MD        1 packet by mouth in liquid daily @ 6-7pm   Quantity:  7 packet   Refills:  11         Stop taking these medicines if you haven't already. Please contact your care team if you have questions.     atorvastatin 10 MG tablet   Commonly known as:  LIPITOR   Stopped by:  Blaine Boo MD           DULCOLAX 10 MG Suppository   Generic drug:  bisacodyl   Stopped by:  Blaine Boo MD                Where to get your medicines      These medications were sent to [id number] - [place], [place] - [address]  [address] [facility] [facility] [address]     Phone:  [phone]     carbidopa-levodopa  MG per tablet    gabapentin 300 MG capsule                Primary Care Provider Office Phone # Fax #    Norberto Howe [phone]       [address] [address]        Equal Access to Services     NICOLE HAWKINS AH: Stefan woodall  Soyolie, wajosé miguelda luqadaha, qaybta kaalmada brandon, bharti allanin hayaan natashacristobal marilinbobo laanalatha sage. So [facility] [phone].    ATENCIÓN: Si sanjuana renteria, tiene a benito disposición servicios gratuitos de asistencia lingüística. Hemant al [phone].    We comply with applicable federal civil rights laws and [place] laws. We do not discriminate on the basis of race, color, national origin, age, disability, sex, sexual orientation, or gender identity.            Thank you!     Thank you for choosing [facility]  for your care. Our goal is always to provide you with excellent care. Hearing back from our patients is one way we can continue to improve our services. Please take a few minutes to complete the written survey that you may receive in the mail after your visit with us. Thank you!             Your Updated Medication List - Protect others around you: Learn how to safely use, store and throw away your medicines at www.disposemymeds.org.          This list is accurate as of 10/23/18 11:46 AM.  Always use your most recent med list.                   Brand Name Dispense Instructions for use Diagnosis    aspirin 81 MG tablet     30 tablet    Take 1 tablet (81 mg) by mouth daily        carbidopa-levodopa  MG per tablet    SINEMET    1260 tablet    2 TABLETS by mouth @ 3AM, 6AM, 9AM, noon, 3PM, 6PM and 12 midnight = 14/day    Parkinson's disease (H)       FLONASE 50 MCG/ACT spray   Generic drug:  fluticasone     1 Bottle    1-2 sprays in both nostril daily @ 7am    Acute rhinitis       gabapentin 300 MG capsule    NEURONTIN    630 capsule    2 CAPSULES by mouth @ 7am and 11am/noon and 3 capsules by mouth @ 5-6pm   = 7/day    Restless legs syndrome (RLS)       methimazole 5 MG tablet    TAPAZOLE    90 tablet    5mg tab by mouth daily @ 7am    Warm thyroid nodule, Subclinical hyperthyroidism       polyethylene glycol Packet    MIRALAX/GLYCOLAX    7 packet    1 packet by mouth in liquid daily @ 6-7pm

## 2018-10-23 NOTE — PATIENT INSTRUCTIONS
GAMA: October 23, 2018    Parkinson  dbs    Wife Mayelin is on hospice for metastatic breast cancer  Lottie Wu daughter in law and Son Caleb Wu.   Mayelin is in [facility]  This is not a permanent address.     Wondering about taking aspirin     Generally his medications are working well    Will be seeing Dr. Howe today and will review his blood pressure and discuss his other medications such as the atorvastatin which he reportedly has stopped and to review his thyroid medication.     He is having some falls and associated freezing that is causing the falls.     2.83 voltage            Medications     3am 6-7am 11am-12noon 3pm 6pm 9pm midnight   Aspirin 81mg  1        Atorvastatin lipitor 10mg  stopped        Bisacodyl dulcolax 10mg  stopped        Carbidopa/levodopa sinemet 25/100 2 2 2 2 2 2 2   Fluticasone flonase 50 mcg/act nasal spray  1 spray        Gabapentin/neurontin 300mg  2 2  3     Methimazole tapazole 5mg   1        Polyethylene glycol miralax     Daily @ 6-7pm                                                                                         History obtained from patient     PLAN  Given the stress ongoing with his wife being in hospice this has impacted his health overall.   He has on related freezing despite medications and his dbs system working well.   He has had cognitive decline in the past few weeks.     He is temporarily living with his wife in her facility and he will need a new living situation where he gets additional care    He is on the waiting list of [facility] on [address]  - assisted living, enhanced care and memory unit.   [email or web address]    We discussed parkinson speciality care facility in [place]    [email or web address]    Senior linkage line.     He could benefit from  consultation with JAKE Blood.    He could benefit from physical and occupational therapy  consultation and possibly a U - step walker     Additional resources provided below    [email or web address]    [email or web address]    Discussion about medications for wearing off - if needed; he has the ability to remember to take his sinemet every 3 hours or so and therefore he does not need rytary at this point    Given his cognitive decline, it will be worth discussing the use of memory enhancing medications such as donepezil (aricept) or/and rivastigmine (excelon) which may also help his gait/freezing. In particular he has on related freezing.     He should return back in 1-3 months.     May consider pharmacy consultation with ngozi Mena, Pharm D down the road to help with medication management ie the above cognitive medications and/or review the rytary option and decision about whether we should continue the gabapentin at the present dose or possibly reduce further but it is probably helping his RLS symptoms.

## 2018-10-23 NOTE — PROGRESS NOTES
HPI:    Pt. Comes in for follow up today. He has followed with Dr. Boo, Neurology for Parkinson's disease. He has h/o prostate CA and is followed at [facility].  Colonoscopy 3/17 possibly repeat in 5 years. He is with his son and daughter-in-law today. His wife Mayelin is now in Hospice. He will be moving to new Care Facility in the next few weeks.  No other HEENT, cardiopulmonary, abdominal, , neurological, systemic complaints. He does follow with outside dermatology.      PMH:    1. Parkinson's followed by Dr. Boo  2. Prostate cancer followed at [facility]; s/p prostatectomy 2008; but possible slight increase in PSA; followed every 6 months.  3. Increased cholesterol  4. Deep Brain Stimulator; see neurology note from 7/7/14.  5. Restless leg sxs.  6. PFO see Dr. Vela's cardiology note from 9/13     PE:    Vitals noted gen nad cooperative alert, HEENT; Oropharynx clear no exudate neck supple nl rom no adenopathy, LCTA, B, normal resp. System exam, RRR, S1, S2, no MRG, abdomen, nt, nd, resting tremor in hands.  No Paraspinous tenderness to palpation, no skin rash.      A/P:    1. Parkinson's he follows here in Neurology and seen today 10/23/2018  2. Check with insurance for new Shingles vaccine  3. Increased cholesterol;  fasting lipids and liver tests done 10/9/17 and stable; he remains on Lipitor. For leg complaints he will stop for about 2 weeks. Checked electrolytes and magnesium on 10/5/2018 unremarkable. He states stopping Lipitor made no difference in his leg sxs and he will go back on this medication.    4. He will continue to follow outside Urology for h/o prostate CA. Will check PSA checked 10/9/17 and 0.13; value 3/7/17 was 0.12. He states he follows with Dr. Valladares and that is PSA usually is in the 0.1 range and stable.   5. He can follow up in Health Psychology for anxiety issues.   6. Low TSH checked 9/14; not on replacement checked  TPO and TSI labs done. He has followed up with   Lul 6/17 and  6/8/2018.   He is on methimazole. TSH was 0.95 on 10/5/2018  7. Placed future dermatology referral for skin check 9/11/17.  8. Colonoscopy done 3/2017 repeat in 5 years. He will try Miralax for constipation and this has worked  9. BP monitor from 10/2017 normal BP. Elevated today but quite low (93/61 on 10/5/2018). Will follow for now  10. Melatonin for sleep     Total time spent 25  minutes.  More than 50% of the time spent with Mr. Wu on counseling / coordinating his care

## 2018-10-23 NOTE — Clinical Note
10/23/2018       RE: Norberto Wu  [address]  [place] [address]     Dear Colleague,    Thank you for referring your patient, Norberto Wu, to the [facility] NEUROLOGY at [facility]. Please see a copy of my visit note below.    No notes on file    Again, thank you for allowing me to participate in the care of your patient.      Sincerely,    Blaine Boo MD

## 2018-10-23 NOTE — NURSING NOTE
Interrogated patient's DBS Activa SC battery. Patient has RGpi. All impedances were checked at 3.0 Volts and were WNL. See neuromodulation sheets scanned. Patient informed.    Model 48298    Battery voltage 2.83    RGpi     Contacts used = C+/1-   (monopolar)  Upper limit = 4.2  Lower limit = 0  Current Setting = 4.0

## 2018-10-23 NOTE — NURSING NOTE
Chief Complaint   Patient presents with     Parkinson     [employer] RETURN MOVEMENT DISORDER -RMO     Depression Response    Patient completed the PHQ-9 assessment for depression and scored >9? Yes  Question 9 on the PHQ-9 was positive for suicidality? No  Is the patient already receiving treatment for depression? No  Patient would like to speak with behavioral health team ([facility] only)? No    I personally notified the following: visit provider    Behavioral Health/Social Work Contact Information     [facility]  Navin Monzon MA, LMFT  Lead Behavioral Health Clinician  Phone: [phone]  [facility] Pager: [phone]    Non-[facility]  [facility] On-Call   Pager: [phone]               Tom Trejo, EMT

## 2018-10-23 NOTE — LETTER
"10/23/2018      RE: Norberto Wu  [address]  [place] [address]       Diagnosis/Summary/Recommendations:    PATIENT: Norberto Wu  73 year old male     : 1945    GAMA: 2018    Parkinson  dbs    Medications     3am 7am 11am 2pm 6pm 10pm   Aspirin 81mg  1       Atorvastatin lipitor 10mg  1       Bisacodyl dulcolax 10mg         Carbidopa/levodopa sinemet 25/100 2 2 2 2 2 2   Fluticasone flonase 50 mcg/act nasal spray         Gabapentin/neurontin 300mg 1 1   3    Methimazole tapazole 5mg   1                                                                                            History obtained from patient      Coding statement:   Duration of  Services: patient care and care coordination was 25 minutes  Greater than 50% of this visit was spent in counseling and coordination of care.     Blaine Boo MD     ______________________________________    Last visit date and details:      Toi Tillman MD (Resident)     Student in organized health care education/training program      [facility] Follow-up     CC: PD follow-up     HPI: Norberto Wu is a 72 year-old male with a history of PD s/p R GPI DBS (2014) who presents in follow-up. He was last seen by Dr. Boo 17. At that time it was unknown if he was experiencing wearing-off or grogginess from gabapentin, but he did not decrease this.     He repots he is wearing-off early, and \"deeper.\" When wearing-off symptoms are discomfort in legs/aching that improves with ambulation and movement. Also has some generalized stiffness. Tends to be more before 11am or 6pm doses. Does not always take meds apart from meals.      He also reports grogginess and difficulty sleeping due to leg symptoms. Gabapentin helps leg symptoms so does not want to decrease further. He takes 1-2 naps per day, sometiems sleeps the whole day.     No dyskinesias, orthostasis, hallucinations. His wife " "reports he has difficulty locating the correct object when it is right in front of him in a cabinet, for example. Will also incorrectly note colors of things.     Tried Requip before and had fatigue. Tried Neupro and was ineffective and too expensive.     Also reports his speech is getting worse. Constipation is worse. Freezing is worse. No falls. No clear timing of freezing in relation to wearing-off and medication doses.        PD Medications                   3 7 11 2 6 10   Carbidopa/levodopa 25/100                                 2 2 2 2 2 2    gabapentin 300mg                          2 2   3        Medications:      Current Outpatient Prescriptions   Medication     aspirin 81 MG tablet     atorvastatin (LIPITOR) 10 MG tablet     bisacodyl (DULCOLAX) 10 MG Suppository     carbidopa-levodopa (SINEMET)  MG per tablet     fluticasone (FLONASE) 50 MCG/ACT nasal spray     gabapentin (NEURONTIN) 300 MG capsule     methimazole (TAPAZOLE) 5 MG tablet      No current facility-administered medications for this visit.          Exam:  Ht 1.791 m (5' 10.5\")  Wt 90.4 kg (199 lb 4.8 oz)  BMI 28.19 kg/m2     Neurological Examination (R/L):  Mental Status: Awake, alert. Fluent. Affect normal.  Cranial Nerves: Versions full. Saccades intact. Mild/moderate hypomimia. Mild/moderate hypophonia. No torticollis.  Motor: Mild bradykinesia with finger tapping left more than right. Also with grasps. Moderate bradykinesia with left foot toe tapping. Mild on right foot. Tone mildly increased in right arm, slight in left arm. Normal in legs. Rare right arm tremor, moderately frequent right leg tremor.  Coordination: Finger to nose without dysmetria.  Gait: Slow to rise from chair. Freezing on gait initiation and going through doorway. Otherwise gait with good stride length. Retropulsion normal.     DBS  Activa SC  2.90V  Electrode impedances OK     RGPI  C+1-  4V 90us 185Hz  Therapeutic impedance: 1068 ohms, " "3.808mA     Assessment: 72 year-old male with a history of PD s/p R GPI DBS (5/2014). Currently reporting frequent wearing-off, possibly resulting in symptoms of RLS. We discussed that his grogginess is likely due to gabapentin which he reports is helpful for these symptoms and he does not want reduced. Will thus increase levodopa frequency. Have also instructed him to keep a strict diary of meds, meals, wearing-off symptoms. If freezing improves with this it may suggest that there is some levodopa responsiveness.     Plan:   -change Sinemet to q3 hour dosing  -keep symptom/medication diary  -neuro-ophthalmology referral  -will send [employer] message in 1-2 weeks     Follow-up 3 months with Dr. Ema Tillman MD  Movement Disorders Fellow     I saw the patient with Dr. Tillman, a senior movement disorders fellow, and I was present for key portions of his history and physical examination; however, I took an additional history from the patient, performed my own movement-disorders examination, and supervised Dr. Tillman's analysis of the patient's IPG parameters.  I agree with Dr. Tillman's assessment and treatment plan.     I spent 20 minutes with the patient as described above.     KARLA Guidry MD  Professor of Neurology and [facility] Physician              [facility]: Nurse (RN) Visit Note  SITUATION/BACKGROUND         Norberto Wu is a 72 year old male, who presents to clinic for follow up of his Parkinson's disease.     Current Status:  He complains of increased freezing when going through small, confined spaces, a foggy/tired feeling at times during the day. He was unable to be specific as to the timing of the \"wearing off\" he was experiencing. One of the questions is whether this \"wearing off\" he describes is wearing off his Parkinson's disease medications or a grogginess from the gabapentin.     ASSESSMENT        Vital Signs:  /83  Pulse 82  Ht 1.816 m (5' 11.5\")  Wt 94.3 " kg (208 lb)  BMI 28.61 kg/m2      Exam:  NEURO: mentation intact and speech normal however, at times it was festinating.     Interrogated patient's DBS Activa SC battery. Patient has RGPi. All impedances were checked at 3.0 Volts and were WNL. See [employer] Neuromodulation sheets scanned. Patient informed.     Therapy impedance = 1015 ohms, 4.002 mA     Using contacts = C+/1-     Model 48260     Battery voltage 2.93     RECOMMENDATION/PLAN         Medical Plan:  MEDICATIONS:       - Decrease his gabapentin from 2 tabs at 3 am and 7 am  to 1 tab at 3 am (if he wakes) and 7 am, keep the 3 tabs at 6 pm.  Follow up with Fellow in 1-2 months     Today's visit was:  Completed within the RN scope of practice                Nursing Note   Edson Monterroso (Medical Assistant)           Chief Complaint   Patient presents with     RECHECK       Movement Disorder     Edson Monterroso CMA         Progress Notes   Blaine Boo MD (Physician)     Neurology   Expand All Collapse All    Diagnosis/Summary/Recommendations:     PATIENT: Norberto Wu  72 year old male      : 1945     GAMA: 2017     Parkinson     DBS - 2.93 VOLTS AND IS RIGHT GPI AND NO OPEN CIRCUITS     Aspirin 81mg  Atorvastatin 10mg  Sinemet 25/100 2 tabs 6 times per day 3am, 7am, 11am, 2pm, 6pm, 10pm  fluticaseon  Gabapentin neurontin 300mg 2 in the am and noon and 3 caps at night  Methimazole tapazole 5mg  Ofloxacin floxin  Polyethylene floxin  Polyethylene glycol  Selegiline eldepryl 5mg 2/day - STOPPED THIS MEDICATION   triamcinolone     Has more heaviness or fogginess in his head when medications are wearing off.   Mayelin is hospital for hernia surgery at [facility].     He is having deep dull pain in his legs when he is off.   He may need happy pill  He shakes his legs to relax.  He has symptoms at 5-6 pm and ? Night time     He is napping mid morning and mid afternoon  He has terrible sleep habits     He has been on requip - had fatigue  and no benefit.      He is doing weights and exercising  He has not done boxing  He has some freezing  He has more problems in small spaces.        Medications     3am 7am 11am 2pm 6pm 10pm     Sinemet 25/100 2 2 2 2 2 2     Gabapentin 300mg 2 2     3       Selegiline 5mg Not taking               Atorvastatin lipitor 10mg   1             Aspirin 81mg   1             Methimazole (tapazole) 5mg   1                                     Over 50% of this visit was spent in patient care and care coordination.      History obtained from patient     Total visit time was 25 minutes     In summary he appears to have fatigue but not clearly depression  He may be having wearing off leg pain but he needs to track his dosing and when he has symptoms  He has been on requip but has not been on a low dose rotigotine patch.     PLAN  Discussed pros and cons of gabapentin (sedating and may help pain)  Reduce from 2 to 1 tabs @ 3am and 7am and continue with 3 tabs @ 6pm     Medications     3am 7am 11am 2pm 6pm 10pm     Sinemet 25/100 2 2 2 2 2 2     Gabapentin 300mg 1 1     3       Selegiline 5mg Not taking               Atorvastatin lipitor 10mg   1             Aspirin 81mg   1             Methimazole (tapazole) 5mg   1                                  Next option would be to try the rotigotine neupro 1 or 2mg patch     Would recommend therapy     Return in 1-2 months to see a movement disorders fellow to review his function.                            ______________________________________      Patient was asked about 14 Review of systems including changes in vision (dry eyes, double vision), hearing, heart, lungs, musculoskeletal, depression, anxiety, snoring, RBD, insomnia, urinary frequency, urinary urgency, constipation, swallowing problems, hematological, ID, allergies, skin problems: seborrhea, endocrinological: thyroid, diabetes, cholesterol; balance, weight changes, and other neurological problems and these were not  "significant at this time except for   Patient Active Problem List   Diagnosis     Parkinson disease (H)     Peripheral neuropathy     Somnolence     Family history of Parkinson's disease     Snores     REM sleep behavior disorder     Prostate cancer (H)     Wears glasses     Insomnia     Constipation     History of MRI of brain and brain stem     PFO (patent foramen ovale)     Cerebellar stroke syndrome     H/O echocardiogram     Restless leg syndrome     S/P brain surgery     Hamstring tendonitis at origin     Parkinson's disease (H)     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  Stayed in the hospital 3 days due to severe nausea & \"retching\" .      Comtan Diarrhea     Camphor      Camphor HELENA     Hydrocodone      Other reaction(s): Headache, Nausea Only     Hydrocodone-Acetaminophen Hives and Nausea     Melatonin Other (See Comments)     No benefit.     Ropinirole Fatigue     Fatigue no benefit.      Seroquel [Quetiapine]      Groggy and aggravated rls  No benefit     Adhesive Tape Rash     Camphor Rash     Other reaction(s): Rash     Liquid Adhesive Rash     Past Surgical History:   Procedure Laterality Date     COLONOSCOPY N/A 3/23/2017    Procedure: COLONOSCOPY;  Surgeon: Salbador Paulson MD;  Location: [facility] GI     PROSTATE SURGERY  3 yrs ago    prostate cancer; Dr. Valladares     REPLACE DEEP BRAIN STIMULATION GENERATOR / BATTERY Right 1/3/2017    Procedure: REPLACE DEEP BRAIN STIMULATION GENERATOR / BATTERY;  Surgeon: Anupam Fair MD;  Location: [facility] OR     Right Gpi DBS Lead Implantation  5/20/2014     Rt Chest DBS Generator Placement Activa SC  5/29/2014     Past Medical " History:   Diagnosis Date     Cerebellar stroke syndrome 8/21/2013    Few small foci of chronic infarct in the right cerebellar hemisphere, unchanged as well as a new focus of now chronic infarct in the left cerebellar hemisphere since the last study.      Dyslipidemia      Hyperthyroidism      Insomnia 6/17/2011     Parkinson disease (H) 6/15/2011     Peripheral neuropathy 6/15/2011     PFO (patent foramen ovale) 8/21/2013    Interpretation Summary 1. Normal LV size and systolic function. Normal diastolic function 2.  Normal RV size and function 3. No significant valvular abnormalities 4.  Small right-to-left shunt visualized with intravenous agitated saline  contrast study, suggestive of PFO. PatientHeight: 72 in PatientWeight: 205 lbs SystolicPressure: 100 mmHg DiastolicPressure: 63 mmHg BSA 2.2 m^2   Procedure Echoc     PONV (postoperative nausea and vomiting)      Prostate cancer (H) 6/17/2011    s/p radical prostectomy     Social History     Social History     Marital status:      Spouse name: N/A     Number of children: N/A     Years of education: N/A     Occupational History     Not on file.     Social History Main Topics     Smoking status: Never Smoker     Smokeless tobacco: Never Used     Alcohol use 0.6 oz/week      Comment: EVERY OTHER DAY     Drug use: No     Sexual activity: No     Other Topics Concern     Parent/Sibling W/ Cabg, Mi Or Angioplasty Before 65f 55m? No     Social History Narrative    Son lives in [place] and he has 2 kids    Wife Mayelin           Drug and lactation database from the [place] [employer] of [employer]:  http://toxnet.nlm.nih.gov/cgi-bin/sis/htmlgen?LACT      B/P: Data Unavailable, T: Data Unavailable, P: Data Unavailable, R: Data Unavailable 0 lbs 0 oz  There were no vitals taken for this visit., There is no height or weight on file to calculate BMI.  Medications and Vitals not listed above were documented in the cart and reviewed by me.     Current  Outpatient Prescriptions   Medication Sig Dispense Refill     aspirin 81 MG tablet Take 1 tablet (81 mg) by mouth daily 30 tablet 0     atorvastatin (LIPITOR) 10 MG tablet Take 1 tablet (10 mg) by mouth daily 90 tablet 2     bisacodyl (DULCOLAX) 10 MG Suppository Place 10 mg rectally daily as needed for constipation       carbidopa-levodopa (SINEMET)  MG per tablet TAKE 2 TABLETS 7 TIMES DAILY (3AM, 6AM, 9AM, noon, 3PM, AND 6PM) 420 tablet 3     fluticasone (FLONASE) 50 MCG/ACT nasal spray Spray 1-2 sprays into both nostrils daily 3 Package 3     gabapentin (NEURONTIN) 300 MG capsule TAKE 2 CAPSULES IN THE MORNING & NOON AND 3 CAPSULES 2 HOURS BEFORE BEDTIME 630 capsule 0     methimazole (TAPAZOLE) 5 MG tablet Take 1 tablet (5 mg) by mouth daily 90 tablet 0         Blaine Boo MD

## 2018-10-24 ASSESSMENT — PATIENT HEALTH QUESTIONNAIRE - PHQ9: SUM OF ALL RESPONSES TO PHQ QUESTIONS 1-9: 18

## 2018-10-26 ENCOUNTER — TELEPHONE (OUTPATIENT)
Dept: INTERNAL MEDICINE | Facility: CLINIC | Age: 73
End: 2018-10-26

## 2018-10-26 ENCOUNTER — TELEPHONE (OUTPATIENT)
Dept: NEUROLOGY | Facility: CLINIC | Age: 73
End: 2018-10-26

## 2018-10-26 DIAGNOSIS — G20.A1 PARKINSON DISEASE (H): Primary | ICD-10-CM

## 2018-10-26 NOTE — TELEPHONE ENCOUNTER
Contacted patient regarding recent difficulty at Assisted Living center. He states his wife is in hospice and dying with cancer and he is in the same care center and unable to make decisions on his own.  He says he does not have input about the proceedings that are going on legally with him.  Advised him to contact neurology as they have documented notes regarding his health.  Donnell Hdz LPN at 2:07 PM on 10/26/2018

## 2018-10-26 NOTE — TELEPHONE ENCOUNTER
[facility] Call Center    Phone Message    May a detailed message be left on voicemail: yes    Reason for Call: Other: Pt's son calling. The other day pt packed 2 bags and said he was going to catch a plane to find his wife becuase she was missing. This information is untrue, she is in a care facility. Pt is frustrated that he cannot live alone. The care facility along with pt's son think it may be a good idea for him to get memory care. They are looking for approval from Dr. Boo. Please give pt's son Haley call back to discuss.      Action Taken: Message routed to:  [facility] Neurology

## 2018-10-26 NOTE — TELEPHONE ENCOUNTER
Could we have SW assist? What about his family? Please let me know how I can help?      MOHIT Howe

## 2018-10-26 NOTE — TELEPHONE ENCOUNTER
[facility] Call Center    Phone Message    May a detailed message be left on voicemail: yes    Reason for Call: Other: Pt would like call back from Dr. Howe or a nurse in regards to his health concerns. Please call back pt asap. Thanks.     Action Taken: Message routed to:  [facility] PCC

## 2018-10-29 ENCOUNTER — TELEPHONE (OUTPATIENT)
Dept: NEUROLOGY | Facility: CLINIC | Age: 73
End: 2018-10-29

## 2018-10-29 DIAGNOSIS — G20.A1 PARKINSON DISEASE (H): Primary | ICD-10-CM

## 2018-10-29 RX ORDER — QUETIAPINE FUMARATE 25 MG/1
25 TABLET, FILM COATED ORAL AT BEDTIME
Qty: 30 TABLET | Refills: 11 | Status: SHIPPED | OUTPATIENT
Start: 2018-10-29 | End: 2018-12-18

## 2018-10-29 NOTE — TELEPHONE ENCOUNTER
Marj, can you please contact Christina Dinh RN for Dr Boo? She was also addressing this issue last week so I'm not sure where things are at. She was doing work on it on Friday with getting orders to admit him to a memory care unit. He was very confused. Let me know after that if there are things I can help with. Thanks. Franchesca  Christina sukhjinder @[phone]    Crhistina called and message left to call back.  Marj Thurston RN 9:03 AM on 10/29/2018.  Left message for Christina to call back if needed.  Marj Thurston RN 1:30 PM on 10/30/2018.

## 2018-10-29 NOTE — TELEPHONE ENCOUNTER
[facility] Call Center    Phone Message    May a detailed message be left on voicemail: no    Reason for Call: Other: Pt requesting call back to discuss the vivid dreams he has been having and to discuss a medication to help with it     Action Taken: Message routed to:  [facility] ([facility]): neuro clinic

## 2018-10-29 NOTE — TELEPHONE ENCOUNTER
On 10/26 at 6:00 pm I reached Lottie, daughter-in-law, who gave me the number for [facility] [place]. I faxed the signed orders from Dr. Boo to the facility.    Today I left voice mail on Nurse Manager, Betty Benjamin (sp?) at the facility that these orders were faxed on Friday and hopefully received.
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
## 2018-10-29 NOTE — TELEPHONE ENCOUNTER
I would recommend that the patient start with half-tablet (12.5 mg) of quetiapine for one week and if no improvement, then increase to one full tablet (25 mg).     Rachel Mena, Pharm.D.  Medication Therapy Management Pharmacist  Phone: [phone]

## 2018-10-29 NOTE — TELEPHONE ENCOUNTER
QUEtiapine (SEROQUEL) 25 MG tablet 30 tablet 11 10/29/2018  --   Sig - Route: Take 1 tablet (25 mg) by mouth At Bedtime - Oral     Rachel suggested to have him begin with 1/2 tab at bedtime and report back in a few days before going up to 1 tab at bedtime.    Called patient to discuss and went over the following information about quetiapine:  Quetiapine rebalances dopamine and serotonin to improve thinking, mood, and behavior. It can help disorganized thinking or trouble organizing your thoughts and making sense.  Low blood pressure, feeling dizzy, and/or increased heart rate, especially when standing up  Most common side effects include: fatigue, sedation, dry mouth, agitation, increased appetite, constipation    I asked him to start with 1/2 tab at bedtime for a few days. I will call him on Wednesday to see how he does tonight and Tuesday night. Patient is agreeable to this plan of care.  Called patient's son Caleb and let him know. He will go now to  the prescription.

## 2018-10-30 ENCOUNTER — TELEPHONE (OUTPATIENT)
Dept: NEUROLOGY | Facility: CLINIC | Age: 73
End: 2018-10-30

## 2018-10-31 NOTE — TELEPHONE ENCOUNTER
"Called patient to check in to see how he is doing with the quetiapine 1/2 tab at bedtime. LEFT VOICE MAIL that I would call back on Friday to check in.    I was able to reach his son Caleb. Caleb states that the patient's sleep \"has been tremendous.\"  Patient's wife Mayelin, passed away this morning. He was very groggy this morning, \"is this a dream.\" He even told Caleb that he has less \"brain(fog)\" than he has had in years. Lottie (daughter-in-law) stated that he is walking better as well.    They will keep him in \"Enhanced\" care for another week or so and then reevaluate whether he needs memory care, continued enhanced care, or if he can go back to assisted living.      "

## 2018-11-03 DIAGNOSIS — G25.81 RESTLESS LEGS SYNDROME (RLS): ICD-10-CM

## 2018-11-06 RX ORDER — GABAPENTIN 300 MG/1
CAPSULE ORAL
Qty: 630 CAPSULE | Refills: 0 | OUTPATIENT
Start: 2018-11-06

## 2018-12-11 ENCOUNTER — THERAPY VISIT (OUTPATIENT)
Dept: PHYSICAL THERAPY | Facility: CLINIC | Age: 73
End: 2018-12-11
Payer: COMMERCIAL

## 2018-12-11 DIAGNOSIS — R26.9 ABNORMAL GAIT: ICD-10-CM

## 2018-12-11 DIAGNOSIS — G20.A1 PARKINSON'S DISEASE (H): Primary | ICD-10-CM

## 2018-12-11 DIAGNOSIS — Z91.81 AT HIGH RISK FOR FALLS: ICD-10-CM

## 2018-12-11 DIAGNOSIS — M54.50 MIDLINE LOW BACK PAIN WITHOUT SCIATICA, UNSPECIFIED CHRONICITY: ICD-10-CM

## 2018-12-13 NOTE — PROGRESS NOTES
12/11/18 0900   General Information   Start of Care Date 12/11/18   Referring Physician Dr Boo   Orders Evaluate and Treat as Indicated   Order Date 10/23/18   Medical Diagnosis Parkinson Disease   Surgical/Medical history reviewed Yes   Pertinent history of current problem (include personal factors and/or comorbidities that impact the POC) IF I can get up and get moving the back is OK. I can walk fairly decently at a clip and have my balance back if my back allows. If Im not freezing and falling.    Prior level of function comment walks around n the complex daily to go to meals, mailbox, socialization. If weather permits walks some outside.    Previous/Current Treatment Other   Other treatment Has had BIG and LOUD therapy in the past.   Current Community Support Family/friend caregiver  (ISAÍAS is present at today's appt)   Patient role/Employment history Retired   Living environment Apartment/condo  ([place] in the past ??6 months or less)   Assistive Devices Comments He has a 4WW that is only a few months old and uses part time   General Information Comments His wife recently passed away.   Fall Risk Screen   Fall screen completed by PT   Have you fallen 2 or more times in the past year? Yes   Have you fallen and had an injury in the past year? No   Is patient a fall risk? Yes   Fall screen comments I catch myself, I dont hurt myself. I end up on the floor. Reports falling daily.   Pain   Patient currently in pain Yes   Pain location pain in low back on both sides. No pain when laying in bed or when in a chair. Has the pain when standing/upright and walking.    Pain description comment constipation may make it worse   Cognitive Status Examination   Cognitive Comment Speech is low volume and dysarthric at times. He has had SLP therapy in the past.    Range of Motion (ROM)   ROM Comment Standing trunk extension WFLS with no back pain complaints,  Standing lateral flexion WFLS with no back pain  complaints. SEated and standing trunk flexion with no back pain complaints.    Gait   Gait Comments Gait with head motion he slows down, partly due to having to multitask but no LOB.    Gait Special Tests 25 Foot Timed Walk   Seconds 6.94  (6.5)   Steps 14 Steps  (13)   Comments no AD. Straight path he does well most of the time.  He freezes and festinates multiple times during the session. Usually occurs when he first starts to move, doorway, and when turning its very consistent. Turning without an AD he freezes, festinates. He had one turn where he had a LOB and I had to grab him to prevent a fall.    Clinical Impression   Criteria for Skilled Therapeutic Interventions Met yes, treatment indicated   PT Diagnosis Falls risk. Impaired gait: festinating and freezing   Influenced by the following impairments LBP, FALLS/dynamic postural control,  bradykinesia/akinesia    Functional limitations due to impairments affects aDLs/IADLS, household/community mobility, and recreational activity   Clinical Presentation Stable/Uncomplicated   Clinical Presentation Rationale medical history (PD for 10 years), FALLS, freezing/festinating gait, LBP and clinical judgement.   Clinical Decision Making (Complexity) Low complexity   Therapy Frequency other (see comments)  (after today recommend home care for furer PT)   Predicted Duration of Therapy Intervention (days/wks) one time out-pt visit, then home care   Risk & Benefits of therapy have been explained Yes   Patient, Family & other staff in agreement with plan of care Yes   Clinical Impression Comments High risk for falls due to gait freezing/festinating. Once he is gets going with or without 4WW he does well but turns are very difficult. He needs regular PT and possible SLP therapy. Home Care is the most appropriate POC for him as family cannot get him to appts regularly. I think looking at how he moves in his apt and the bldg is best.   PT G-Codes   Mobility: Walking and  Moving Around Current Status () CK   Mobility: Walking and Moving Around Goal Status () CK   Mobility: Walking and Moving Around Discharge Status () CK   Discharge Mobility Modifier Rationale Gait varies. He festinates/freezes and is falling daily. Plus LBP with ambulation.    Goal 1   Goal Identifier HEP-partially met   Goal Description HE will be able to do stretches for LBP IND   Target Date 12/11/18   Date Met 12/11/18   Total Evaluation Time   PT Eval, Low Complexity Minutes (23083) 30   Lala Gerardo DPT, MPT, NCS

## 2018-12-14 ENCOUNTER — TELEPHONE (OUTPATIENT)
Dept: NEUROLOGY | Facility: CLINIC | Age: 73
End: 2018-12-14

## 2018-12-14 ENCOUNTER — OFFICE VISIT (OUTPATIENT)
Dept: ENDOCRINOLOGY | Facility: CLINIC | Age: 73
End: 2018-12-14
Payer: COMMERCIAL

## 2018-12-14 VITALS
DIASTOLIC BLOOD PRESSURE: 70 MMHG | HEIGHT: 71 IN | HEART RATE: 75 BPM | WEIGHT: 184 LBS | SYSTOLIC BLOOD PRESSURE: 113 MMHG | BODY MASS INDEX: 25.76 KG/M2

## 2018-12-14 DIAGNOSIS — Z91.81 AT RISK FOR FALLS: Primary | ICD-10-CM

## 2018-12-14 DIAGNOSIS — M89.9 DISORDER OF BONE: ICD-10-CM

## 2018-12-14 ASSESSMENT — PAIN SCALES - GENERAL: PAINLEVEL: NO PAIN (0)

## 2018-12-14 ASSESSMENT — MIFFLIN-ST. JEOR: SCORE: 1603.81

## 2018-12-14 NOTE — PROGRESS NOTES
Endocrinology Note    Chief Complaint: follow up subclinical Hyperthyroidism    HPI: Norberto Wu is a 73 year old year old man with history of Parkinson's disease s/p DBS who presents for follow-up of subclinical hyperthyroidism.     He has been taking methimazole 5mg daily without side effects. He continues to feel well from thyroid standpoint.    His Parkinson's disease seems to be getting worse particularly chronic constipation, difficulty getting up and walk, imbalance, and deepening of his voice. He fell daily particularly. He started doing physical therapy this week and is planning for home physical therapy. He has constipation and used stool softener regularly. He has no paresthesias, burning sensations, heat intolerance, neck swelling, dysphagia, palpitations, dyspnea, chest pain, diaphoresis, abdominal pain, diarrhea, irritability/agitation.    He is now sleep better after starting on Seroquel. Mr. Wu exercises daily with treadmill and mostly walk around assisted living facility.    Past Medical History:   Diagnosis Date     Cerebellar stroke syndrome 8/21/2013    Few small foci of chronic infarct in the right cerebellar hemisphere, unchanged as well as a new focus of now chronic infarct in the left cerebellar hemisphere since the last study.      Dyslipidemia      Hyperthyroidism      Insomnia 6/17/2011     Parkinson disease (H) 6/15/2011     Peripheral neuropathy 6/15/2011     PFO (patent foramen ovale) 8/21/2013    Interpretation Summary 1. Normal LV size and systolic function. Normal diastolic function 2.  Normal RV size and function 3. No significant valvular abnormalities 4.  Small right-to-left shunt visualized with intravenous agitated saline  contrast study, suggestive of PFO. PatientHeight: 72 in PatientWeight: 205 lbs SystolicPressure: 100 mmHg DiastolicPressure: 63 mmHg BSA 2.2 m^2   Procedure Echoc     PONV (postoperative nausea and vomiting)      Prostate cancer (H) 6/17/2011    s/p  "radical prostectomy       Past Surgical History:   Procedure Laterality Date     COLONOSCOPY N/A 3/23/2017    Procedure: COLONOSCOPY;  Surgeon: Salbador Paulson MD;  Location: [facility]     PROSTATE SURGERY  3 yrs ago    prostate cancer; Dr. Valladares     REPLACE DEEP BRAIN STIMULATION GENERATOR / BATTERY Right 1/3/2017    Procedure: REPLACE DEEP BRAIN STIMULATION GENERATOR / BATTERY;  Surgeon: Anupam Fair MD;  Location: [facility] OR     Right Gpi DBS Lead Implantation  5/20/2014     Rt Chest DBS Generator Placement Activa SC  5/29/2014       Current Outpatient Medications   Medication Sig Dispense Refill     carbidopa-levodopa (SINEMET)  MG per tablet 2 TABLETS by mouth @ 3AM, 6AM, 9AM, noon, 3PM, 6PM and 12 midnight = 14/day 1260 tablet 3     fluticasone (FLONASE) 50 MCG/ACT spray 1-2 sprays in both nostril daily @ 7am 1 Bottle 11     gabapentin (NEURONTIN) 300 MG capsule 2 CAPSULES by mouth @ 7am and 11am/noon and 3 capsules by mouth @ 5-6pm   = 7/day 630 capsule 3     polyethylene glycol (MIRALAX/GLYCOLAX) Packet 1 packet by mouth in liquid daily @ 6-7pm 7 packet 11     QUEtiapine (SEROQUEL) 25 MG tablet Take 1 tablet (25 mg) by mouth At Bedtime 30 tablet 11     aspirin 81 MG tablet Take 1 tablet (81 mg) by mouth daily 30 tablet 0     methimazole (TAPAZOLE) 5 MG tablet 5mg tab by mouth daily @ 7am 90 tablet 3          Allergies   Allergen Reactions     Hydromorphone Nausea and Nausea and Vomiting     Used post DBS surgery.  Stayed in the hospital 3 days due to severe nausea & \"retching\" .      Comtan Diarrhea     Camphor      Camphor HELENA     Hydrocodone      Other reaction(s): Headache, Nausea Only     Hydrocodone-Acetaminophen Hives and Nausea     Melatonin Other (See Comments)     No benefit.     Ropinirole Fatigue     Fatigue no benefit.      Seroquel [Quetiapine]      Groggy and aggravated rls  No benefit     Adhesive Tape Rash     Camphor Rash     Other reaction(s): Rash     Liquid Adhesive Rash " "      Family History   Problem Relation Age of Onset     Parkinsonism Father      Other - See Comments Son         lives in [place]     Skin Cancer No family hx of         no skin cancer       Social History     Social History     Marital Status:      Spouse Name: N/A     Number of Children: N/A     Years of Education: N/A     Social History Main Topics     Smoking status: Never Smoker      Smokeless tobacco: Never Used     Alcohol Use: Yes      Comment: Rare     Drug Use: No     Sexual Activity: Not Asked     Other Topics Concern     None     Social History Narrative    Son lives in [place] and he has 2 kids    Wife Mayelin           Review of Systems   Constitutional: no fevers, night sweats, or chills; no weight or appetite changes; no recent illnesses, increased diaphoresis, or fatigue  HEENT: no headaches, hearing changes, +deepening of the voice    Neck: no new neck swelling or masses  Cardiac: no chest pain, palpitations, lightheadedness, or exertional dyspnea  Resp: no dyspnea  GI: +severe constipation; no dysphagia, nausea, vomiting, diarrhea, or abdominal pain  : no difficulty urinating or dysuria  MSK: no new myalgias or arthralgias  Extremities: +frozen feet, difficulty getting up and ambulate, +fall frequently  Endo: no polydipsia or polyuria; no heat or cold intolerance intolerance  Neuro: some difficulty with walking when starting to ambulate, numbness; no paresthesias of extremities  Derm: no skin or nail changes; no hair loss or changes  Psych: +slow cognitive function; no depressed mood or increased irritibility    Objective:   /70   Pulse 75   Ht 1.791 m (5' 10.5\")   Wt 83.5 kg (184 lb)   BMI 26.03 kg/m      Physical Exam  General: pt appears mildly slowed but well; no acute distress  HEENT: hair appears normal; mucous membranes moist; speech is clear; normocephalic   Neck:no thyroid enlargement, no tenderness to palpation  Cardiac: normal S1/S2 and rhythm; no S3/S4, murmurs, " clicks, or rubs  Resp: appropriate effort with air entry to lung bases; no increased work of breathing; breath sounds are symmetric; no crackles, wheezes, or rhonchi - lungs clear to auscultation bilaterally   Extremities: no edema or finger clubbing   Derm: no rash, skin lesions, or dryness on face, neck, hands or lower legs  Neuro: symmetric +2/4 biceps reflexes, +1 symmetric patellar reflexes; +minimal facial expression; short narrow shuffling gait   Psych: pt is appropriately conversational with restricted affect    Lab      Assessment and Plan:      Norberto Wu is a 73 year old year old man with a PMHx of Parkinson's disease s/p DBS who presents for follow-up of subclinical hyperthyroidism.     #Subclinical Hyperthyroidism: diagnosed in 2015 with TSH suppression with normal free T4 with negative anti-TPO and TSI. Previous thyroid uptake and scan showed warm nodule on left thyroid.   - pt has been consistent with methimazole 5 mg daily and TSH was normalized   - recent lab is normal  - no symptoms of overt hyperthyroidism   Plan:  Continue with methimazole 5 mg and repeat lab in 6-8 months.    #Parkinson's Disease s/p deep brain stimulation  - appears to suffer from constipation, difficulty ambulation and deepening voice  - Continue to follow-up with neurologist    #fall risk: will check screening DXA  Recommend to get calcium and vitamin D from diary product    RTC 6-8 months     Brandin Mccarty MD    Division of Diabetes and Endocrinology  Department of Medicine  [phone]

## 2018-12-14 NOTE — NURSING NOTE
Chief Complaint   Patient presents with     RECHECK     ABNORMAL THYROID FUNCTION      Tammy Faustin CMA

## 2018-12-14 NOTE — LETTER
12/14/2018       RE: Norberto Wu  [address]  [place] [address]     Dear Colleague,    Thank you for referring your patient, Norberto Wu, to the [facility] ENDOCRINOLOGY at [facility]. Please see a copy of my visit note below.    Endocrinology Note    Chief Complaint: follow up subclinical Hyperthyroidism    HPI: Norberto Wu is a 73 year old year old man with history of Parkinson's disease s/p DBS who presents for follow-up of subclinical hyperthyroidism.     He has been taking methimazole 5mg daily without side effects. He continues to feel well from thyroid standpoint.    His Parkinson's disease seems to be getting worse particularly chronic constipation, difficulty getting up and walk, imbalance, and deepening of his voice. He fell daily particularly. He started doing physical therapy this week and is planning for home physical therapy. He has constipation and used stool softener regularly. He has no paresthesias, burning sensations, heat intolerance, neck swelling, dysphagia, palpitations, dyspnea, chest pain, diaphoresis, abdominal pain, diarrhea, irritability/agitation.    He is now sleep better after starting on Seroquel. Mr. Wu exercises daily with treadmill and mostly walk around assisted living facility.    Past Medical History:   Diagnosis Date     Cerebellar stroke syndrome 8/21/2013    Few small foci of chronic infarct in the right cerebellar hemisphere, unchanged as well as a new focus of now chronic infarct in the left cerebellar hemisphere since the last study.      Dyslipidemia      Hyperthyroidism      Insomnia 6/17/2011     Parkinson disease (H) 6/15/2011     Peripheral neuropathy 6/15/2011     PFO (patent foramen ovale) 8/21/2013    Interpretation Summary 1. Normal LV size and systolic function. Normal diastolic function 2.  Normal RV size and function 3. No significant valvular abnormalities 4.  Small right-to-left  "shunt visualized with intravenous agitated saline  contrast study, suggestive of PFO. PatientHeight: 72 in PatientWeight: 205 lbs SystolicPressure: 100 mmHg DiastolicPressure: 63 mmHg BSA 2.2 m^2   Procedure Echoc     PONV (postoperative nausea and vomiting)      Prostate cancer (H) 6/17/2011    s/p radical prostectomy       Past Surgical History:   Procedure Laterality Date     COLONOSCOPY N/A 3/23/2017    Procedure: COLONOSCOPY;  Surgeon: Salbador Paulson MD;  Location: [facility]     PROSTATE SURGERY  3 yrs ago    prostate cancer; Dr. Valladares     REPLACE DEEP BRAIN STIMULATION GENERATOR / BATTERY Right 1/3/2017    Procedure: REPLACE DEEP BRAIN STIMULATION GENERATOR / BATTERY;  Surgeon: Anupam Fair MD;  Location: [facility] OR     Right Gpi DBS Lead Implantation  5/20/2014     Rt Chest DBS Generator Placement Activa SC  5/29/2014       Current Outpatient Medications   Medication Sig Dispense Refill     carbidopa-levodopa (SINEMET)  MG per tablet 2 TABLETS by mouth @ 3AM, 6AM, 9AM, noon, 3PM, 6PM and 12 midnight = 14/day 1260 tablet 3     fluticasone (FLONASE) 50 MCG/ACT spray 1-2 sprays in both nostril daily @ 7am 1 Bottle 11     gabapentin (NEURONTIN) 300 MG capsule 2 CAPSULES by mouth @ 7am and 11am/noon and 3 capsules by mouth @ 5-6pm   = 7/day 630 capsule 3     polyethylene glycol (MIRALAX/GLYCOLAX) Packet 1 packet by mouth in liquid daily @ 6-7pm 7 packet 11     QUEtiapine (SEROQUEL) 25 MG tablet Take 1 tablet (25 mg) by mouth At Bedtime 30 tablet 11     aspirin 81 MG tablet Take 1 tablet (81 mg) by mouth daily 30 tablet 0     methimazole (TAPAZOLE) 5 MG tablet 5mg tab by mouth daily @ 7am 90 tablet 3          Allergies   Allergen Reactions     Hydromorphone Nausea and Nausea and Vomiting     Used post DBS surgery.  Stayed in the hospital 3 days due to severe nausea & \"retching\" .      Comtan Diarrhea     Camphor      Camphor HELENA     Hydrocodone      Other reaction(s): Headache, Nausea Only     " "Hydrocodone-Acetaminophen Hives and Nausea     Melatonin Other (See Comments)     No benefit.     Ropinirole Fatigue     Fatigue no benefit.      Seroquel [Quetiapine]      Groggy and aggravated rls  No benefit     Adhesive Tape Rash     Camphor Rash     Other reaction(s): Rash     Liquid Adhesive Rash       Family History   Problem Relation Age of Onset     Parkinsonism Father      Other - See Comments Son         lives in [place]     Skin Cancer No family hx of         no skin cancer       Social History     Social History     Marital Status:      Spouse Name: N/A     Number of Children: N/A     Years of Education: N/A     Social History Main Topics     Smoking status: Never Smoker      Smokeless tobacco: Never Used     Alcohol Use: Yes      Comment: Rare     Drug Use: No     Sexual Activity: Not Asked     Other Topics Concern     None     Social History Narrative    Son lives in [place] and he has 2 kids    Wife Mayelin           Review of Systems   Constitutional: no fevers, night sweats, or chills; no weight or appetite changes; no recent illnesses, increased diaphoresis, or fatigue  HEENT: no headaches, hearing changes, +deepening of the voice    Neck: no new neck swelling or masses  Cardiac: no chest pain, palpitations, lightheadedness, or exertional dyspnea  Resp: no dyspnea  GI: +severe constipation; no dysphagia, nausea, vomiting, diarrhea, or abdominal pain  : no difficulty urinating or dysuria  MSK: no new myalgias or arthralgias  Extremities: +frozen feet, difficulty getting up and ambulate, +fall frequently  Endo: no polydipsia or polyuria; no heat or cold intolerance intolerance  Neuro: some difficulty with walking when starting to ambulate, numbness; no paresthesias of extremities  Derm: no skin or nail changes; no hair loss or changes  Psych: +slow cognitive function; no depressed mood or increased irritibility    Objective:   /70   Pulse 75   Ht 1.791 m (5' 10.5\")   Wt 83.5 kg " (184 lb)   BMI 26.03 kg/m       Physical Exam  General: pt appears mildly slowed but well; no acute distress  HEENT: hair appears normal; mucous membranes moist; speech is clear; normocephalic   Neck:no thyroid enlargement, no tenderness to palpation  Cardiac: normal S1/S2 and rhythm; no S3/S4, murmurs, clicks, or rubs  Resp: appropriate effort with air entry to lung bases; no increased work of breathing; breath sounds are symmetric; no crackles, wheezes, or rhonchi - lungs clear to auscultation bilaterally   Extremities: no edema or finger clubbing   Derm: no rash, skin lesions, or dryness on face, neck, hands or lower legs  Neuro: symmetric +2/4 biceps reflexes, +1 symmetric patellar reflexes; +minimal facial expression; short narrow shuffling gait   Psych: pt is appropriately conversational with restricted affect    Lab      Assessment and Plan:      Norberto Wu is a 73 year old year old man with a PMHx of Parkinson's disease s/p DBS who presents for follow-up of subclinical hyperthyroidism.     #Subclinical Hyperthyroidism: diagnosed in 2015 with TSH suppression with normal free T4 with negative anti-TPO and TSI. Previous thyroid uptake and scan showed warm nodule on left thyroid.   - pt has been consistent with methimazole 5 mg daily and TSH was normalized   - recent lab is normal  - no symptoms of overt hyperthyroidism   Plan:  Continue with methimazole 5 mg and repeat lab in 6-8 months.    #Parkinson's Disease s/p deep brain stimulation  - appears to suffer from constipation, difficulty ambulation and deepening voice  - Continue to follow-up with neurologist    #fall risk: will check screening DXA  Recommend to get calcium and vitamin D from diary product    RTC 6-8 months     Brandin Mccarty MD    Division of Diabetes and Endocrinology  Department of Medicine  [phone]

## 2018-12-14 NOTE — PATIENT INSTRUCTIONS
- please get DXA scan  - increase calcium from dairy   - please return back in 6-8 months    CALCIUM Recommendation 1200 mg/day in divided dose of no more than 500 mg/dose. This includes what is in your food and also what is in any supplements you are taking.      Dietary sources of calcium: These also contain vitamin D  Milk / buttermilk              8 oz            300 mg  Dry milk powder       5 Tbsp             300 mg  Yogurt                          1 cup           400 mg  Ice cream                    1/2 cup          100 mg  Hard cheese                     1.5 oz          300 mg  Cottage cheese                1/2 cup        65 mg  Spinach, cooked          1 cup              240 mg  Tofu, soft regular           4 oz          120 to 390 mg  Sardines                       3 oz               370 mg  Mackerel canned         3 oz                250 mg  Canned salmon with bones 3 oz        170-210 mg  Calcium fortified cereals are available  SILK soy and almond milk products are calcium fortified  Orange juice  Fortified with Calcium       8 oz            300 mg  Low fat dairy sources are recommended      Recommended exercise goals:    30 minutes of moderate exercise on most days of the week .  Weight bearing exercise (ie, walking, jogging, hiking, dancing)    Strength training 2 or more times/week in addition to other weight -being exercise.  Strength training -- uses weight or resistance beyond that seen in everyday activities -(pilates, weight training with free weights, weight machines or resistance bands)    If you have any questions, please do not hesitate to call [phone] and ask for Endocrinology clinic.      After clinic hours, please contact [phone] and ask for Endocrinologist-on call    Sincerely,    Brandin Mccarty MD  Endocrinology

## 2018-12-18 ENCOUNTER — TELEPHONE (OUTPATIENT)
Dept: NEUROLOGY | Facility: CLINIC | Age: 73
End: 2018-12-18

## 2018-12-18 DIAGNOSIS — G20.A1 PARKINSON DISEASE (H): ICD-10-CM

## 2018-12-18 RX ORDER — QUETIAPINE FUMARATE 25 MG/1
TABLET, FILM COATED ORAL
Qty: 180 TABLET | Refills: 11 | Status: SHIPPED | OUTPATIENT
Start: 2018-12-18 | End: 2019-01-29

## 2018-12-18 NOTE — TELEPHONE ENCOUNTER
QUEtiapine (SEROQUEL) 25 MG tablet 180 tablet 11 2018  No   Si x 25mg tab by mouth nightly         Called Norberto back and let him know that it would be okay to take two tablets of the Quetiapine at night.

## 2018-12-18 NOTE — TELEPHONE ENCOUNTER
QUEtiapine (SEROQUEL) 25 MG tablet 30 tablet 11 10/29/2018  --   Sig - Route: Take 1 tablet (25 mg) by mouth At Bedtime - Oral   Sent to pharmacy as: QUEtiapine (SEROQUEL) 25 MG tablet     Called Norberto back regarding an increase in the above medication and medication refill. I let him know that he does have refills available from the last time it was filled and that his pharmacy should be able to fill it for him.    Symptoms: Still having vivid dreaming but not as bad since starting the above medication, still having some fogginess in the late morning. He is wondering if taking two tablets a day would be more beneficial to help the vivid dreams.    Will discuss with Dr. Boo.

## 2018-12-18 NOTE — TELEPHONE ENCOUNTER
Health Call Center    Phone Message    May a detailed message be left on voicemail: yes    Reason for Call: Medication Question or concern regarding medication   Prescription Clarification  Name of Medication: QUEtiapine (SEROQUEL) 25 MG tablet  Prescribing Provider: Dr. Boo   Pharmacy: [facility] # [id number] [address] [place]   What on the order needs clarification? Pt needs a refill of this medication and also was wondering if he can increase to 2 tablets a day. He would like a 90 day supple. Please call him back.      Action Taken: Message routed to:  [facility] ([facility] Neurology

## 2018-12-31 ENCOUNTER — APPOINTMENT (OUTPATIENT)
Dept: CT IMAGING | Facility: CLINIC | Age: 73
End: 2018-12-31
Attending: EMERGENCY MEDICINE
Payer: MEDICARE

## 2018-12-31 ENCOUNTER — APPOINTMENT (OUTPATIENT)
Dept: GENERAL RADIOLOGY | Facility: CLINIC | Age: 73
End: 2018-12-31
Attending: EMERGENCY MEDICINE
Payer: MEDICARE

## 2018-12-31 ENCOUNTER — HOSPITAL ENCOUNTER (EMERGENCY)
Facility: CLINIC | Age: 73
Discharge: HOME | End: 2018-12-31
Attending: EMERGENCY MEDICINE | Admitting: EMERGENCY MEDICINE
Payer: MEDICARE

## 2018-12-31 VITALS
TEMPERATURE: 98.1 F | OXYGEN SATURATION: 98 % | RESPIRATION RATE: 16 BRPM | DIASTOLIC BLOOD PRESSURE: 105 MMHG | SYSTOLIC BLOOD PRESSURE: 161 MMHG | HEART RATE: 79 BPM

## 2018-12-31 DIAGNOSIS — G20.A1 PARKINSON DISEASE (H): Primary | ICD-10-CM

## 2018-12-31 DIAGNOSIS — F80.0 ARTICULATION DISORDER: ICD-10-CM

## 2018-12-31 DIAGNOSIS — G20.A1 PARKINSON DISEASE (H): ICD-10-CM

## 2018-12-31 LAB
ALBUMIN SERPL-MCNC: 3.6 G/DL (ref 3.4–5)
ALBUMIN UR-MCNC: NEGATIVE MG/DL
ALP SERPL-CCNC: 122 U/L (ref 40–150)
ALT SERPL W P-5'-P-CCNC: 8 U/L (ref 0–70)
ANION GAP SERPL CALCULATED.3IONS-SCNC: 8 MMOL/L (ref 3–14)
APPEARANCE UR: CLEAR
AST SERPL W P-5'-P-CCNC: 7 U/L (ref 0–45)
BASOPHILS # BLD AUTO: 0 10E9/L (ref 0–0.2)
BASOPHILS NFR BLD AUTO: 0.3 %
BILIRUB SERPL-MCNC: 0.9 MG/DL (ref 0.2–1.3)
BILIRUB UR QL STRIP: NEGATIVE
BUN SERPL-MCNC: 19 MG/DL (ref 7–30)
CALCIUM SERPL-MCNC: 8.2 MG/DL (ref 8.5–10.1)
CHLORIDE SERPL-SCNC: 107 MMOL/L (ref 94–109)
CO2 SERPL-SCNC: 25 MMOL/L (ref 20–32)
COLOR UR AUTO: YELLOW
CREAT SERPL-MCNC: 0.56 MG/DL (ref 0.66–1.25)
DIFFERENTIAL METHOD BLD: NORMAL
EOSINOPHIL # BLD AUTO: 0.1 10E9/L (ref 0–0.7)
EOSINOPHIL NFR BLD AUTO: 1.2 %
ERYTHROCYTE [DISTWIDTH] IN BLOOD BY AUTOMATED COUNT: 14.5 % (ref 10–15)
GFR SERPL CREATININE-BSD FRML MDRD: >90 ML/MIN/{1.73_M2}
GLUCOSE SERPL-MCNC: 116 MG/DL (ref 70–99)
GLUCOSE UR STRIP-MCNC: NEGATIVE MG/DL
HCT VFR BLD AUTO: 42.7 % (ref 40–53)
HGB BLD-MCNC: 13.7 G/DL (ref 13.3–17.7)
HGB UR QL STRIP: NEGATIVE
IMM GRANULOCYTES # BLD: 0 10E9/L (ref 0–0.4)
IMM GRANULOCYTES NFR BLD: 0.3 %
INTERPRETATION ECG - MUSE: NORMAL
KETONES UR STRIP-MCNC: 5 MG/DL
LEUKOCYTE ESTERASE UR QL STRIP: NEGATIVE
LIPASE SERPL-CCNC: 68 U/L (ref 73–393)
LYMPHOCYTES # BLD AUTO: 1 10E9/L (ref 0.8–5.3)
LYMPHOCYTES NFR BLD AUTO: 12.6 %
MCH RBC QN AUTO: 29.7 PG (ref 26.5–33)
MCHC RBC AUTO-ENTMCNC: 32.1 G/DL (ref 31.5–36.5)
MCV RBC AUTO: 92 FL (ref 78–100)
MONOCYTES # BLD AUTO: 0.4 10E9/L (ref 0–1.3)
MONOCYTES NFR BLD AUTO: 5.4 %
NEUTROPHILS # BLD AUTO: 6.2 10E9/L (ref 1.6–8.3)
NEUTROPHILS NFR BLD AUTO: 80.2 %
NITRATE UR QL: NEGATIVE
NRBC # BLD AUTO: 0 10*3/UL
NRBC BLD AUTO-RTO: 0 /100
PH UR STRIP: 6.5 PH (ref 5–7)
PLATELET # BLD AUTO: 215 10E9/L (ref 150–450)
POTASSIUM SERPL-SCNC: 3.9 MMOL/L (ref 3.4–5.3)
PROT SERPL-MCNC: 6.8 G/DL (ref 6.8–8.8)
RBC # BLD AUTO: 4.62 10E12/L (ref 4.4–5.9)
SODIUM SERPL-SCNC: 141 MMOL/L (ref 133–144)
SOURCE: ABNORMAL
SP GR UR STRIP: 1.01 (ref 1–1.03)
T4 FREE SERPL-MCNC: 1.03 NG/DL (ref 0.76–1.46)
TROPONIN I SERPL-MCNC: <0.015 UG/L (ref 0–0.04)
TSH SERPL DL<=0.005 MIU/L-ACNC: 0.09 MU/L (ref 0.4–4)
UROBILINOGEN UR STRIP-MCNC: 2 MG/DL (ref 0–2)
WBC # BLD AUTO: 7.8 10E9/L (ref 4–11)

## 2018-12-31 PROCEDURE — 80053 COMPREHEN METABOLIC PANEL: CPT | Performed by: EMERGENCY MEDICINE

## 2018-12-31 PROCEDURE — 25000132 ZZH RX MED GY IP 250 OP 250 PS 637: Mod: GY | Performed by: EMERGENCY MEDICINE

## 2018-12-31 PROCEDURE — 99284 EMERGENCY DEPT VISIT MOD MDM: CPT | Mod: 25 | Performed by: EMERGENCY MEDICINE

## 2018-12-31 PROCEDURE — 84439 ASSAY OF FREE THYROXINE: CPT | Performed by: EMERGENCY MEDICINE

## 2018-12-31 PROCEDURE — A9270 NON-COVERED ITEM OR SERVICE: HCPCS | Mod: GY | Performed by: EMERGENCY MEDICINE

## 2018-12-31 PROCEDURE — 93005 ELECTROCARDIOGRAM TRACING: CPT

## 2018-12-31 PROCEDURE — 84484 ASSAY OF TROPONIN QUANT: CPT | Performed by: EMERGENCY MEDICINE

## 2018-12-31 PROCEDURE — 70450 CT HEAD/BRAIN W/O DYE: CPT

## 2018-12-31 PROCEDURE — 85025 COMPLETE CBC W/AUTO DIFF WBC: CPT | Performed by: EMERGENCY MEDICINE

## 2018-12-31 PROCEDURE — 71045 X-RAY EXAM CHEST 1 VIEW: CPT

## 2018-12-31 PROCEDURE — 83690 ASSAY OF LIPASE: CPT | Performed by: EMERGENCY MEDICINE

## 2018-12-31 PROCEDURE — 84443 ASSAY THYROID STIM HORMONE: CPT | Performed by: EMERGENCY MEDICINE

## 2018-12-31 PROCEDURE — 99285 EMERGENCY DEPT VISIT HI MDM: CPT | Mod: 25

## 2018-12-31 PROCEDURE — 81003 URINALYSIS AUTO W/O SCOPE: CPT | Performed by: EMERGENCY MEDICINE

## 2018-12-31 PROCEDURE — 93010 ELECTROCARDIOGRAM REPORT: CPT | Mod: Z6 | Performed by: EMERGENCY MEDICINE

## 2018-12-31 PROCEDURE — 25000131 ZZH RX MED GY IP 250 OP 636 PS 637: Mod: GY | Performed by: STUDENT IN AN ORGANIZED HEALTH CARE EDUCATION/TRAINING PROGRAM

## 2018-12-31 RX ORDER — ONDANSETRON 4 MG/1
4 TABLET, ORALLY DISINTEGRATING ORAL ONCE
Status: COMPLETED | OUTPATIENT
Start: 2018-12-31 | End: 2018-12-31

## 2018-12-31 RX ORDER — GABAPENTIN 300 MG/1
600 CAPSULE ORAL ONCE
Status: COMPLETED | OUTPATIENT
Start: 2018-12-31 | End: 2018-12-31

## 2018-12-31 RX ORDER — CARBIDOPA AND LEVODOPA 25; 100 MG/1; MG/1
2 TABLET ORAL ONCE
Status: COMPLETED | OUTPATIENT
Start: 2018-12-31 | End: 2018-12-31

## 2018-12-31 RX ADMIN — CARBIDOPA AND LEVODOPA 2 TABLET: 25; 100 TABLET ORAL at 11:11

## 2018-12-31 RX ADMIN — ONDANSETRON 4 MG: 4 TABLET, ORALLY DISINTEGRATING ORAL at 11:11

## 2018-12-31 RX ADMIN — GABAPENTIN 600 MG: 300 CAPSULE ORAL at 11:11

## 2018-12-31 ASSESSMENT — ENCOUNTER SYMPTOMS
NAUSEA: 1
TREMORS: 1
DIZZINESS: 1

## 2018-12-31 NOTE — ED TRIAGE NOTES
Pt comes in with multiple complaints: hypertension, dizziness, nausea, increased falls, brain fog, and leg pain. Pt says symptoms began this morning. Recent increase of seroquel dose. Alert and oriented, bp elevated, other vss.

## 2018-12-31 NOTE — CONSULTS
"General acute hospital  Neurology Consultation  Patient Name: Norberto Wu  MRN: [id number]  : 1945  PCP: Norberto Howe  Attending provider: Conchita Mercado  Admission Date: 2018  Date of Service: 2018    The Neurology consultation service was asked to see Norberto Wu to evaluate nausea, leg sensory disturbances, and headache.    Assessment:  Mr. Wu is a 73M seen for nausea and sensation of RLE \"heaviness.\" It's unclear what the etiology is for this sensory change; possibly could be mild off-states. He's having freezing and resulting gait instability with stumbles and small falls without self-injury. This likely is the expected disease progression. We discussed that he really needs to use his walker much more frequently and he endorsed understanding and will start doing this; his daughter-in-law was present and supportive of this. Will not make med changes in ED as he has an appt coming up with Dr. Boo. Discussed with Mr. Wu the possibility of trying Rytari for longer duration efficacy or possibly Inbrija inhalation for rescue in off-states.    Recommendations:  - continue Sinemet  2 tabs 7x daily  - orthostatic vitals  - encouraged use of walker  - PT twice weekly  - f/u w/ Dr. Boo at next appt    Patient was seen and discussed with Dr. Ding.    John Kruger MD  Neurology PGY4  [facility]  General Neurology Consult pager: [phone]    HPI:  73 year old male w/ PMH of PD s/p R DBS placement  who presents with sensation of RLE heaviness and nausea. Since Saturday he's felt an increased heaviness in his legs associated with freezing episodes. This causes him to typically stumble and he catches himself, has not fallen or injured himself. He also notes some increased abdominal discomfort like nausea but without emesis. There is no temporal relation of this to taking his medications. He has not had any recent " medication dose changes. He does not use his walker regularly in the house despite needing to. He is engaging with physical therapy twice weekly.    ROS: negative except as per HPI.    PMH:  Past Medical History:   Diagnosis Date     Cerebellar stroke syndrome 8/21/2013    Few small foci of chronic infarct in the right cerebellar hemisphere, unchanged as well as a new focus of now chronic infarct in the left cerebellar hemisphere since the last study.      Dyslipidemia      Hyperthyroidism      Insomnia 6/17/2011     Parkinson disease (H) 6/15/2011     Peripheral neuropathy 6/15/2011     PFO (patent foramen ovale) 8/21/2013    Interpretation Summary 1. Normal LV size and systolic function. Normal diastolic function 2.  Normal RV size and function 3. No significant valvular abnormalities 4.  Small right-to-left shunt visualized with intravenous agitated saline  contrast study, suggestive of PFO. PatientHeight: 72 in PatientWeight: 205 lbs SystolicPressure: 100 mmHg DiastolicPressure: 63 mmHg BSA 2.2 m^2   Procedure Echoc     PONV (postoperative nausea and vomiting)      Prostate cancer (H) 6/17/2011    s/p radical prostectomy       PSH:  Past Surgical History:   Procedure Laterality Date     COLONOSCOPY N/A 3/23/2017    Procedure: COLONOSCOPY;  Surgeon: Salbador Paulson MD;  Location: [facility]     PROSTATE SURGERY  3 yrs ago    prostate cancer; Dr. Valladares     REPLACE DEEP BRAIN STIMULATION GENERATOR / BATTERY Right 1/3/2017    Procedure: REPLACE DEEP BRAIN STIMULATION GENERATOR / BATTERY;  Surgeon: Anupam Fair MD;  Location: [facility] OR     Right Gpi DBS Lead Implantation  5/20/2014     Rt Chest DBS Generator Placement Activa SC  5/29/2014       Medications:  No current facility-administered medications for this encounter.      Current Outpatient Medications   Medication     aspirin 81 MG tablet     carbidopa-levodopa (SINEMET)  MG per tablet     gabapentin (NEURONTIN) 300 MG capsule     methimazole  "(TAPAZOLE) 5 MG tablet     QUEtiapine (SEROQUEL) 25 MG tablet     fluticasone (FLONASE) 50 MCG/ACT spray     polyethylene glycol (MIRALAX/GLYCOLAX) Packet       Allergies:  Allergies   Allergen Reactions     Hydromorphone Nausea and Nausea and Vomiting     Used post DBS surgery.  Stayed in the hospital 3 days due to severe nausea & \"retching\" .      Comtan Diarrhea     Camphor      Camphor HELENA     Hydrocodone      Other reaction(s): Headache, Nausea Only     Hydrocodone-Acetaminophen Hives and Nausea     Melatonin Other (See Comments)     No benefit.     Ropinirole Fatigue     Fatigue no benefit.      Seroquel [Quetiapine]      Groggy and aggravated rls  No benefit     Adhesive Tape Rash     Camphor Rash     Other reaction(s): Rash     Liquid Adhesive Rash       Social History:  Social History     Socioeconomic History     Marital status:      Spouse name: Not on file     Number of children: Not on file     Years of education: Not on file     Highest education level: Not on file   Social Needs     Financial resource strain: Not on file     Food insecurity - worry: Not on file     Food insecurity - inability: Not on file     Transportation needs - medical: Not on file     Transportation needs - non-medical: Not on file   Occupational History     Not on file   Tobacco Use     Smoking status: Never Smoker     Smokeless tobacco: Never Used   Substance and Sexual Activity     Alcohol use: Yes     Alcohol/week: 0.6 oz     Comment: EVERY OTHER DAY     Drug use: No     Sexual activity: No   Other Topics Concern     Parent/sibling w/ CABG, MI or angioplasty before 65F 55M? No   Social History Narrative    Son lives in [place] and he has 2 kids    Wife Mayelin       Family History:  Family History   Problem Relation Age of Onset     Parkinsonism Father      Other - See Comments Son         lives in [place]     Skin Cancer No family hx of         no skin cancer       Physical Examination:  Vitals: Temp: 98.1  F (36.7 "  C) Temp src: Oral BP: (!) 173/129 Pulse: 79 Heart Rate: 88 Resp: 16 SpO2: 99 % O2 Device: None (Room air)    General: adult, NAD, cooperative  Neuro:   Mental status: alert; language is fluent with intact comprehension and naming   Cranial nerves: PERRL, EOMI with intact smooth pursuit, full visual fields, no facial asymmetry or ptosis, hearing intact to conversation, tongue and uvula are midline, moderately hypophonic, no dysarthria   Motor: no tremors; generally bradykinetic; tone within normal limits in all limbs   RIGHT LEFT    5 5   Biceps 5 5   Triceps 5 5   Deltoid 5 5   Hip flexion 5 5   Knee extension 5 5   Knee flexion 5 5   Dorsiflexion 5 5   Plantar flexion 5 5    Reflexes: absent Babinski.   RIGHT LEFT   Brachioradialis 2+ 2+   Biceps 2+ 2+   Patellar 2+ 2+   Achilles 2+ 2+    Sensory: Intact to light touch and pin prick.   Coordination: No dysmetria or ataxia.   Gait: festinating gait, stumbles and freezes when turning.    Image Studies:  Recent Results (from the past 24 hour(s))   XR Chest Port 1 View    Narrative    EXAM: XR CHEST PORT 1 VW  12/31/2018 7:47 AM      HISTORY: dizzy    COMPARISON: None    FINDINGS: Single AP view of the chest. Presumed deep brain stimulator  device generator projects over the right chest. Clear trachea. Normal  cardiac silhouette minimal streaky left basilar opacities. No  pneumothorax.       Impression    IMPRESSION: Minimal streaky left basilar opacities, favor atelectasis.  Differentials include infection/aspiration.    I have personally reviewed the examination and initial interpretation  and I agree with the findings.    TONYA THRASHER MD   CT Head w/o Contrast    Narrative    CT HEAD W/O CONTRAST 12/31/2018 7:52 AM    Provided History: Altered level of consciousness (LOC), unexplained    Comparison: CT head 6/20/2014.    Technique: Using multidetector thin collimation helical acquisition  technique, axial, coronal and sagittal CT images from the skull  base  to the vertex were obtained without intravenous contrast.     Findings:    Redemonstration of right frontal approach deep brain stimulator with  the tip of the electrode terminating in the inferior right basal  ganglia, unchanged from prior. No intracranial hemorrhage, mass  effect, or midline shift. Mild to moderate generalized parenchymal  volume loss. The ventricles are proportionate to the cerebral sulci.  The gray to white matter differentiation of the cerebral hemispheres  is preserved. The basal cisterns are patent.    Small mucous retention cyst in the right maxillary sinus. Mid right  ethmoid 6 mm osteoma. The mastoid air cells are clear.       Impression    Impression:   1. No acute intracranial pathology.  2. Stable position of right frontal approach deep brain stimulator.    I have personally reviewed the examination and initial interpretation  and I agree with the findings.    TETO ROBERTS MD     Laboratory Studies:   12/31/2018 07:57   Sodium 141   Potassium 3.9   Chloride 107   Carbon Dioxide 25   Urea Nitrogen 19   Creatinine 0.56 (L)   GFR Estimate >90   GFR Estimate If Black >90   Calcium 8.2 (L)   Anion Gap 8   Albumin 3.6   Protein Total 6.8   Bilirubin Total 0.9   Alkaline Phosphatase 122   ALT 8   AST 7   Lipase 68 (L)   T4 Free 1.03   Troponin I ES <0.015   TSH 0.09 (L)   Glucose 116 (H)   WBC 7.8   Hemoglobin 13.7   Hematocrit 42.7   Platelet Count 215   RBC Count 4.62   MCV 92   MCH 29.7   MCHC 32.1   RDW 14.5   Diff Method Automated Method   % Neutrophils 80.2   % Lymphocytes 12.6   % Monocytes 5.4   % Eosinophils 1.2   % Basophils 0.3   % Immature Granulocytes 0.3   Nucleated RBCs 0   Absolute Neutrophil 6.2   Absolute Lymphocytes 1.0   Absolute Monocytes 0.4   Absolute Eosinophils 0.1   Absolute Basophils 0.0   Abs Immature Granulocytes 0.0   Absolute Nucleated RBC 0.0      12/31/2018 09:26   Color Urine Yellow   Appearance Urine Clear   Glucose Urine Negative   Bilirubin Urine  Negative   Ketones Urine 5 (A)   Specific Gravity Urine 1.014   pH Urine 6.5   Protein Albumin Urine Negative   Urobilinogen mg/dL 2.0   Nitrite Urine Negative   Blood Urine Negative   Leukocyte Esterase Urine Negative   Source Clean catch urine     This note was written with the assistance of the Dragon voice-dictation technology software. The final document, although reviewed, may contain errors. For corrections, please contact the office.    Attending physician: Dr. Mercado  of Emergency Medicine requested neurologic consultation for Parkinson's disease and nausea for opinion regarding managemnet.    I saw and evaluated the patient on 12/31/2018 with the resident team and I agree with the findings and plan of care as per. Dr. Kruger above, with the following additions.     The patient is a 73 year old male with PD s/p DBS who presents for slowly worsening gait problems and episode of nausea this AM that resolved.   He is having some trouble with gait freezing as well.  On exam he has slow, festinating, shuffling gait with some trouble with turning but no strength, sensation, reflex, CN deficits.  I suspect his symptoms are related to disease progression.  Gait freezing does not respond as well as some other PD features to sinemet.  Educated to always use walker and PT referral arranged already.  No changes in medicines at this time as he does not appear particularly rigid but is a little bradykinetic.  We will inform outpatient neurologist in case sooner follow up or medicine changes recommended.  Possibly quetiapine is contributing, but given he had quality of life affecting hallucinations at lower doses do not recommend dose adjustment at this time.       Judson Ding DO   of Neurology

## 2018-12-31 NOTE — ED PROVIDER NOTES
"      [facility] ([facility])  December 31, 2018    History     Chief Complaint   Patient presents with     Fall     Dizziness     HPI  Norberto Wu is a 73 year old male with history notable for Parkinson disease (s/p deep brain stimulation generator placed by ), cerebellar stroke syndrome, prostate cancer (s/p prostatectomy), and restless leg syndrome who presents the ED with his son and daughter-in-law for new dizziness, nausea, and increased frequency of falls. Patient states since he last saw Dr. Boo back on 10/23/18, he has been having more difficulty ambulating on his own for the past few days and is using a walker currently. He also had an increased dosage of Seroquel (double) on 10/28 by Dr. Boo, as he was waking up in the middle of the night with delusions of walking nonexistent dogs, saving people from plane crashes, etc. However, he has noted since the Seroquel dosage increase he is getting dull aches in his left leg again which hasn't been a problem since he has been on carbidopa levodopa.  His wife also unfortunately passed away on 10/31 in Hospice from metastatic breast cancer. Patient states for the past 3 days, he has had increasing \"brain fog\", increased frequency in dizziness causing his increased frequency of falls, and nausea. He denies any recent adjustments to his deep brain stimulator and states he has it in place to control his tremors.       PAST MEDICAL HISTORY  Past Medical History:   Diagnosis Date     Cerebellar stroke syndrome 8/21/2013    Few small foci of chronic infarct in the right cerebellar hemisphere, unchanged as well as a new focus of now chronic infarct in the left cerebellar hemisphere since the last study.      Dyslipidemia      Hyperthyroidism      Insomnia 6/17/2011     Parkinson disease (H) 6/15/2011     Peripheral neuropathy 6/15/2011     PFO (patent foramen ovale) 8/21/2013    Interpretation Summary 1. Normal LV size and " systolic function. Normal diastolic function 2.  Normal RV size and function 3. No significant valvular abnormalities 4.  Small right-to-left shunt visualized with intravenous agitated saline  contrast study, suggestive of PFO. PatientHeight: 72 in PatientWeight: 205 lbs SystolicPressure: 100 mmHg DiastolicPressure: 63 mmHg BSA 2.2 m^2   Procedure Echoc     PONV (postoperative nausea and vomiting)      Prostate cancer (H) 6/17/2011    s/p radical prostectomy     PAST SURGICAL HISTORY  Past Surgical History:   Procedure Laterality Date     COLONOSCOPY N/A 3/23/2017    Procedure: COLONOSCOPY;  Surgeon: Salbador Paulson MD;  Location: [facility] GI     PROSTATE SURGERY  3 yrs ago    prostate cancer; Dr. Valladares     REPLACE DEEP BRAIN STIMULATION GENERATOR / BATTERY Right 1/3/2017    Procedure: REPLACE DEEP BRAIN STIMULATION GENERATOR / BATTERY;  Surgeon: Anupam Fair MD;  Location: [facility] OR     Right Gpi DBS Lead Implantation  5/20/2014     Rt Chest DBS Generator Placement Activa SC  5/29/2014     FAMILY HISTORY  Family History   Problem Relation Age of Onset     Parkinsonism Father      Other - See Comments Son         lives in [place]     Skin Cancer No family hx of         no skin cancer     SOCIAL HISTORY  Social History     Tobacco Use     Smoking status: Never Smoker     Smokeless tobacco: Never Used   Substance Use Topics     Alcohol use: Yes     Alcohol/week: 0.6 oz     Comment: EVERY OTHER DAY     MEDICATIONS  No current facility-administered medications for this encounter.      Current Outpatient Medications   Medication     aspirin 81 MG tablet     carbidopa-levodopa (SINEMET)  MG per tablet     gabapentin (NEURONTIN) 300 MG capsule     methimazole (TAPAZOLE) 5 MG tablet     QUEtiapine (SEROQUEL) 25 MG tablet     fluticasone (FLONASE) 50 MCG/ACT spray     polyethylene glycol (MIRALAX/GLYCOLAX) Packet     ALLERGIES  Allergies   Allergen Reactions     Hydromorphone Nausea and Nausea and Vomiting  "    Used post DBS surgery.  Stayed in the hospital 3 days due to severe nausea & \"retching\" .      Comtan Diarrhea     Camphor      Camphor HELENA     Hydrocodone      Other reaction(s): Headache, Nausea Only     Hydrocodone-Acetaminophen Hives and Nausea     Melatonin Other (See Comments)     No benefit.     Ropinirole Fatigue     Fatigue no benefit.      Seroquel [Quetiapine]      Groggy and aggravated rls  No benefit     Adhesive Tape Rash     Camphor Rash     Other reaction(s): Rash     Liquid Adhesive Rash       I have reviewed the Medications, Allergies, Past Medical and Surgical History, and Social History in the Epic system.    Review of Systems   Gastrointestinal: Positive for nausea.   Musculoskeletal:        Positive for dull achy pain in his left lower extremity.   Neurological: Positive for dizziness and tremors (baseline).   All other systems reviewed and are negative.      Physical Exam   BP: (!) 163/93  Pulse: 79  Heart Rate: 88  Temp: 98.1  F (36.7  C)  Resp: 16  SpO2: 98 %  Lying Orthostatic BP: 151/89  Lying Orthostatic Pulse: 79 bpm  Sitting Orthostatic BP: 163/95  Sitting Orthostatic Pulse: 78 bpm  Standing Orthostatic BP: 164/101  Standing Orthostatic Pulse: 84 bpm      Physical Exam   Constitutional: He is oriented to person, place, and time. He appears well-developed and well-nourished. No distress.   HENT:   Head: Atraumatic.   Mouth/Throat: Oropharynx is clear and moist.   Eyes: Pupils are equal, round, and reactive to light. No scleral icterus.   Cardiovascular: Normal rate, regular rhythm, normal heart sounds and intact distal pulses.   Pulmonary/Chest: Breath sounds normal. No respiratory distress.   Abdominal: Soft. Bowel sounds are normal. There is no tenderness.   Musculoskeletal: He exhibits no edema or tenderness.   Neurological: He is alert and oriented to person, place, and time.   Skin: Skin is warm. No rash noted. He is not diaphoretic.   Nursing note and vitals reviewed.      ED " Course        Procedures             Critical Care time:  none             Labs Ordered and Resulted from Time of ED Arrival Up to the Time of Departure from the ED   CBC WITH PLATELETS DIFFERENTIAL   COMPREHENSIVE METABOLIC PANEL   LIPASE   TROPONIN I   TSH WITH FREE T4 REFLEX   UA MACROSCOPIC WITH REFLEX TO MICRO AND CULTURE   PERIPHERAL IV CATHETER   CARDIAC CONTINUOUS MONITORING   ORTHOSTATIC BLOOD PRESSURE AND PULSE            Assessments & Plan (with Medical Decision Making)      73 year old male with history notable for Parkinson disease (s/p deep brain stimulation generator placed by ), cerebellar stroke syndrome, prostate cancer (s/p prostatectomy), and restless leg syndrome who presents the ED with his son and daughter-in-law for new dizziness, nausea, and increased frequency of falls.  IV established, labs drawn sent reviewed and documented in epic and remarkable for TSH low at 0.09, but otherwise essentially normal CBC and electrolytes.  X-ray of the chest was obtained which revealed no acute process.  CT of the head obtained which also revealed no acute process.  Neurology was consulted and evaluated patient in the emergency department recommended outpatient treatment with follow-up in Dr[facility].     I have reviewed the nursing notes.    I have reviewed the findings, diagnosis, plan and need for follow up with the patient.       Medication List      There are no discharge medications for this visit.         Final diagnoses:   Parkinson disease (H)     Lenin GARCIA, am serving as a trained medical scribe to document services personally performed by Conchita Mercado MD, based on the provider's statements to me.      Conchita GARCIA MD, was physically present and have reviewed and verified the accuracy of this note documented by Lenin Lemons.       12/31/2018   [facility], EMERGENCY DEPARTMENT     Conchita Mercado MD  01/08/19 1471

## 2018-12-31 NOTE — ED AVS SNAPSHOT
[facility], [place], Emergency Department  [address] [place] [id number]  Phone:  [phone]                                    Norberto Wu   MRN: [id number]    Department:  [facility], Emergency Department   Date of Visit:  12/31/2018           After Visit Summary Signature Page    I have received my discharge instructions, and my questions have been answered. I have discussed any challenges I see with this plan with the nurse or doctor.    ..........................................................................................................................................  Patient/Patient Representative Signature      ..........................................................................................................................................  Patient Representative Print Name and Relationship to Patient    ..................................................               ................................................  Date                                   Time    ..........................................................................................................................................  Reviewed by Signature/Title    ...................................................              ..............................................  Date                                               Time          22EPIC Rev 08/18

## 2019-01-04 ENCOUNTER — TELEPHONE (OUTPATIENT)
Dept: INTERNAL MEDICINE | Facility: CLINIC | Age: 74
End: 2019-01-04

## 2019-01-04 NOTE — TELEPHONE ENCOUNTER
Verbal orders given to Rissa from [facility], per Dr. Howe, for Order(s): Home Care Orders: Occupational Therapy (OT): Rissa is requesting, BOTH, OT & PT, needing to visit Pt on Mon 1/7 or Tues 1/8 for evaluation and treatment (PT & OT). Jeimy Mendoza LPN 1/4/2019 2:04 PM

## 2019-01-04 NOTE — TELEPHONE ENCOUNTER
Health Call Center    Phone Message    May a detailed message be left on voicemail: yes    Reason for Call: Order(s): Home Care Orders: Occupational Therapy (OT): Rissa is requesting, BOTH, OT & PT, needing to visit Pt on Mon 1/7 or Tues 1/8 for evaluation and treatment (PT & OT).  Please call Rissa at [phone] with the verbal order to see pt next monday or tuesdayj.  Thank you.    Action Taken: Message routed to:  [facility] ([facility]):  Primary Care Clinic

## 2019-01-08 ENCOUNTER — DOCUMENTATION ONLY (OUTPATIENT)
Dept: CARE COORDINATION | Facility: CLINIC | Age: 74
End: 2019-01-08

## 2019-01-08 ENCOUNTER — MEDICAL CORRESPONDENCE (OUTPATIENT)
Dept: HEALTH INFORMATION MANAGEMENT | Facility: CLINIC | Age: 74
End: 2019-01-08

## 2019-01-08 NOTE — PROGRESS NOTES
[facility] now requests orders and shares plan of care/discharge summaries for some patients through [employer].  Please REPLY TO THIS MESSAGE OR ROUTE BACK TO THE AUTHOR in order to give authorization for orders when needed.  This is considered a verbal order, you will still receive a faxed copy of orders for signature.  Thank you for your assistance in improving collaboration for our patients.    ORDER- cont home PT 1x a wk x 4 wks for gait training, there ex for stretching and functional strengthening.    Speech therapy evaluation for articular disorder.

## 2019-01-25 NOTE — PROGRESS NOTES
Diagnosis/Summary/Recommendations:    PATIENT: Norberto Wu  73 year old male     : 1945    GAMA: 2019    Parkinson  dbs    Freezing in the evening.       Still in [place].   It is assisted living. He sets up his pills.     He continues to have hallucinations at night.   They are formed.   Fine structure  Sometimes sees people.   One time he thought that there were 3 people in his room - children and was scared.   Was babysitting his daughter the next day.       Medications     3am 6am 9am 12p 3p 6p 9p midnight   Aspirin 81mg           Carbidopa/levodopa sinemet 25/100 2 2 2 2 2 2 2 2   Fluticasone flonase 50mcg/act spray           Gabapentin neurontin 300mg  2  2  2     Lidocaine lmx4 4% external cream      Apply as needed     Methimazole tapazole 5mg  stopped         Polyethylene glycol miralax      1     Quetiapine seroquel 25mg       2                              History obtained from patient     PLAN  1. Review of his sinemet. Hold off changing this now - discussion about rytary and stalevo 200  2. Discussion of quetiapine dose increase from 2 x 25mg tab at bedtime for hallucinations.   3. Discussion about the use of nuplazid (pimavanserin) - would need prior authorization for hallucinations   4. Discussion about cognitive enhancing medications. His 446 is his most recent QTc  A. Rivastigmine patch (exelon) 4.6 mg patch daily x 1month then 9.5mg patch daily x 1 month then 13.3mg patch daily  B. Donepezil (aricept) 5mg tab/capsule by mouth for one month then 10mg  C. Namenda (memantine) dose increase weekly starting with 5mg/day and then increasing over 4 weeks to 10mg twice daily    Return to see Lucy in 1-3 months - may need need battery soon as voltage 2.75  Pharmacy consultation today if possible with Rachel Mena, [place] RENE.     Coding statement:   Duration of  Services: patient care and care coordination was 25 minutes  Greater than 50% of this visit was spent in counseling and  coordination of care.     Blaine Boo MD     ______________________________________    Last visit date and details:      GAMA: October 23, 2018     Parkinson  dbs     Wife Mayelin is on hospice for metastatic breast cancer  Lottie Wu daughter in law and Son Caleb Wu.   Mayelin is in [facility] for [facility]  This is not a permanent address.      Wondering about taking aspirin      Generally his medications are working well     Will be seeing Dr. Howe today and will review his blood pressure and discuss his other medications such as the atorvastatin which he reportedly has stopped and to review his thyroid medication.      He is having some falls and associated freezing that is causing the falls.      2.83 voltage                 Medications     3am 6-7am 11am-12noon 3pm 6pm 9pm midnight   Aspirin 81mg   1             Atorvastatin lipitor 10mg   stopped             Bisacodyl dulcolax 10mg   stopped             Carbidopa/levodopa sinemet 25/100 2 2 2 2 2 2 2   Fluticasone flonase 50 mcg/act nasal spray   1 spray             Gabapentin/neurontin 300mg   2 2   3       Methimazole tapazole 5mg    1             Polyethylene glycol miralax         Daily @ 6-7pm                                                                                                                                                             History obtained from patient      PLAN  Given the stress ongoing with his wife being in hospice this has impacted his health overall.   He has on related freezing despite medications and his dbs system working well.   He has had cognitive decline in the past few weeks.      He is temporarily living with his wife in her facility and he will need a new living situation where he gets additional care     He is on the waiting list of [facility] on [address]  - assisted living, enhanced care and memory unit.   [email or web address]     We discussed parkinson  speciality care facility in [place]     [email or web address]     Senior linkage line.      He could benefit from  consultation with JAKE Blood.     He could benefit from physical and occupational therapy consultation and possibly a U - step walker      Additional resources provided below     [email or web address]     [email or web address]     Discussion about medications for wearing off - if needed; he has the ability to remember to take his sinemet every 3 hours or so and therefore he does not need rytary at this point     Given his cognitive decline, it will be worth discussing the use of memory enhancing medications such as donepezil (aricept) or/and rivastigmine (excelon) which may also help his gait/freezing. In particular he has on related freezing.      He should return back in 1-3 months.      May consider pharmacy consultation with ngozi Mena, Pharm D down the road to help with medication management ie the above cognitive medications and/or review the rytary option and decision about whether we should continue the gabapentin at the present dose or possibly reduce further but it is probably helping his RLS symptoms.              ______________________________________      Patient was asked about 14 Review of systems including changes in vision (dry eyes, double vision), hearing, heart, lungs, musculoskeletal, depression, anxiety, snoring, RBD, insomnia, urinary frequency, urinary urgency, constipation, swallowing problems, hematological, ID, allergies, skin problems: seborrhea, endocrinological: thyroid, diabetes, cholesterol; balance, weight changes, and other neurological problems and these were not significant at this time except for   Patient Active Problem List   Diagnosis     Parkinson disease (H)     Peripheral neuropathy     Somnolence     Family history of Parkinson's disease     Snores     REM sleep behavior disorder     Prostate cancer (H)     Wears  "glasses     Insomnia     Constipation     History of MRI of brain and brain stem     PFO (patent foramen ovale)     Cerebellar stroke syndrome     H/O echocardiogram     Restless leg syndrome     S/P brain surgery     Hamstring tendonitis at origin     Parkinson's disease (H)     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  Stayed in the hospital 3 days due to severe nausea & \"retching\" .      Comtan Diarrhea     Camphor      Camphor HELENA     Hydrocodone      Other reaction(s): Headache, Nausea Only     Hydrocodone-Acetaminophen Hives and Nausea     Melatonin Other (See Comments)     No benefit.     Ropinirole Fatigue     Fatigue no benefit.      Seroquel [Quetiapine]      Groggy and aggravated rls  No benefit     Adhesive Tape Rash     Camphor Rash     Other reaction(s): Rash     Liquid Adhesive Rash     Past Surgical History:   Procedure Laterality Date     COLONOSCOPY N/A 3/23/2017    Procedure: COLONOSCOPY;  Surgeon: Salbador Paulson MD;  Location: [facility]     PROSTATE SURGERY  3 yrs ago    prostate cancer; Dr. Valladares     REPLACE DEEP BRAIN STIMULATION GENERATOR / BATTERY Right 1/3/2017    Procedure: REPLACE DEEP BRAIN STIMULATION GENERATOR / BATTERY;  Surgeon: Anupam Fair MD;  Location: [facility]     Right Gpi DBS Lead Implantation  5/20/2014     Rt Chest DBS Generator Placement Activa SC  5/29/2014     Past Medical History:   Diagnosis Date     Cerebellar stroke syndrome 8/21/2013    Few small foci of chronic infarct in the right cerebellar hemisphere, unchanged as well as a new focus of now chronic infarct in the left cerebellar hemisphere since the last study.      " Dyslipidemia      Hyperthyroidism      Insomnia 6/17/2011     Parkinson disease (H) 6/15/2011     Peripheral neuropathy 6/15/2011     PFO (patent foramen ovale) 8/21/2013    Interpretation Summary 1. Normal LV size and systolic function. Normal diastolic function 2.  Normal RV size and function 3. No significant valvular abnormalities 4.  Small right-to-left shunt visualized with intravenous agitated saline  contrast study, suggestive of PFO. PatientHeight: 72 in PatientWeight: 205 lbs SystolicPressure: 100 mmHg DiastolicPressure: 63 mmHg BSA 2.2 m^2   Procedure Echoc     PONV (postoperative nausea and vomiting)      Prostate cancer (H) 6/17/2011    s/p radical prostectomy     Social History     Socioeconomic History     Marital status:      Spouse name: Not on file     Number of children: Not on file     Years of education: Not on file     Highest education level: Not on file   Social Needs     Financial resource strain: Not on file     Food insecurity - worry: Not on file     Food insecurity - inability: Not on file     Transportation needs - medical: Not on file     Transportation needs - non-medical: Not on file   Occupational History     Not on file   Tobacco Use     Smoking status: Never Smoker     Smokeless tobacco: Never Used   Substance and Sexual Activity     Alcohol use: Yes     Alcohol/week: 0.6 oz     Comment: EVERY OTHER DAY     Drug use: No     Sexual activity: No   Other Topics Concern     Parent/sibling w/ CABG, MI or angioplasty before 65F 55M? No   Social History Narrative    Son lives in [place] and he has 2 kids    Wife Mayelin       Drug and lactation database from the [place] [employer] Library of Medicine:  http://toxnet.nlm.nih.gov/cgi-bin/sis/htmlgen?LACT      B/P: Data Unavailable, T: Data Unavailable, P: Data Unavailable, R: Data Unavailable 0 lbs 0 oz  There were no vitals taken for this visit., There is no height or weight on file to calculate BMI.  Medications and Vitals not  listed above were documented in the cart and reviewed by me.     Current Outpatient Medications   Medication Sig Dispense Refill     aspirin 81 MG tablet Take 1 tablet (81 mg) by mouth daily 30 tablet 0     carbidopa-levodopa (SINEMET)  MG per tablet 2 TABLETS by mouth @ 3AM, 6AM, 9AM, noon, 3PM, 6PM and 12 midnight = 14/day 1260 tablet 3     fluticasone (FLONASE) 50 MCG/ACT spray 1-2 sprays in both nostril daily @ 7am 1 Bottle 11     gabapentin (NEURONTIN) 300 MG capsule 2 CAPSULES by mouth @ 7am and 11am/noon and 3 capsules by mouth @ 5-6pm   = 7/day 630 capsule 3     methimazole (TAPAZOLE) 5 MG tablet 5mg tab by mouth daily @ 7am 90 tablet 3     polyethylene glycol (MIRALAX/GLYCOLAX) Packet 1 packet by mouth in liquid daily @ 6-7pm 7 packet 11     QUEtiapine (SEROQUEL) 25 MG tablet 2 x 25mg tab by mouth nightly 180 tablet 11         Blaine Boo MD          ______________________________________      Patient was asked about 14 Review of systems including changes in vision (dry eyes, double vision), hearing, heart, lungs, musculoskeletal, depression, anxiety, snoring, RBD, insomnia, urinary frequency, urinary urgency, constipation, swallowing problems, hematological, ID, allergies, skin problems: seborrhea, endocrinological: thyroid, diabetes, cholesterol; balance, weight changes, and other neurological problems and these were not significant at this time except for   Patient Active Problem List   Diagnosis     Parkinson disease (H)     Peripheral neuropathy     Somnolence     Family history of Parkinson's disease     Snores     REM sleep behavior disorder     Prostate cancer (H)     Wears glasses     Insomnia     Constipation     History of MRI of brain and brain stem     PFO (patent foramen ovale)     Cerebellar stroke syndrome     H/O echocardiogram     Restless leg syndrome     S/P brain surgery     Hamstring tendonitis at origin     Parkinson's disease (H)     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  Stayed in the hospital 3 days due to severe nausea & \"retching\" .      Comtan Diarrhea     Camphor      Camphor HELENA     Hydrocodone      Other reaction(s): Headache, Nausea Only     Hydrocodone-Acetaminophen Hives and Nausea     Melatonin Other (See Comments)     No benefit.     Ropinirole Fatigue     Fatigue no benefit.      Seroquel [Quetiapine]      Groggy and aggravated rls  No benefit     Adhesive Tape Rash     Camphor Rash     Other reaction(s): Rash     Liquid Adhesive Rash     Past Surgical History:   Procedure Laterality Date     COLONOSCOPY N/A 3/23/2017    Procedure: COLONOSCOPY;  Surgeon: Salbador Paulson MD;  Location: [facility]     PROSTATE SURGERY  3 yrs ago    prostate cancer; Dr. Valladares     REPLACE DEEP BRAIN STIMULATION GENERATOR / BATTERY Right 1/3/2017    Procedure: REPLACE DEEP BRAIN STIMULATION GENERATOR / BATTERY;  Surgeon: Anupam Fair MD;  Location: [facility]     Right Gpi DBS Lead Implantation  5/20/2014     Rt Chest DBS Generator Placement Activa SC  5/29/2014     Past Medical History:   Diagnosis Date     Cerebellar stroke syndrome 8/21/2013    Few small foci of chronic infarct in the right cerebellar hemisphere, unchanged as well as a new focus of now chronic infarct in the left cerebellar hemisphere since the last study.      Dyslipidemia      Hyperthyroidism      Insomnia 6/17/2011     Parkinson disease (H) 6/15/2011     Peripheral neuropathy 6/15/2011     PFO (patent foramen ovale) 8/21/2013    Interpretation Summary 1. Normal LV size and systolic function. Normal diastolic function 2.  Normal RV size and function 3. No significant valvular " "abnormalities 4.  Small right-to-left shunt visualized with intravenous agitated saline  contrast study, suggestive of PFO. PatientHeight: 72 in PatientWeight: 205 lbs SystolicPressure: 100 mmHg DiastolicPressure: 63 mmHg BSA 2.2 m^2   Procedure Echoc     PONV (postoperative nausea and vomiting)      Prostate cancer (H) 6/17/2011    s/p radical prostectomy     Social History     Socioeconomic History     Marital status:      Spouse name: Not on file     Number of children: Not on file     Years of education: Not on file     Highest education level: Not on file   Social Needs     Financial resource strain: Not on file     Food insecurity - worry: Not on file     Food insecurity - inability: Not on file     Transportation needs - medical: Not on file     Transportation needs - non-medical: Not on file   Occupational History     Not on file   Tobacco Use     Smoking status: Never Smoker     Smokeless tobacco: Never Used   Substance and Sexual Activity     Alcohol use: Yes     Alcohol/week: 0.6 oz     Comment: EVERY OTHER DAY     Drug use: No     Sexual activity: No   Other Topics Concern     Parent/sibling w/ CABG, MI or angioplasty before 65F 55M? No   Social History Narrative    Son lives in [place] and he has 2 kids    Wife Mayelin       Drug and lactation database from the [place] [employer] Library [employer] Medicine:  http://toxnet.nlm.nih.gov/cgi-bin/sis/htmlgen?LACT      B/P: 169/99, T: 98.5, P: 82, R: 16 174 lbs 1.6 oz  Blood pressure (!) 169/99, pulse 82, temperature 98.5  F (36.9  C), temperature source Oral, resp. rate 16, height 1.791 m (5' 10.51\"), weight 79 kg (174 lb 1.6 oz), SpO2 99 %., Body mass index is 24.62 kg/m .  Medications and Vitals not listed above were documented in the cart and reviewed by me.     Current Outpatient Medications   Medication Sig Dispense Refill     carbidopa-levodopa (SINEMET)  MG tablet 2 TABLETS by mouth @ 3AM, 6AM, 9AM, noon, 3PM, 6PM and 12 midnight = 14/day 1260 " tablet 3     fluticasone (FLONASE) 50 MCG/ACT spray 1-2 sprays in both nostril daily @ 7am 1 Bottle 11     gabapentin (NEURONTIN) 300 MG capsule 2 CAPSULES by mouth @ 6am and 12pm and 3 capsules by mouth @ 6pm   = 7/day 630 capsule 3     methimazole (TAPAZOLE) 5 MG tablet 5mg tab by mouth daily @ 7am 90 tablet 3     polyethylene glycol (MIRALAX/GLYCOLAX) Packet 1 packet by mouth in liquid daily @ 6-7pm 7 packet 11     QUEtiapine (SEROQUEL) 25 MG tablet 2 x 25mg tab by mouth nightly @ 9pm  = 2/day 180 tablet 11     aspirin 81 MG tablet Take 1 tablet (81 mg) by mouth daily 30 tablet 0         Blaine Boo MD

## 2019-01-26 ENCOUNTER — MEDICAL CORRESPONDENCE (OUTPATIENT)
Dept: HEALTH INFORMATION MANAGEMENT | Facility: CLINIC | Age: 74
End: 2019-01-26

## 2019-01-29 ENCOUNTER — ANCILLARY PROCEDURE (OUTPATIENT)
Dept: BONE DENSITY | Facility: CLINIC | Age: 74
End: 2019-01-29
Attending: INTERNAL MEDICINE
Payer: COMMERCIAL

## 2019-01-29 ENCOUNTER — OFFICE VISIT (OUTPATIENT)
Dept: NEUROLOGY | Facility: CLINIC | Age: 74
End: 2019-01-29
Payer: COMMERCIAL

## 2019-01-29 ENCOUNTER — OFFICE VISIT (OUTPATIENT)
Dept: INTERNAL MEDICINE | Facility: CLINIC | Age: 74
End: 2019-01-29
Payer: COMMERCIAL

## 2019-01-29 ENCOUNTER — TELEPHONE (OUTPATIENT)
Dept: NEUROLOGY | Facility: CLINIC | Age: 74
End: 2019-01-29

## 2019-01-29 ENCOUNTER — OFFICE VISIT (OUTPATIENT)
Dept: PHARMACY | Facility: CLINIC | Age: 74
End: 2019-01-29
Payer: COMMERCIAL

## 2019-01-29 ENCOUNTER — ANCILLARY PROCEDURE (OUTPATIENT)
Dept: BONE DENSITY | Facility: CLINIC | Age: 74
End: 2019-01-29
Payer: COMMERCIAL

## 2019-01-29 ENCOUNTER — PATIENT OUTREACH (OUTPATIENT)
Dept: NEUROLOGY | Facility: CLINIC | Age: 74
End: 2019-01-29

## 2019-01-29 ENCOUNTER — OFFICE VISIT (OUTPATIENT)
Dept: NEUROSURGERY | Facility: CLINIC | Age: 74
End: 2019-01-29
Payer: COMMERCIAL

## 2019-01-29 VITALS
DIASTOLIC BLOOD PRESSURE: 99 MMHG | WEIGHT: 174.1 LBS | TEMPERATURE: 98.5 F | HEIGHT: 71 IN | OXYGEN SATURATION: 99 % | RESPIRATION RATE: 16 BRPM | SYSTOLIC BLOOD PRESSURE: 169 MMHG | HEART RATE: 82 BPM | BODY MASS INDEX: 24.37 KG/M2

## 2019-01-29 VITALS
BODY MASS INDEX: 25.21 KG/M2 | DIASTOLIC BLOOD PRESSURE: 86 MMHG | WEIGHT: 178.2 LBS | SYSTOLIC BLOOD PRESSURE: 152 MMHG | HEART RATE: 80 BPM | RESPIRATION RATE: 20 BRPM

## 2019-01-29 VITALS
HEIGHT: 71 IN | BODY MASS INDEX: 24.08 KG/M2 | WEIGHT: 172 LBS | OXYGEN SATURATION: 99 % | DIASTOLIC BLOOD PRESSURE: 98 MMHG | SYSTOLIC BLOOD PRESSURE: 186 MMHG | HEART RATE: 60 BPM

## 2019-01-29 DIAGNOSIS — R94.6 THYROID FUNCTION TEST ABNORMAL: Primary | ICD-10-CM

## 2019-01-29 DIAGNOSIS — R03.0 ELEVATED BLOOD PRESSURE READING WITHOUT DIAGNOSIS OF HYPERTENSION: ICD-10-CM

## 2019-01-29 DIAGNOSIS — Z91.81 AT RISK FOR FALLS: ICD-10-CM

## 2019-01-29 DIAGNOSIS — I10 BENIGN ESSENTIAL HYPERTENSION: ICD-10-CM

## 2019-01-29 DIAGNOSIS — M89.9 DISORDER OF BONE: ICD-10-CM

## 2019-01-29 DIAGNOSIS — F02.80 DEMENTIA ASSOCIATED WITH OTHER UNDERLYING DISEASE WITHOUT BEHAVIORAL DISTURBANCE (H): ICD-10-CM

## 2019-01-29 DIAGNOSIS — G20.A1 PARKINSON'S DISEASE (H): ICD-10-CM

## 2019-01-29 DIAGNOSIS — R94.6 THYROID FUNCTION TEST ABNORMAL: ICD-10-CM

## 2019-01-29 DIAGNOSIS — E05.90 SUBCLINICAL HYPERTHYROIDISM: ICD-10-CM

## 2019-01-29 DIAGNOSIS — G25.81 RESTLESS LEGS SYNDROME (RLS): ICD-10-CM

## 2019-01-29 DIAGNOSIS — G20.A1 PARKINSON'S DISEASE (H): Primary | ICD-10-CM

## 2019-01-29 DIAGNOSIS — G20.A1 PARKINSON DISEASE (H): ICD-10-CM

## 2019-01-29 DIAGNOSIS — G20.A1 PARKINSON DISEASE (H): Primary | ICD-10-CM

## 2019-01-29 DIAGNOSIS — E04.1 WARM THYROID NODULE: ICD-10-CM

## 2019-01-29 DIAGNOSIS — F29 PSYCHOSIS, UNSPECIFIED PSYCHOSIS TYPE (H): ICD-10-CM

## 2019-01-29 DIAGNOSIS — G62.9 NEUROPATHY: ICD-10-CM

## 2019-01-29 DIAGNOSIS — G25.81 RESTLESS LEG SYNDROME: ICD-10-CM

## 2019-01-29 LAB
ALBUMIN SERPL-MCNC: 4 G/DL (ref 3.4–5)
ALP SERPL-CCNC: 118 U/L (ref 40–150)
ALT SERPL W P-5'-P-CCNC: 7 U/L (ref 0–70)
ANION GAP SERPL CALCULATED.3IONS-SCNC: 6 MMOL/L (ref 3–14)
AST SERPL W P-5'-P-CCNC: 11 U/L (ref 0–45)
BASOPHILS # BLD AUTO: 0.1 10E9/L (ref 0–0.2)
BASOPHILS NFR BLD AUTO: 0.9 %
BILIRUB SERPL-MCNC: 1.3 MG/DL (ref 0.2–1.3)
BUN SERPL-MCNC: 19 MG/DL (ref 7–30)
CALCIUM SERPL-MCNC: 8.4 MG/DL (ref 8.5–10.1)
CHLORIDE SERPL-SCNC: 109 MMOL/L (ref 94–109)
CO2 SERPL-SCNC: 26 MMOL/L (ref 20–32)
CREAT SERPL-MCNC: 0.58 MG/DL (ref 0.66–1.25)
DEPRECATED CALCIDIOL+CALCIFEROL SERPL-MC: 6 UG/L (ref 20–75)
DIFFERENTIAL METHOD BLD: NORMAL
EOSINOPHIL # BLD AUTO: 0 10E9/L (ref 0–0.7)
EOSINOPHIL NFR BLD AUTO: 0.1 %
ERYTHROCYTE [DISTWIDTH] IN BLOOD BY AUTOMATED COUNT: 13.7 % (ref 10–15)
GFR SERPL CREATININE-BSD FRML MDRD: >90 ML/MIN/{1.73_M2}
GLUCOSE SERPL-MCNC: 105 MG/DL (ref 70–99)
HCT VFR BLD AUTO: 43 % (ref 40–53)
HGB BLD-MCNC: 14 G/DL (ref 13.3–17.7)
IMM GRANULOCYTES # BLD: 0 10E9/L (ref 0–0.4)
IMM GRANULOCYTES NFR BLD: 0.3 %
LYMPHOCYTES # BLD AUTO: 1.1 10E9/L (ref 0.8–5.3)
LYMPHOCYTES NFR BLD AUTO: 15.9 %
MCH RBC QN AUTO: 29 PG (ref 26.5–33)
MCHC RBC AUTO-ENTMCNC: 32.6 G/DL (ref 31.5–36.5)
MCV RBC AUTO: 89 FL (ref 78–100)
MONOCYTES # BLD AUTO: 0.4 10E9/L (ref 0–1.3)
MONOCYTES NFR BLD AUTO: 6.4 %
NEUTROPHILS # BLD AUTO: 5.2 10E9/L (ref 1.6–8.3)
NEUTROPHILS NFR BLD AUTO: 76.4 %
NRBC # BLD AUTO: 0 10*3/UL
NRBC BLD AUTO-RTO: 0 /100
PLATELET # BLD AUTO: 232 10E9/L (ref 150–450)
POTASSIUM SERPL-SCNC: 3.9 MMOL/L (ref 3.4–5.3)
PROT SERPL-MCNC: 7.4 G/DL (ref 6.8–8.8)
RBC # BLD AUTO: 4.83 10E12/L (ref 4.4–5.9)
SODIUM SERPL-SCNC: 141 MMOL/L (ref 133–144)
T4 FREE SERPL-MCNC: 1.38 NG/DL (ref 0.76–1.46)
TSH SERPL DL<=0.005 MIU/L-ACNC: 0.06 MU/L (ref 0.4–4)
WBC # BLD AUTO: 6.8 10E9/L (ref 4–11)

## 2019-01-29 PROCEDURE — 99607 MTMS BY PHARM ADDL 15 MIN: CPT | Performed by: PHARMACIST

## 2019-01-29 PROCEDURE — 82306 VITAMIN D 25 HYDROXY: CPT | Performed by: INTERNAL MEDICINE

## 2019-01-29 PROCEDURE — 86376 MICROSOMAL ANTIBODY EACH: CPT | Performed by: INTERNAL MEDICINE

## 2019-01-29 PROCEDURE — 99605 MTMS BY PHARM NP 15 MIN: CPT | Performed by: PHARMACIST

## 2019-01-29 RX ORDER — RIVASTIGMINE 9.5 MG/24H
PATCH, EXTENDED RELEASE TRANSDERMAL
Qty: 90 PATCH | Refills: 3 | Status: ON HOLD | OUTPATIENT
Start: 2019-01-29 | End: 2019-02-04

## 2019-01-29 RX ORDER — METHIMAZOLE 5 MG/1
TABLET ORAL
Qty: 90 TABLET | Refills: 3 | COMMUNITY
Start: 2019-01-29 | End: 2019-02-13

## 2019-01-29 RX ORDER — QUETIAPINE FUMARATE 25 MG/1
TABLET, FILM COATED ORAL
Qty: 180 TABLET | Refills: 11 | COMMUNITY
Start: 2019-01-29 | End: 2019-02-05

## 2019-01-29 RX ORDER — RIVASTIGMINE 4.6 MG/24H
PATCH, EXTENDED RELEASE TRANSDERMAL
Qty: 90 PATCH | Refills: 3 | Status: SHIPPED | OUTPATIENT
Start: 2019-01-29 | End: 2019-03-25 | Stop reason: DRUGHIGH

## 2019-01-29 RX ORDER — GABAPENTIN 300 MG/1
CAPSULE ORAL
Qty: 630 CAPSULE | Refills: 3 | Status: SHIPPED | OUTPATIENT
Start: 2019-01-29 | End: 2019-11-05

## 2019-01-29 RX ORDER — CARBIDOPA AND LEVODOPA 25; 100 MG/1; MG/1
TABLET ORAL
Qty: 1260 TABLET | Refills: 3 | Status: SHIPPED | OUTPATIENT
Start: 2019-01-29 | End: 2019-11-05

## 2019-01-29 RX ORDER — LIDOCAINE 40 MG/G
CREAM TOPICAL DAILY PRN
Qty: 1 TUBE | Refills: 11 | Status: SHIPPED | OUTPATIENT
Start: 2019-01-29 | End: 2019-11-05

## 2019-01-29 ASSESSMENT — MIFFLIN-ST. JEOR
SCORE: 1549.38
SCORE: 1559.09

## 2019-01-29 ASSESSMENT — PAIN SCALES - GENERAL
PAINLEVEL: MODERATE PAIN (5)
PAINLEVEL: EXTREME PAIN (8)

## 2019-01-29 NOTE — PROGRESS NOTES
SUBJECTIVE/OBJECTIVE:                           Norberto Wu is a 73 year old male coming in for an initial visit for Medication Therapy Management.  He was referred to me from Dr. Boo. Daughter-in-law, Lottie, is also present for the visit today.     Chief Complaint: initial MTM visit - discuss cognition/hallucinations  Allergies/ADRs: Reviewed in Epic  Tobacco: No tobacco use  Alcohol: 1-3 beverages / week  Caffeine: not discussed  Activity: not discussed  PMH: Reviewed in Epic    Medication Adherence/Access:  no issues reported    Parkinson's Disease/RLS:  Current medications include:  Carbidopa-levodopa  mg 2 tablets every 3 hours (3 am, 6 am, 9 am, 12 pm, 3 pm, 6 pm, 9 pm, 12 am), gabapentin 600 mg 3 times daily, and quetiapine 50 mg nightly. He has not been on acetylcholinesterase inhibitors. Primary concern today is pain in his ankle and hallucinations/confusions. Patient's hallucinations are primarily at night and they have gotten worse to the point of being scary at times. Patient also states that the pain has been so bad lately that he hasn't slept in 24 hours (this was not discussed during the visit with Dr. Boo). He thinks that levodopa helps the pain and he hasn't tried anything else.     Elevated blood pressure: Patient states his BP has been high the past few days and he thinks this is due to pain. During PCP visit today BP dropped to 152/68.    Today's Vitals:  BP Readings from Last 1 Encounters:   01/29/19 (!) 186/98     Pulse Readings from Last 1 Encounters:   01/29/19 60     BP Readings from Last 3 Encounters:   01/29/19 152/86   01/29/19 (!) 186/98   01/29/19 (!) 169/99       ASSESSMENT:                             Current medications were reviewed today.     Medication Adherence: excellent, no issues identified    Parkinson's Disease/RLS: Needs improvement. Discussed with Dr. Boo and patient and we opted to initiate an acetylcholinesterase inhibitor to first treat  confusion/possible dementia prior to adding more antipsychotic medications. Pimavanserin may be a good add-on medication after he is on the patch. Treatment form for Nuplazid connect was filled out by Christina Dinh RN and signed by Dr. Boo for future use. For pain, Dr. Boo prescribed lidocaine gel and we also discussed that the patient could try taking Tylenol.     Elevated blood pressure: Needs improvement. Patient's BP may be elevated due to increased pain. Defer to PCP for management - he is also seeing the patient today.    PLAN:                            1. Start rivastigmine patch 4.6 mg daily x 1 month then 9.5 mg daily x 1 month  2. Try the lidocaine gel and/or consider acetaminophen 500 mg 4 times/day as needed for pain  3. Discuss elevated BP with PCP - at PCP visit Dr. Howe recommended monitoring elevated BP due to previous orthostatic BP's in the fall.    Future: consider starting pimavanserin 34 mg daily.    I spent 30 minutes with this patient today. All changes were made via verbal approval with Dr. Boo. A copy of the visit note was provided to the patient's referring provider.    Will follow up in 2 months: 3/25/19    The patient was given a summary of these recommendations as an after visit summary.     Rachel Mena, Pharm.D.  Medication Therapy Management Pharmacist  Phone: [phone]

## 2019-01-29 NOTE — PROGRESS NOTES
Surgeon (please enter first and last name): Vic Boyd  Fax number for Preop Evaluation:   Location of Surgery: [facility]  Date of Surgery: 02/04/19  Procedure: battery replacement  History of reaction to anesthesia? NO

## 2019-01-29 NOTE — LETTER
1/29/2019      RE: Norberto Wu  [address]  [place] [address]       HPI:    Pt. Comes in today for follow up and DBS battery exchage procedure. He has followed with Dr. Boo, Neurology for Parkinson's disease and was seen today 1/29/2019. He saw Dr. Rothman, Neurosurgery today as well 1/29/2019. He denies any rest/exertional CP/SOB. No anesthesia or bleeding issues. He had significant adverse reaction in the hospital to hydromorphone (dilaudid)  He has h/o prostate CA and is followed at [facility].  Colonoscopy 3/17 possibly repeat in 5 years. He is with his son and daughter-in-law today.  He has moved to [facility] in the next few weeks.  No other HEENT, cardiopulmonary, abdominal, , neurological, systemic complaints. He does follow with outside dermatology.      Past Medical History:   Diagnosis Date     Cerebellar stroke syndrome 8/21/2013    Few small foci of chronic infarct in the right cerebellar hemisphere, unchanged as well as a new focus of now chronic infarct in the left cerebellar hemisphere since the last study.      Dyslipidemia      Hyperthyroidism      Insomnia 6/17/2011     Parkinson disease (H) 6/15/2011     Peripheral neuropathy 6/15/2011     PFO (patent foramen ovale) 8/21/2013    Interpretation Summary 1. Normal LV size and systolic function. Normal diastolic function 2.  Normal RV size and function 3. No significant valvular abnormalities 4.  Small right-to-left shunt visualized with intravenous agitated saline  contrast study, suggestive of PFO. PatientHeight: 72 in PatientWeight: 205 lbs SystolicPressure: 100 mmHg DiastolicPressure: 63 mmHg BSA 2.2 m^2   Procedure Echoc     PONV (postoperative nausea and vomiting)      Prostate cancer (H) 6/17/2011    s/p radical prostectomy     Past Surgical History:   Procedure Laterality Date     COLONOSCOPY N/A 3/23/2017    Procedure: COLONOSCOPY;  Surgeon: Salbador Paulson MD;  Location:  GI      PROSTATE SURGERY  3 yrs ago    prostate cancer; Dr. Valladares     REPLACE DEEP BRAIN STIMULATION GENERATOR / BATTERY Right 1/3/2017    Procedure: REPLACE DEEP BRAIN STIMULATION GENERATOR / BATTERY;  Surgeon: Anupam Fair MD;  Location: [facility] OR     Right Gpi DBS Lead Implantation  5/20/2014     Rt Chest DBS Generator Placement Activa SC  5/29/2014         PE:    Vitals noted gen nad cooperative alert, HEENT; Oropharynx clear no exudate neck supple nl rom no adenopathy, LCTA, B, normal resp. System exam, RRR, S1, S2, no MRG, abdomen, nt, nd, resting tremor in hands.  No Paraspinous tenderness to palpation, no skin rash.    EKG from 1/31/2018; SR at 84, tremor or electronic interference in several leads. No significant change from 12/26/2016    A/P:    Low risk surgery for DBS battery exchange. He will avoid ASA/NAIDS before surgery. Will discuss with Dr. Boo Neurology, if we start low dose antihypertensive (if not due to Parkinson's related autonomic dysfunction). He can remain on his other medications. Avoid hydromorphone as above.    1. Parkinson's he follows here in Neurology and seen today 1/29/2019 by Dr. Boo  2. Check with insurance for new Shingles vaccine  3. Increased cholesterol;  fasting lipids and liver tests done 10/9/17 and stable; he remains on Lipitor. For leg complaints he will stop for about 2 weeks. Checked electrolytes and magnesium on 10/5/2018 unremarkable. He states stopping Lipitor made no difference in his leg sxs and he will go back on this medication.    4. He will continue to follow outside Urology for h/o prostate CA. Will check PSA checked 10/9/17 and 0.13; value 3/7/17 was 0.12. He states he follows with Dr. Valladares and that is PSA usually is in the 0.1 range and stable.   5. He can follow up in Health Psychology for anxiety issues.   6. Low TSH checked 9/14; not on replacement checked  TPO and TSI labs done. He has followed up with Dr. Mccarty 6/17 and  6/8/2018.   He is  on methimazole. TSH was 0.95 on 10/5/2018. TSH on 12/31/2018 0.09 and T4 1.03. Will discuss again with Endocrine   7. Placed future dermatology referral for skin check 9/11/17.  8. Colonoscopy done 3/2017 repeat in 5 years. He will try Miralax for constipation and this has worked  9. BP monitor from 10/2017 normal BP. Elevated today but quite low (93/61 on 10/5/2018). As above may start low dose antihypertensive medication  10. Melatonin for sleep     Total time spent 25  minutes.  More than 50% of the time spent with Mr. Wu on counseling / coordinating his care    Addendum;    I personally spoke with Dr. Mccarty, Endocrinology today regarding low TSH and upcoming surgery. OK to proceed with surgery and will follow up with recheck of thyroid testing    MOHIT Howe MD               Surgeon (please enter first and last name): Vic Boyd  Fax number for Preop Evaluation:   Location of Surgery: [facility]  Date of Surgery: 02/04/19  Procedure: battery replacement  History of reaction to anesthesia? NO          Norberto Howe MD

## 2019-01-29 NOTE — LETTER
1/29/2019       RE: Norberto Wu  [address]  [place] [address]     Dear Colleague,    Thank you for referring your patient, Norberto Wu, to the [facility]. Please see a copy of my visit note below.    HISTORY AND PHYSICAL EXAM    No chief complaint on file.      HISTORY OF PRESENT ILLNESS  Mr. Wu is a 74 y/o gentleman s/p R GPi DBS (Activa SC) presenting for neurosurgical evaluation for a battery replacement. He saw Dr. Boo today in follow-up with DBS interrogation today revealing a battery voltage of 2.75.   His settings are:  C+/1-, 4.0/90/185    He has noticed his symptoms have been getting worse lately and thinks it may be due to his battery being depleted. He takes ASA 81 but no other antiplatelet or anticoagulant medications.       Past Medical History:   Diagnosis Date     Cerebellar stroke syndrome 8/21/2013    Few small foci of chronic infarct in the right cerebellar hemisphere, unchanged as well as a new focus of now chronic infarct in the left cerebellar hemisphere since the last study.      Dyslipidemia      Hyperthyroidism      Insomnia 6/17/2011     Parkinson disease (H) 6/15/2011     Peripheral neuropathy 6/15/2011     PFO (patent foramen ovale) 8/21/2013    Interpretation Summary 1. Normal LV size and systolic function. Normal diastolic function 2.  Normal RV size and function 3. No significant valvular abnormalities 4.  Small right-to-left shunt visualized with intravenous agitated saline  contrast study, suggestive of PFO. PatientHeight: 72 in PatientWeight: 205 lbs SystolicPressure: 100 mmHg DiastolicPressure: 63 mmHg BSA 2.2 m^2   Procedure Echoc     PONV (postoperative nausea and vomiting)      Prostate cancer (H) 6/17/2011    s/p radical prostectomy       Past Surgical History:   Procedure Laterality Date     COLONOSCOPY N/A 3/23/2017    Procedure: COLONOSCOPY;  Surgeon: Salbador Paulson  "MD;  [facility]     PROSTATE SURGERY  3 yrs ago    prostate cancer; Dr. Valladares     REPLACE DEEP BRAIN STIMULATION GENERATOR / BATTERY Right 1/3/2017    Procedure: REPLACE DEEP BRAIN STIMULATION GENERATOR / BATTERY;  Surgeon: Anupam Fair MD;  Location: [facility] OR     Right Gpi DBS Lead Implantation  5/20/2014     Rt Chest DBS Generator Placement Activa SC  5/29/2014       Family History   Problem Relation Age of Onset     Parkinsonism Father      Other - See Comments Son         lives in [place]     Skin Cancer No family hx of         no skin cancer       Social History     Socioeconomic History     Marital status:      Spouse name: Not on file     Number of children: Not on file     Years of education: Not on file     Highest education level: Not on file   Social Needs     Financial resource strain: Not on file     Food insecurity - worry: Not on file     Food insecurity - inability: Not on file     Transportation needs - medical: Not on file     Transportation needs - non-medical: Not on file   Occupational History     Not on file   Tobacco Use     Smoking status: Never Smoker     Smokeless tobacco: Never Used   Substance and Sexual Activity     Alcohol use: Yes     Alcohol/week: 0.6 oz     Comment: EVERY OTHER DAY     Drug use: No     Sexual activity: No   Other Topics Concern     Parent/sibling w/ CABG, MI or angioplasty before 65F 55M? No   Social History Narrative    Son lives in [place] and he has 2 kids    Wife Mayelin          Allergies   Allergen Reactions     Hydromorphone Nausea and Nausea and Vomiting     Used post DBS surgery.  Stayed in the hospital 3 days due to severe nausea & \"retching\" .      Comtan Diarrhea     Camphor      Camphor HELENA     Hydrocodone      Other reaction(s): Headache, Nausea Only     Hydrocodone-Acetaminophen Hives and Nausea     Melatonin Other (See Comments)     No benefit.     Ropinirole Fatigue     Fatigue no benefit.      Seroquel [Quetiapine]      Groggy " and aggravated rls  No benefit     Adhesive Tape Rash     Camphor Rash     Other reaction(s): Rash     Liquid Adhesive Rash       Current Outpatient Medications   Medication     aspirin 81 MG tablet     carbidopa-levodopa (SINEMET)  MG tablet     fluticasone (FLONASE) 50 MCG/ACT spray     gabapentin (NEURONTIN) 300 MG capsule     lidocaine (LMX4) 4 % external cream     methimazole (TAPAZOLE) 5 MG tablet     Pimavanserin Tartrate (NUPLAZID) 34 MG CAPS     polyethylene glycol (MIRALAX/GLYCOLAX) Packet     QUEtiapine (SEROQUEL) 25 MG tablet     rivastigmine (EXELON) 4.6 MG/24HR 24 hr patch     rivastigmine (EXELON) 9.5 MG/24HR 24 hr patch     No current facility-administered medications for this visit.        REVIEW OF SYSTEMS  Constitutional: Negative for activity change, appetite change, chills, fatigue, fever and unexpected weight change.   HENT: Negative for trouble swallowing.    Eyes: Negative for visual disturbance.   Respiratory: Negative for shortness of breath.    Cardiovascular: Negative for leg swelling.  Gastrointestinal: Negative for abdominal pain, nausea and vomiting.   Endocrine: Negative for cold intolerance.  Genitourinary: Negative for incontinence, frequency and urgency.   Musculoskeletal: Negative for back pain. Negative for gait problem, neck pain and neck stiffness.  Skin: Negative for color change  Allergic/Immunologic: Negative for immunocompromised state.  Neurological: Negative for tremors, speech difficulty, weakness, numbness and headaches.   Hematological: Does not bruise/bleed easily.   Psychiatric/Behavioral: The patient is not nervous/anxious.     PHYSICAL EXAM  There were no vitals taken for this visit.      General: Awake, alert, oriented. Well nourished, well developed, Mr. Wu is not in any acute distress.  HEENT: Head normocephalic, atraumatic. No carotid bruit. Neck supple. Good range of motion. No palpable thyroid mass.  Heart: Regular rhythm and rate. No  murmurs.  Lungs: Clear to auscultation and percussion bilaterally. No ronchi, rales or wheeze.  Abdomen: Soft, non-tender, non-distended. No hepatosplenomegaly.  Extremity: Warm with no clubbing or cyanosis. No lower extremity edema.  Incisions: clean, well healed, dry without erythema or edema    Neurological:  Awake, alert and oriented to date, time, place and person. Speech fluent.   Pupils equal, round, reactive to light.  Extraocular movement intact.  Visual fields are full on confrontation.  Hearing is grossly normal to finger rub.   Facial sensation intact.  Face symmetric.  Tongue midline.  Uvula elevates equally.    Motor: full strength throughout.  Sensation: intact to light touch and pinpoint.      ASSESSMENT   73 year old male with a history of idiopathic PD. s/p R GPi DBS .  Depleted DBS battery/generator over the right chest wall.    During today's visit, we discussed the surgical procedure for replacing the DBS generator/battery over the right chest wall. He currently has a Activa SC generator/battery, and this will be replaced during the upcoming procedure. Risks, benefits, alternative therapies were discussed with the patient, including but not limited to infection and bleeding. This surgical procedure will be performed under MAC with local anesthetic. The thinned part of the wound/pocket may be corrected with mobilization of the tissue under the incision. The pocket may need to be enlarged to minimize the stress on the closure.  Surgical procedure was discussed in detail. All questions were answered, and he expressed understanding      PLAN:  1. He will undergo replacement of the DBS generator/battery over the right chest wall under MAC with local anesthetic.     Again, thank you for allowing me to participate in the care of your patient.      Sincerely,    Marco Rothman MD

## 2019-01-29 NOTE — PROGRESS NOTES
HISTORY AND PHYSICAL EXAM    No chief complaint on file.      HISTORY OF PRESENT ILLNESS  Mr. Wu is a 72 y/o gentleman s/p R GPi DBS (Activa SC) presenting for neurosurgical evaluation for a battery replacement. He saw Dr. Boo today in follow-up with DBS interrogation today revealing a battery voltage of 2.75.   His settings are:  C+/1-, 4.0/90/185    He has noticed his symptoms have been getting worse lately and thinks it may be due to his battery being depleted. He takes ASA 81 but no other antiplatelet or anticoagulant medications.       Past Medical History:   Diagnosis Date     Cerebellar stroke syndrome 8/21/2013    Few small foci of chronic infarct in the right cerebellar hemisphere, unchanged as well as a new focus of now chronic infarct in the left cerebellar hemisphere since the last study.      Dyslipidemia      Hyperthyroidism      Insomnia 6/17/2011     Parkinson disease (H) 6/15/2011     Peripheral neuropathy 6/15/2011     PFO (patent foramen ovale) 8/21/2013    Interpretation Summary 1. Normal LV size and systolic function. Normal diastolic function 2.  Normal RV size and function 3. No significant valvular abnormalities 4.  Small right-to-left shunt visualized with intravenous agitated saline  contrast study, suggestive of PFO. PatientHeight: 72 in PatientWeight: 205 lbs SystolicPressure: 100 mmHg DiastolicPressure: 63 mmHg BSA 2.2 m^2   Procedure Echoc     PONV (postoperative nausea and vomiting)      Prostate cancer (H) 6/17/2011    s/p radical prostectomy       Past Surgical History:   Procedure Laterality Date     COLONOSCOPY N/A 3/23/2017    Procedure: COLONOSCOPY;  Surgeon: Salbador Paulson MD;  Location: [facility]     PROSTATE SURGERY  3 yrs ago    prostate cancer; Dr. Valladares     REPLACE DEEP BRAIN STIMULATION GENERATOR / BATTERY Right 1/3/2017    Procedure: REPLACE DEEP BRAIN STIMULATION GENERATOR / BATTERY;  Surgeon: Anupam Fair MD;  Location: [facility] OR     Right Gpi DBS  "Lead Implantation  5/20/2014     Rt Chest DBS Generator Placement Activa SC  5/29/2014       Family History   Problem Relation Age of Onset     Parkinsonism Father      Other - See Comments Son         lives in [place]     Skin Cancer No family hx of         no skin cancer       Social History     Socioeconomic History     Marital status:      Spouse name: Not on file     Number of children: Not on file     Years of education: Not on file     Highest education level: Not on file   Social Needs     Financial resource strain: Not on file     Food insecurity - worry: Not on file     Food insecurity - inability: Not on file     Transportation needs - medical: Not on file     Transportation needs - non-medical: Not on file   Occupational History     Not on file   Tobacco Use     Smoking status: Never Smoker     Smokeless tobacco: Never Used   Substance and Sexual Activity     Alcohol use: Yes     Alcohol/week: 0.6 oz     Comment: EVERY OTHER DAY     Drug use: No     Sexual activity: No   Other Topics Concern     Parent/sibling w/ CABG, MI or angioplasty before 65F 55M? No   Social History Narrative    Son lives in [place] and he has 2 kids    Wife Mayelin          Allergies   Allergen Reactions     Hydromorphone Nausea and Nausea and Vomiting     Used post DBS surgery.  Stayed in the hospital 3 days due to severe nausea & \"retching\" .      Comtan Diarrhea     Camphor      Camphor HELENA     Hydrocodone      Other reaction(s): Headache, Nausea Only     Hydrocodone-Acetaminophen Hives and Nausea     Melatonin Other (See Comments)     No benefit.     Ropinirole Fatigue     Fatigue no benefit.      Seroquel [Quetiapine]      Groggy and aggravated rls  No benefit     Adhesive Tape Rash     Camphor Rash     Other reaction(s): Rash     Liquid Adhesive Rash       Current Outpatient Medications   Medication     aspirin 81 MG tablet     carbidopa-levodopa (SINEMET)  MG tablet     fluticasone (FLONASE) 50 MCG/ACT spray "     gabapentin (NEURONTIN) 300 MG capsule     lidocaine (LMX4) 4 % external cream     methimazole (TAPAZOLE) 5 MG tablet     Pimavanserin Tartrate (NUPLAZID) 34 MG CAPS     polyethylene glycol (MIRALAX/GLYCOLAX) Packet     QUEtiapine (SEROQUEL) 25 MG tablet     rivastigmine (EXELON) 4.6 MG/24HR 24 hr patch     rivastigmine (EXELON) 9.5 MG/24HR 24 hr patch     No current facility-administered medications for this visit.        REVIEW OF SYSTEMS  Constitutional: Negative for activity change, appetite change, chills, fatigue, fever and unexpected weight change.   HENT: Negative for trouble swallowing.    Eyes: Negative for visual disturbance.   Respiratory: Negative for shortness of breath.    Cardiovascular: Negative for leg swelling.  Gastrointestinal: Negative for abdominal pain, nausea and vomiting.   Endocrine: Negative for cold intolerance.  Genitourinary: Negative for incontinence, frequency and urgency.   Musculoskeletal: Negative for back pain. Negative for gait problem, neck pain and neck stiffness.  Skin: Negative for color change  Allergic/Immunologic: Negative for immunocompromised state.  Neurological: Negative for tremors, speech difficulty, weakness, numbness and headaches.   Hematological: Does not bruise/bleed easily.   Psychiatric/Behavioral: The patient is not nervous/anxious.     PHYSICAL EXAM  There were no vitals taken for this visit.      General: Awake, alert, oriented. Well nourished, well developed, Mr. Wu is not in any acute distress.  HEENT: Head normocephalic, atraumatic. No carotid bruit. Neck supple. Good range of motion. No palpable thyroid mass.  Heart: Regular rhythm and rate. No murmurs.  Lungs: Clear to auscultation and percussion bilaterally. No ronchi, rales or wheeze.  Abdomen: Soft, non-tender, non-distended. No hepatosplenomegaly.  Extremity: Warm with no clubbing or cyanosis. No lower extremity edema.  Incisions: clean, well healed, dry without erythema or  edema    Neurological:  Awake, alert and oriented to date, time, place and person. Speech fluent.   Pupils equal, round, reactive to light.  Extraocular movement intact.  Visual fields are full on confrontation.  Hearing is grossly normal to finger rub.   Facial sensation intact.  Face symmetric.  Tongue midline.  Uvula elevates equally.    Motor: full strength throughout.  Sensation: intact to light touch and pinpoint.      ASSESSMENT   73 year old male with a history of idiopathic PD. s/p R GPi DBS .  Depleted DBS battery/generator over the right chest wall.    During today's visit, we discussed the surgical procedure for replacing the DBS generator/battery over the right chest wall. He currently has a Activa SC generator/battery, and this will be replaced during the upcoming procedure. Risks, benefits, alternative therapies were discussed with the patient, including but not limited to infection and bleeding. This surgical procedure will be performed under MAC with local anesthetic. The thinned part of the wound/pocket may be corrected with mobilization of the tissue under the incision. The pocket may need to be enlarged to minimize the stress on the closure.  Surgical procedure was discussed in detail. All questions were answered, and he expressed understanding      PLAN:  1. He will undergo replacement of the DBS generator/battery over the right chest wall under MAC with local anesthetic.       Marco Rothman MD

## 2019-01-29 NOTE — PATIENT INSTRUCTIONS
Medications     3am 6am 9am 12p 3p 6p 9p midnight   Aspirin 81mg           Carbidopa/levodopa sinemet 25/100 2 2 2 2 2 2 2 2   Fluticasone flonase 50mcg/act spray           Gabapentin neurontin 300mg  2  2  2     Lidocaine lmx4 4% external cream      Apply as needed     Methimazole tapazole 5mg  stopped         Polyethylene glycol miralax      1     Quetiapine seroquel 25mg       2                              History obtained from patient     PLAN  1. Review of his sinemet. Hold off changing this now - discussion about rytary and stalevo 200  2. Discussion of quetiapine dose increase from 2 x 25mg tab at bedtime for hallucinations.   3. Discussion about the use of nuplazid (pimavanserin) - would need prior authorization for hallucinations   4. Discussion about cognitive enhancing medications. His 446 is his most recent QTc  A. Rivastigmine patch (exelon) 4.6 mg patch daily x 1month then 9.5mg patch daily x 1 month then 13.3mg patch daily  B. Donepezil (aricept) 5mg tab/capsule by mouth for one month then 10mg  C. Namenda (memantine) dose increase weekly starting with 5mg/day and then increasing over 4 weeks to 10mg twice daily    Return to see Lucy in 1-3 months  Pharmacy consultation today if possible with Rachel Mena Pharm D.

## 2019-01-29 NOTE — NURSING NOTE
Chief Complaint   Patient presents with     RECHECK     MOVEMENT DISORDER     Medication Reconciliation     NEED ATTENTION     Kay Escalona

## 2019-01-29 NOTE — Clinical Note
1/29/2019       RE: Norberto Wu  [address]  [place] [address]     Dear Colleague,    Thank you for referring your patient, Norberto Wu, to the [facility] NEUROLOGY at [facility]. Please see a copy of my visit note below.    No notes on file    Again, thank you for allowing me to participate in the care of your patient.      Sincerely,    Blaine Boo MD

## 2019-01-29 NOTE — PROGRESS NOTES
"[facility]:  Care Coordination Note     SITUATION   Norberto Wu is a 73 year old male who is receiving support for: Parkinson's Disease.    BACKGROUND     He is currently living in \"Enhanced\" senior care at [facility] in [place]. This \"enhanced\" care is only helping him with housework, laundry. I spoke to Alma at the facility and she stated that the patient and his son Caleb, handle all of his medications. They currently do not provide any skilled nursing care.    Patient's wife recently passed away and he was in their memory unit due to significant hallucinations and physical deterioration due to his Parkinson's disease.     He was seen today in clinic with his daughter-in-law, Lottie. His Deep Brain Stimulation battery was at 2.72 which means that it will rapidly deplete. He was seen by Dr. Rothman in neurosurgery in order to have the obligatory consult before battery replacement. Patient also saw Medication Therapy Management  Pharmacist, Rachel Mena.    Rachel is recommending he begin a memory medication, Exelon patch, before going to the [facility] (for Parkinson's disease psychosis).     ASSESSMENT     Neurology          Parkinson's Disease / DEEP BRAIN STIMULATION     I am concerned about how he is managing his medications at home during this time before moving to assisted living. I am wondering if this is playing into the increase in his Parkinson's disease symptoms, (not taking his Parkinson's disease medications properly). Also, I would like to know if the facility will be administering his medications throughout the day.     PLAN        Nursing Interventions:   I left voice mail on son's cell and home phone numbers. I spoke with \"Alma\" at [facility].    Follow-up plan:      1. Follow-up with Caleb (son) to find out when he will be moving to assisted living. Who/how will his medications be managed? What is the current plan? What services do they think the patient " "needs?    Caleb states, \"He is in the wing where the bed-ridden people are. Dad is resistant to help with medication. He is good about his Parkinson's disease pills. He has been lucid on the days that I have seen him. Staff thinks he should be in assisted living because he does not need \"enhanced\" care. A spot opened up on the third floor in \"assisted living\". Staff is not nursing care.     He did not need a cane or walker in September, now he does. He tries to put himself in the best light possible when he goes out. He has no long term care insurance. His pension is paying $10,000 per month for his care.    2. I'd also like to find out from Caleb, what ADLs the patient is able to do, questions under the \"care management\" tab of this flow sheet.    3. I will connect with Franchesca Sheldon to obtain education on how this type of transfer works and what questions to ask. Is there a  at the facility to help with arranging services?    Franchesca brought up these issues that can be addressed at Assisted Living:  Setting up meds, come and give meds when they are due  Bathing and dressing  Three meals per day  End of life care  Families can pay for another agency to come in and be with them around the clock - Home health aide    4. What will be the plan when he has to go under MAC anesthesia for the battery replacement?    Caleb is aware of the surgery on Monday. He is not concerned about having someone there for 24 hours as the patient is currently staying in the \"enhanced\" care where he is checked on quite often.    When asked how I could help, he said he would like to have the patient's prescriptions on an autorefill system and delivered. I will check with [place] [facility] to see if they have these services. Patient is currently calling in all of his own prescriptions to [facility] and Caleb picks them up and delivers. The patient is a worrier and this extra planning is causing him cognitive dissonance.    Christina Dinh  "

## 2019-01-29 NOTE — PATIENT INSTRUCTIONS
[facility] Medication Refill Request Information:  * Please contact your pharmacy regarding ANY request for medication refills.  ** [facility] Prescription Fax = [phone]  * Please allow 3 business days for routine medication refills.  * Please allow 5 business days for controlled substance medication refills.     [facility] Test Result notification information:  *You will be notified with in 7-10 days of your appointment day regarding the results of your test.  If you are on MyChart you will be notified as soon as the provider has reviewed the results and signed off on them.    [facility]: [phone]

## 2019-01-29 NOTE — NURSING NOTE
Interrogated patient's DBS Activa SC battery. Patient has RGpi. All impedances were checked at 3.0 Volts and were WNL. See neuromodulation sheets scanned. Patient informed.    RGpi Therapy impedance = 983 ohms, 4.131 mA    Contacts used = C+/1-   (Monopolar)  Upper limit = 4.2  Lower limit = 0.0  Current Setting = 4.0    Model 05284    Battery voltage 2.75

## 2019-01-29 NOTE — LETTER
2019      RE: Norberto Wu  [address]  [place] [id number]           PaulDiagnosis/Summary/Recommendations:    PATIENT: Norberto Wu  73 year old male     : 1945    GAMA: 2019    Parkinson  dbs    Freezing in the evening.       Still in [employer].   It is assisted living. He sets up his pills.     He continues to have hallucinations at night.   They are formed.   Fine structure  Sometimes sees people.   One time he thought that there were 3 people in his room - children and was scared.   Was babysitting his daughter the next day.       Medications     3am 6am 9am 12p 3p 6p 9p midnight   Aspirin 81mg           Carbidopa/levodopa sinemet 25/100 2 2 2 2 2 2 2 2   Fluticasone flonase 50mcg/act spray           Gabapentin neurontin 300mg  2  2  2     Lidocaine lmx4 4% external cream      Apply as needed     Methimazole tapazole 5mg  stopped         Polyethylene glycol miralax      1     Quetiapine seroquel 25mg       2                              History obtained from patient     PLAN  1. Review of his sinemet. Hold off changing this now - discussion about rytary and stalevo 200  2. Discussion of quetiapine dose increase from 2 x 25mg tab at bedtime for hallucinations.   3. Discussion about the use of nuplazid (pimavanserin) - would need prior authorization for hallucinations   4. Discussion about cognitive enhancing medications. His 446 is his most recent QTc  A. Rivastigmine patch (exelon) 4.6 mg patch daily x 1month then 9.5mg patch daily x 1 month then 13.3mg patch daily  B. Donepezil (aricept) 5mg tab/capsule by mouth for one month then 10mg  C. Namenda (memantine) dose increase weekly starting with 5mg/day and then increasing over 4 weeks to 10mg twice daily    Return to see Lucy in 1-3 months - may need need battery soon as voltage 2.75  Pharmacy consultation today if possible with Rachel Mena, Pharm D.     Coding statement:   Duration of  Services: patient  care and care coordination was 25 minutes  Greater than 50% of this visit was spent in counseling and coordination of care.     Blaine Boo MD     ______________________________________    Last visit date and details:      GAMA: October 23, 2018     Parkinson  dbs     Wife Mayelin is on hospice for metastatic breast cancer  Lottie Wu daughter in law and Son Caleb Wu.   Mayelin is in [facility] for [facility]  This is not a permanent address.      Wondering about taking aspirin      Generally his medications are working well     Will be seeing Dr. Howe today and will review his blood pressure and discuss his other medications such as the atorvastatin which he reportedly has stopped and to review his thyroid medication.      He is having some falls and associated freezing that is causing the falls.      2.83 voltage                 Medications     3am 6-7am 11am-12noon 3pm 6pm 9pm midnight   Aspirin 81mg   1             Atorvastatin lipitor 10mg   stopped             Bisacodyl dulcolax 10mg   stopped             Carbidopa/levodopa sinemet 25/100 2 2 2 2 2 2 2   Fluticasone flonase 50 mcg/act nasal spray   1 spray             Gabapentin/neurontin 300mg   2 2   3       Methimazole tapazole 5mg    1             Polyethylene glycol miralax         Daily @ 6-7pm                                                                                                                                                             History obtained from patient      PLAN  Given the stress ongoing with his wife being in hospice this has impacted his health overall.   He has on related freezing despite medications and his dbs system working well.   He has had cognitive decline in the past few weeks.      He is temporarily living with his wife in her facility and he will need a new living situation where he gets additional care     He is on the waiting list of [facility] on [address]  - assisted living, enhanced care and  memory unit.   [email or web address]     We discussed parkinson speciality care facility in [place]     [email or web address]     Senior linkage line.      He could benefit from  consultation with JAKE Blood.     He could benefit from physical and occupational therapy consultation and possibly a U - step walker      Additional resources provided below     [email or web address]     [email or web address]     Discussion about medications for wearing off - if needed; he has the ability to remember to take his sinemet every 3 hours or so and therefore he does not need rytary at this point     Given his cognitive decline, it will be worth discussing the use of memory enhancing medications such as donepezil (aricept) or/and rivastigmine (excelon) which may also help his gait/freezing. In particular he has on related freezing.      He should return back in 1-3 months.      May consider pharmacy consultation with ngozi Mena, Pharm D down the road to help with medication management ie the above cognitive medications and/or review the rytary option and decision about whether we should continue the gabapentin at the present dose or possibly reduce further but it is probably helping his RLS symptoms.              ______________________________________      Patient was asked about 14 Review of systems including changes in vision (dry eyes, double vision), hearing, heart, lungs, musculoskeletal, depression, anxiety, snoring, RBD, insomnia, urinary frequency, urinary urgency, constipation, swallowing problems, hematological, ID, allergies, skin problems: seborrhea, endocrinological: thyroid, diabetes, cholesterol; balance, weight changes, and other neurological problems and these were not significant at this time except for   Patient Active Problem List   Diagnosis     Parkinson disease (H)     Peripheral neuropathy     Somnolence     Family history  "of Parkinson's disease     Snores     REM sleep behavior disorder     Prostate cancer (H)     Wears glasses     Insomnia     Constipation     History of MRI of brain and brain stem     PFO (patent foramen ovale)     Cerebellar stroke syndrome     H/O echocardiogram     Restless leg syndrome     S/P brain surgery     Hamstring tendonitis at origin     Parkinson's disease (H)     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  Stayed in the hospital 3 days due to severe nausea & \"retching\" .      Comtan Diarrhea     Camphor      Camphor HELENA     Hydrocodone      Other reaction(s): Headache, Nausea Only     Hydrocodone-Acetaminophen Hives and Nausea     Melatonin Other (See Comments)     No benefit.     Ropinirole Fatigue     Fatigue no benefit.      Seroquel [Quetiapine]      Groggy and aggravated rls  No benefit     Adhesive Tape Rash     Camphor Rash     Other reaction(s): Rash     Liquid Adhesive Rash     Past Surgical History:   Procedure Laterality Date     COLONOSCOPY N/A 3/23/2017    Procedure: COLONOSCOPY;  Surgeon: Salbador Paulson MD;  [facility]     PROSTATE SURGERY  3 yrs ago    prostate cancer; Dr. Valladares     REPLACE DEEP BRAIN STIMULATION GENERATOR / BATTERY Right 1/3/2017    Procedure: REPLACE DEEP BRAIN STIMULATION GENERATOR / BATTERY;  Surgeon: Anupam Fair MD;  [facility]     Right Gpi DBS Lead Implantation  5/20/2014     Rt Chest DBS Generator Placement Activa SC  5/29/2014     Past Medical History:   Diagnosis Date     Cerebellar stroke syndrome 8/21/2013    Few small foci of chronic infarct in the right cerebellar hemisphere, unchanged as well as a " new focus of now chronic infarct in the left cerebellar hemisphere since the last study.      Dyslipidemia      Hyperthyroidism      Insomnia 6/17/2011     Parkinson disease (H) 6/15/2011     Peripheral neuropathy 6/15/2011     PFO (patent foramen ovale) 8/21/2013    Interpretation Summary 1. Normal LV size and systolic function. Normal diastolic function 2.  Normal RV size and function 3. No significant valvular abnormalities 4.  Small right-to-left shunt visualized with intravenous agitated saline  contrast study, suggestive of PFO. PatientHeight: 72 in PatientWeight: 205 lbs SystolicPressure: 100 mmHg DiastolicPressure: 63 mmHg BSA 2.2 m^2   Procedure Echoc     PONV (postoperative nausea and vomiting)      Prostate cancer (H) 6/17/2011    s/p radical prostectomy     Social History     Socioeconomic History     Marital status:      Spouse name: Not on file     Number of children: Not on file     Years of education: Not on file     Highest education level: Not on file   Social Needs     Financial resource strain: Not on file     Food insecurity - worry: Not on file     Food insecurity - inability: Not on file     Transportation needs - medical: Not on file     Transportation needs - non-medical: Not on file   Occupational History     Not on file   Tobacco Use     Smoking status: Never Smoker     Smokeless tobacco: Never Used   Substance and Sexual Activity     Alcohol use: Yes     Alcohol/week: 0.6 oz     Comment: EVERY OTHER DAY     Drug use: No     Sexual activity: No   Other Topics Concern     Parent/sibling w/ CABG, MI or angioplasty before 65F 55M? No   Social History Narrative    Son lives in [place] and he has 2 kids    Wife Mayelin       Drug and lactation database from the [place] National Library of Medicine:  http://toxnet.nlm.nih.gov/cgi-bin/sis/htmlgen?LACT      B/P: Data Unavailable, T: Data Unavailable, P: Data Unavailable, R: Data Unavailable 0 lbs 0 oz  There were no vitals taken for  this visit., There is no height or weight on file to calculate BMI.  Medications and Vitals not listed above were documented in the cart and reviewed by me.     Current Outpatient Medications   Medication Sig Dispense Refill     aspirin 81 MG tablet Take 1 tablet (81 mg) by mouth daily 30 tablet 0     carbidopa-levodopa (SINEMET)  MG per tablet 2 TABLETS by mouth @ 3AM, 6AM, 9AM, noon, 3PM, 6PM and 12 midnight = 14/day 1260 tablet 3     fluticasone (FLONASE) 50 MCG/ACT spray 1-2 sprays in both nostril daily @ 7am 1 Bottle 11     gabapentin (NEURONTIN) 300 MG capsule 2 CAPSULES by mouth @ 7am and 11am/noon and 3 capsules by mouth @ 5-6pm   = 7/day 630 capsule 3     methimazole (TAPAZOLE) 5 MG tablet 5mg tab by mouth daily @ 7am 90 tablet 3     polyethylene glycol (MIRALAX/GLYCOLAX) Packet 1 packet by mouth in liquid daily @ 6-7pm 7 packet 11     QUEtiapine (SEROQUEL) 25 MG tablet 2 x 25mg tab by mouth nightly 180 tablet 11         Blaine Boo MD          ______________________________________      Patient was asked about 14 Review of systems including changes in vision (dry eyes, double vision), hearing, heart, lungs, musculoskeletal, depression, anxiety, snoring, RBD, insomnia, urinary frequency, urinary urgency, constipation, swallowing problems, hematological, ID, allergies, skin problems: seborrhea, endocrinological: thyroid, diabetes, cholesterol; balance, weight changes, and other neurological problems and these were not significant at this time except for   Patient Active Problem List   Diagnosis     Parkinson disease (H)     Peripheral neuropathy     Somnolence     Family history of Parkinson's disease     Snores     REM sleep behavior disorder     Prostate cancer (H)     Wears glasses     Insomnia     Constipation     History of MRI of brain and brain stem     PFO (patent foramen ovale)     Cerebellar stroke syndrome     H/O echocardiogram     Restless leg syndrome     S/P brain surgery     Hamstring  "tendonitis at origin     Parkinson's disease (H)     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  Stayed in the hospital 3 days due to severe nausea & \"retching\" .      Comtan Diarrhea     Camphor      Camphor HELENA     Hydrocodone      Other reaction(s): Headache, Nausea Only     Hydrocodone-Acetaminophen Hives and Nausea     Melatonin Other (See Comments)     No benefit.     Ropinirole Fatigue     Fatigue no benefit.      Seroquel [Quetiapine]      Groggy and aggravated rls  No benefit     Adhesive Tape Rash     Camphor Rash     Other reaction(s): Rash     Liquid Adhesive Rash     Past Surgical History:   Procedure Laterality Date     COLONOSCOPY N/A 3/23/2017    Procedure: COLONOSCOPY;  Surgeon: Salbador Paulson MD;  [facility]     PROSTATE SURGERY  3 yrs ago    prostate cancer; Dr. Valladares     REPLACE DEEP BRAIN STIMULATION GENERATOR / BATTERY Right 1/3/2017    Procedure: REPLACE DEEP BRAIN STIMULATION GENERATOR / BATTERY;  Surgeon: Anupam Fair MD;  [facility]     Right Gpi DBS Lead Implantation  5/20/2014     Rt Chest DBS Generator Placement Activa SC  5/29/2014     Past Medical History:   Diagnosis Date     Cerebellar stroke syndrome 8/21/2013    Few small foci of chronic infarct in the right cerebellar hemisphere, unchanged as well as a new focus of now chronic infarct in the left cerebellar hemisphere since the last study.      Dyslipidemia      Hyperthyroidism      Insomnia 6/17/2011     Parkinson disease (H) 6/15/2011     Peripheral neuropathy 6/15/2011     PFO (patent foramen ovale) 8/21/2013    Interpretation Summary 1. Normal LV size and systolic " "function. Normal diastolic function 2.  Normal RV size and function 3. No significant valvular abnormalities 4.  Small right-to-left shunt visualized with intravenous agitated saline  contrast study, suggestive of PFO. PatientHeight: 72 in PatientWeight: 205 lbs SystolicPressure: 100 mmHg DiastolicPressure: 63 mmHg BSA 2.2 m^2   Procedure Echoc     PONV (postoperative nausea and vomiting)      Prostate cancer (H) 6/17/2011    s/p radical prostectomy     Social History     Socioeconomic History     Marital status:      Spouse name: Not on file     Number of children: Not on file     Years of education: Not on file     Highest education level: Not on file   Social Needs     Financial resource strain: Not on file     Food insecurity - worry: Not on file     Food insecurity - inability: Not on file     Transportation needs - medical: Not on file     Transportation needs - non-medical: Not on file   Occupational History     Not on file   Tobacco Use     Smoking status: Never Smoker     Smokeless tobacco: Never Used   Substance and Sexual Activity     Alcohol use: Yes     Alcohol/week: 0.6 oz     Comment: EVERY OTHER DAY     Drug use: No     Sexual activity: No   Other Topics Concern     Parent/sibling w/ CABG, MI or angioplasty before 65F 55M? No   Social History Narrative    Son lives in [place] and he has 2 kids    Wife Mayelin       Drug and lactation database from the [place] National Library of Medicine:  http://toxnet.nlm.nih.gov/cgi-bin/sis/htmlgen?LACT      B/P: 169/99, T: 98.5, P: 82, R: 16 174 lbs 1.6 oz  Blood pressure (!) 169/99, pulse 82, temperature 98.5  F (36.9  C), temperature source Oral, resp. rate 16, height 1.791 m (5' 10.51\"), weight 79 kg (174 lb 1.6 oz), SpO2 99 %., Body mass index is 24.62 kg/m .  Medications and Vitals not listed above were documented in the cart and reviewed by me.     Current Outpatient Medications   Medication Sig Dispense Refill     carbidopa-levodopa (SINEMET) "  MG tablet 2 TABLETS by mouth @ 3AM, 6AM, 9AM, noon, 3PM, 6PM and 12 midnight = 14/day 1260 tablet 3     fluticasone (FLONASE) 50 MCG/ACT spray 1-2 sprays in both nostril daily @ 7am 1 Bottle 11     gabapentin (NEURONTIN) 300 MG capsule 2 CAPSULES by mouth @ 6am and 12pm and 3 capsules by mouth @ 6pm   = 7/day 630 capsule 3     methimazole (TAPAZOLE) 5 MG tablet 5mg tab by mouth daily @ 7am 90 tablet 3     polyethylene glycol (MIRALAX/GLYCOLAX) Packet 1 packet by mouth in liquid daily @ 6-7pm 7 packet 11     QUEtiapine (SEROQUEL) 25 MG tablet 2 x 25mg tab by mouth nightly @ 9pm  = 2/day 180 tablet 11     aspirin 81 MG tablet Take 1 tablet (81 mg) by mouth daily 30 tablet 0         Blaine Boo MD

## 2019-01-29 NOTE — TELEPHONE ENCOUNTER
Patient will not be starting Nuplazid today. The prescription has been cancelled in Epic and the treatment form was filled out and will be scanned into Epic for future use.    Sola Yadav.D.  Medication Therapy Management Pharmacist  Phone: [phone]

## 2019-01-29 NOTE — TELEPHONE ENCOUNTER
Prior Authorization Not Needed per Insurance    Medication: Nuplazid 34MG Capsules (NO PA NEEDED)  Insurance Company: [employer] - Phone [phone] Fax [phone]  Expected CoPay:    $2349.23  [facility] - [place], [place] - [address]   Pharmacy Notified: Yes  Patient Notified: Yes

## 2019-01-29 NOTE — PATIENT INSTRUCTIONS
Recommendations from today's MTM visit:                                                    MTM (medication therapy management) is a service provided by a clinical pharmacist designed to help you get the most of out of your medicines.     1. We're going to start the rivastigmine (Exelon) patch 4.6 mg daily x 1 month, then 9.5 mg daily x 1 month.     2. DO NOT  Nuplazid. This is a medication we may consider in the future.     3.  lidocaine cream. Try Tylenol - you can take 500 mg 4 times/day.     Next MTM visit: 3/25/19 at 8:30 am (I will call you)    To schedule another MTM appointment, please call the clinic directly or you may call the MTM scheduling line at [phone] or toll-free at [phone].     My Clinical Pharmacist's contact information:                                                      It was a pleasure talking with you today!  Please feel free to contact me with any questions or concerns you have.      Rachel Mena, Pharm.D.  Medication Therapy Management Pharmacist  Phone: [phone]    You may receive a survey about the MTM services you received.  I would appreciate your feedback to help me serve you better in the future. Please fill it out and return it when you can. Your comments will be anonymous.

## 2019-01-29 NOTE — LETTER
2019      RE: Norberto Wu  [address]  [facility] [address]       Diagnosis/Summary/Recommendations:    PATIENT: Norberto Wu  73 year old male     : 1945    GAMA: 2019    Parkinson  dbs    Freezing in the evening.       Still in [facility].   It is assisted living. He sets up his pills.     He continues to have hallucinations at night.   They are formed.   Fine structure  Sometimes sees people.   One time he thought that there were 3 people in his room - children and was scared.   Was babysitting his daughter the next day.       Medications     3am 6am 9am 12p 3p 6p 9p midnight   Aspirin 81mg           Carbidopa/levodopa sinemet 25/100 2 2 2 2 2 2 2 2   Fluticasone flonase 50mcg/act spray           Gabapentin neurontin 300mg  2  2  2     Lidocaine lmx4 4% external cream      Apply as needed     Methimazole tapazole 5mg  stopped         Polyethylene glycol miralax      1     Quetiapine seroquel 25mg       2                              History obtained from patient     PLAN  1. Review of his sinemet. Hold off changing this now - discussion about rytary and stalevo 200  2. Discussion of quetiapine dose increase from 2 x 25mg tab at bedtime for hallucinations.   3. Discussion about the use of nuplazid (pimavanserin) - would need prior authorization for hallucinations   4. Discussion about cognitive enhancing medications. His 446 is his most recent QTc  A. Rivastigmine patch (exelon) 4.6 mg patch daily x 1month then 9.5mg patch daily x 1 month then 13.3mg patch daily  B. Donepezil (aricept) 5mg tab/capsule by mouth for one month then 10mg  C. Namenda (memantine) dose increase weekly starting with 5mg/day and then increasing over 4 weeks to 10mg twice daily    Return to see Lucy in 1-3 months  Pharmacy consultation today if possible with Rachel Mena, Pharm D.     Coding statement:   Duration of  Services: patient care and care coordination was 25 minutes  Greater  than 50% of this visit was spent in counseling and coordination of care.     Blaine Boo MD     ______________________________________    Last visit date and details:      GAMA: October 23, 2018     Parkinson  dbs     Wife Mayelin is on hospice for metastatic breast cancer  Lottie Wu daughter in law and Son Caleb Wu.   [facility]  This is not a permanent address.      Wondering about taking aspirin      Generally his medications are working well     Will be seeing Dr. Howe today and will review his blood pressure and discuss his other medications such as the atorvastatin which he reportedly has stopped and to review his thyroid medication.      He is having some falls and associated freezing that is causing the falls.      2.83 voltage                 Medications     3am 6-7am 11am-12noon 3pm 6pm 9pm midnight   Aspirin 81mg   1             Atorvastatin lipitor 10mg   stopped             Bisacodyl dulcolax 10mg   stopped             Carbidopa/levodopa sinemet 25/100 2 2 2 2 2 2 2   Fluticasone flonase 50 mcg/act nasal spray   1 spray             Gabapentin/neurontin 300mg   2 2   3       Methimazole tapazole 5mg    1             Polyethylene glycol miralax         Daily @ 6-7pm                                                                                                                                                             History obtained from patient      PLAN  Given the stress ongoing with his wife being in hospice this has impacted his health overall.   He has on related freezing despite medications and his dbs system working well.   He has had cognitive decline in the past few weeks.      He is temporarily living with his wife in her facility and he will need a new living situation where he gets additional care     He is on the waiting list of [facility] on [facility] assisted living, enhanced care and memory unit.    [email or web address]     We discussed parkinson speciality care facility in [place]     [email or web address]     Senior linkage line.      He could benefit from  consultation with JAKE Blood.     He could benefit from physical and occupational therapy consultation and possibly a U - step walker      Additional resources provided below     [email or web address]     [email or web address]     Discussion about medications for wearing off - if needed; he has the ability to remember to take his sinemet every 3 hours or so and therefore he does not need rytary at this point     Given his cognitive decline, it will be worth discussing the use of memory enhancing medications such as donepezil (aricept) or/and rivastigmine (excelon) which may also help his gait/freezing. In particular he has on related freezing.      He should return back in 1-3 months.      May consider pharmacy consultation with ngozi Mena, Pharm D down the road to help with medication management ie the above cognitive medications and/or review the rytary option and decision about whether we should continue the gabapentin at the present dose or possibly reduce further but it is probably helping his RLS symptoms.              ______________________________________      Patient was asked about 14 Review of systems including changes in vision (dry eyes, double vision), hearing, heart, lungs, musculoskeletal, depression, anxiety, snoring, RBD, insomnia, urinary frequency, urinary urgency, constipation, swallowing problems, hematological, ID, allergies, skin problems: seborrhea, endocrinological: thyroid, diabetes, cholesterol; balance, weight changes, and other neurological problems and these were not significant at this time except for   Patient Active Problem List   Diagnosis     Parkinson disease (H)     Peripheral neuropathy     Somnolence     Family history of Parkinson's  "disease     Snores     REM sleep behavior disorder     Prostate cancer (H)     Wears glasses     Insomnia     Constipation     History of MRI of brain and brain stem     PFO (patent foramen ovale)     Cerebellar stroke syndrome     H/O echocardiogram     Restless leg syndrome     S/P brain surgery     Hamstring tendonitis at origin     Parkinson's disease (H)     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  Stayed in the hospital 3 days due to severe nausea & \"retching\" .      Comtan Diarrhea     Camphor      Camphor HELENA     Hydrocodone      Other reaction(s): Headache, Nausea Only     Hydrocodone-Acetaminophen Hives and Nausea     Melatonin Other (See Comments)     No benefit.     Ropinirole Fatigue     Fatigue no benefit.      Seroquel [Quetiapine]      Groggy and aggravated rls  No benefit     Adhesive Tape Rash     Camphor Rash     Other reaction(s): Rash     Liquid Adhesive Rash     Past Surgical History:   Procedure Laterality Date     COLONOSCOPY N/A 3/23/2017    Procedure: COLONOSCOPY;  Surgeon: Salbador Paulson MD;  Location: [facility]     PROSTATE SURGERY  3 yrs ago    prostate cancer; Dr. Valladares     REPLACE DEEP BRAIN STIMULATION GENERATOR / BATTERY Right 1/3/2017    Procedure: REPLACE DEEP BRAIN STIMULATION GENERATOR / BATTERY;  Surgeon: Anupam Fair MD;  Location: [facility] OR     Right Gpi DBS Lead Implantation  5/20/2014     Rt Chest DBS Generator Placement Activa SC  5/29/2014     Past Medical History:   Diagnosis Date     Cerebellar stroke syndrome 8/21/2013    Few small foci of chronic infarct in the right cerebellar hemisphere, unchanged as well as a new focus of now " chronic infarct in the left cerebellar hemisphere since the last study.      Dyslipidemia      Hyperthyroidism      Insomnia 6/17/2011     Parkinson disease (H) 6/15/2011     Peripheral neuropathy 6/15/2011     PFO (patent foramen ovale) 8/21/2013    Interpretation Summary 1. Normal LV size and systolic function. Normal diastolic function 2.  Normal RV size and function 3. No significant valvular abnormalities 4.  Small right-to-left shunt visualized with intravenous agitated saline  contrast study, suggestive of PFO. PatientHeight: 72 in PatientWeight: 205 lbs SystolicPressure: 100 mmHg DiastolicPressure: 63 mmHg BSA 2.2 m^2   Procedure Echoc     PONV (postoperative nausea and vomiting)      Prostate cancer (H) 6/17/2011    s/p radical prostectomy     Social History     Socioeconomic History     Marital status:      Spouse name: Not on file     Number of children: Not on file     Years of education: Not on file     Highest education level: Not on file   Social Needs     Financial resource strain: Not on file     Food insecurity - worry: Not on file     Food insecurity - inability: Not on file     Transportation needs - medical: Not on file     Transportation needs - non-medical: Not on file   Occupational History     Not on file   Tobacco Use     Smoking status: Never Smoker     Smokeless tobacco: Never Used   Substance and Sexual Activity     Alcohol use: Yes     Alcohol/week: 0.6 oz     Comment: EVERY OTHER DAY     Drug use: No     Sexual activity: No   Other Topics Concern     Parent/sibling w/ CABG, MI or angioplasty before 65F 55M? No   Social History Narrative    Son lives in [place] and he has 2 kids    Wife Mayelin       Drug and lactation database from the [place] National Library of Medicine:  http://toxnet.nlm.nih.gov/cgi-bin/sis/htmlgen?LACT      B/P: Data Unavailable, T: Data Unavailable, P: Data Unavailable, R: Data Unavailable 0 lbs 0 oz  There were no vitals taken for this visit., There  is no height or weight on file to calculate BMI.  Medications and Vitals not listed above were documented in the cart and reviewed by me.     Current Outpatient Medications   Medication Sig Dispense Refill     aspirin 81 MG tablet Take 1 tablet (81 mg) by mouth daily 30 tablet 0     carbidopa-levodopa (SINEMET)  MG per tablet 2 TABLETS by mouth @ 3AM, 6AM, 9AM, noon, 3PM, 6PM and 12 midnight = 14/day 1260 tablet 3     fluticasone (FLONASE) 50 MCG/ACT spray 1-2 sprays in both nostril daily @ 7am 1 Bottle 11     gabapentin (NEURONTIN) 300 MG capsule 2 CAPSULES by mouth @ 7am and 11am/noon and 3 capsules by mouth @ 5-6pm   = 7/day 630 capsule 3     methimazole (TAPAZOLE) 5 MG tablet 5mg tab by mouth daily @ 7am 90 tablet 3     polyethylene glycol (MIRALAX/GLYCOLAX) Packet 1 packet by mouth in liquid daily @ 6-7pm 7 packet 11     QUEtiapine (SEROQUEL) 25 MG tablet 2 x 25mg tab by mouth nightly 180 tablet 11         Blaine Boo MD          ______________________________________      Patient was asked about 14 Review of systems including changes in vision (dry eyes, double vision), hearing, heart, lungs, musculoskeletal, depression, anxiety, snoring, RBD, insomnia, urinary frequency, urinary urgency, constipation, swallowing problems, hematological, ID, allergies, skin problems: seborrhea, endocrinological: thyroid, diabetes, cholesterol; balance, weight changes, and other neurological problems and these were not significant at this time except for   Patient Active Problem List   Diagnosis     Parkinson disease (H)     Peripheral neuropathy     Somnolence     Family history of Parkinson's disease     Snores     REM sleep behavior disorder     Prostate cancer (H)     Wears glasses     Insomnia     Constipation     History of MRI of brain and brain stem     PFO (patent foramen ovale)     Cerebellar stroke syndrome     H/O echocardiogram     Restless leg syndrome     S/P brain surgery     Hamstring tendonitis at origin  "    Parkinson's disease (H)     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  Stayed in the hospital 3 days due to severe nausea & \"retching\" .      Comtan Diarrhea     Camphor      Camphor HELENA     Hydrocodone      Other reaction(s): Headache, Nausea Only     Hydrocodone-Acetaminophen Hives and Nausea     Melatonin Other (See Comments)     No benefit.     Ropinirole Fatigue     Fatigue no benefit.      Seroquel [Quetiapine]      Groggy and aggravated rls  No benefit     Adhesive Tape Rash     Camphor Rash     Other reaction(s): Rash     Liquid Adhesive Rash     Past Surgical History:   Procedure Laterality Date     COLONOSCOPY N/A 3/23/2017    Procedure: COLONOSCOPY;  Surgeon: Salbador Paulson MD;  Location: [facility]     PROSTATE SURGERY  3 yrs ago    prostate cancer; Dr. Valladares     REPLACE DEEP BRAIN STIMULATION GENERATOR / BATTERY Right 1/3/2017    Procedure: REPLACE DEEP BRAIN STIMULATION GENERATOR / BATTERY;  Surgeon: Anupam Fair MD;  Location: [facility] OR     Right Gpi DBS Lead Implantation  5/20/2014     Rt Chest DBS Generator Placement Activa SC  5/29/2014     Past Medical History:   Diagnosis Date     Cerebellar stroke syndrome 8/21/2013    Few small foci of chronic infarct in the right cerebellar hemisphere, unchanged as well as a new focus of now chronic infarct in the left cerebellar hemisphere since the last study.      Dyslipidemia      Hyperthyroidism      Insomnia 6/17/2011     Parkinson disease (H) 6/15/2011     Peripheral neuropathy 6/15/2011     PFO (patent foramen ovale) 8/21/2013    Interpretation Summary 1. Normal LV size and systolic function. Normal diastolic " "function 2.  Normal RV size and function 3. No significant valvular abnormalities 4.  Small right-to-left shunt visualized with intravenous agitated saline  contrast study, suggestive of PFO. PatientHeight: 72 in PatientWeight: 205 lbs SystolicPressure: 100 mmHg DiastolicPressure: 63 mmHg BSA 2.2 m^2   Procedure Echoc     PONV (postoperative nausea and vomiting)      Prostate cancer (H) 6/17/2011    s/p radical prostectomy     Social History     Socioeconomic History     Marital status:      Spouse name: Not on file     Number of children: Not on file     Years of education: Not on file     Highest education level: Not on file   Social Needs     Financial resource strain: Not on file     Food insecurity - worry: Not on file     Food insecurity - inability: Not on file     Transportation needs - medical: Not on file     Transportation needs - non-medical: Not on file   Occupational History     Not on file   Tobacco Use     Smoking status: Never Smoker     Smokeless tobacco: Never Used   Substance and Sexual Activity     Alcohol use: Yes     Alcohol/week: 0.6 oz     Comment: EVERY OTHER DAY     Drug use: No     Sexual activity: No   Other Topics Concern     Parent/sibling w/ CABG, MI or angioplasty before 65F 55M? No   Social History Narrative    Son lives in [place] and he has 2 kids    Wife Mayelin       Drug and lactation database from the [place] National Library of Medicine:  http://toxnet.nlm.nih.gov/cgi-bin/sis/htmlgen?LACT      B/P: 169/99, T: 98.5, P: 82, R: 16 174 lbs 1.6 oz  Blood pressure (!) 169/99, pulse 82, temperature 98.5  F (36.9  C), temperature source Oral, resp. rate 16, height 1.791 m (5' 10.51\"), weight 79 kg (174 lb 1.6 oz), SpO2 99 %., Body mass index is 24.62 kg/m .  Medications and Vitals not listed above were documented in the cart and reviewed by me.     Current Outpatient Medications   Medication Sig Dispense Refill     carbidopa-levodopa (SINEMET)  MG tablet 2 TABLETS by " mouth @ 3AM, 6AM, 9AM, noon, 3PM, 6PM and 12 midnight = 14/day 1260 tablet 3     fluticasone (FLONASE) 50 MCG/ACT spray 1-2 sprays in both nostril daily @ 7am 1 Bottle 11     gabapentin (NEURONTIN) 300 MG capsule 2 CAPSULES by mouth @ 6am and 12pm and 3 capsules by mouth @ 6pm   = 7/day 630 capsule 3     methimazole (TAPAZOLE) 5 MG tablet 5mg tab by mouth daily @ 7am 90 tablet 3     polyethylene glycol (MIRALAX/GLYCOLAX) Packet 1 packet by mouth in liquid daily @ 6-7pm 7 packet 11     QUEtiapine (SEROQUEL) 25 MG tablet 2 x 25mg tab by mouth nightly @ 9pm  = 2/day 180 tablet 11     aspirin 81 MG tablet Take 1 tablet (81 mg) by mouth daily 30 tablet 0         Blaine Boo MD

## 2019-01-29 NOTE — PROGRESS NOTES
HPI:    Pt. Comes in today for follow up and DBS battery exchage procedure. He has followed with Dr. Boo, Neurology for Parkinson's disease and was seen today 1/29/2019. He saw Dr. Rothman, Neurosurgery today as well 1/29/2019. He denies any rest/exertional CP/SOB. No anesthesia or bleeding issues. He had significant adverse reaction in the hospital to hydromorphone (dilaudid)  He has h/o prostate CA and is followed at [facility].  Colonoscopy 3/17 possibly repeat in 5 years. He is with his son and daughter-in-law today.  He has moved to new Care Facility in the next few weeks.  No other HEENT, cardiopulmonary, abdominal, , neurological, systemic complaints. He does follow with outside dermatology.      Past Medical History:   Diagnosis Date     Cerebellar stroke syndrome 8/21/2013    Few small foci of chronic infarct in the right cerebellar hemisphere, unchanged as well as a new focus of now chronic infarct in the left cerebellar hemisphere since the last study.      Dyslipidemia      Hyperthyroidism      Insomnia 6/17/2011     Parkinson disease (H) 6/15/2011     Peripheral neuropathy 6/15/2011     PFO (patent foramen ovale) 8/21/2013    Interpretation Summary 1. Normal LV size and systolic function. Normal diastolic function 2.  Normal RV size and function 3. No significant valvular abnormalities 4.  Small right-to-left shunt visualized with intravenous agitated saline  contrast study, suggestive of PFO. PatientHeight: 72 in PatientWeight: 205 lbs SystolicPressure: 100 mmHg DiastolicPressure: 63 mmHg BSA 2.2 m^2   Procedure Echoc     PONV (postoperative nausea and vomiting)      Prostate cancer (H) 6/17/2011    s/p radical prostectomy     Past Surgical History:   Procedure Laterality Date     COLONOSCOPY N/A 3/23/2017    Procedure: COLONOSCOPY;  Surgeon: Salbador Paulson MD;  Location:  GI     PROSTATE SURGERY  3 yrs ago    prostate cancer; Dr. Valladares     REPLACE DEEP BRAIN STIMULATION  GENERATOR / BATTERY Right 1/3/2017    Procedure: REPLACE DEEP BRAIN STIMULATION GENERATOR / BATTERY;  Surgeon: Anupam Fair MD;  Location: [facility] OR     Right Gpi DBS Lead Implantation  5/20/2014     Rt Chest DBS Generator Placement Activa SC  5/29/2014         PE:    Vitals noted gen nad cooperative alert, HEENT; Oropharynx clear no exudate neck supple nl rom no adenopathy, LCTA, B, normal resp. System exam, RRR, S1, S2, no MRG, abdomen, nt, nd, resting tremor in hands.  No Paraspinous tenderness to palpation, no skin rash.    EKG from 1/31/2018; SR at 84, tremor or electronic interference in several leads. No significant change from 12/26/2016    A/P:    Low risk surgery for DBS battery exchange. He will avoid ASA/NAIDS before surgery. Will discuss with Dr. Boo Neurology, if we start low dose antihypertensive (if not due to Parkinson's related autonomic dysfunction). He can remain on his other medications. Avoid hydromorphone as above.    1. Parkinson's he follows here in Neurology and seen today 1/29/2019 by Dr. Boo  2. Check with insurance for new Shingles vaccine  3. Increased cholesterol;  fasting lipids and liver tests done 10/9/17 and stable; he remains on Lipitor. For leg complaints he will stop for about 2 weeks. Checked electrolytes and magnesium on 10/5/2018 unremarkable. He states stopping Lipitor made no difference in his leg sxs and he will go back on this medication.    4. He will continue to follow outside Urology for h/o prostate CA. Will check PSA checked 10/9/17 and 0.13; value 3/7/17 was 0.12. He states he follows with Dr. Valladares and that is PSA usually is in the 0.1 range and stable.   5. He can follow up in Health Psychology for anxiety issues.   6. Low TSH checked 9/14; not on replacement checked  TPO and TSI labs done. He has followed up with Dr. Mccarty 6/17 and  6/8/2018.   He is on methimazole. TSH was 0.95 on 10/5/2018. TSH on 12/31/2018 0.09 and T4 1.03. Will discuss  again with Endocrine   7. Placed future dermatology referral for skin check 9/11/17.  8. Colonoscopy done 3/2017 repeat in 5 years. He will try Miralax for constipation and this has worked  9. BP monitor from 10/2017 normal BP. Elevated today but quite low (93/61 on 10/5/2018). As above may start low dose antihypertensive medication  10. Melatonin for sleep     Total time spent 25  minutes.  More than 50% of the time spent with Mr. Wu on counseling / coordinating his care    Addendum;    I personally spoke with Dr. Mccarty, Endocrinology today regarding low TSH and upcoming surgery. OK to proceed with surgery and will follow up with recheck of thyroid testing    MOHIT Howe MD

## 2019-01-30 LAB — THYROPEROXIDASE AB SERPL-ACNC: <10 IU/ML

## 2019-02-01 ENCOUNTER — ANESTHESIA EVENT (OUTPATIENT)
Dept: SURGERY | Facility: CLINIC | Age: 74
End: 2019-02-01
Payer: COMMERCIAL

## 2019-02-01 DIAGNOSIS — E05.90 SUBCLINICAL HYPERTHYROIDISM: ICD-10-CM

## 2019-02-01 DIAGNOSIS — E04.1 WARM THYROID NODULE: ICD-10-CM

## 2019-02-01 DIAGNOSIS — R94.6 THYROID FUNCTION TEST ABNORMAL: Primary | ICD-10-CM

## 2019-02-01 LAB — TSI SER-ACNC: <1 TSI INDEX

## 2019-02-01 NOTE — TELEPHONE ENCOUNTER
Dear Marj;    Norberto's TSH is off (he has subclinical hyperthyroidsim). He should be taking Methimazole?  He has worse Parkinson's as well.     (1) Please give his daughter-in-law a phone call to see if he is taking the Methimazole?    (2) Placed future thyroid lab orders for a month or so.      Thanks, MOHIT Howe        Dear Norberto;    Your labs are attached. Your thyroid labs show some over-replacement. I discussed with Dr. Mccarty today and we will follow up.     MOHIT Howe MD

## 2019-02-02 DIAGNOSIS — E55.9 VITAMIN D DEFICIENCY: Primary | ICD-10-CM

## 2019-02-03 ASSESSMENT — LIFESTYLE VARIABLES: TOBACCO_USE: 0

## 2019-02-03 NOTE — ANESTHESIA PREPROCEDURE EVALUATION
Anesthesia Pre-Procedure Evaluation    Patient: Norberto Wu   MRN:     [id number] Gender:   male   Age:    73 year old :      1945        Preoperative Diagnosis: Parkinson Disease   Procedure(s):  Right Deep Brain Stimulatory Battery Replacement     Past Medical History:   Diagnosis Date     Cerebellar stroke syndrome 2013    Few small foci of chronic infarct in the right cerebellar hemisphere, unchanged as well as a new focus of now chronic infarct in the left cerebellar hemisphere since the last study.      Dyslipidemia      Hyperthyroidism      Insomnia 2011     Parkinson disease (H) 6/15/2011     Peripheral neuropathy 6/15/2011     PFO (patent foramen ovale) 2013    Interpretation Summary 1. Normal LV size and systolic function. Normal diastolic function 2.  Normal RV size and function 3. No significant valvular abnormalities 4.  Small right-to-left shunt visualized with intravenous agitated saline  contrast study, suggestive of PFO. PatientHeight: 72 in PatientWeight: 205 lbs SystolicPressure: 100 mmHg DiastolicPressure: 63 mmHg BSA 2.2 m^2   Procedure Echoc     PONV (postoperative nausea and vomiting)      Prostate cancer (H) 2011    s/p radical prostectomy      Past Surgical History:   Procedure Laterality Date     COLONOSCOPY N/A 3/23/2017    Procedure: COLONOSCOPY;  Surgeon: Salbador Paulson MD;  Location: [facility]     PROSTATE SURGERY  3 yrs ago    prostate cancer; Dr. Valladares     REPLACE DEEP BRAIN STIMULATION GENERATOR / BATTERY Right 1/3/2017    Procedure: REPLACE DEEP BRAIN STIMULATION GENERATOR / BATTERY;  Surgeon: Anupam Fair MD;  Location: [facility]     Right Gpi DBS Lead Implantation  2014     Rt Chest DBS Generator Placement Activa SC  2014          Anesthesia Evaluation     . Pt has had prior anesthetic. Type: General and MAC    History of anesthetic complications   - PONV        ROS/MED HX    ENT/Pulmonary:     (+)FORTINO risk factors snores  loudly, , . .   (-) tobacco use and sleep apnea   Neurologic:     (+)neuropathy CVA date: found incidently without deficitsParkinson's disease features: tremors, stiffness in LE's, freezing, slurred speech, shuffling,     Cardiovascular: Comment: History of PFO    (+) Dyslipidemia, ----. : . . . :. . Previous cardiac testing Echodate:2013results:    Echocardiogram 2013  Normal exercise stress echocardiogram without evidence of infarct or   ischemia. Normal resting LV function with EF of approximately 60-65%; normal   response to exercise with increase to approximately 70-75%. No stress induced   regional wall motion abnormalities. Good exercise capacity. No ECG evidence   of ischemia. No subjective symptoms of ischemia.No significant valvular   disease noted on routine screening color flow Doppler and pulsed Doppler   Examination.     Echocardiogram with bubble 2013  1. Normal LV size and systolic function. Normal diastolic function  2.    Normal RV size and function  3. No significant valvular abnormalities  4.   Small right-to-left shunt visualized with intravenous agitated saline   contrast study, suggestive of PFO.    date: results:ECG reviewed date:12/26/16 results:NSR date: results:          METS/Exercise Tolerance: Comment: Walks 30 min on treadmill at 3.2 MPH >4 METS   Hematologic:  - neg hematologic  ROS       Musculoskeletal:  - neg musculoskeletal ROS       GI/Hepatic:     (+) Other GI/Hepatic chronic constipation      Renal/Genitourinary:  - ROS Renal section negative       Endo:  - neg endo ROS       Psychiatric:     (+) psychiatric history anxiety      Infectious Disease:  - neg infectious disease ROS       Malignancy:   (+) Malignancy History of Prostate  Prostate CA Remission status post Surgery,         Other:                         PHYSICAL EXAM:   Mental Status/Neuro:    Airway: Facies: Feasible  Mallampati: II  Mouth/Opening: Full  TM distance: > 6 cm  Neck ROM: Full   Respiratory: Auscultation:  "CTAB     Resp. Rate: Normal     Resp. Effort: Normal      CV: Rhythm: Regular  Rate: Age appropriate  Heart: Normal Sounds   Comments:      Dental:  Dental Comments: Permanent crowns                Lab Results   Component Value Date    WBC 6.8 01/29/2019    HGB 14.0 01/29/2019    HCT 43.0 01/29/2019     01/29/2019    CRP <5.0 04/30/2009    SED 5 04/30/2009     01/29/2019    POTASSIUM 3.9 01/29/2019    CHLORIDE 109 01/29/2019    CO2 26 01/29/2019    BUN 19 01/29/2019    CR 0.58 (L) 01/29/2019     (H) 01/29/2019    NAVDEEP 8.4 (L) 01/29/2019    MAG 2.4 (H) 10/05/2018    ALBUMIN 4.0 01/29/2019    PROTTOTAL 7.4 01/29/2019    ALT 7 01/29/2019    AST 11 01/29/2019    ALKPHOS 118 01/29/2019    BILITOTAL 1.3 01/29/2019    LIPASE 68 (L) 12/31/2018    PTT 26 01/03/2017    INR 1.13 01/03/2017    TSH 0.06 (L) 01/29/2019    T4 1.38 01/29/2019    T3 138 02/13/2015       Preop Vitals  BP Readings from Last 3 Encounters:   01/29/19 152/86   01/29/19 (!) 186/98   01/29/19 (!) 169/99    Pulse Readings from Last 3 Encounters:   01/29/19 80   01/29/19 60   01/29/19 82      Resp Readings from Last 3 Encounters:   01/29/19 20   01/29/19 16   12/31/18 16    SpO2 Readings from Last 3 Encounters:   01/29/19 99%   01/29/19 99%   12/31/18 98%      Temp Readings from Last 1 Encounters:   01/29/19 36.9  C (98.5  F) (Oral)    Ht Readings from Last 1 Encounters:   01/29/19 1.791 m (5' 10.5\")      Wt Readings from Last 1 Encounters:   01/29/19 80.8 kg (178 lb 3.2 oz)    Estimated body mass index is 25.21 kg/m  as calculated from the following:    Height as of 1/29/19: 1.791 m (5' 10.5\").    Weight as of 1/29/19: 80.8 kg (178 lb 3.2 oz).     LDA:            Assessment:   ASA SCORE: 3       Documentation: H&P complete; Preop Testing complete; Consents complete   Proceeding: Proceed without further delay  Tobacco Use:  Active user of Tobacco     Plan:   Anes. Type:  MAC   Pre-Induction: Acetaminophen PO   Induction:  IV (Standard); " Propofol   Airway: Native Airway   Access/Monitoring: PIV   Maintenance: Propofol; IV   Emergence: Recovery Site (PACU/ICU)   Logistics: Same Day Surgery     Postop Pain/Sedation Strategy:  Standard-Options: Opioids PRN     PONV Management:  Adult Risk Factors:, H/o PONV or Motion Sickness, Postop Opioids  Prevention: Propofol Infusion; Ondansetron     CONSENT: Direct conversation   Plan and risks discussed with: Patient   Blood Products: N/a                         Cam Rahman MD

## 2019-02-04 ENCOUNTER — DOCUMENTATION ONLY (OUTPATIENT)
Dept: NEUROLOGY | Facility: CLINIC | Age: 74
End: 2019-02-04

## 2019-02-04 ENCOUNTER — HOSPITAL ENCOUNTER (OUTPATIENT)
Facility: CLINIC | Age: 74
Discharge: HOME | End: 2019-02-04
Attending: NEUROLOGICAL SURGERY | Admitting: NEUROLOGICAL SURGERY
Payer: COMMERCIAL

## 2019-02-04 ENCOUNTER — ANESTHESIA (OUTPATIENT)
Dept: SURGERY | Facility: CLINIC | Age: 74
End: 2019-02-04
Payer: COMMERCIAL

## 2019-02-04 VITALS
WEIGHT: 169.31 LBS | HEIGHT: 72 IN | RESPIRATION RATE: 20 BRPM | BODY MASS INDEX: 22.93 KG/M2 | SYSTOLIC BLOOD PRESSURE: 153 MMHG | OXYGEN SATURATION: 98 % | HEART RATE: 80 BPM | DIASTOLIC BLOOD PRESSURE: 85 MMHG | TEMPERATURE: 98.3 F

## 2019-02-04 DIAGNOSIS — Z96.89 S/P DEEP BRAIN STIMULATOR PLACEMENT: Primary | ICD-10-CM

## 2019-02-04 LAB
ANION GAP SERPL CALCULATED.3IONS-SCNC: 6 MMOL/L (ref 3–14)
APTT PPP: 27 SEC (ref 22–37)
BUN SERPL-MCNC: 24 MG/DL (ref 7–30)
CALCIUM SERPL-MCNC: 8.4 MG/DL (ref 8.5–10.1)
CHLORIDE SERPL-SCNC: 108 MMOL/L (ref 94–109)
CO2 SERPL-SCNC: 29 MMOL/L (ref 20–32)
CREAT SERPL-MCNC: 0.58 MG/DL (ref 0.66–1.25)
ERYTHROCYTE [DISTWIDTH] IN BLOOD BY AUTOMATED COUNT: 14 % (ref 10–15)
GFR SERPL CREATININE-BSD FRML MDRD: >90 ML/MIN/{1.73_M2}
GLUCOSE BLDC GLUCOMTR-MCNC: 76 MG/DL (ref 70–99)
GLUCOSE SERPL-MCNC: 88 MG/DL (ref 70–99)
HCT VFR BLD AUTO: 39.9 % (ref 40–53)
HGB BLD-MCNC: 13.1 G/DL (ref 13.3–17.7)
INR PPP: 1.11 (ref 0.86–1.14)
MCH RBC QN AUTO: 29.8 PG (ref 26.5–33)
MCHC RBC AUTO-ENTMCNC: 32.8 G/DL (ref 31.5–36.5)
MCV RBC AUTO: 91 FL (ref 78–100)
PLATELET # BLD AUTO: 220 10E9/L (ref 150–450)
POTASSIUM SERPL-SCNC: 3.4 MMOL/L (ref 3.4–5.3)
RBC # BLD AUTO: 4.39 10E12/L (ref 4.4–5.9)
SODIUM SERPL-SCNC: 142 MMOL/L (ref 133–144)
WBC # BLD AUTO: 5.4 10E9/L (ref 4–11)

## 2019-02-04 PROCEDURE — 25000128 H RX IP 250 OP 636: Performed by: STUDENT IN AN ORGANIZED HEALTH CARE EDUCATION/TRAINING PROGRAM

## 2019-02-04 PROCEDURE — 37000008 ZZH ANESTHESIA TECHNICAL FEE, 1ST 30 MIN: Performed by: NEUROLOGICAL SURGERY

## 2019-02-04 PROCEDURE — 25000128 H RX IP 250 OP 636: Performed by: NEUROLOGICAL SURGERY

## 2019-02-04 PROCEDURE — 36415 COLL VENOUS BLD VENIPUNCTURE: CPT

## 2019-02-04 PROCEDURE — 85730 THROMBOPLASTIN TIME PARTIAL: CPT

## 2019-02-04 PROCEDURE — C1767 GENERATOR, NEURO NON-RECHARG: HCPCS | Performed by: NEUROLOGICAL SURGERY

## 2019-02-04 PROCEDURE — 25000132 ZZH RX MED GY IP 250 OP 250 PS 637: Performed by: STUDENT IN AN ORGANIZED HEALTH CARE EDUCATION/TRAINING PROGRAM

## 2019-02-04 PROCEDURE — 37000009 ZZH ANESTHESIA TECHNICAL FEE, EACH ADDTL 15 MIN: Performed by: NEUROLOGICAL SURGERY

## 2019-02-04 PROCEDURE — 25000125 ZZHC RX 250: Performed by: STUDENT IN AN ORGANIZED HEALTH CARE EDUCATION/TRAINING PROGRAM

## 2019-02-04 PROCEDURE — 82962 GLUCOSE BLOOD TEST: CPT

## 2019-02-04 PROCEDURE — 25000128 H RX IP 250 OP 636

## 2019-02-04 PROCEDURE — 27210794 ZZH OR GENERAL SUPPLY STERILE: Performed by: NEUROLOGICAL SURGERY

## 2019-02-04 PROCEDURE — 40000170 ZZH STATISTIC PRE-PROCEDURE ASSESSMENT II: Performed by: NEUROLOGICAL SURGERY

## 2019-02-04 PROCEDURE — 80048 BASIC METABOLIC PNL TOTAL CA: CPT

## 2019-02-04 PROCEDURE — 71000027 ZZH RECOVERY PHASE 2 EACH 15 MINS: Performed by: NEUROLOGICAL SURGERY

## 2019-02-04 PROCEDURE — 25000125 ZZHC RX 250: Performed by: NEUROLOGICAL SURGERY

## 2019-02-04 PROCEDURE — 36000059 ZZH SURGERY LEVEL 3 EA 15 ADDTL MIN UMMC: Performed by: NEUROLOGICAL SURGERY

## 2019-02-04 PROCEDURE — 85610 PROTHROMBIN TIME: CPT

## 2019-02-04 PROCEDURE — 85027 COMPLETE CBC AUTOMATED: CPT

## 2019-02-04 PROCEDURE — 36000057 ZZH SURGERY LEVEL 3 1ST 30 MIN - UMMC: Performed by: NEUROLOGICAL SURGERY

## 2019-02-04 DEVICE — NEUROSTIMULATOR MEDT ACTIVA SC 37603
Type: IMPLANTABLE DEVICE | Site: CHEST  WALL | Status: NON-FUNCTIONAL
Removed: 2021-04-16

## 2019-02-04 RX ORDER — PROPOFOL 10 MG/ML
INJECTION, EMULSION INTRAVENOUS CONTINUOUS PRN
Status: DISCONTINUED | OUTPATIENT
Start: 2019-02-04 | End: 2019-02-04

## 2019-02-04 RX ORDER — CEFAZOLIN SODIUM 2 G/100ML
2 INJECTION, SOLUTION INTRAVENOUS
Status: COMPLETED | OUTPATIENT
Start: 2019-02-04 | End: 2019-02-04

## 2019-02-04 RX ORDER — FENTANYL CITRATE 50 UG/ML
INJECTION, SOLUTION INTRAMUSCULAR; INTRAVENOUS PRN
Status: DISCONTINUED | OUTPATIENT
Start: 2019-02-04 | End: 2019-02-04

## 2019-02-04 RX ORDER — ACETAMINOPHEN 325 MG/1
975 TABLET ORAL ONCE
Status: COMPLETED | OUTPATIENT
Start: 2019-02-04 | End: 2019-02-04

## 2019-02-04 RX ORDER — ONDANSETRON 2 MG/ML
4 INJECTION INTRAMUSCULAR; INTRAVENOUS EVERY 30 MIN PRN
Status: CANCELLED | OUTPATIENT
Start: 2019-02-04

## 2019-02-04 RX ORDER — LIDOCAINE HYDROCHLORIDE 20 MG/ML
INJECTION, SOLUTION INFILTRATION; PERINEURAL PRN
Status: DISCONTINUED | OUTPATIENT
Start: 2019-02-04 | End: 2019-02-04

## 2019-02-04 RX ORDER — LIDOCAINE 40 MG/G
CREAM TOPICAL
Status: DISCONTINUED | OUTPATIENT
Start: 2019-02-04 | End: 2019-02-04 | Stop reason: HOSPADM

## 2019-02-04 RX ORDER — LIDOCAINE HYDROCHLORIDE AND EPINEPHRINE 10; 10 MG/ML; UG/ML
INJECTION, SOLUTION INFILTRATION; PERINEURAL PRN
Status: DISCONTINUED | OUTPATIENT
Start: 2019-02-04 | End: 2019-02-04 | Stop reason: HOSPADM

## 2019-02-04 RX ORDER — FENTANYL CITRATE 50 UG/ML
25-50 INJECTION, SOLUTION INTRAMUSCULAR; INTRAVENOUS
Status: CANCELLED | OUTPATIENT
Start: 2019-02-04

## 2019-02-04 RX ORDER — SODIUM CHLORIDE, SODIUM LACTATE, POTASSIUM CHLORIDE, CALCIUM CHLORIDE 600; 310; 30; 20 MG/100ML; MG/100ML; MG/100ML; MG/100ML
INJECTION, SOLUTION INTRAVENOUS CONTINUOUS
Status: CANCELLED | OUTPATIENT
Start: 2019-02-04

## 2019-02-04 RX ORDER — CEFAZOLIN SODIUM 1 G/3ML
1 INJECTION, POWDER, FOR SOLUTION INTRAMUSCULAR; INTRAVENOUS SEE ADMIN INSTRUCTIONS
Status: DISCONTINUED | OUTPATIENT
Start: 2019-02-04 | End: 2019-02-04 | Stop reason: HOSPADM

## 2019-02-04 RX ORDER — ONDANSETRON 4 MG/1
4 TABLET, ORALLY DISINTEGRATING ORAL EVERY 30 MIN PRN
Status: CANCELLED | OUTPATIENT
Start: 2019-02-04

## 2019-02-04 RX ORDER — NALOXONE HYDROCHLORIDE 0.4 MG/ML
.1-.4 INJECTION, SOLUTION INTRAMUSCULAR; INTRAVENOUS; SUBCUTANEOUS
Status: CANCELLED | OUTPATIENT
Start: 2019-02-04 | End: 2019-02-05

## 2019-02-04 RX ORDER — LABETALOL HYDROCHLORIDE 5 MG/ML
10 INJECTION, SOLUTION INTRAVENOUS
Status: CANCELLED | OUTPATIENT
Start: 2019-02-04

## 2019-02-04 RX ORDER — ONDANSETRON 2 MG/ML
INJECTION INTRAMUSCULAR; INTRAVENOUS PRN
Status: DISCONTINUED | OUTPATIENT
Start: 2019-02-04 | End: 2019-02-04

## 2019-02-04 RX ORDER — SODIUM CHLORIDE, SODIUM LACTATE, POTASSIUM CHLORIDE, CALCIUM CHLORIDE 600; 310; 30; 20 MG/100ML; MG/100ML; MG/100ML; MG/100ML
INJECTION, SOLUTION INTRAVENOUS CONTINUOUS PRN
Status: DISCONTINUED | OUTPATIENT
Start: 2019-02-04 | End: 2019-02-04

## 2019-02-04 RX ORDER — PROPOFOL 10 MG/ML
INJECTION, EMULSION INTRAVENOUS PRN
Status: DISCONTINUED | OUTPATIENT
Start: 2019-02-04 | End: 2019-02-04

## 2019-02-04 RX ORDER — SODIUM CHLORIDE, SODIUM LACTATE, POTASSIUM CHLORIDE, CALCIUM CHLORIDE 600; 310; 30; 20 MG/100ML; MG/100ML; MG/100ML; MG/100ML
INJECTION, SOLUTION INTRAVENOUS CONTINUOUS
Status: DISCONTINUED | OUTPATIENT
Start: 2019-02-04 | End: 2019-02-04 | Stop reason: HOSPADM

## 2019-02-04 RX ADMIN — ONDANSETRON 4 MG: 2 INJECTION INTRAMUSCULAR; INTRAVENOUS at 14:10

## 2019-02-04 RX ADMIN — PROPOFOL 20 MG: 10 INJECTION, EMULSION INTRAVENOUS at 14:13

## 2019-02-04 RX ADMIN — PROPOFOL 20 MG: 10 INJECTION, EMULSION INTRAVENOUS at 13:48

## 2019-02-04 RX ADMIN — LIDOCAINE HYDROCHLORIDE 40 MG: 20 INJECTION, SOLUTION INFILTRATION; PERINEURAL at 13:30

## 2019-02-04 RX ADMIN — PROPOFOL 20 MG: 10 INJECTION, EMULSION INTRAVENOUS at 13:44

## 2019-02-04 RX ADMIN — FENTANYL CITRATE 25 MCG: 50 INJECTION, SOLUTION INTRAMUSCULAR; INTRAVENOUS at 13:52

## 2019-02-04 RX ADMIN — PROPOFOL 20 MG: 10 INJECTION, EMULSION INTRAVENOUS at 13:52

## 2019-02-04 RX ADMIN — CEFAZOLIN SODIUM 2 G: 2 INJECTION, SOLUTION INTRAVENOUS at 13:39

## 2019-02-04 RX ADMIN — ACETAMINOPHEN 975 MG: 325 TABLET, FILM COATED ORAL at 13:12

## 2019-02-04 RX ADMIN — SODIUM CHLORIDE, POTASSIUM CHLORIDE, SODIUM LACTATE AND CALCIUM CHLORIDE: 600; 310; 30; 20 INJECTION, SOLUTION INTRAVENOUS at 13:20

## 2019-02-04 RX ADMIN — PROPOFOL 20 MG: 10 INJECTION, EMULSION INTRAVENOUS at 13:40

## 2019-02-04 RX ADMIN — PROPOFOL 100 MCG/KG/MIN: 10 INJECTION, EMULSION INTRAVENOUS at 13:30

## 2019-02-04 ASSESSMENT — VISUAL ACUITY: OU: BLURRED VISION

## 2019-02-04 ASSESSMENT — MIFFLIN-ST. JEOR: SCORE: 1561

## 2019-02-04 NOTE — H&P
[facility]       NEUROSURGERY H&P NOTE    HPI:  Mr. Norberto Wu is a 74 yo male with history of Parkinson disease, hyperlipidemia, hyperthyroidism, neuropathy, patent foramen ovale, and prostate cancer s/p prostatectomy. The patient has not taken his aspirin in anticipation of his surgery He presents today because his deep brain stimulator generator/battery is at end of life. It was last replaced by Dr. Fair 1/3/17. His original device was implanted by Dr. Schultz in 05/20/2014 with Dr. Grossman as the electrophysiologist. On review of systems, the patient has been having right sided lower extremity symptoms but is hesitant to pursue left sided surgery at this time. The patient's battery was interrogated by Dr. Rothman 1/29/19 and found to be at end of life. Patient did have fall in interim prior to today's presentation.     PAST MEDICAL HISTORY:   Past Medical History:   Diagnosis Date     Cerebellar stroke syndrome 8/21/2013    Few small foci of chronic infarct in the right cerebellar hemisphere, unchanged as well as a new focus of now chronic infarct in the left cerebellar hemisphere since the last study.      Dyslipidemia      Hyperthyroidism      Insomnia 6/17/2011     Parkinson disease (H) 6/15/2011     Peripheral neuropathy 6/15/2011     PFO (patent foramen ovale) 8/21/2013    Interpretation Summary 1. Normal LV size and systolic function. Normal diastolic function 2.  Normal RV size and function 3. No significant valvular abnormalities 4.  Small right-to-left shunt visualized with intravenous agitated saline  contrast study, suggestive of PFO. PatientHeight: 72 in PatientWeight: 205 lbs SystolicPressure: 100 mmHg DiastolicPressure: 63 mmHg BSA 2.2 m^2   Procedure Echoc     PONV (postoperative nausea and vomiting)      Prostate cancer (H) 6/17/2011    s/p radical prostectomy     PAST SURGICAL HISTORY:   Past Surgical History:   Procedure Laterality Date      "COLONOSCOPY N/A 3/23/2017    Procedure: COLONOSCOPY;  Surgeon: Salbador Paulson MD;  Location: [facility]     PROSTATE SURGERY  3 yrs ago    prostate cancer; Dr. Valladares     REPLACE DEEP BRAIN STIMULATION GENERATOR / BATTERY Right 1/3/2017    Procedure: REPLACE DEEP BRAIN STIMULATION GENERATOR / BATTERY;  Surgeon: Anupam Fair MD;  Location: [facility]     Right Gpi DBS Lead Implantation  5/20/2014     Rt Chest DBS Generator Placement Activa SC  5/29/2014     FAMILY HISTORY:   Family History   Problem Relation Age of Onset     Parkinsonism Father      Other - See Comments Son         lives in [place]     Skin Cancer No family hx of         no skin cancer     SOCIAL HISTORY:   Social History     Tobacco Use     Smoking status: Never Smoker     Smokeless tobacco: Never Used   Substance Use Topics     Alcohol use: Yes     Alcohol/week: 0.6 oz     Comment: EVERY OTHER DAY     MEDICATIONS:  Sinemet  Gabapentin  Quetiapine  Rivastigmine    Allergies:  Allergies   Allergen Reactions     Hydromorphone Nausea and Nausea and Vomiting     Used post DBS surgery.  Stayed in the hospital 3 days due to severe nausea & \"retching\" .      Comtan Diarrhea     Hydrocodone      Other reaction(s): Headache, Nausea Only     Hydrocodone-Acetaminophen Hives and Nausea     Melatonin Other (See Comments)     No benefit.     Ropinirole Fatigue     Fatigue no benefit.      Adhesive Tape Rash     Camphor Rash     Other reaction(s): Rash     Liquid Adhesive Rash     ROS: 10 point ROS of systems including Constitutional, Eyes, Respiratory, Cardiovascular, Gastroenterology, Genitourinary, Integumentary, Muscularskeletal, Psychiatric were all negative except for pertinent positives noted in my HPI.    Physical exam:   Blood pressure (!) 148/98, pulse 73, temperature 97.9  F (36.6  C), temperature source Oral, resp. rate 20, height 1.829 m (6'), weight 76.8 kg (169 lb 5 oz), SpO2 100 %.  General: awake and alert  HEENT: well-healed right " "cranial incision   CV: regular rate  PULM: breathing comfortably on room air; clear to auscultation bilaterally  NEUROLOGIC:  -- Awake; Alert  -- Follows commands briskly  -- No gross dysarthria. Speech is hypophonic.   -- no gaze preference. No apparent hemineglect.  Cranial Nerves:  -- extraocular movements intact  -- face symmetrical, tongue midline  -- hearing grossly intact   -- Trapezii 5/5 strength bilat symmetric    Motor notable right lower leg tremors      Delt Bi Tri Hand Flexion/  Extension Iliopsoas Quadriceps Hamstrings Tibialis Anterior Gastroc    C5 C6 C7 C8/T1 L2 L3 L4-S1 L4 S1   R 5 5 5 5 5 5 5 5 5   L 5 5 5 5 5 5 5 5 5   Sensory:  intact to LT x 4 extremities     IMAGING:  None for this visit.     LABS:   1/29/19:  BMP normal   CBC normal     ASSESSMENT:  Right deep brain stimulator generator/battery at end of life     PLAN:  Battery replacement 2/4/19 with Dr. Rothman     The patient was discussed with Dr. Rothman, neurosurgery staff, and he agrees with the above.    Tito \"Tim\" MD Amy   Neurosurgery, PGY-2    Please contact neurosurgery resident on call with questions.    Dial * * [phone], enter [phone] when prompted.       "

## 2019-02-04 NOTE — ANESTHESIA CARE TRANSFER NOTE
Patient: Norberto Wu    Procedure(s):  Right Deep Brain Stimulatory Battery Replacement    Diagnosis: Parkinson Disease  Diagnosis Additional Information: No value filed.    Anesthesia Type:   No value filed.     Note:  Airway :Room Air  Patient transferred to:Phase II  Comments:     Patient maintained throughout case on spontaneous respirations via supplemental oxygen. VSS at end of procedure, sedation stopped and patient was responsive, protecting airway with adequate respiratory effort/TV, following commands but somnolent. Transferred to Phase II with supplemental oxygen, VSS on transfer. Report given to RN. Last set of vitals noted in intraop record.  Handoff Report: Identifed the Patient, Identified the Reponsible Provider, Reviewed the pertinent medical history, Discussed the surgical course, Reviewed Intra-OP anesthesia mangement and issues during anesthesia, Set expectations for post-procedure period and Allowed opportunity for questions and acknowledgement of understanding      Vitals: (Last set prior to Anesthesia Care Transfer)    CRNA VITALS  2/4/2019 1419 - 2/4/2019 1504      2/4/2019             NIBP:  120/80    Ht Rate:  76    Resp Rate (observed):  20                Electronically Signed By: Cam Rahman MD  February 4, 2019  3:04 PM

## 2019-02-04 NOTE — PROGRESS NOTES
"Received a fax from [facility] at [place] this morning. It states,    \"Dr. Boo,  FYI resident fell on buttocks between his chairs. Sustained a minor surface abrasion to left lower back approximatelly 6 cm x 4 cm. Any changes you would like or questions, let us know.  thank you,  BOB Rodriguez\"      "

## 2019-02-04 NOTE — DISCHARGE INSTRUCTIONS
[facility]  Same-Day Surgery   Adult Discharge Orders & Instructions     For 24 hours after surgery    1. Get plenty of rest.  A responsible adult must stay with you for at least 24 hours after you leave the hospital.   2. Do not drive or use heavy equipment.  If you have weakness or tingling, don't drive or use heavy equipment until this feeling goes away.  3. Do not drink alcohol.  4. Avoid strenuous or risky activities.  Ask for help when climbing stairs.   5. You may feel lightheaded.  IF so, sit for a few minutes before standing.  Have someone help you get up.   6. If you have nausea (feel sick to your stomach): Drink only clear liquids such as apple juice, ginger ale, broth or 7-Up.  Rest may also help.  Be sure to drink enough fluids.  Move to a regular diet as you feel able.  7. You may have a slight fever. Call the doctor if your fever is over 100 F (37.7 C) (taken under the tongue) or lasts longer than 24 hours.  8. You may have a dry mouth, a sore throat, muscle aches or trouble sleeping.  These should go away after 24 hours.  9. Do not make important or legal decisions.   Call your doctor for any of the followin.  Signs of infection (fever, growing tenderness at the surgery site, a large amount of drainage or bleeding, severe pain, foul-smelling drainage, redness, swelling).    2. It has been over 8 to 10 hours since surgery and you are still not able to urinate (pass water).    3.  Headache for over 24 hours.      To contact a doctor, call Dr. Rothman's clinic at #[phone] or:        [phone] and ask for the resident on call for neurosurgery (answered 24 hours a day)      Emergency Department:    [facility]: [phone]       (TTY for hearing impaired: [phone])

## 2019-02-04 NOTE — ANESTHESIA POSTPROCEDURE EVALUATION
Anesthesia POST Procedure Evaluation    Patient: Norberto Wu   MRN:     [id number] Gender:   male   Age:    73 year old :      1945        Preoperative Diagnosis: Parkinson Disease   Procedure(s):  Right Deep Brain Stimulatory Battery Replacement   Postop Comments: No value filed.       Anesthesia Type:  MAC    Reportable Event: NO     PAIN: Uncomplicated   Sign Out status: Comfortable, Well controlled pain     PONV: No PONV   Sign Out status:  No Nausea or Vomiting     Neuro/Psych: Uneventful perioperative course   Sign Out Status: Preoperative baseline; Age appropriate mentation     Airway/Resp.: Uneventful perioperative course   Sign Out Status: Non labored breathing, age appropriate RR; Resp. Status within EXPECTED Parameters     CV: Uneventful perioperative course   Sign Out status: Appropriate BP and perfusion indices; Appropriate HR/Rhythm     Disposition:   Sign Out in:  Phase II  Disposition:  Home  Recovery Course: Uneventful  Follow-Up: Not required           Last Anesthesia Record Vitals:  CRNA VITALS  2019 1419 - 2019 1519      2019             NIBP:  120/80    Ht Rate:  76    Resp Rate (observed):  20          Last PACU/Preop Vitals:  Vitals:    19 1147 19 1453 19 1500   BP: (!) 148/98 136/78 151/85   Pulse: 73  79   Resp: 20 20 20   Temp: 36.6  C (97.9  F)  36.8  C (98.2  F)   SpO2: 100%  91%         Electronically Signed By: Anupam Morris MD, 2019, 4:18 PM

## 2019-02-05 ENCOUNTER — PATIENT OUTREACH (OUTPATIENT)
Dept: NEUROSURGERY | Facility: CLINIC | Age: 74
End: 2019-02-05

## 2019-02-05 ENCOUNTER — DOCUMENTATION ONLY (OUTPATIENT)
Dept: NEUROLOGY | Facility: CLINIC | Age: 74
End: 2019-02-05

## 2019-02-05 NOTE — PROGRESS NOTES
Received form from [facility] 2/5/19, form was placed in provider folder for signature 2/5/19    Received form back signed by provider 2/5/19, for was fax to [phone] 2/5/19    Sandrita Begum [facility]

## 2019-02-05 NOTE — PROGRESS NOTES
Called pt's daughter-in-law Lottie to check on pt's post-operative status (this is the preferred phone number listed in pt's chart). She has not seen pt yet today, but will visit later this afternoon and call me with an update. She has my direct number. She verified that he has 24 hour care and that she reviewed all the discharge instructions with pt's caregiver. Will f/u with Lottie if I do not hear back.

## 2019-02-05 NOTE — OP NOTE
PATIENT NAME: Norberto Wu  PATIENT MRN: [id number]  PATIENT ACCOUNT: [id number]  PATIENT YOB: 1945    NEUROSURGERY OPERATIVE REPORT     DATE OF SURGERY: 02/05/19     PREOPERATIVE DIAGNOSIS:  1. Parkinson's disease    2. Depleted deep brain stimulator generator/battery, model Activa SC, over the right chest wall  3. S/p right sided deep brain stimulator placement      POSTOPERATIVE DIAGNOSIS:  1. Parkinson's disease    2. Depleted deep brain stimulator generator/battery, model Activa SC, over the right chest wall  3. S/p right sided deep brain stimulator placement      PROCEDURES PERFORMED:  1. Replacement of deep brain stimulator generator/battery, model Activa SC, over the right chest wall with connection to one electrode array  2. Revision of the right chest wall generator/battery pocket  3. Electrical interrogation and analysis of the right side deep brain stimulator system     STAFF SURGEON: Marco Rothman MD     RESIDENT SURGEONS: Tito Reyes MD     ANESTHESIA: Monitored anesthesia care and local anesthetic.     ESTIMATED BLOOD LOSS: 5 mL     IMPLANTS:   1. Medtronic Activa SC [id number] Serial No. [id number]    EXPLANTS:   1. Medtronic Activa SC 44791 Serial No. [id number]    FINDINGS:  Impedances of right sided deep brain stimulator system within normal limits      COMPLICATIONS: None     INDICATIONS:   Mr. Norberto Wu is a 73 year old male with history of Parkinson's Disease and s/p right globus pallidus internus lead placement in 05/2014. He presents today because his deep brain stimulator generator/battery is at end of life. It was last replaced by Dr. Fair 1/3/17. His original device was implanted by Dr. Schultz in 05/20/2014 with Dr. Grossman as the electrophysiologist. The patient's battery was interrogated by Dr. Rothman 1/29/19 and found to be at end of life. After a discussion of the risks, benefits, and alternatives, the patient provided written and verbal consent to proceed with the  operation.      DESCRIPTION OF PROCEDURE:   Informed consent was obtained from the patient and his right chest was marked, specifically the previous incision.  He was brought to the operating room and transferred onto the operating table in a supine position.  Both arms were tucked using a sheet.  All pressure points were padded appropriately.  Monitored anesthesia care was induced and maintained throughout the entire case.  Notably, the patient required markedly more anesthetic than his last operation in 2017.      His scar and previous incision over the right chest wall was prepped and draped in sterile fashion.  Local anesthetic was injected at the previous incision.  We used a #10 blade scalpel to make the skin incision, going over the previously well healed scar. We carried our dissection through the dermal layer until we reached the subcutaneous fat and then the generator/battery capsule. The cautery was to dissect the subcutaneous tissue and to open the capsule.  Using a combination of bovie cautery and the scalpel, we gently opened up the fibrous capsule surrounding the generator to expose it.  The generator/battery was anchored with ethibond suture.  We grasped the suture and freed it by cutting it with a #15 blade.  The generator/battery was mobile and we were able to remove it easily.  The generator/battery was disconnected from the extension wire.  The connector contacts for the extension wire was cleaned.  The extension wire was examined and was found to be without any damage or erosion or defect. There were no obvious signs of infection.  The pocket was inspected and found to be hemostatic.  The pocket was irrigated with antibiotic impregnated saline.  The new generator/battery, Medtronic Activa SC, was connected to the extension wire, thus one electrode array, and secured.  We then performed blunt and sharp dissection within the pocket after the generator had been removed in order to revise the chest  "wall pocket for the new generator/battery, so as to reduce the tension on the wound closure. The new generator was placed back into the pocket with the excess extension wire being placed posteriorly and around the periphery of the generator/battery.  No sutures were used to secure the generator/battery.      The right side DBS system was tested and interrogated wirelessly and was found to be working well and impedances were noted to be within normal. No problems were found.     After this, we turned our attention to closing. We reapproximated the fibrous capsule of the generator/battery using 3-0 Vicryl sutures in a simple interrupted fashion. The dermal layer was then closed using 3-0 Vicryl sutures in an inverted interrupted fashion. The skin was then closed using a 4-0 Monocryl suture in a running subcuticular fashion. The wound was dressed with steristrips, telfa, and a Tegaderm.     The patient was reversed from the monitored anesthesia care. He was then transferred back onto his hospital [facility] for transport to the postanesthesia care unit for ongoing recovery.    At the end of the case, all counts including needle, sponge and instrument counts were correct x 2. There were no complications.     Dr. Rothman, Neurosurgery staff, was present and scrubbed for the critical parts of the operation and immediately available for the remainder.     Operative note prepared by Tito \"Tim\" MD Amy, Neurosurgery PGY-2.   "

## 2019-02-05 NOTE — TELEPHONE ENCOUNTER
RN reached Lottie, patient's daughter-in-law, and she relayed that patient is not taking Methimazole.  RN let her know about future orders for thyroid lab placed by Dr. Howe.  RN also let her know clinic would follow up after consult with Dr. Mccarty.    Alexsandra Powell RN on 2/5/2019 at 10:46 AM

## 2019-02-05 NOTE — BRIEF OP NOTE
[facility], [place]    Brief Operative Note    Pre-operative diagnosis: Parkinson Disease  Post-operative diagnosis Parkinson Disease  Procedure: Procedure(s):  Right Deep Brain Stimulatory Battery Replacement  Surgeon: Surgeon(s) and Role:     * Marco Rothman MD - Primary     * Tito Reyes MD - Resident - Assisting  Anesthesia: Combined MAC with Local   Estimated blood loss: 5 mL  Drains: None  Specimens: * No specimens in log *  Findings:   Impedances within normal limits.  Complications: None.  Implants:   1. Medtronic Activa SC 71606 Serial No. [id number]    Explants:   1. Medtronic Activa SC 16256 Serial No. [id number]

## 2019-02-06 ENCOUNTER — PATIENT OUTREACH (OUTPATIENT)
Dept: NEUROSURGERY | Facility: CLINIC | Age: 74
End: 2019-02-06

## 2019-02-06 NOTE — TELEPHONE ENCOUNTER
Dear Dr. Mccarty;    Please see attached note. I guess Norberto is not taking methimazole. Can he just restart at previous dose (5 mg)?    Thanks, Jose De Jesus MORSE   Daily Assessment

## 2019-02-06 NOTE — PROGRESS NOTES
[facility]: Post-Discharge Note  SITUATION                                                      Admission:    Admission Date: 02/04/19   Reason for Admission: DBS battery replacement  Discharge:   Discharge Date: 02/04/19  Discharge Diagnosis: Parkinson's disease  Discharge Service: Neurosurgery  Discharge Plan: Routine post op follow up    BACKGROUND                                                      Neurosurgery Discharge Coordination Note     Attending physician: Dr. Rothman  Discharge to: Assisted living - 24 hour private care, per family    Current status: Spoke with pt's daughter-in-law Lottie. Patient's pain is well controlled, only using ibuprofen as needed with good relief. Denies redness, swelling, increased tenderness, drainage, incision opening or elevated temp. Reports Incision CDI without signs of infection.  Denies current bowel or bladder issues.    Discharge instructions and medications reviewed with patient.  Follow up appointments/imaging/tests needed: 2 week post op scheduled. See below.    RN triage/on call number given: [phone]/ [phone]        ASSESSMENT      Discharge Assessment  Patient reports symptoms are: Improved  Does the patient have all of their medications?: Yes  Does patient know what their new medications are for?: Yes  Does patient have a follow-up appointment scheduled?: Yes  Does patient have any other questions or concerns?: No    Post-op  Did the patient have surgery or a procedure: Yes  Incision: closed;healing  Drainage: No  Bleeding: none  Fever: No  Chills: No  Redness: No  Warmth: No  Swelling: No  Incision site pain: No  Closure: suture;dissolving  Eating & Drinking: eating and drinking without complaints/concerns  Bowel Function: normal  Urinary Status: voiding without complaint/concerns        PLAN                                                      Outpatient Plan:  Routine post op follow up    Future Appointments   Date Time Provider  Department Center   2/21/2019  3:00 PM Cande Ruiz, APRN CNP [facility]   3/25/2019  8:30 AM Rachel Mena, [facility]   5/1/2019 12:30 PM Cristal Becker, APRN [facility]           CARLITOS DAUGHERTY RN

## 2019-02-07 NOTE — TELEPHONE ENCOUNTER
Deareyna Orozco;    Please see telephone notes from Divine Wan about restarting Methimazole. Also placed future (for one month) to repeat thyroid testing. Also, I would like to see him back in about one month as well.    Thanks, MOHIT Howe

## 2019-02-11 ENCOUNTER — DOCUMENTATION ONLY (OUTPATIENT)
Dept: NEUROLOGY | Facility: CLINIC | Age: 74
End: 2019-02-11

## 2019-02-11 ENCOUNTER — TRANSFERRED RECORDS (OUTPATIENT)
Dept: HEALTH INFORMATION MANAGEMENT | Facility: CLINIC | Age: 74
End: 2019-02-11

## 2019-02-11 NOTE — PROGRESS NOTES
Received Plan Of Care from [facility] 2/11/19, form was placed in provider folder for signature 2/11/19    Received forms back signed and 02/12/19, forms were fax to [phone], and sent to be scanned 02/12/19    aSndrita Begum CMA

## 2019-02-12 ENCOUNTER — DOCUMENTATION ONLY (OUTPATIENT)
Dept: NEUROLOGY | Facility: CLINIC | Age: 74
End: 2019-02-12

## 2019-02-12 ENCOUNTER — MEDICAL CORRESPONDENCE (OUTPATIENT)
Dept: HEALTH INFORMATION MANAGEMENT | Facility: CLINIC | Age: 74
End: 2019-02-12

## 2019-02-12 NOTE — PROGRESS NOTES
Janki Parker requested a signed medication list, medication list was signed and fax to [phone]    Sandrita Begum CMA

## 2019-02-13 NOTE — TELEPHONE ENCOUNTER
Lottie called me back, was informed the recommendations. I coordinated the appt with Dr. Howe on 5/1 as pt already has an appt with neurology and wants the same day appt.

## 2019-02-13 NOTE — TELEPHONE ENCOUNTER
Left a detailed message to Lottie regarding the recommendations as below or call me back with questions. Clinic phone number given.

## 2019-02-14 ENCOUNTER — TELEPHONE (OUTPATIENT)
Dept: NEUROLOGY | Facility: CLINIC | Age: 74
End: 2019-02-14

## 2019-02-14 RX ORDER — METHIMAZOLE 5 MG/1
5 TABLET ORAL DAILY
Qty: 90 TABLET | Refills: 3 | Status: SHIPPED | OUTPATIENT
Start: 2019-02-14 | End: 2019-11-05

## 2019-02-14 NOTE — TELEPHONE ENCOUNTER
Received a voice mail from Micki Lubin at the care facility. She wanted to clarify the Exelon patch dose. I called her back and left voice mail letting her know Dr. Boo ordered the 4.6 mg patch on , 30 days later to increase to the 9.5, 30 days later to increase to 13.3 mg.    rivastigmine (EXELON) 4.6 MG/24HR 24 hr patch 90 patch 3 2019  --   Si.6 mg patch daily x 1month then 9.5mg patch daily x 1 month then 13.3mg patch daily

## 2019-02-18 ENCOUNTER — TELEPHONE (OUTPATIENT)
Dept: NEUROLOGY | Facility: CLINIC | Age: 74
End: 2019-02-18

## 2019-02-18 NOTE — TELEPHONE ENCOUNTER
Received request for Rivastigmine orders to be sent to patients care facility. Also received request to discontinue polyethylene Glycol and Methimazole 5 mg tablets.    Faxed Rivastigmine orders to Ana Luisa Mendez LPN at [facility]. I advised Ana Luisa to ask the prescribing provider, Dr. Norberto Howe, about discontinuing the other medications she inquired about.

## 2019-02-19 ENCOUNTER — MYC REFILL (OUTPATIENT)
Dept: NEUROLOGY | Facility: CLINIC | Age: 74
End: 2019-02-19

## 2019-02-19 DIAGNOSIS — G62.9 NEUROPATHY: ICD-10-CM

## 2019-02-28 ENCOUNTER — MEDICAL CORRESPONDENCE (OUTPATIENT)
Dept: HEALTH INFORMATION MANAGEMENT | Facility: CLINIC | Age: 74
End: 2019-02-28

## 2019-03-01 ENCOUNTER — DOCUMENTATION ONLY (OUTPATIENT)
Dept: NEUROLOGY | Facility: CLINIC | Age: 74
End: 2019-03-01

## 2019-03-01 ENCOUNTER — OFFICE VISIT (OUTPATIENT)
Dept: NEUROSURGERY | Facility: CLINIC | Age: 74
End: 2019-03-01
Payer: COMMERCIAL

## 2019-03-01 VITALS
HEART RATE: 73 BPM | BODY MASS INDEX: 25.9 KG/M2 | OXYGEN SATURATION: 99 % | SYSTOLIC BLOOD PRESSURE: 137 MMHG | HEIGHT: 71 IN | WEIGHT: 185 LBS | DIASTOLIC BLOOD PRESSURE: 74 MMHG

## 2019-03-01 DIAGNOSIS — Z48.89 ENCOUNTER FOR POST SURGICAL WOUND CHECK: ICD-10-CM

## 2019-03-01 DIAGNOSIS — G20.A1 PARKINSON DISEASE (H): ICD-10-CM

## 2019-03-01 DIAGNOSIS — Z45.42 END OF BATTERY LIFE OF DEEP BRAIN STIMULATOR: ICD-10-CM

## 2019-03-01 ASSESSMENT — MIFFLIN-ST. JEOR: SCORE: 1608.34

## 2019-03-01 ASSESSMENT — PAIN SCALES - GENERAL: PAINLEVEL: NO PAIN (0)

## 2019-03-01 NOTE — PROGRESS NOTES
Received plan of care from [facility] 3/1/19, forms were place in provider folder for signature 3/1/19    Received forms back signed by provider 3/12/19, forms were fax to [phone] 3/12/19    Sandrita Begum [facility]

## 2019-03-01 NOTE — LETTER
3/1/2019       RE: Norberto Wu  [address]  [address]  [place] [address]     Dear Colleague,    Thank you for referring your patient, Norberto Wu, to the [facility] NEUROSURGERY at [facility]. Please see a copy of my visit note below.    NEUROSURGERY PROGRESS NOTE    REASON FOR VISIT: Routine postop wound check and suture/staple removal.    DATE OF SURGERY: 02/04/19   PREOPERATIVE DIAGNOSIS:  1. Parkinson's disease    2. Depleted deep brain stimulator generator/battery, model Activa SC, over the right chest wall  3. S/p right sided deep brain stimulator placement   PROCEDURES PERFORMED:  1. Replacement of deep brain stimulator generator/battery, model Activa SC, over the right chest wall with connection to one electrode array  2. Revision of the right chest wall generator/battery pocket  3. Electrical interrogation and analysis of the right side deep brain stimulator system  IMPLANTS:   1. Medtronic Activa SC 67468 Serial No. [id number]  EXPLANTS:   1. Medtronic Activa SC 36927 Serial No. [id number]  SURGEON: Marco Rothman MD    HPI:   Mr. Norberto Wu is a 73 year old male with history of Parkinson's Disease and s/p right globus pallidus internus lead placement in 05/2014. He presents today because his deep brain stimulator generator/battery is at end of life. It was last replaced by Dr. Fair 1/3/17. His original device was implanted by Dr. Schultz in 05/20/2014 with Dr. Grossman as the electrophysiologist. The patient's battery was interrogated by Dr. Rothman 1/29/19 and found to be at end of life. After a discussion of the risks, benefits, and alternatives, the patient consented to proceed with the operation without complications. He recovered well and was discharged home on the same day in good condition.  Since then he presents for routine wound check.      CURRENT MEDICATIONS:    Current Outpatient Medications:      carbidopa-levodopa (SINEMET)  MG  "tablet, 2 TABLETS by mouth @ 3AM, 6AM, 9AM, noon, 3PM, 6PM and 12 midnight = 14/day, Disp: 1260 tablet, Rfl: 3     fluticasone (FLONASE) 50 MCG/ACT spray, 1-2 sprays in both nostril daily @ 7am, Disp: 1 Bottle, Rfl: 11     gabapentin (NEURONTIN) 300 MG capsule, 2 CAPSULES by mouth @ 6am and 12pm and 3 capsules by mouth @ 6pm   = 7/day, Disp: 630 capsule, Rfl: 3     ibuprofen (ADVIL/MOTRIN) 100 MG tablet, Take 1 tablet (100 mg) by mouth every 6 hours as needed (pain), Disp: 30 tablet, Rfl: 0     lidocaine (LMX4) 4 % external cream, Apply topically daily as needed for mild pain, Disp: 1 Tube, Rfl: 11     methimazole (TAPAZOLE) 5 MG tablet, Take 1 tablet (5 mg) by mouth daily, Disp: 90 tablet, Rfl: 3     polyethylene glycol (MIRALAX/GLYCOLAX) Packet, 1 packet by mouth in liquid daily @ 6-7pm, Disp: 7 packet, Rfl: 11     QUEtiapine (SEROQUEL) 25 MG tablet, 3 x 25mg tab by mouth nightly @ bedtime (6:00 pm)  = 3/day, Disp: 270 tablet, Rfl: 3     rivastigmine (EXELON) 4.6 MG/24HR 24 hr patch, 4.6 mg patch daily x 1month then 9.5mg patch daily x 1 month then 13.3mg patch daily, Disp: 90 patch, Rfl: 3      ALLERGIES:  Allergies   Allergen Reactions     Hydromorphone Nausea and Nausea and Vomiting     Used post DBS surgery.  Stayed in the hospital 3 days due to severe nausea & \"retching\" .      Comtan Diarrhea     Hydrocodone      Other reaction(s): Headache, Nausea Only     Hydrocodone-Acetaminophen Hives and Nausea     Melatonin Other (See Comments)     No benefit.     Ropinirole Fatigue     Fatigue no benefit.      Adhesive Tape Rash     Camphor Rash     Other reaction(s): Rash     Liquid Adhesive Rash       FOCUS EXAMINATION:  /74 (BP Location: Left arm)   Pulse 73   Ht 1.791 m (5' 10.5\")   Wt 83.9 kg (185 lb)   SpO2 99%   BMI 26.17 kg/m     General:Well developed well nourished male found seated comfortably in exam chair. No apparent distress.    He is A&O X3.  Mood and affect WNL. Language and fund of " knowledge intact.  He is able to sit and rise independently.    He has a nicely healed incision below the left clavicle. The incision was closed by Monocryl in subcuticular fashion that require no removal.    ASSESSMENT:  1. Parkinson disease (H)    2. S/p battery replacement due to end of battery life of deep brain stimulator on 2/5/2019    3. Encounter for post surgical wound check    Norberto Wu is doing well.The wound is healthy without evidence of infection.     PLAN:  We discussed wound care.  *  Keep it open to air   *  May shower and shampoo with mild soap/shampoo including the incision. No hair conditioners, hair treatments or skin cream for two more weeks.  *  May swim or bathe when all scabbing is gone from the incision.  *  Call our services if you develop fever, chills, redness, swelling, and/or drainage from incision.  We discussed activities.  *  We recommend he return to normal activities as able.  We discussed follow up    *  Follow up with Dr. Boo in Neurology clinic as scheduled.   *  We are comfortable releasing him to PRN follow up.  We have not made a specific return appointment but will be happy to see him again should the need arise.    All the patient's questions have been answered and they demonstrate good understanding of the above.  The patient has our contact information and is aware that he should call if he has questions comments or concerns.     We appreciate the opportunity to be of service in the care of this pleasant patient.  Please do call if there is anything more we can do    Cande Ruiz, SCARLET, APRN, FNP-BC  Department of Neurosurgery

## 2019-03-01 NOTE — PROGRESS NOTES
NEUROSURGERY PROGRESS NOTE    REASON FOR VISIT: Routine postop wound check and suture/staple removal.      DATE OF SURGERY: 02/04/19   PREOPERATIVE DIAGNOSIS:  1. Parkinson's disease    2. Depleted deep brain stimulator generator/battery, model Activa SC, over the right chest wall  3. S/p right sided deep brain stimulator placement   PROCEDURES PERFORMED:  1. Replacement of deep brain stimulator generator/battery, model Activa SC, over the right chest wall with connection to one electrode array  2. Revision of the right chest wall generator/battery pocket  3. Electrical interrogation and analysis of the right side deep brain stimulator system  IMPLANTS:   1. Medtronic Activa SC 54106 Serial No. [id number]  EXPLANTS:   1. Medtronic Activa SC 90216 Serial No. [id number]  SURGEON: Marco Rothman MD      HPI:   Mr. Norberto Wu is a 73 year old male with history of Parkinson's Disease and s/p right globus pallidus internus lead placement in 05/2014. He presents today because his deep brain stimulator generator/battery is at end of life. It was last replaced by Dr. Fair 1/3/17. His original device was implanted by Dr. Schultz in 05/20/2014 with Dr. Grossman as the electrophysiologist. The patient's battery was interrogated by Dr. Rothman 1/29/19 and found to be at end of life. After a discussion of the risks, benefits, and alternatives, the patient consented to proceed with the operation without complications. He recovered well and was discharged home on the same day in good condition.  Since then he presents for routine wound check.      CURRENT MEDICATIONS:    Current Outpatient Medications:      carbidopa-levodopa (SINEMET)  MG tablet, 2 TABLETS by mouth @ 3AM, 6AM, 9AM, noon, 3PM, 6PM and 12 midnight = 14/day, Disp: 1260 tablet, Rfl: 3     fluticasone (FLONASE) 50 MCG/ACT spray, 1-2 sprays in both nostril daily @ 7am, Disp: 1 Bottle, Rfl: 11     gabapentin (NEURONTIN) 300 MG capsule, 2 CAPSULES by mouth @ 6am  "and 12pm and 3 capsules by mouth @ 6pm   = 7/day, Disp: 630 capsule, Rfl: 3     ibuprofen (ADVIL/MOTRIN) 100 MG tablet, Take 1 tablet (100 mg) by mouth every 6 hours as needed (pain), Disp: 30 tablet, Rfl: 0     lidocaine (LMX4) 4 % external cream, Apply topically daily as needed for mild pain, Disp: 1 Tube, Rfl: 11     methimazole (TAPAZOLE) 5 MG tablet, Take 1 tablet (5 mg) by mouth daily, Disp: 90 tablet, Rfl: 3     polyethylene glycol (MIRALAX/GLYCOLAX) Packet, 1 packet by mouth in liquid daily @ 6-7pm, Disp: 7 packet, Rfl: 11     QUEtiapine (SEROQUEL) 25 MG tablet, 3 x 25mg tab by mouth nightly @ bedtime (6:00 pm)  = 3/day, Disp: 270 tablet, Rfl: 3     rivastigmine (EXELON) 4.6 MG/24HR 24 hr patch, 4.6 mg patch daily x 1month then 9.5mg patch daily x 1 month then 13.3mg patch daily, Disp: 90 patch, Rfl: 3      ALLERGIES:  Allergies   Allergen Reactions     Hydromorphone Nausea and Nausea and Vomiting     Used post DBS surgery.  Stayed in the hospital 3 days due to severe nausea & \"retching\" .      Comtan Diarrhea     Hydrocodone      Other reaction(s): Headache, Nausea Only     Hydrocodone-Acetaminophen Hives and Nausea     Melatonin Other (See Comments)     No benefit.     Ropinirole Fatigue     Fatigue no benefit.      Adhesive Tape Rash     Camphor Rash     Other reaction(s): Rash     Liquid Adhesive Rash       FOCUS EXAMINATION:  /74 (BP Location: Left arm)   Pulse 73   Ht 1.791 m (5' 10.5\")   Wt 83.9 kg (185 lb)   SpO2 99%   BMI 26.17 kg/m    General:Well developed well nourished male found seated comfortably in exam chair. No apparent distress.    He is A&O X3.  Mood and affect WNL. Language and fund of knowledge intact.  He is able to sit and rise independently.    He has a nicely healed incision below the left clavicle. The incision was closed by Monocryl in subcuticular fashion that require no removal.      ASSESSMENT:  1. Parkinson disease (H)    2. S/p battery replacement due to end of " battery life of deep brain stimulator on 2/5/2019    3. Encounter for post surgical wound check    Norberto Wu is doing well.The wound is healthy without evidence of infection.       PLAN:  We discussed wound care.  *  Keep it open to air   *  May shower and shampoo with mild soap/shampoo including the incision. No hair conditioners, hair treatments or skin cream for two more weeks.  *  May swim or bathe when all scabbing is gone from the incision.  *  Call our services if you develop fever, chills, redness, swelling, and/or drainage from incision.  We discussed activities.  *  We recommend he return to normal activities as able.  We discussed follow up    *  Follow up with Dr. Boo in Neurology clinic as scheduled.   *  We are comfortable releasing him to PRN follow up.  We have not made a specific return appointment but will be happy to see him again should the need arise.    All the patient's questions have been answered and they demonstrate good understanding of the above.  The patient has our contact information and is aware that he should call if he has questions comments or concerns.     We appreciate the opportunity to be of service in the care of this pleasant patient.  Please do call if there is anything more we can do    Cande Ruiz, SCARLET, APRN, FNP-BC  Department of Neurosurgery

## 2019-03-04 ENCOUNTER — TELEPHONE (OUTPATIENT)
Dept: NEUROLOGY | Facility: CLINIC | Age: 74
End: 2019-03-04

## 2019-03-04 RX ORDER — IBUPROFEN 200 MG
TABLET ORAL
Refills: 97 | OUTPATIENT
Start: 2019-03-04

## 2019-03-04 NOTE — TELEPHONE ENCOUNTER
Spoke to Micki. Patient wants to be able to take his miralax by himself as he needs it. He wants to be the boss of it. Micki is wondering if we can make the miralax as needed and then the patient still gets to say when he needs it but he won't forget to take it since it will be given by staff. Her concern is that if he is in control of this medication, he may forget to take it and become constipated which could lead to worse things.       They do not have the Exelon orders. I refaxed the orders to [phone].        Called her back to let her know this was done.

## 2019-03-05 NOTE — TELEPHONE ENCOUNTER
Left voice mail on EjIkonisys's phone. I stated the current prescription for the polyethylene glycol (MIRALAX/GLYCOLAX) Si packet by mouth in liquid daily @ 6-7pm will remain the same. Dr. Boo does not believe patient should be in charge of this medication as it is crucially important for patient's providers to know if he is taking this to avoid constipation which could cause deleterious effects. Patient can always refuse to take it should he not feel the need for it.

## 2019-03-07 ENCOUNTER — TELEPHONE (OUTPATIENT)
Dept: NEUROLOGY | Facility: CLINIC | Age: 74
End: 2019-03-07

## 2019-03-07 NOTE — TELEPHONE ENCOUNTER
Received fax from assisted living facility regarding prescription for Methimazole. Faxed this to the prescribing provider: Dr. Norberto Howe.

## 2019-03-12 ENCOUNTER — MEDICAL CORRESPONDENCE (OUTPATIENT)
Dept: HEALTH INFORMATION MANAGEMENT | Facility: CLINIC | Age: 74
End: 2019-03-12

## 2019-03-13 ENCOUNTER — TELEPHONE (OUTPATIENT)
Dept: INTERNAL MEDICINE | Facility: CLINIC | Age: 74
End: 2019-03-13

## 2019-03-13 ENCOUNTER — TELEPHONE (OUTPATIENT)
Dept: NEUROLOGY | Facility: CLINIC | Age: 74
End: 2019-03-13

## 2019-03-13 DIAGNOSIS — Z96.89 S/P DEEP BRAIN STIMULATOR PLACEMENT: ICD-10-CM

## 2019-03-13 NOTE — TELEPHONE ENCOUNTER
Received request from Norberto's assisted living facility to discontinue Methimozole. Request was faxed back with a note stated Dr. Norberto Howe prescribes this medication and to refer to that physician regarding this request.

## 2019-03-13 NOTE — TELEPHONE ENCOUNTER
Nurse from [facility] calling to get a refill on the ibuprophen. I explained that Dr. Boo is the patient's neurologist and Dr. Howe is his primary care provider. Gave nurse the phone and fax number for Dr. Howe.

## 2019-03-13 NOTE — TELEPHONE ENCOUNTER
Last Clinic Visit: 1/29/2019  [facility]  Overdue for creat - clinic notified

## 2019-03-13 NOTE — TELEPHONE ENCOUNTER
[facility] Call Center    Phone Message    May a detailed message be left on voicemail: yes    Reason for Call: Medication Question or concern regarding medication   Prescription Clarification  Name of Medication: methimazole (TAPAZOLE) 5 MG tablet     Prescribing Provider: Dr Howe   Pharmacy: [facility] [id number] [address] [place]   What on the order needs clarification? Is this medication discontinued please call Allison @ [phone] its urgent.           Action Taken: Message routed to:  [facility] ni

## 2019-03-14 NOTE — TELEPHONE ENCOUNTER
Rx. Filled 2/14/2019 #90 with 3 refills    MOHIT Cooper called and message left that RX was filled on 02/14/2019 with 3 refills. Clinic numbers given for questions or concerns.  Marj Thurston RN 10:41 AM on 3/14/2019.

## 2019-03-15 ENCOUNTER — MEDICAL CORRESPONDENCE (OUTPATIENT)
Dept: HEALTH INFORMATION MANAGEMENT | Facility: CLINIC | Age: 74
End: 2019-03-15

## 2019-03-20 RX ORDER — LIDOCAINE 40 MG/G
CREAM TOPICAL DAILY PRN
Qty: 1 TUBE | Refills: 11 | OUTPATIENT
Start: 2019-03-20

## 2019-03-20 NOTE — TELEPHONE ENCOUNTER
lidocaine (LMX4) 4 % external cream 1 Tube 11 1/29/2019 1/29/2020 --   Sig - Route: Apply topically daily as needed for mild pain - Topical   Nurse received refill request for: See above    Pharmacy: [facility]    Last re-ordered: 1/29/2019    Action taken: Patient received 12 refills on 1/29/2019. No new prescription needed.

## 2019-03-25 ENCOUNTER — TELEPHONE (OUTPATIENT)
Dept: PHARMACY | Facility: CLINIC | Age: 74
End: 2019-03-25

## 2019-03-25 ENCOUNTER — ALLIED HEALTH/NURSE VISIT (OUTPATIENT)
Dept: PHARMACY | Facility: CLINIC | Age: 74
End: 2019-03-25
Payer: COMMERCIAL

## 2019-03-25 DIAGNOSIS — G25.81 RESTLESS LEG SYNDROME: ICD-10-CM

## 2019-03-25 DIAGNOSIS — G20.A1 PARKINSON'S DISEASE (H): Primary | ICD-10-CM

## 2019-03-25 PROCEDURE — 99606 MTMS BY PHARM EST 15 MIN: CPT | Performed by: PHARMACIST

## 2019-03-25 RX ORDER — RIVASTIGMINE 13.3 MG/24H
1 PATCH, EXTENDED RELEASE TRANSDERMAL DAILY
COMMUNITY
End: 2019-04-16 | Stop reason: DRUGHIGH

## 2019-03-25 NOTE — Clinical Note
He is doing much better on rivastigmine and quetiapine. He also moved into assisted living and medications are administered to him now.

## 2019-03-25 NOTE — TELEPHONE ENCOUNTER
Attempted to reach daughter-in-law, Lottie, for MTM follow up call today. Left VM for her to call me back.    Rachel Mena, Pharm.D.  Medication Therapy Management Pharmacist  Phone: [phone]

## 2019-03-26 RX ORDER — RIVASTIGMINE 9.5 MG/24H
PATCH, EXTENDED RELEASE TRANSDERMAL
Refills: 11 | OUTPATIENT
Start: 2019-03-26

## 2019-03-26 NOTE — TELEPHONE ENCOUNTER
Nurse received refill request for: Rivastigmine (Neupro) patch 9.5 mg    Pharmacy: [facility]    Last re-ordered: 1/29/2019    Action taken: Not sent to be signed. Per Rachel Mena's office visit note on 3/25/2019 Norberto is scheduled to increase from the rivastigmine 9.5 mg patch to the 13.3 mg patch on 4/1/2019. No refills needed of the above medication.

## 2019-03-26 NOTE — TELEPHONE ENCOUNTER
MTM appt completed 3/25/19.    Rachel Mena, Pharm.D.  Medication Therapy Management Pharmacist  Phone: [phone]

## 2019-04-01 RX ORDER — RIVASTIGMINE 9.5 MG/24H
PATCH, EXTENDED RELEASE TRANSDERMAL
Refills: 0 | OUTPATIENT
Start: 2019-04-01

## 2019-04-01 NOTE — TELEPHONE ENCOUNTER
Nurse received refill request for: Rivastigmine 9.5 mg patch    Last re-ordered: 1/29/2019    Action taken: Not sent to be signed.    Per Rachel Mena's Office visit note on 3/25/2019 the patient will be starting the 13.3 mg Rivastigmine patch today. Patient was also given a 1 year supply of the 9.5 mg patches on 1/29/2019.

## 2019-04-05 ENCOUNTER — TELEPHONE (OUTPATIENT)
Dept: NEUROLOGY | Facility: CLINIC | Age: 74
End: 2019-04-05

## 2019-04-05 ENCOUNTER — MEDICAL CORRESPONDENCE (OUTPATIENT)
Dept: HEALTH INFORMATION MANAGEMENT | Facility: CLINIC | Age: 74
End: 2019-04-05

## 2019-04-05 NOTE — TELEPHONE ENCOUNTER
Left message asking Norberto to give me a call back and leave a good time for me to reach him.

## 2019-04-05 NOTE — TELEPHONE ENCOUNTER
[facility] Call Center    Phone Message    May a detailed message be left on voicemail: yes    Reason for Call: Other: Pt requesting a call back to discuss some possible side effects from QUEtiapine (SEROQUEL). Please call back to discuss.     Action Taken: Message routed to:  [facility]

## 2019-04-05 NOTE — TELEPHONE ENCOUNTER
[facility] Call Center    Phone Message    May a detailed message be left on voicemail: yes    Reason for Call: Other: Pt calling to speak with someone about his QUEtiapine (SEROQUEL) medication and some possible side effects. Please give pt a call back to discuss.      Action Taken: Message routed to:  [facility] Neurology

## 2019-04-12 ENCOUNTER — TELEPHONE (OUTPATIENT)
Dept: NEUROLOGY | Facility: CLINIC | Age: 74
End: 2019-04-12

## 2019-04-12 DIAGNOSIS — G20.A1 PARKINSON DISEASE (H): Primary | ICD-10-CM

## 2019-04-12 NOTE — TELEPHONE ENCOUNTER
Left a message for Caleb asking that he call me back and leave a good time for me to reach him.      Rachel Mena's note to me:  Rivastigmine increase is the most recent medication change, so it is possible that this is causing the dizziness. I'm not sure about a change in vision. We can decrease to the 9.5 mg dose if he would like and I think we should recommend an eye exam. He is scheduled to see PCP and Lucy on 5/1

## 2019-04-12 NOTE — TELEPHONE ENCOUNTER
"Nurse received a fax from assisted living facility stating \"resident complained of occasional dizziness and cloudy vision.\" Resident told staff it has been lasting for 1 week now and could be due to his quetiapine and rivastigmine patch. They are questioning if this is from the rivastigmine patch due to the recent dose increase to 13.3 mg.    Will discuss with Dr. Boo and Dr. Mena.  "

## 2019-04-16 RX ORDER — RIVASTIGMINE 9.5 MG/24H
1 PATCH, EXTENDED RELEASE TRANSDERMAL DAILY
Qty: 30 PATCH | Refills: 11 | Status: SHIPPED | OUTPATIENT
Start: 2019-04-16 | End: 2019-04-19

## 2019-04-16 NOTE — TELEPHONE ENCOUNTER
[facility] Call Center    Phone Message    May a detailed message be left on voicemail: yes    Reason for Call: Other: Pts son Caleb returning call to [facility]. Please call him on his work number at [phone].     Action Taken: Message routed to:  [facility]

## 2019-04-16 NOTE — TELEPHONE ENCOUNTER
Called Caleb and informed him of Rachel Mena's recommendation.    Rachel Mena's note to me:  Rivastigmine increase is the most recent medication change, so it is possible that this is causing the dizziness. I'm not sure about a change in vision. We can decrease to the 9.5 mg dose if he would like and I think we should recommend an eye exam. He is scheduled to see PCP and Lucy on 5/1    rivastigmine (EXELON) 13.3 MG/24HR 24 hr patch     --   Sig - Route: Place 1 patch onto the skin daily (will be increased from 9.5 to 13.3 mg on      Caleb stated he has been complaining quite a bit about his symptoms. Caleb stated Norberto thinks the changes in the mediations has caused his difficulties, which Caleb does not think is true. Caleb thinks Norberto wants to think it is the medications causing his symptoms and not the parkinson's.    Caleb stated Norberto did well on the 9.5 mg dose of rivastigmine and thinks this would be a good thing to try to go back down to.

## 2019-04-16 NOTE — TELEPHONE ENCOUNTER
Will update rivastigmine order from 13.3 mg to 9.5 mg per Alceia's discussion with son, Caleb. Dose change made via [facility] with Dr. Boo.     Rachel Mena, Pharm.D.  Medication Therapy Management Pharmacist  Phone: [phone]

## 2019-04-18 ENCOUNTER — TELEPHONE (OUTPATIENT)
Dept: NEUROLOGY | Facility: CLINIC | Age: 74
End: 2019-04-18

## 2019-04-18 DIAGNOSIS — G20.A1 PARKINSON DISEASE (H): ICD-10-CM

## 2019-04-18 DIAGNOSIS — F02.80 DEMENTIA ASSOCIATED WITH OTHER UNDERLYING DISEASE WITHOUT BEHAVIORAL DISTURBANCE (H): Primary | ICD-10-CM

## 2019-04-18 NOTE — TELEPHONE ENCOUNTER
[facility] Call Center    Phone Message    May a detailed message be left on voicemail: yes    Reason for Call: Medication Question or concern regarding medication   Prescription Clarification  Name of Medication: QUEtiapine (SEROQUEL) 25 MG tablet and rivastigmine (EXELON) 9.5 MG/24HR 24 hr patch  Prescribing Provider: Dr. Boo   Pharmacy: [facility] - [address]   What on the order needs clarification? Norberto calling to request that the directions for the QUEtiapine be changed to 2x per day instead of 3x per day and that he get a lower dosage on the rivastigmine, 4.9mg vs 9.5mg.  He states that the dosage for both are too strong for him and would like them lowered.  He is also in need of refills for both.  Please call him back with any questions or concerns          Action Taken: Message routed to:  [facility] ([facility]

## 2019-04-19 RX ORDER — RIVASTIGMINE 4.6 MG/24H
1 PATCH, EXTENDED RELEASE TRANSDERMAL DAILY
Qty: 30 PATCH | Refills: 11 | Status: SHIPPED | OUTPATIENT
Start: 2019-04-19 | End: 2019-04-19

## 2019-04-19 RX ORDER — QUETIAPINE FUMARATE 25 MG/1
TABLET, FILM COATED ORAL
Qty: 180 TABLET | Refills: 3 | Status: SHIPPED | OUTPATIENT
Start: 2019-04-19 | End: 2019-11-05

## 2019-04-19 RX ORDER — RIVASTIGMINE 9.5 MG/24H
1 PATCH, EXTENDED RELEASE TRANSDERMAL DAILY
Qty: 1 PATCH | Refills: 0 | COMMUNITY
Start: 2019-04-19 | End: 2019-11-05

## 2019-04-19 NOTE — TELEPHONE ENCOUNTER
rivastigmine (EXELON) 4.6 MG/24HR 24 hr patch 30 patch 11 4/19/2019  --   Sig - Route: Place 1 patch onto the skin daily - Transdermal     Called [facility] and spoke to Juanis, Juanis stated she will cancel the above prescription and make a note stating he should stay on the 9.5 mg dose.

## 2019-04-19 NOTE — TELEPHONE ENCOUNTER
QUEtiapine (SEROQUEL) 25 MG tablet 270 tablet 3 2019  No   Sig: Reduce to 2 x 25mg tab by mouth nightly @ bedtime (6:00 pm)  = 2/day   -For his hallucinations  Rivastigmine-helps with memory stay on 9.5 mg dose    Assessment  Norberto states he would like the 6 PM time taken off the above prescription. He states the assisted living facility takes this time literally. He would like this time changed to 10 PM.    Nausea and vomiting started 1-2 weeks after quetiapine increase (19). Vomits every few days. Nauseous in the afternoon/evening before Quetiapine, after taking the Quetiapine states this makes him more nauseous.  -Does not take ginger ale/food with medication-takes on empty stomach    Background  carbidopa-levodopa (SINEMET)  MG tablet 1260 tablet 3 2019  No   Si TABLETS by mouth @ 3AM, 6AM, 9AM, noon, 3PM, 6PM and 12 midnight = 14/day     gabapentin (NEURONTIN) 300 MG capsule 630 capsule 3 2019  No   Si CAPSULES by mouth @ 6am and 12pm and 3 capsules by mouth @ 6pm   = 7/day   -States he did not have the nausea before the quetiapine increase and has always been fine on the carbidopa/levodopa and gabapentin    Recommendation  1. I informed Norberto that per Dr. Boo and Rachel Mena it is okay to take quetiapine 2 tablets at night. I will send an updated prescription to be signed by Dr. Boo.    2. I educated Norberto on the importance of only changing one medication at a time. Per Rachel Mena's note to me I informed Norberto that further going down on the rivastigmine would likely not be effective as this would be a small dose.    3. I educated Norberto that the rivastigmine patch likely is not the cause of his nausea as this by-passes the stomach.    4. I informed Norberto to take some crackers or ginger ale before his quetiapine or when he starts to feel nauseous.

## 2019-09-30 ENCOUNTER — MEDICAL CORRESPONDENCE (OUTPATIENT)
Dept: HEALTH INFORMATION MANAGEMENT | Facility: CLINIC | Age: 74
End: 2019-09-30

## 2019-10-28 NOTE — PROGRESS NOTES
Diagnosis/Summary/Recommendations:    PATIENT: Norberto Wu  74 year old male     : 1945    GAMA: 2019    Had drop in his blood pressure - was 80/42    [facility] AT [place] [address]     Here with Lottie his daughter in law.     Please refer to nursing note on DBS interrogation from today.     He has dreams occasionally    He is in [place] and family is nearby.     bp 91/55 heart rate 70        Medications     3am 6am 9am 12p 3p 6p 9p midnight                   Carbidopa/levodopa sinemet 25/100 2 2 2 2 2 2  2   Fluticasone flonase 50mcg/act spray                   Gabapentin neurontin 300mg   2   2   3       Ibuprofen advil/motrin 200mg  As needed         Polyethylene glycol miralax           1       Quetiapine seroquel 25mg             2     Rivastigmine exelon 9.5mg/24 hr patch   1                                         History obtained from patient    LOW BLOOD PRESSURE ISSUES - FAINT TODAY AND LOW IN THE OFFICE.   Obtain daily blood pressures as suspect that he has orthostatic hypotension and may need to be on proamatine 2.5mg every morning - 9am    FATIGUE  He is not taking methimazole.   TSH was 0.13 which was low and T4 was normal  CBC and metabolic were normal.     BALANCE Problems persist    PLAN  1. Reduce the seroquel to 1/night  2. Blood pressure monitoring on site.   3. Return to see Lucy Becker in 1-2 months to review his blood pressures and the seroquel.     If patient unable to make it his son Caleb or daughter in law Lottie can contact us and let us know how things are going.     Make miralax as needed    No night time sinemet for now.     Medications     3am 6am 9am 12p 3p 6p 9p midnight                   Carbidopa/levodopa sinemet 25/100 2 2 2 2 2 2  2   Fluticasone flonase 50mcg/act spray                   Gabapentin neurontin 300mg   2   2   3       Ibuprofen advil/motrin 200mg  As needed         Polyethylene  glycol miralax           As needed       Quetiapine seroquel 25mg             2     Rivastigmine exelon 9.5mg/24 hr patch   1                                                           Coding statement:   Duration of  Services: patient care and care coordination was 25 minutes  Greater than 50% of this visit was spent in counseling and coordination of care.     Blaine Boo MD     ______________________________________    Last visit date and details:   Answers for HPI/ROS submitted by the patient on 11/5/2019   General Symptoms: No  Skin Symptoms: No  HENT Symptoms: No  EYE SYMPTOMS: No  HEART SYMPTOMS: No  LUNG SYMPTOMS: No  INTESTINAL SYMPTOMS: No  URINARY SYMPTOMS: No  REPRODUCTIVE SYMPTOMS: No  SKELETAL SYMPTOMS: Yes  BLOOD SYMPTOMS: No  NERVOUS SYSTEM SYMPTOMS: Yes  MENTAL HEALTH SYMPTOMS: No  Back pain: Yes  Muscle aches: No  Neck pain: No  Swollen joints: No  Joint pain: Yes  Bone pain: No  Muscle cramps: No  Trouble with coordination: Yes  Dizziness or trouble with balance: Yes  Fainting or black-out spells: No  Memory loss: Yes  Headache: No  Seizures: No  Speech problems: Yes    Norberto Wu is a 73 year old male coming in for an initial visit for Medication Therapy Management.  He was referred to me from Dr. Boo. Daughter-in-law, Lottie, is also present for the visit today.      Chief Complaint: initial MTM visit - discuss cognition/hallucinations  Allergies/ADRs: Reviewed in Epic  Tobacco: No tobacco use  Alcohol: 1-3 beverages / week  Caffeine: not discussed  Activity: not discussed  PMH: Reviewed in Epic     Medication Adherence/Access:  no issues reported     Parkinson's Disease/RLS:  Current medications include:  Carbidopa-levodopa  mg 2 tablets every 3 hours (3 am, 6 am, 9 am, 12 pm, 3 pm, 6 pm, 9 pm, 12 am), gabapentin 600 mg 3 times daily, and quetiapine 50 mg nightly. He has not been on acetylcholinesterase inhibitors. Primary concern today is pain in his ankle and  hallucinations/confusions. Patient's hallucinations are primarily at night and they have gotten worse to the point of being scary at times. Patient also states that the pain has been so bad lately that he hasn't slept in 24 hours (this was not discussed during the visit with Dr. Boo). He thinks that levodopa helps the pain and he hasn't tried anything else.      Elevated blood pressure: Patient states his BP has been high the past few days and he thinks this is due to pain. During PCP visit today BP dropped to 152/68.     Today's Vitals:      BP Readings from Last 1 Encounters:   01/29/19 (!) 186/98          Pulse Readings from Last 1 Encounters:   01/29/19 60          BP Readings from Last 3 Encounters:   01/29/19 152/86   01/29/19 (!) 186/98   01/29/19 (!) 169/99         ASSESSMENT:                             Current medications were reviewed today.      Medication Adherence: excellent, no issues identified     Parkinson's Disease/RLS: Needs improvement. Discussed with Dr. Boo and patient and we opted to initiate an acetylcholinesterase inhibitor to first treat confusion/possible dementia prior to adding more antipsychotic medications. Pimavanserin may be a good add-on medication after he is on the patch. Treatment form for Nuplazid connect was filled out by Christina Dinh RN and signed by Dr. Boo for future use. For pain, Dr. Boo prescribed lidocaine gel and we also discussed that the patient could try taking Tylenol.      Elevated blood pressure: Needs improvement. Patient's BP may be elevated due to increased pain. Defer to PCP for management - he is also seeing the patient today.     PLAN:                             1. Start rivastigmine patch 4.6 mg daily x 1 month then 9.5 mg daily x 1 month  2. Try the lidocaine gel and/or consider acetaminophen 500 mg 4 times/day as needed for pain  3. Discuss elevated BP with PCP - at PCP visit Dr. Howe recommended monitoring elevated BP due to previous  orthostatic BP's in the fall.     Future: consider starting pimavanserin 34 mg daily.     I spent 30 minutes with this patient today. All changes were made via verbal approval with Dr. Boo. A copy of the visit note was provided to the patient's referring provider.     Will follow up in 2 months: 3/25/19     The patient was given a summary of these recommendations as an after visit summary.      Rachel Mena, Pharm.D.  Medication Therapy Management Pharmacist  Phone: [phone]    GAMA: January 29, 2019     Parkinson  dbs     Freezing in the evening.         Still in [facility].   It is assisted living. He sets up his pills.      He continues to have hallucinations at night.   They are formed.   Fine structure  Sometimes sees people.   One time he thought that there were 3 people in his room - children and was scared.   Was babysitting his daughter the next day.         Medications     3am 6am 9am 12p 3p 6p 9p midnight   Aspirin 81mg                   Carbidopa/levodopa sinemet 25/100 2 2 2 2 2 2 2 2   Fluticasone flonase 50mcg/act spray                   Gabapentin neurontin 300mg   2   2   2       Lidocaine lmx4 4% external cream           Apply as needed       Methimazole tapazole 5mg   stopped               Polyethylene glycol miralax           1       Quetiapine seroquel 25mg             2                                                   History obtained from patient      PLAN  1. Review of his sinemet. Hold off changing this now - discussion about rytary and stalevo 200  2. Discussion of quetiapine dose increase from 2 x 25mg tab at bedtime for hallucinations.   3. Discussion about the use of nuplazid (pimavanserin) - would need prior authorization for hallucinations   4. Discussion about cognitive enhancing medications. His 446 is his most recent QTc  A. Rivastigmine patch (exelon) 4.6 mg patch daily x 1month then 9.5mg patch daily x 1 month then 13.3mg patch daily  B. Donepezil (aricept) 5mg tab/capsule  "by mouth for one month then 10mg  C. Namenda (memantine) dose increase weekly starting with 5mg/day and then increasing over 4 weeks to 10mg twice daily     Return to see Lucy in 1-3 months - may need need battery soon as voltage 2.75  Pharmacy consultation today if possible with Sola Yadav D.     ______________________________________      Patient was asked about 14 Review of systems including changes in vision (dry eyes, double vision), hearing, heart, lungs, musculoskeletal, depression, anxiety, snoring, RBD, insomnia, urinary frequency, urinary urgency, constipation, swallowing problems, hematological, ID, allergies, skin problems: seborrhea, endocrinological: thyroid, diabetes, cholesterol; balance, weight changes, and other neurological problems and these were not significant at this time except for   Patient Active Problem List   Diagnosis     Parkinson disease (H)     Peripheral neuropathy     Somnolence     Family history of Parkinson's disease     Snores     REM sleep behavior disorder     Prostate cancer (H)     Wears glasses     Insomnia     Constipation     History of MRI of brain and brain stem     PFO (patent foramen ovale)     Cerebellar stroke syndrome     H/O echocardiogram     Restless leg syndrome     S/P brain surgery     Hamstring tendonitis at origin     Parkinson's disease (H)     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  Stayed in the hospital 3 days due to severe nausea & \"retching\" .      Comtan Diarrhea     Hydrocodone      Other reaction(s): Headache, Nausea Only     Hydrocodone-Acetaminophen Hives and Nausea     Melatonin Other (See Comments)     No " benefit.     Ropinirole Fatigue     Fatigue no benefit.      Adhesive Tape Rash     Camphor Rash     Other reaction(s): Rash     Liquid Adhesive Rash     Past Surgical History:   Procedure Laterality Date     COLONOSCOPY N/A 3/23/2017    Procedure: COLONOSCOPY;  Surgeon: Salbador Paulson MD;  Location: [facility]     PROSTATE SURGERY  3 yrs ago    prostate cancer; Dr. Valladares     REPLACE DEEP BRAIN STIMULATION GENERATOR / BATTERY Right 1/3/2017    Procedure: REPLACE DEEP BRAIN STIMULATION GENERATOR / BATTERY;  Surgeon: Anupam Fair MD;  Location: [facility] OR     REPLACE DEEP BRAIN STIMULATION GENERATOR / BATTERY Right 2/4/2019    Procedure: Right Deep Brain Stimulatory Battery Replacement;  Surgeon: Marco Rothman MD;  Location: [facility] OR     Right Gpi DBS Lead Implantation  5/20/2014     Rt Chest DBS Generator Placement Activa SC  5/29/2014     Past Medical History:   Diagnosis Date     Cerebellar stroke syndrome 8/21/2013    Few small foci of chronic infarct in the right cerebellar hemisphere, unchanged as well as a new focus of now chronic infarct in the left cerebellar hemisphere since the last study.      Dyslipidemia      Hyperthyroidism      Insomnia 6/17/2011     Parkinson disease (H) 6/15/2011     Peripheral neuropathy 6/15/2011     PFO (patent foramen ovale) 8/21/2013    Interpretation Summary 1. Normal LV size and systolic function. Normal diastolic function 2.  Normal RV size and function 3. No significant valvular abnormalities 4.  Small right-to-left shunt visualized with intravenous agitated saline  contrast study, suggestive of PFO. PatientHeight: 72 in PatientWeight: 205 lbs SystolicPressure: 100 mmHg DiastolicPressure: 63 mmHg BSA 2.2 m^2   Procedure Echoc     PONV (postoperative nausea and vomiting)      Prostate cancer (H) 6/17/2011    s/p radical prostectomy     Social History     Socioeconomic History     Marital status:      Spouse name: Not on file     Number of children: Not on  file     Years of education: Not on file     Highest education level: Not on file   Occupational History     Not on file   Social Needs     Financial resource strain: Not on file     Food insecurity:     Worry: Not on file     Inability: Not on file     Transportation needs:     Medical: Not on file     Non-medical: Not on file   Tobacco Use     Smoking status: Never Smoker     Smokeless tobacco: Never Used   Substance and Sexual Activity     Alcohol use: Yes     Alcohol/week: 1.0 standard drinks     Comment: EVERY OTHER DAY     Drug use: No     Sexual activity: Never   Lifestyle     Physical activity:     Days per week: Not on file     Minutes per session: Not on file     Stress: Not on file   Relationships     Social connections:     Talks on phone: Not on file     Gets together: Not on file     Attends Scientologist service: Not on file     Active member of club or organization: Not on file     Attends meetings of clubs or organizations: Not on file     Relationship status: Not on file     Intimate partner violence:     Fear of current or ex partner: Not on file     Emotionally abused: Not on file     Physically abused: Not on file     Forced sexual activity: Not on file   Other Topics Concern     Parent/sibling w/ CABG, MI or angioplasty before 65F 55M? No   Social History Narrative    Son lives in [place] and he has 2 kids    Wife Mayelin       Drug and lactation database from the [place] [employer] of [employer]:  http://toxnet.nlm.nih.gov/cgi-bin/sis/htmlgen?LACT      B/P: Data Unavailable, T: Data Unavailable, P: Data Unavailable, R: Data Unavailable 0 lbs 0 oz  There were no vitals taken for this visit., There is no height or weight on file to calculate BMI.  Medications and Vitals not listed above were documented in the cart and reviewed by me.     Current Outpatient Medications   Medication Sig Dispense Refill     carbidopa-levodopa (SINEMET)  MG tablet 2 TABLETS by mouth @ 3AM, 6AM, 9AM, noon,  3PM, 6PM and 12 midnight = 14/day 1260 tablet 3     fluticasone (FLONASE) 50 MCG/ACT spray 1-2 sprays in both nostril daily @ 7am 1 Bottle 11     gabapentin (NEURONTIN) 300 MG capsule 2 CAPSULES by mouth @ 6am and 12pm and 3 capsules by mouth @ 6pm   = 7/day 630 capsule 3     ibuprofen (ADVIL/MOTRIN) 100 MG tablet Take 1 tablet (100 mg) by mouth every 6 hours as needed (pain) 30 tablet 0     lidocaine (LMX4) 4 % external cream Apply topically daily as needed for mild pain 1 Tube 11     methimazole (TAPAZOLE) 5 MG tablet Take 1 tablet (5 mg) by mouth daily 90 tablet 3     polyethylene glycol (MIRALAX/GLYCOLAX) Packet 1 packet by mouth in liquid daily @ 6-7pm 7 packet 11     QUEtiapine (SEROQUEL) 25 MG tablet Reduce to 2 x 25mg tab by mouth nightly @ bedtime (10 pm)  = 2/day 180 tablet 3     rivastigmine (EXELON) 9.5 MG/24HR 24 hr patch Place 1 patch onto the skin daily 1 patch 0         Blaine Boo MD

## 2019-11-05 ENCOUNTER — ALLIED HEALTH/NURSE VISIT (OUTPATIENT)
Dept: NEUROLOGY | Facility: CLINIC | Age: 74
End: 2019-11-05

## 2019-11-05 ENCOUNTER — OFFICE VISIT (OUTPATIENT)
Dept: INTERNAL MEDICINE | Facility: CLINIC | Age: 74
End: 2019-11-05
Payer: COMMERCIAL

## 2019-11-05 ENCOUNTER — OFFICE VISIT (OUTPATIENT)
Dept: NEUROLOGY | Facility: CLINIC | Age: 74
End: 2019-11-05
Payer: COMMERCIAL

## 2019-11-05 VITALS
SYSTOLIC BLOOD PRESSURE: 91 MMHG | HEIGHT: 71 IN | OXYGEN SATURATION: 97 % | RESPIRATION RATE: 16 BRPM | BODY MASS INDEX: 26.46 KG/M2 | HEART RATE: 70 BPM | DIASTOLIC BLOOD PRESSURE: 55 MMHG | WEIGHT: 189 LBS

## 2019-11-05 VITALS
BODY MASS INDEX: 26.74 KG/M2 | WEIGHT: 189 LBS | SYSTOLIC BLOOD PRESSURE: 91 MMHG | HEART RATE: 70 BPM | DIASTOLIC BLOOD PRESSURE: 55 MMHG | OXYGEN SATURATION: 97 %

## 2019-11-05 DIAGNOSIS — I95.1 ORTHOSTATIC HYPOTENSION: ICD-10-CM

## 2019-11-05 DIAGNOSIS — E78.00 HIGH BLOOD CHOLESTEROL: ICD-10-CM

## 2019-11-05 DIAGNOSIS — R94.6 THYROID FUNCTION TEST ABNORMAL: ICD-10-CM

## 2019-11-05 DIAGNOSIS — J00 ACUTE RHINITIS: ICD-10-CM

## 2019-11-05 DIAGNOSIS — G20.A1 PARKINSON'S DISEASE (H): ICD-10-CM

## 2019-11-05 DIAGNOSIS — M54.89 OTHER CHRONIC BACK PAIN: ICD-10-CM

## 2019-11-05 DIAGNOSIS — R94.6 THYROID FUNCTION TEST ABNORMAL: Primary | ICD-10-CM

## 2019-11-05 DIAGNOSIS — C61 PROSTATE CANCER (H): ICD-10-CM

## 2019-11-05 DIAGNOSIS — G20.A1 PARKINSON DISEASE (H): Primary | ICD-10-CM

## 2019-11-05 DIAGNOSIS — G25.81 RESTLESS LEGS SYNDROME (RLS): ICD-10-CM

## 2019-11-05 DIAGNOSIS — G20.A1 PARKINSON'S DISEASE (H): Primary | ICD-10-CM

## 2019-11-05 DIAGNOSIS — G89.29 OTHER CHRONIC BACK PAIN: ICD-10-CM

## 2019-11-05 DIAGNOSIS — H53.8 BLURRED VISION: ICD-10-CM

## 2019-11-05 DIAGNOSIS — K59.00 CONSTIPATION, UNSPECIFIED CONSTIPATION TYPE: ICD-10-CM

## 2019-11-05 LAB
ALBUMIN SERPL-MCNC: 3.6 G/DL (ref 3.4–5)
ALP SERPL-CCNC: 107 U/L (ref 40–150)
ALT SERPL W P-5'-P-CCNC: 10 U/L (ref 0–70)
ANION GAP SERPL CALCULATED.3IONS-SCNC: 4 MMOL/L (ref 3–14)
AST SERPL W P-5'-P-CCNC: 4 U/L (ref 0–45)
BASOPHILS # BLD AUTO: 0.1 10E9/L (ref 0–0.2)
BASOPHILS NFR BLD AUTO: 0.8 %
BILIRUB SERPL-MCNC: 1 MG/DL (ref 0.2–1.3)
BUN SERPL-MCNC: 18 MG/DL (ref 7–30)
CALCIUM SERPL-MCNC: 8.6 MG/DL (ref 8.5–10.1)
CHLORIDE SERPL-SCNC: 108 MMOL/L (ref 94–109)
CHOLEST SERPL-MCNC: 171 MG/DL
CO2 SERPL-SCNC: 31 MMOL/L (ref 20–32)
CREAT SERPL-MCNC: 0.88 MG/DL (ref 0.66–1.25)
DIFFERENTIAL METHOD BLD: NORMAL
EOSINOPHIL # BLD AUTO: 0.1 10E9/L (ref 0–0.7)
EOSINOPHIL NFR BLD AUTO: 1.8 %
ERYTHROCYTE [DISTWIDTH] IN BLOOD BY AUTOMATED COUNT: 14 % (ref 10–15)
GFR SERPL CREATININE-BSD FRML MDRD: 86 ML/MIN/{1.73_M2}
GLUCOSE SERPL-MCNC: 89 MG/DL (ref 70–99)
HCT VFR BLD AUTO: 44.1 % (ref 40–53)
HDLC SERPL-MCNC: 55 MG/DL
HGB BLD-MCNC: 14.3 G/DL (ref 13.3–17.7)
IMM GRANULOCYTES # BLD: 0 10E9/L (ref 0–0.4)
IMM GRANULOCYTES NFR BLD: 0.5 %
LDLC SERPL CALC-MCNC: 83 MG/DL
LYMPHOCYTES # BLD AUTO: 1.4 10E9/L (ref 0.8–5.3)
LYMPHOCYTES NFR BLD AUTO: 22.2 %
MCH RBC QN AUTO: 30.4 PG (ref 26.5–33)
MCHC RBC AUTO-ENTMCNC: 32.4 G/DL (ref 31.5–36.5)
MCV RBC AUTO: 94 FL (ref 78–100)
MONOCYTES # BLD AUTO: 0.6 10E9/L (ref 0–1.3)
MONOCYTES NFR BLD AUTO: 9.8 %
NEUTROPHILS # BLD AUTO: 4 10E9/L (ref 1.6–8.3)
NEUTROPHILS NFR BLD AUTO: 64.9 %
NONHDLC SERPL-MCNC: 116 MG/DL
NRBC # BLD AUTO: 0 10*3/UL
NRBC BLD AUTO-RTO: 0 /100
PLATELET # BLD AUTO: 197 10E9/L (ref 150–450)
POTASSIUM SERPL-SCNC: 4 MMOL/L (ref 3.4–5.3)
PROT SERPL-MCNC: 6.7 G/DL (ref 6.8–8.8)
PSA SERPL-MCNC: 0.12 UG/L (ref 0–4)
RBC # BLD AUTO: 4.7 10E12/L (ref 4.4–5.9)
SODIUM SERPL-SCNC: 142 MMOL/L (ref 133–144)
T4 FREE SERPL-MCNC: 0.98 NG/DL (ref 0.76–1.46)
TRIGL SERPL-MCNC: 165 MG/DL
TSH SERPL DL<=0.005 MIU/L-ACNC: 0.13 MU/L (ref 0.4–4)
WBC # BLD AUTO: 6.2 10E9/L (ref 4–11)

## 2019-11-05 RX ORDER — CARBIDOPA AND LEVODOPA 25; 100 MG/1; MG/1
TABLET ORAL
Qty: 1260 TABLET | Refills: 3 | Status: SHIPPED | OUTPATIENT
Start: 2019-11-05 | End: 2020-10-20

## 2019-11-05 RX ORDER — QUETIAPINE FUMARATE 25 MG/1
TABLET, FILM COATED ORAL
Qty: 90 TABLET | Refills: 3 | Status: SHIPPED | OUTPATIENT
Start: 2019-11-05 | End: 2019-11-05

## 2019-11-05 RX ORDER — POLYETHYLENE GLYCOL 3350 17 G/17G
POWDER, FOR SOLUTION ORAL
Qty: 7 PACKET | Refills: 11 | Status: SHIPPED | OUTPATIENT
Start: 2019-11-05 | End: 2020-10-27

## 2019-11-05 RX ORDER — RIVASTIGMINE 9.5 MG/24H
1 PATCH, EXTENDED RELEASE TRANSDERMAL DAILY
Qty: 90 PATCH | Refills: 3 | Status: SHIPPED | OUTPATIENT
Start: 2019-11-05 | End: 2020-12-09

## 2019-11-05 RX ORDER — GABAPENTIN 300 MG/1
CAPSULE ORAL
Qty: 630 CAPSULE | Refills: 3 | Status: SHIPPED | OUTPATIENT
Start: 2019-11-05 | End: 2020-10-20

## 2019-11-05 RX ORDER — QUETIAPINE FUMARATE 25 MG/1
TABLET, FILM COATED ORAL
Qty: 90 TABLET | Refills: 3 | Status: SHIPPED | OUTPATIENT
Start: 2019-11-05 | End: 2020-09-23

## 2019-11-05 RX ORDER — IBUPROFEN 200 MG
100 TABLET ORAL EVERY 6 HOURS PRN
COMMUNITY
End: 2020-04-16

## 2019-11-05 ASSESSMENT — ENCOUNTER SYMPTOMS
BACK PAIN: 1
HEADACHES: 0
DIZZINESS: 1
MEMORY LOSS: 1
SEIZURES: 0
NECK PAIN: 0
ARTHRALGIAS: 1
DISTURBANCES IN COORDINATION: 1
LOSS OF CONSCIOUSNESS: 0
MYALGIAS: 0
JOINT SWELLING: 0
SPEECH CHANGE: 1
MUSCLE CRAMPS: 0

## 2019-11-05 ASSESSMENT — PAIN SCALES - GENERAL
PAINLEVEL: MODERATE PAIN (4)
PAINLEVEL: MODERATE PAIN (4)

## 2019-11-05 ASSESSMENT — MIFFLIN-ST. JEOR: SCORE: 1621.49

## 2019-11-05 NOTE — PROGRESS NOTES
HPI:    Pt. Comes in today for follow up and DBS battery exchage procedure. He has followed with Dr. Boo, Neurology for Parkinson's disease and was seen today 11/5/2019.  He denies any rest/exertional CP/SOB. No anesthesia or bleeding issues. He had significant adverse reaction in the hospital to hydromorphone (dilaudid)  He has h/o prostate CA and is followed at [facility].  Colonoscopy 3/17 possibly repeat in 5 years. He is with his son and daughter-in-law today.  He has moved to new Care Facility in the next few weeks.  No other HEENT, cardiopulmonary, abdominal, , neurological, systemic complaints. He does follow with outside dermatology.      Past Medical History:   Diagnosis Date     Cerebellar stroke syndrome 8/21/2013    Few small foci of chronic infarct in the right cerebellar hemisphere, unchanged as well as a new focus of now chronic infarct in the left cerebellar hemisphere since the last study.      Dyslipidemia      Hyperthyroidism      Insomnia 6/17/2011     Parkinson disease (H) 6/15/2011     Peripheral neuropathy 6/15/2011     PFO (patent foramen ovale) 8/21/2013    Interpretation Summary 1. Normal LV size and systolic function. Normal diastolic function 2.  Normal RV size and function 3. No significant valvular abnormalities 4.  Small right-to-left shunt visualized with intravenous agitated saline  contrast study, suggestive of PFO. PatientHeight: 72 in PatientWeight: 205 lbs SystolicPressure: 100 mmHg DiastolicPressure: 63 mmHg BSA 2.2 m^2   Procedure Echoc     PONV (postoperative nausea and vomiting)      Prostate cancer (H) 6/17/2011    s/p radical prostectomy     Past Surgical History:   Procedure Laterality Date     COLONOSCOPY N/A 3/23/2017    Procedure: COLONOSCOPY;  Surgeon: Salbador Paulson MD;  Location:  GI     PROSTATE SURGERY  3 yrs ago    prostate cancer; Dr. Valladares     REPLACE DEEP BRAIN STIMULATION GENERATOR / BATTERY Right 1/3/2017    Procedure: REPLACE DEEP  BRAIN STIMULATION GENERATOR / BATTERY;  Surgeon: Anupam Fair MD;  Location: [facility] OR     REPLACE DEEP BRAIN STIMULATION GENERATOR / BATTERY Right 2/4/2019    Procedure: Right Deep Brain Stimulatory Battery Replacement;  Surgeon: Marco Rothman MD;  Location: [facility] OR     Right Gpi DBS Lead Implantation  5/20/2014     Rt Chest DBS Generator Placement Activa SC  5/29/2014         PE:    Vitals noted gen nad cooperative alert, HEENT; Oropharynx clear no exudate neck supple nl rom no adenopathy, LCTA, B, normal resp. System exam, RRR, S1, S2, no MRG, abdomen, nt, nd, resting tremor in hands.  No Paraspinous tenderness to palpation, no skin rash.    EKG from 1/31/2018; SR at 84, tremor or electronic interference in several leads. No significant change from 12/26/2016    A/P:    1. Parkinson's he follows here in Neurology and seen today 11/5/2029 by Dr. Boo  2. Check with insurance for new Shingles vaccine. Influenza vaccination done 10/19/2019  3. Increased cholesterol;  fasting lipids and liver tests done 10/9/17 and stable; he remains on Lipitor. For leg complaints he will stop for about 2 weeks. Checked electrolytes and magnesium on 10/5/2018 unremarkable. He states stopping Lipitor made no difference in his leg sxs and he will go back on this medication.    4. He will continue to follow outside Urology for h/o prostate CA. Will check PSA checked 10/9/17 at 0.13; value 3/7/17 was 0.12. He states he follows with Dr. Valladares and that is PSA usually is in the 0.1 range and stable.   5. He can follow up in Health Psychology for anxiety issues.   6. Low TSH checked 9/14; not on replacement checked  TPO and TSI labs done. He has followed up with Dr. Mccarty 6/17 and  6/8/2018.   He is on methimazole. TSH was 0.95 on 10/5/2018. TSH on 12/31/2018 0.09 and T4 1.03. Will discuss again with Endocrine   7. Placed future dermatology referral for skin check 9/11/17.  8. Colonoscopy done 3/2017 repeat in 5 years.  He will try Miralax for constipation and this has worked  9. BP monitor from 10/2017 normal BP. Elevated today but quite low (93/61 on 10/5/2018). As above may start low dose antihypertensive medication  10. Melatonin for sleep   11. Refer to ortho for R shoulder and lower back.     Total time spent 25  minutes.  More than 50% of the time spent with Mr. Wu on counseling / coordinating his care

## 2019-11-05 NOTE — NURSING NOTE
Chief Complaint   Patient presents with     Parkinson     [facility] RETURN MOVEMENT DISORDER RMO F/U        Tom Trejo, EMT

## 2019-11-05 NOTE — LETTER
2019       RE: Norberto Wu  [address]  [place] [address]     Dear Colleague,    Thank you for referring your patient, Norberto Wu, to the [facility] NEUROLOGY at [facility] Please see a copy of my visit note below.    Diagnosis/Summary/Recommendations:    PATIENT: Norberto Wu  74 year old male     : 1945    GAMA: 2019    Had drop in his blood pressure - was 80/42    [facility] AT [address] [address]     Here with Lottie his daughter in law.     Please refer to nursing note on DBS interrogation from today.     He has dreams occasionally    He is in [place] and family is nearby.     bp 91/55 heart rate 70        Medications     3am 6am 9am 12p 3p 6p 9p midnight                   Carbidopa/levodopa sinemet 25/100 2 2 2 2 2 2  2   Fluticasone flonase 50mcg/act spray                   Gabapentin neurontin 300mg   2   2   3       Ibuprofen advil/motrin 200mg  As needed         Polyethylene glycol miralax           1       Quetiapine seroquel 25mg             2     Rivastigmine exelon 9.5mg/24 hr patch   1                                         History obtained from patient    LOW BLOOD PRESSURE ISSUES - FAINT TODAY AND LOW IN THE OFFICE.   Obtain daily blood pressures as suspect that he has orthostatic hypotension and may need to be on proamatine 2.5mg every morning - 9am    FATIGUE  He is not taking methimazole.   TSH was 0.13 which was low and T4 was normal  CBC and metabolic were normal.     BALANCE Problems persist    PLAN  1. Reduce the seroquel to 1/night  2. Blood pressure monitoring on site.   3. Return to see Lucy Becker in 1-2 months to review his blood pressures and the seroquel.     If patient unable to make it his son Caleb or daughter in law Lottie can contact us and let us know how things are going.     Make miralax as needed    No night time sinemet for  now.     Medications     3am 6am 9am 12p 3p 6p 9p midnight                   Carbidopa/levodopa sinemet 25/100 2 2 2 2 2 2  2   Fluticasone flonase 50mcg/act spray                   Gabapentin neurontin 300mg   2   2   3       Ibuprofen advil/motrin 200mg  As needed         Polyethylene glycol miralax           As needed       Quetiapine seroquel 25mg             2     Rivastigmine exelon 9.5mg/24 hr patch   1                                                           Coding statement:   Duration of  Services: patient care and care coordination was 25 minutes  Greater than 50% of this visit was spent in counseling and coordination of care.     Blaine Boo MD     ______________________________________    Last visit date and details:   Answers for HPI/ROS submitted by the patient on 11/5/2019   General Symptoms: No  Skin Symptoms: No  HENT Symptoms: No  EYE SYMPTOMS: No  HEART SYMPTOMS: No  LUNG SYMPTOMS: No  INTESTINAL SYMPTOMS: No  URINARY SYMPTOMS: No  REPRODUCTIVE SYMPTOMS: No  SKELETAL SYMPTOMS: Yes  BLOOD SYMPTOMS: No  NERVOUS SYSTEM SYMPTOMS: Yes  MENTAL HEALTH SYMPTOMS: No  Back pain: Yes  Muscle aches: No  Neck pain: No  Swollen joints: No  Joint pain: Yes  Bone pain: No  Muscle cramps: No  Trouble with coordination: Yes  Dizziness or trouble with balance: Yes  Fainting or black-out spells: No  Memory loss: Yes  Headache: No  Seizures: No  Speech problems: Yes    Norberto Wu is a 73 year old male coming in for an initial visit for Medication Therapy Management.  He was referred to me from Dr. Boo. Daughter-in-law, Lottie, is also present for the visit today.      Chief Complaint: initial MTM visit - discuss cognition/hallucinations  Allergies/ADRs: Reviewed in Epic  Tobacco: No tobacco use  Alcohol: 1-3 beverages / week  Caffeine: not discussed  Activity: not discussed  PMH: Reviewed in Epic     Medication Adherence/Access:  no issues reported     Parkinson's Disease/RLS:  Current medications include:   Carbidopa-levodopa  mg 2 tablets every 3 hours (3 am, 6 am, 9 am, 12 pm, 3 pm, 6 pm, 9 pm, 12 am), gabapentin 600 mg 3 times daily, and quetiapine 50 mg nightly. He has not been on acetylcholinesterase inhibitors. Primary concern today is pain in his ankle and hallucinations/confusions. Patient's hallucinations are primarily at night and they have gotten worse to the point of being scary at times. Patient also states that the pain has been so bad lately that he hasn't slept in 24 hours (this was not discussed during the visit with Dr. Boo). He thinks that levodopa helps the pain and he hasn't tried anything else.      Elevated blood pressure: Patient states his BP has been high the past few days and he thinks this is due to pain. During PCP visit today BP dropped to 152/68.     Today's Vitals:      BP Readings from Last 1 Encounters:   01/29/19 (!) 186/98          Pulse Readings from Last 1 Encounters:   01/29/19 60          BP Readings from Last 3 Encounters:   01/29/19 152/86   01/29/19 (!) 186/98   01/29/19 (!) 169/99         ASSESSMENT:                             Current medications were reviewed today.      Medication Adherence: excellent, no issues identified     Parkinson's Disease/RLS: Needs improvement. Discussed with Dr. Boo and patient and we opted to initiate an acetylcholinesterase inhibitor to first treat confusion/possible dementia prior to adding more antipsychotic medications. Pimavanserin may be a good add-on medication after he is on the patch. Treatment form for Nuplazid connect was filled out by Christina Dinh RN and signed by Dr. Boo for future use. For pain, Dr. Boo prescribed lidocaine gel and we also discussed that the patient could try taking Tylenol.      Elevated blood pressure: Needs improvement. Patient's BP may be elevated due to increased pain. Defer to PCP for management - he is also seeing the patient today.     PLAN:                             1. Start rivastigmine  patch 4.6 mg daily x 1 month then 9.5 mg daily x 1 month  2. Try the lidocaine gel and/or consider acetaminophen 500 mg 4 times/day as needed for pain  3. Discuss elevated BP with PCP - at PCP visit Dr. Howe recommended monitoring elevated BP due to previous orthostatic BP's in the fall.     Future: consider starting pimavanserin 34 mg daily.     I spent 30 minutes with this patient today. All changes were made via verbal approval with Dr. Boo. A copy of the visit note was provided to the patient's referring provider.     Will follow up in 2 months: 3/25/19     The patient was given a summary of these recommendations as an after visit summary.      Rachel Mena, Pharm.D.  Medication Therapy Management Pharmacist  Phone: [phone]    GAMA: January 29, 2019     Parkinson  dbs     Freezing in the evening.         Still in [facility].   It is assisted living. He sets up his pills.      He continues to have hallucinations at night.   They are formed.   Fine structure  Sometimes sees people.   One time he thought that there were 3 people in his room - children and was scared.   Was babysitting his daughter the next day.         Medications     3am 6am 9am 12p 3p 6p 9p midnight   Aspirin 81mg                   Carbidopa/levodopa sinemet 25/100 2 2 2 2 2 2 2 2   Fluticasone flonase 50mcg/act spray                   Gabapentin neurontin 300mg   2   2   2       Lidocaine lmx4 4% external cream           Apply as needed       Methimazole tapazole 5mg   stopped               Polyethylene glycol miralax           1       Quetiapine seroquel 25mg             2                                                   History obtained from patient      PLAN  1. Review of his sinemet. Hold off changing this now - discussion about rytary and stalevo 200  2. Discussion of quetiapine dose increase from 2 x 25mg tab at bedtime for hallucinations.   3. Discussion about the use of nuplazid (pimavanserin) - would need prior authorization  for hallucinations   4. Discussion about cognitive enhancing medications. His 446 is his most recent QTc  A. Rivastigmine patch (exelon) 4.6 mg patch daily x 1month then 9.5mg patch daily x 1 month then 13.3mg patch daily  B. Donepezil (aricept) 5mg tab/capsule by mouth for one month then 10mg  C. Namenda (memantine) dose increase weekly starting with 5mg/day and then increasing over 4 weeks to 10mg twice daily     Return to see Lucy in 1-3 months - may need need battery soon as voltage 2.75  Pharmacy consultation today if possible with Sola Yadav D.     ______________________________________      Patient was asked about 14 Review of systems including changes in vision (dry eyes, double vision), hearing, heart, lungs, musculoskeletal, depression, anxiety, snoring, RBD, insomnia, urinary frequency, urinary urgency, constipation, swallowing problems, hematological, ID, allergies, skin problems: seborrhea, endocrinological: thyroid, diabetes, cholesterol; balance, weight changes, and other neurological problems and these were not significant at this time except for   Patient Active Problem List   Diagnosis     Parkinson disease (H)     Peripheral neuropathy     Somnolence     Family history of Parkinson's disease     Snores     REM sleep behavior disorder     Prostate cancer (H)     Wears glasses     Insomnia     Constipation     History of MRI of brain and brain stem     PFO (patent foramen ovale)     Cerebellar stroke syndrome     H/O echocardiogram     Restless leg syndrome     S/P brain surgery     Hamstring tendonitis at origin     Parkinson's disease (H)     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  Stayed in the hospital 3 days due to severe nausea & \"retching\" .      Comtan Diarrhea     Hydrocodone      Other reaction(s): Headache, Nausea Only     Hydrocodone-Acetaminophen Hives and Nausea     Melatonin Other (See Comments)     No benefit.     Ropinirole Fatigue     Fatigue no benefit.      Adhesive Tape Rash     Camphor Rash     Other reaction(s): Rash     Liquid Adhesive Rash     Past Surgical History:   Procedure Laterality Date     COLONOSCOPY N/A 3/23/2017    Procedure: COLONOSCOPY;  Surgeon: Salbador Paulson MD;  Location: [facility]     PROSTATE SURGERY  3 yrs ago    prostate cancer; Dr. Valladares     REPLACE DEEP BRAIN STIMULATION GENERATOR / BATTERY Right 1/3/2017    Procedure: REPLACE DEEP BRAIN STIMULATION GENERATOR / BATTERY;  Surgeon: Anupam Fair MD;  Location: [facility] OR     REPLACE DEEP BRAIN STIMULATION GENERATOR / BATTERY Right 2/4/2019    Procedure: Right Deep Brain Stimulatory Battery Replacement;  Surgeon: Marco Rothman MD;  Location: [facility] OR     Right Gpi DBS Lead Implantation  5/20/2014     Rt Chest DBS Generator Placement Activa SC  5/29/2014     Past Medical History:   Diagnosis Date     Cerebellar stroke syndrome 8/21/2013    Few small foci of chronic infarct in the right cerebellar hemisphere, unchanged as well as a new focus of now chronic infarct in the left cerebellar hemisphere since the last study.      Dyslipidemia      Hyperthyroidism      Insomnia 6/17/2011     Parkinson disease (H) 6/15/2011     Peripheral neuropathy 6/15/2011     PFO (patent foramen ovale) 8/21/2013    Interpretation Summary 1. Normal LV size and systolic function. Normal diastolic function 2.  Normal RV size and function 3. No significant valvular abnormalities 4.  Small right-to-left shunt visualized with intravenous agitated saline  contrast study, suggestive of PFO. PatientHeight: 72 in PatientWeight: 205 lbs SystolicPressure: 100 mmHg DiastolicPressure: 63 mmHg BSA 2.2 " m^2   Procedure Echoc     PONV (postoperative nausea and vomiting)      Prostate cancer (H) 6/17/2011    s/p radical prostectomy     Social History     Socioeconomic History     Marital status:      Spouse name: Not on file     Number of children: Not on file     Years of education: Not on file     Highest education level: Not on file   Occupational History     Not on file   Social Needs     Financial resource strain: Not on file     Food insecurity:     Worry: Not on file     Inability: Not on file     Transportation needs:     Medical: Not on file     Non-medical: Not on file   Tobacco Use     Smoking status: Never Smoker     Smokeless tobacco: Never Used   Substance and Sexual Activity     Alcohol use: Yes     Alcohol/week: 1.0 standard drinks     Comment: EVERY OTHER DAY     Drug use: No     Sexual activity: Never   Lifestyle     Physical activity:     Days per week: Not on file     Minutes per session: Not on file     Stress: Not on file   Relationships     Social connections:     Talks on phone: Not on file     Gets together: Not on file     Attends Tenriism service: Not on file     Active member of club or organization: Not on file     Attends meetings of clubs or organizations: Not on file     Relationship status: Not on file     Intimate partner violence:     Fear of current or ex partner: Not on file     Emotionally abused: Not on file     Physically abused: Not on file     Forced sexual activity: Not on file   Other Topics Concern     Parent/sibling w/ CABG, MI or angioplasty before 65F 55M? No   Social History Narrative    Son lives in [place] and he has 2 kids    Wife Mayelin       Drug and lactation database from the [place] [employer] of Medicine:  http://toxnet.nlm.nih.gov/cgi-bin/sis/htmlgen?LACT      B/P: Data Unavailable, T: Data Unavailable, P: Data Unavailable, R: Data Unavailable 0 lbs 0 oz  There were no vitals taken for this visit., There is no height or weight on file to  calculate BMI.  Medications and Vitals not listed above were documented in the cart and reviewed by me.     Current Outpatient Medications   Medication Sig Dispense Refill     carbidopa-levodopa (SINEMET)  MG tablet 2 TABLETS by mouth @ 3AM, 6AM, 9AM, noon, 3PM, 6PM and 12 midnight = 14/day 1260 tablet 3     fluticasone (FLONASE) 50 MCG/ACT spray 1-2 sprays in both nostril daily @ 7am 1 Bottle 11     gabapentin (NEURONTIN) 300 MG capsule 2 CAPSULES by mouth @ 6am and 12pm and 3 capsules by mouth @ 6pm   = 7/day 630 capsule 3     ibuprofen (ADVIL/MOTRIN) 100 MG tablet Take 1 tablet (100 mg) by mouth every 6 hours as needed (pain) 30 tablet 0     lidocaine (LMX4) 4 % external cream Apply topically daily as needed for mild pain 1 Tube 11     methimazole (TAPAZOLE) 5 MG tablet Take 1 tablet (5 mg) by mouth daily 90 tablet 3     polyethylene glycol (MIRALAX/GLYCOLAX) Packet 1 packet by mouth in liquid daily @ 6-7pm 7 packet 11     QUEtiapine (SEROQUEL) 25 MG tablet Reduce to 2 x 25mg tab by mouth nightly @ bedtime (10 pm)  = 2/day 180 tablet 3     rivastigmine (EXELON) 9.5 MG/24HR 24 hr patch Place 1 patch onto the skin daily 1 patch 0         Blaine Boo MD

## 2019-11-05 NOTE — PROGRESS NOTES
Norberto comes into clinic today at the request of Dr. Boo, ordering provider for follow-up visit to further evaluate his Deep Brain Stimulation systember. This service provided today was under the supervising provider of the day Dr. Boo, who was available if needed.     Reason for visit: DEEP BRAIN STIMULATION Interrogation   Time spent on visit: 10 minutes    Christina Dinh RN    Movement Disorder Care Coordinator  [facility]  ----------------------------  Interrogated patient's DBS Medtronic Activa SC battery. Patient has RGpi. All impedances were checked at 0.7 Volts and were WNL. See neuromodulation sheets scanned. Patient informed.    LGpi Therapy impedance = 895 ohms, 4.5 mA    Contacts used = C+/1-   (monopolar)  Upper limit = 4.2  Lower limit = 0  Current Setting = 4.0    Model 03310    Battery voltage 2.93

## 2019-11-05 NOTE — PATIENT INSTRUCTIONS
GAMA: November 5, 2019    Had drop in his blood pressure - was 80/42    [facility] AT [address] [address]     Here with Lottie his daughter in law.     Please refer to nursing note on DBS interrogation from today.     He has dreams occasionally    He is in [place] and family is nearby.     bp 91/55 heart rate 70        Medications     3am 6am 9am 12p 3p 6p 9p midnight                   Carbidopa/levodopa sinemet 25/100 2 2 2 2 2 2  2   Fluticasone flonase 50mcg/act spray                   Gabapentin neurontin 300mg   2   2   3       Ibuprofen advil/motrin 200mg  As needed         Polyethylene glycol miralax           1       Quetiapine seroquel 25mg             2     Rivastigmine exelon 9.5mg/24 hr patch   1                                         History obtained from patient    LOW BLOOD PRESSURE ISSUES - FAINT TODAY AND LOW IN THE OFFICE.   Obtain daily blood pressures as suspect that he has orthostatic hypotension and may need to be on proamatine 2.5mg every morning - 9am    FATIGUE  He is not taking methimazole.   TSH was 0.13 which was low and T4 was normal  CBC and metabolic were normal.     BALANCE Problems persist    PLAN  1. Reduce the seroquel to 1/night  2. Blood pressure monitoring on site.   3. Return to see Lucy Becker in 1-2 months to review his blood pressures and the seroquel.     If patient unable to make it his son Caleb or daughter in law Lottie can contact us and let us know how things are going.     Make miralax as needed    No night time sinemet for now.       Medications     3am 6am 9am 12p 3p 6p 9p midnight                   Carbidopa/levodopa sinemet 25/100 2 2 2 2 2 2  2   Fluticasone flonase 50mcg/act spray                   Gabapentin neurontin 300mg   2   2   3       Ibuprofen advil/motrin 200mg  As needed         Polyethylene glycol miralax           As needed       Quetiapine seroquel 25mg             1     Rivastigmine  exelon 9.5mg/24 hr patch   1

## 2019-11-05 NOTE — NURSING NOTE
Chief Complaint   Patient presents with     Recheck Medication     pt here for med check and follow up       Juanita Collazo CMA, EMT at 11:01 AM on 11/5/2019.

## 2019-11-05 NOTE — PATIENT INSTRUCTIONS
[facility] Medication Refill Request Information:  * Please contact your pharmacy regarding ANY request for medication refills.  ** [facility] Prescription Fax = [phone]  * Please allow 3 business days for routine medication refills.  * Please allow 5 business days for controlled substance medication refills.     [facility] Test Result notification information:  *You will be notified with in 7-10 days of your appointment day regarding the results of your test.  If you are on MyChart you will be notified as soon as the provider has reviewed the results and signed off on them.    [facility]: [phone]

## 2019-11-07 ENCOUNTER — TELEPHONE (OUTPATIENT)
Dept: INTERNAL MEDICINE | Facility: CLINIC | Age: 74
End: 2019-11-07

## 2019-11-07 DIAGNOSIS — R94.6 THYROID FUNCTION TEST ABNORMAL: Primary | ICD-10-CM

## 2019-11-07 NOTE — TELEPHONE ENCOUNTER
Dear Marj;    Please see results letter sent to Mr. Wu. He has significant Parkinson's disease. Please call his daughter in law with this recommendation and help coordinate appt.    Thanks, MOHIT Howe      Dear Norberto;    Your thyroid lab testing is still abnormal and I recommend you see Dr. Mccarty again. I placed a referral and my nurse Marj will give you a follow up phone call.     MOHIT Howe MD    Left message on pt's daughter cell phone to call back. Will have clinic coordinators call and assist pt/daughter with scheduling appt in endocrine.  Marj Thurston RN 7:29 AM on 11/7/2019.

## 2019-11-07 NOTE — TELEPHONE ENCOUNTER
Called patient's daughter, Lottie, to assist with scheduling appointment in endocrine. No answered. Left message with Endo cinology phone number for scheduling.

## 2019-11-09 ENCOUNTER — HEALTH MAINTENANCE LETTER (OUTPATIENT)
Age: 74
End: 2019-11-09

## 2019-11-18 ENCOUNTER — DOCUMENTATION ONLY (OUTPATIENT)
Dept: CARE COORDINATION | Facility: CLINIC | Age: 74
End: 2019-11-18

## 2019-11-26 DIAGNOSIS — J00 ACUTE RHINITIS: ICD-10-CM

## 2019-11-26 RX ORDER — FLUTICASONE PROPIONATE 50 MCG
1-2 SPRAY, SUSPENSION (ML) NASAL DAILY
Qty: 48 G | Refills: 3 | Status: SHIPPED | OUTPATIENT
Start: 2019-11-26 | End: 2020-10-27

## 2020-01-02 ENCOUNTER — TELEPHONE (OUTPATIENT)
Dept: OPHTHALMOLOGY | Facility: CLINIC | Age: 75
End: 2020-01-02

## 2020-01-07 ENCOUNTER — TELEPHONE (OUTPATIENT)
Dept: INTERNAL MEDICINE | Facility: CLINIC | Age: 75
End: 2020-01-07

## 2020-01-07 NOTE — TELEPHONE ENCOUNTER
[facility] Call Center    Phone Message    May a detailed message be left on voicemail: yes    Reason for Call: Other: Per call from Caleb PT needs nausea RX and it can be sent to [facility] - [address]. Wilfredo Ellis doesn't want to schedule an APPT for the PT. Wilfredo Ellis call Lottie at [phone] if any questions.      Action Taken: Message routed to:  [facility] Primary Care

## 2020-01-07 NOTE — TELEPHONE ENCOUNTER
Called and advised pt needs an appointment to rule out anything serious. Arely Bermudez Paramedic on 1/7/2020 at 1:35 PM   Left a  Arely Bermudez Paramedic on 1/7/2020 at 1:35 PM \

## 2020-01-20 ENCOUNTER — MEDICAL CORRESPONDENCE (OUTPATIENT)
Dept: HEALTH INFORMATION MANAGEMENT | Facility: CLINIC | Age: 75
End: 2020-01-20

## 2020-01-29 ENCOUNTER — TELEPHONE (OUTPATIENT)
Dept: NEUROLOGY | Facility: CLINIC | Age: 75
End: 2020-01-29

## 2020-01-29 NOTE — TELEPHONE ENCOUNTER
[facility] Call Center    Phone Message    May a detailed message be left on voicemail: yes    Reason for Call: Order(s): Other: Order for a new walker for [facility]     Reason for requested: The cable is catching in door frames.  Date needed: As soon as you can  Pt didn't say  Provider name: Dr Boo       Action Taken: Message routed to:  [facility] neurology

## 2020-02-17 ENCOUNTER — ANCILLARY PROCEDURE (OUTPATIENT)
Dept: CT IMAGING | Facility: CLINIC | Age: 75
End: 2020-02-17
Attending: INTERNAL MEDICINE
Payer: COMMERCIAL

## 2020-02-17 ENCOUNTER — OFFICE VISIT (OUTPATIENT)
Dept: INTERNAL MEDICINE | Facility: CLINIC | Age: 75
End: 2020-02-17
Payer: COMMERCIAL

## 2020-02-17 VITALS
SYSTOLIC BLOOD PRESSURE: 156 MMHG | OXYGEN SATURATION: 98 % | DIASTOLIC BLOOD PRESSURE: 90 MMHG | BODY MASS INDEX: 26.74 KG/M2 | WEIGHT: 189 LBS | HEART RATE: 60 BPM

## 2020-02-17 DIAGNOSIS — R29.6 FALLS FREQUENTLY: Primary | ICD-10-CM

## 2020-02-17 DIAGNOSIS — R29.6 FALLS FREQUENTLY: ICD-10-CM

## 2020-02-17 DIAGNOSIS — Z96.89 S/P DEEP BRAIN STIMULATOR PLACEMENT: ICD-10-CM

## 2020-02-17 DIAGNOSIS — R53.83 FATIGUE, UNSPECIFIED TYPE: ICD-10-CM

## 2020-02-17 LAB
ALBUMIN SERPL-MCNC: 3.6 G/DL (ref 3.4–5)
ALBUMIN UR-MCNC: NEGATIVE MG/DL
ALP SERPL-CCNC: 114 U/L (ref 40–150)
ALT SERPL W P-5'-P-CCNC: 9 U/L (ref 0–70)
ANION GAP SERPL CALCULATED.3IONS-SCNC: 4 MMOL/L (ref 3–14)
APPEARANCE UR: CLEAR
AST SERPL W P-5'-P-CCNC: 10 U/L (ref 0–45)
BASOPHILS # BLD AUTO: 0.1 10E9/L (ref 0–0.2)
BASOPHILS NFR BLD AUTO: 1.1 %
BILIRUB SERPL-MCNC: 0.7 MG/DL (ref 0.2–1.3)
BILIRUB UR QL STRIP: NEGATIVE
BUN SERPL-MCNC: 20 MG/DL (ref 7–30)
CALCIUM SERPL-MCNC: 8.6 MG/DL (ref 8.5–10.1)
CHLORIDE SERPL-SCNC: 108 MMOL/L (ref 94–109)
CO2 SERPL-SCNC: 28 MMOL/L (ref 20–32)
COLOR UR AUTO: YELLOW
CREAT SERPL-MCNC: 0.64 MG/DL (ref 0.66–1.25)
DIFFERENTIAL METHOD BLD: NORMAL
EOSINOPHIL # BLD AUTO: 0.2 10E9/L (ref 0–0.7)
EOSINOPHIL NFR BLD AUTO: 2.7 %
ERYTHROCYTE [DISTWIDTH] IN BLOOD BY AUTOMATED COUNT: 14.7 % (ref 10–15)
GFR SERPL CREATININE-BSD FRML MDRD: >90 ML/MIN/{1.73_M2}
GLUCOSE SERPL-MCNC: 88 MG/DL (ref 70–99)
GLUCOSE UR STRIP-MCNC: NEGATIVE MG/DL
HCT VFR BLD AUTO: 44.4 % (ref 40–53)
HGB BLD-MCNC: 14.1 G/DL (ref 13.3–17.7)
HGB UR QL STRIP: NEGATIVE
IMM GRANULOCYTES # BLD: 0 10E9/L (ref 0–0.4)
IMM GRANULOCYTES NFR BLD: 0.5 %
KETONES UR STRIP-MCNC: 5 MG/DL
LEUKOCYTE ESTERASE UR QL STRIP: NEGATIVE
LYMPHOCYTES # BLD AUTO: 1.7 10E9/L (ref 0.8–5.3)
LYMPHOCYTES NFR BLD AUTO: 26.7 %
MCH RBC QN AUTO: 29.4 PG (ref 26.5–33)
MCHC RBC AUTO-ENTMCNC: 31.8 G/DL (ref 31.5–36.5)
MCV RBC AUTO: 93 FL (ref 78–100)
MONOCYTES # BLD AUTO: 0.5 10E9/L (ref 0–1.3)
MONOCYTES NFR BLD AUTO: 8.5 %
MUCOUS THREADS #/AREA URNS LPF: PRESENT /LPF
NEUTROPHILS # BLD AUTO: 3.8 10E9/L (ref 1.6–8.3)
NEUTROPHILS NFR BLD AUTO: 60.5 %
NITRATE UR QL: NEGATIVE
NRBC # BLD AUTO: 0 10*3/UL
NRBC BLD AUTO-RTO: 0 /100
PH UR STRIP: 5 PH (ref 5–7)
PLATELET # BLD AUTO: 234 10E9/L (ref 150–450)
POTASSIUM SERPL-SCNC: 4.3 MMOL/L (ref 3.4–5.3)
PROT SERPL-MCNC: 7.1 G/DL (ref 6.8–8.8)
RBC # BLD AUTO: 4.79 10E12/L (ref 4.4–5.9)
RBC #/AREA URNS AUTO: <1 /HPF (ref 0–2)
SODIUM SERPL-SCNC: 140 MMOL/L (ref 133–144)
SOURCE: ABNORMAL
SP GR UR STRIP: 1.02 (ref 1–1.03)
SQUAMOUS #/AREA URNS AUTO: <1 /HPF (ref 0–1)
T4 FREE SERPL-MCNC: 0.9 NG/DL (ref 0.76–1.46)
TSH SERPL DL<=0.005 MIU/L-ACNC: 0.19 MU/L (ref 0.4–4)
UROBILINOGEN UR STRIP-MCNC: 0 MG/DL (ref 0–2)
WBC # BLD AUTO: 6.3 10E9/L (ref 4–11)
WBC #/AREA URNS AUTO: 1 /HPF (ref 0–5)

## 2020-02-17 ASSESSMENT — ANXIETY QUESTIONNAIRES
2. NOT BEING ABLE TO STOP OR CONTROL WORRYING: NOT AT ALL
5. BEING SO RESTLESS THAT IT IS HARD TO SIT STILL: NOT AT ALL
7. FEELING AFRAID AS IF SOMETHING AWFUL MIGHT HAPPEN: NOT AT ALL
6. BECOMING EASILY ANNOYED OR IRRITABLE: SEVERAL DAYS
GAD7 TOTAL SCORE: 2
IF YOU CHECKED OFF ANY PROBLEMS ON THIS QUESTIONNAIRE, HOW DIFFICULT HAVE THESE PROBLEMS MADE IT FOR YOU TO DO YOUR WORK, TAKE CARE OF THINGS AT HOME, OR GET ALONG WITH OTHER PEOPLE: NOT DIFFICULT AT ALL
1. FEELING NERVOUS, ANXIOUS, OR ON EDGE: NOT AT ALL
3. WORRYING TOO MUCH ABOUT DIFFERENT THINGS: SEVERAL DAYS

## 2020-02-17 ASSESSMENT — PAIN SCALES - GENERAL: PAINLEVEL: NO PAIN (0)

## 2020-02-17 ASSESSMENT — PATIENT HEALTH QUESTIONNAIRE - PHQ9
5. POOR APPETITE OR OVEREATING: NOT AT ALL
SUM OF ALL RESPONSES TO PHQ QUESTIONS 1-9: 8

## 2020-02-17 NOTE — PROGRESS NOTES
HPI:    Pt. Comes in today for follow up today. His daughter and law is present. He states more imbalance and is using his cane more frequently. He also uses as walker. He had a fall about 3 weeks ago. He has chronic fatigue. He denies any orthostatic sxs. He likes to sleep in the morning sometimes until 9 AM. However, he gets his medication at 6 AM and this wakes him up.  He had  DBS battery exchage procedure in the past. He has followed with Dr. Boo, Neurology for Parkinson's disease and was seen  11/5/2019.   He denies any rest/exertional CP/SOB.  He had significant adverse reaction in the hospital to hydromorphone (dilaudid)  He has h/o prostate CA and is followed at [facility].  Colonoscopy 3/17 possibly repeat in 5 years.   He lives in a  Care Facility in [facility].   No other HEENT, cardiopulmonary, abdominal, , neurological, systemic complaints.       Past Medical History:   Diagnosis Date     Cerebellar stroke syndrome 8/21/2013    Few small foci of chronic infarct in the right cerebellar hemisphere, unchanged as well as a new focus of now chronic infarct in the left cerebellar hemisphere since the last study.      Dyslipidemia      Hyperthyroidism      Insomnia 6/17/2011     Parkinson disease (H) 6/15/2011     Peripheral neuropathy 6/15/2011     PFO (patent foramen ovale) 8/21/2013    Interpretation Summary 1. Normal LV size and systolic function. Normal diastolic function 2.  Normal RV size and function 3. No significant valvular abnormalities 4.  Small right-to-left shunt visualized with intravenous agitated saline  contrast study, suggestive of PFO. PatientHeight: 72 in PatientWeight: 205 lbs SystolicPressure: 100 mmHg DiastolicPressure: 63 mmHg BSA 2.2 m^2   Procedure Echoc     PONV (postoperative nausea and vomiting)      Prostate cancer (H) 6/17/2011    s/p radical prostectomy     Past Surgical History:   Procedure Laterality Date     COLONOSCOPY N/A 3/23/2017    Procedure:  COLONOSCOPY;  Surgeon: Salbador Paulson MD;  Location: [facility]     PROSTATE SURGERY  3 yrs ago    prostate cancer; Dr. Valladares     REPLACE DEEP BRAIN STIMULATION GENERATOR / BATTERY Right 1/3/2017    Procedure: REPLACE DEEP BRAIN STIMULATION GENERATOR / BATTERY;  Surgeon: Anupam Fair MD;  Location: [facility] OR     REPLACE DEEP BRAIN STIMULATION GENERATOR / BATTERY Right 2/4/2019    Procedure: Right Deep Brain Stimulatory Battery Replacement;  Surgeon: Marco Rothman MD;  Location: [facility] OR     Right Gpi DBS Lead Implantation  5/20/2014     Rt Chest DBS Generator Placement Activa SC  5/29/2014         PE:    Vitals noted gen nad cooperative alert, HEENT; Oropharynx clear no exudate neck supple nl rom no adenopathy, LCTA, B, normal resp. System exam, RRR, S1, S2, no MRG, abdomen, nt, nd, resting tremor in hands.  No Paraspinous tenderness to palpation, no skin rash. He gets up from a chair and walks w/o problems.         A/P:    1. Parkinson's he follows here in Neurology and seen 11/5/2029 by Dr. Boo and he remains on the same medications. He states possible more sxs. After starting Rivastigmine? Sleep may be an issues. Could consider checking orthostatics. Head CT scan today w/o acute findings. Labs are unremarkable today   2. Check with insurance for new Shingles vaccine. Influenza vaccination done 10/19/2019  3. Increased cholesterol;  He is off statins   4. He will continue to follow outside Urology for h/o prostate CA. Will check PSA checked 10/9/17 at 0.13; value 3/7/17 was 0.12. He states he follows with Dr. Valladares and that is PSA usually is in the 0.1 range and stable. PSA was 0.12 on 11/5/2019  5. He can follow up in Health Psychology for anxiety issues.   6. Low TSH checked 9/14; not on replacement checked  TPO and TSI labs done. He has followed up with Dr. Mccarty 6/17 and  6/8/2018.   He is on methimazole? TSH was 0.95 on 10/5/2018. TSH on 12/31/2018 0.09 and T4 1.03. Ordered TSH today  2/17/2020  7. See in  dermatology  for skin check 9/11/17.  8. Colonoscopy done 3/2017 repeat in 5 years. He will try Miralax for constipation and this has worked  10. Eye complaints. He has opthalmology appt. With Dr. Lepe 2/25/2020      Total time spent 40  minutes.  More than 50% of the time spent with Mr. Wu on counseling / coordinating his care

## 2020-02-17 NOTE — NURSING NOTE
Chief Complaint   Patient presents with     Recheck Medication     pt here with worsening vision and balance over the past couple of months.     Kimberly Nissen, EMT at 5:03 PM on 2/17/2020\

## 2020-02-18 ASSESSMENT — ANXIETY QUESTIONNAIRES: GAD7 TOTAL SCORE: 2

## 2020-02-20 ENCOUNTER — TELEPHONE (OUTPATIENT)
Dept: INTERNAL MEDICINE | Facility: CLINIC | Age: 75
End: 2020-02-20

## 2020-02-20 DIAGNOSIS — R94.6 THYROID FUNCTION TEST ABNORMAL: Primary | ICD-10-CM

## 2020-02-20 NOTE — TELEPHONE ENCOUNTER
Dear Marj;    Please see results note I sent to Norberto with recommendation for Endocrine referral (placed this encounter). You will have to call his daughter-in-law Lottie to help get this set up.    Thanks, MOHIT Howe         Dear Norberto;     Your thyroid testing suggest some hyperthyroidism and as before, I recommend you see the endocrinology doctors. My nurse Marj will call your daughter in law Lottie.       MOHIT Tafoya daughter in law called with plan of care. Pt called with plan of care. Clinic numbers given for questions or concerns.  Marj Thurston RN 11:51 AM on 2/20/2020.

## 2020-02-23 ENCOUNTER — HEALTH MAINTENANCE LETTER (OUTPATIENT)
Age: 75
End: 2020-02-23

## 2020-02-25 ENCOUNTER — TELEPHONE (OUTPATIENT)
Dept: ENDOCRINOLOGY | Facility: CLINIC | Age: 75
End: 2020-02-25

## 2020-02-25 ENCOUNTER — OFFICE VISIT (OUTPATIENT)
Dept: OPHTHALMOLOGY | Facility: CLINIC | Age: 75
End: 2020-02-25
Attending: INTERNAL MEDICINE
Payer: COMMERCIAL

## 2020-02-25 DIAGNOSIS — H04.123 DRY EYE SYNDROME OF BOTH EYES: ICD-10-CM

## 2020-02-25 DIAGNOSIS — H53.10 SUBJECTIVE VISUAL DISTURBANCE: ICD-10-CM

## 2020-02-25 DIAGNOSIS — H51.9 CONVERGENCE INSUFFICIENCY OR PALSY IN BINOCULAR EYE MOVEMENT: Primary | ICD-10-CM

## 2020-02-25 DIAGNOSIS — H53.10 SUBJECTIVE VISUAL DISTURBANCE: Primary | ICD-10-CM

## 2020-02-25 PROCEDURE — 92060 SENSORIMOTOR EXAMINATION: CPT | Mod: ZF | Performed by: OPHTHALMOLOGY

## 2020-02-25 PROCEDURE — G0463 HOSPITAL OUTPT CLINIC VISIT: HCPCS | Mod: 25,ZF

## 2020-02-25 ASSESSMENT — VISUAL ACUITY
OS_SC: 20/40
OD_SC: 20/30
OS_SC+: +2
METHOD: SNELLEN - LINEAR

## 2020-02-25 ASSESSMENT — REFRACTION_WEARINGRX
OS_SPHERE: +2.50
OD_HPRISM: 3
OS_HPRISM: 3
OD_HBASE: IN
OD_SPHERE: +2.50
OS_HBASE: IN

## 2020-02-25 ASSESSMENT — EXTERNAL EXAM - RIGHT EYE: OD_EXAM: NORMAL

## 2020-02-25 ASSESSMENT — TONOMETRY
OS_IOP_MMHG: 10
OD_IOP_MMHG: 9
IOP_METHOD: ICARE

## 2020-02-25 ASSESSMENT — REFRACTION_FINALRX
OD_HPRISM: 3
OS_HBASE: IN
OD_HBASE: IN
OS_HPRISM: 3

## 2020-02-25 ASSESSMENT — CUP TO DISC RATIO
OS_RATIO: 0.2
OD_RATIO: 0.3

## 2020-02-25 ASSESSMENT — EXTERNAL EXAM - LEFT EYE: OS_EXAM: NORMAL

## 2020-02-25 ASSESSMENT — CONF VISUAL FIELD
OD_NORMAL: 1
OS_NORMAL: 1

## 2020-02-25 NOTE — PATIENT INSTRUCTIONS
You have been diagnosed with Dry eye syndrome.  Symptoms of Dry eye syndrome can include double vision, eye pain, blurry vision, stinging, burning, tearing, eye redness.      Some useful instructions are listed below:     1.  Use artificial tears on a regular basis.  If you use artificial tear drops with preservative, you can use them up to 4-6 times per day. If you use artificial tear drops without preservatives, then you can use them more often.      2.  Wash your eyelashes with hot water.  Do not make the water so hot that you burn yourself, but the water should be more than just warm.  Often patients will wash their eyelashes in the shower under the hot water.  You should rub gently along the base of your eyelashes.      3.  Taking fish oil capsules twice a day for a month and then once a day after that can help improve Dry eye symptoms.      4.  Drink a lot of water.  Hydration can be helpful.      5.  Humidify your environment.      6.  If this is not beneficial, other treatments can include punctal plugs or cautery, corticosteroid eye drops, Restasis, Lipiflow, or autologous serum.  If you are still struggling with dry eye symptoms, call Dr. Lepe's office for other therapies or a referral to a dry eye specialist.

## 2020-02-25 NOTE — PROGRESS NOTES
Assessment & Plan     Norberto Wu is a 74 year old male with the following diagnoses:   1. Convergence insufficiency or palsy in binocular eye movement    2. Subjective visual disturbance       A 75 yo male with history of parkinson's disease on Sinemet here for follow up. Last visit 2018. He notes that since his last visit he is having blurry vision in both eyes at near. He attributes this to starting seroquel and rivastigmine in November. He is uncertain if he has diplopia. He notes that he is having foreign body sensation in the left eye. Not using artificial tears or fish oil.     His visual acuity is 20/30 right eye and 20/40 uncorrected. His pupillary exam is normal. His color vision, confrontation fields, and extraocular movements are decreased in upgaze. His strabismus exam is notable for 10 prism diopter intermittent exotropia at near.  His near point of convergence is about 6 inches. His anterior exam is notable for blepharitis and decreased blink related to Parkinson's.  Cornea with some inferior PEK both eyes. His posterior exam shows normal optic nerves.     It is my impression that he has convergence insufficiency likely related to parkinson's disease.  I would recommend pencil push ups to strengthen his musles of convergence, and continue with current reading glasses with ground in prism.    It is my impression that patient's intermittent blurred vision is related to dry eyes.  I asked the patient to use artificial tears as well as warm compresses to the eyelid margin  on a regular basis.  I asked the patient to take fish oil capsules 2x/day for a month and then 1x/d thereafter.   If that is not beneficial we could consider punctal plugs, corticosteroid eye drops and Restasis.         Attending Physician Attestation:  Complete documentation of historical and exam elements from today's encounter can be found in the full encounter summary report (not reduplicated in this progress note).  I  personally obtained the chief complaint(s) and history of present illness.  I confirmed and edited as necessary the review of systems, past medical/surgical history, family history, social history, and examination findings as documented by others; and I examined the patient myself.  I personally reviewed the relevant tests, images, and reports as documented above.  I formulated and edited as necessary the assessment and plan and discussed the findings and management plan with the patient and family. - Anupam Valle MD  Ophthalmology, PGY-5  Neuro-Ophthalmology Fellow

## 2020-02-25 NOTE — NURSING NOTE
Chief Complaints and History of Present Illnesses   Patient presents with     Diplopia Follow-Up     Chief Complaint(s) and History of Present Illness(es)     Diplopia Follow-Up               Comments     Norberto Wu is a 74 year old male with the following diagnoses:   1. Convergence insufficiency or palsy in binocular eye movement   2. Subjective visual disturbance   3. Insufficiency of tear film of both eyes     Blurred vision at near. No diplopia. Vision at near is much better with glasses on.   Patient feels that his vision worsened after he started the seroquel and exelon.   Hoda BERRY 8:28 AM February 25, 2020

## 2020-03-02 NOTE — TELEPHONE ENCOUNTER
Spoke with pt's daughter-in-law Lottie. She stated that the pt is refusing to come to clinic, and that both she and her  have attempted to convince him.

## 2020-03-19 ENCOUNTER — TELEPHONE (OUTPATIENT)
Dept: AUDIOLOGY | Facility: CLINIC | Age: 75
End: 2020-03-19

## 2020-03-19 NOTE — TELEPHONE ENCOUNTER
Left message for patient's daughter in law Lottie at [phone], advising her to reschedule this non-urgent appointment to 4/13/2020 or after, given the current COVID-19 risks.  Left scheduling #[phone].       Soila Mcclure, CCC-A, [facility]  Licensed Audiologist  [place] #[id number]

## 2020-03-24 NOTE — TELEPHONE ENCOUNTER
Placed follow up call to patient's son Caleb [phone].  Advised them to cancel 3/26/2020 hearing test and reschedule after 4/27/2020 due to building protocol regarding COVID-19.  Appt was canceled and Caleb indicated he would call scheduling to set up new date when they are ready to move forward with the appt.      Soila Mcclure, CCC-A, [facility]  Licensed Audiologist  [place] #[id number]

## 2020-04-14 ENCOUNTER — MEDICAL CORRESPONDENCE (OUTPATIENT)
Dept: HEALTH INFORMATION MANAGEMENT | Facility: CLINIC | Age: 75
End: 2020-04-14

## 2020-04-14 DIAGNOSIS — G20.A1 PARKINSON'S DISEASE (H): Primary | ICD-10-CM

## 2020-04-16 NOTE — TELEPHONE ENCOUNTER
Ibuprofen 200 MG Tablet     Last Written Prescription Date:  ?  Last Fill Quantity: ?   # refills: ?  Last Office Visit : 11/5/2019  Future Office visit:  None    Routing refill request to provider for review/approval because:  Medication is reported/historical    Brenda Hernandez RN  Central Triage Red Flags/Med Refills

## 2020-08-17 ENCOUNTER — MEDICAL CORRESPONDENCE (OUTPATIENT)
Dept: HEALTH INFORMATION MANAGEMENT | Facility: CLINIC | Age: 75
End: 2020-08-17

## 2020-08-29 ENCOUNTER — MEDICAL CORRESPONDENCE (OUTPATIENT)
Dept: HEALTH INFORMATION MANAGEMENT | Facility: CLINIC | Age: 75
End: 2020-08-29

## 2020-09-02 ENCOUNTER — MEDICAL CORRESPONDENCE (OUTPATIENT)
Dept: HEALTH INFORMATION MANAGEMENT | Facility: CLINIC | Age: 75
End: 2020-09-02

## 2020-09-14 ENCOUNTER — MEDICAL CORRESPONDENCE (OUTPATIENT)
Dept: HEALTH INFORMATION MANAGEMENT | Facility: CLINIC | Age: 75
End: 2020-09-14

## 2020-09-20 DIAGNOSIS — G20.A1 PARKINSON'S DISEASE (H): ICD-10-CM

## 2020-09-20 DIAGNOSIS — G20.A1 PARKINSON DISEASE (H): ICD-10-CM

## 2020-09-23 NOTE — TELEPHONE ENCOUNTER
Ibuprofen 200 MG Tablet   Last Written Prescription Date:  4/16/2020  Last Fill Quantity: 100,   # refills: 4  Last Office Visit : 11/5/2019  Future Office visit:  None    Routing refill request to provider for review/approval because:  Abnormal Labs: Cr   & Refer to Provider for review due to Pt is not be tween the ages of 6-64 Years old.  Clarification of orders.  Is it 1 Tab at bedtime?  Or 1 Tab every 6 hours as needed.    Recent Labs   Lab Test 02/17/20  1650   CR 0.64*            QUEtiapine Fumarate 25 MG Tablet    Last Written Prescription Date:  11/5/2019  Last Fill Quantity: 90,   # refills: 3  Last Office Visit : 11/5/2019  Future Office visit:  None    Routing refill request to provider for review/approval because:  Blood pressure out of range     6 months office visit due.     Assisted Living asking for 31 Tabs with PRN refills.  Pl advise  02/17/20 (!) 156/90       Brenda Hernandez RN  Central Triage Red Flags/Med Refills

## 2020-09-25 RX ORDER — IBUPROFEN 200 MG
TABLET ORAL
OUTPATIENT
Start: 2020-09-25

## 2020-09-25 RX ORDER — QUETIAPINE FUMARATE 25 MG/1
25 TABLET, FILM COATED ORAL AT BEDTIME
Qty: 31 TABLET | Status: SHIPPED | OUTPATIENT
Start: 2020-09-25 | End: 2020-10-25

## 2020-09-29 DIAGNOSIS — G20.A1 PARKINSON'S DISEASE (H): ICD-10-CM

## 2020-10-02 RX ORDER — IBUPROFEN 200 MG
TABLET ORAL
Qty: 28 TABLET | Refills: 11 | Status: SHIPPED | OUTPATIENT
Start: 2020-10-02 | End: 2021-06-08

## 2020-10-02 NOTE — TELEPHONE ENCOUNTER
Ibuprofen 200 MG Tablet  Last Written Prescription Date:  4/16/2020  Last Fill Quantity: 100,   # refills: 4  Last Office Visit : 11/5/2019  Future Office visit:  11/6/2020  Routing refill request to provider for review/approval because:  Must refer to Provider because Pt is older then 64 years of age.    B/P abnormal and Cr level out of range    02/17/20 (!) 156/90     Recent Labs   Lab Test 02/17/20  1650   CR 0.64*       Brenda Hernandez RN  Central Triage Red Flags/Med Refills

## 2020-10-05 ENCOUNTER — TELEPHONE (OUTPATIENT)
Dept: INTERNAL MEDICINE | Facility: CLINIC | Age: 75
End: 2020-10-05

## 2020-10-05 NOTE — TELEPHONE ENCOUNTER
Patients daughter in law called to schedule a medication review appointment and is scheduled for 10/21/20 but is still in need of a refill of Advil. Please call to discuss thank you.

## 2020-10-05 NOTE — TELEPHONE ENCOUNTER
Advil refilled by Dr. Boo on 10/02/20 with 11 refills.  Med sent to [facility] - [place], [place] - [address] [place] [address]      Christopher Tan CMA (AAMA) at 12:23 PM on 10/5/2020

## 2020-10-19 DIAGNOSIS — G25.81 RESTLESS LEGS SYNDROME (RLS): ICD-10-CM

## 2020-10-19 DIAGNOSIS — G20.A1 PARKINSON'S DISEASE (H): ICD-10-CM

## 2020-10-20 RX ORDER — GABAPENTIN 300 MG/1
CAPSULE ORAL
Qty: 217 CAPSULE | Status: SHIPPED | OUTPATIENT
Start: 2020-10-20 | End: 2020-10-27

## 2020-10-20 RX ORDER — CARBIDOPA AND LEVODOPA 25; 100 MG/1; MG/1
TABLET ORAL
Qty: 434 TABLET | Status: SHIPPED | OUTPATIENT
Start: 2020-10-20 | End: 2021-06-02

## 2020-10-20 NOTE — TELEPHONE ENCOUNTER
Rx Authorization:    Requested Medication/ Dose carbidopa-levodopa (SINEMET)  MG tablet    Date last refill ordered: 11/5/19    Quantity ordered: 1260    # refills: 3    Date of last clinic visit with ordering provider: 11/5/19    Date of next clinic visit with ordering provider:     All pertinent protocol data (lab date/result):     Include pertinent information from patients message:       Rx Authorization:    Requested Medication/ Dose Gabapentin 300MG Capsules    Date last refill ordered: 11/5/19    Quantity ordered: 630    # refills: 3    Date of last clinic visit with ordering provider: 11/5/19    Date of next clinic visit with ordering provider:     All pertinent protocol data (lab date/result):     Include pertinent information from patients message:

## 2020-10-21 ENCOUNTER — VIRTUAL VISIT (OUTPATIENT)
Dept: INTERNAL MEDICINE | Facility: CLINIC | Age: 75
End: 2020-10-21
Payer: COMMERCIAL

## 2020-10-21 DIAGNOSIS — G20.C PRIMARY PARKINSONISM (H): ICD-10-CM

## 2020-10-21 DIAGNOSIS — H90.6 MIXED CONDUCTIVE AND SENSORINEURAL HEARING LOSS OF BOTH EARS: ICD-10-CM

## 2020-10-21 DIAGNOSIS — R94.6 THYROID FUNCTION TEST ABNORMAL: Primary | ICD-10-CM

## 2020-10-21 PROCEDURE — 99213 OFFICE O/P EST LOW 20 MIN: CPT | Mod: 95 | Performed by: INTERNAL MEDICINE

## 2020-10-21 NOTE — PROGRESS NOTES
Telephone visit    Mr. Wu agrees to a telephone visit      Last in-person visit with me was 2/17/2020. He has h/o Parkinson's disease and was seen by Dr. Boo here 11/5/2019. He got the influenza vaccination 9/29/2020. TSH was checked 2/17/2020 at 0.19 with free T4 0.90 and ordered repeat today. I also spoke with his daugther-in-law Lottie. He has some chronic fatigue but before the weather change he was walking outside. He can walk inside his care facility. To the best of his knowledge he remains on his same medications including gabapentin and at bedtime Seroquel. He did get this year's influenza vaccination. Lottie states she was just able to enter his care facility to see Norberto. I ordered future labs to be done on 11/2/2020 when he comes in for an Audiology appointment. Placed Neurology referral to Dr. Boo for Naya's    D. MD Shyam      Total time today 5 minutes and 12 seconds

## 2020-10-21 NOTE — NURSING NOTE
Chief Complaint   Patient presents with     Medication Follow-up     Pt is following up on medications.     Referral     Pt needs a referral to audiology.     Video Visit Technology for this patient: No video technology available to patient, please call patient over the phone.    Vivian Harrington LPN at 2:42 PM on 10/21/2020.

## 2020-10-25 RX ORDER — QUETIAPINE FUMARATE 25 MG/1
TABLET, FILM COATED ORAL
Qty: 90 TABLET | Refills: 3 | COMMUNITY
Start: 2020-10-25 | End: 2020-10-27

## 2020-10-26 NOTE — PROGRESS NOTES
VIDEO VISIT - am well not working so used zoom    Date of Visit: October 27, 2020  Name: Norberto West  Date of Birth 1945  [address]    [facility] [address]  [email or web address]  Lottie west daughter in law [phone] (M)   [phone] (H)   [phone] (W)     send link via email [email or web address].   follow up- last seen in 11/2019-scheduled per daughter getting weaker, decline in walking [phone]-     [facility] [place]  [address] [facility] [address]     Assessment:  (G20) Parkinson's disease (H)  (primary encounter diagnosis)  DBS right battery 2/4/2019 (prior replacement 1/2017 and 5/2014)  Carbidopa/levodopa 25/100 2 tabs 7/day  Using walker all the time.  He has falls that are related to loss of balance. No warning.  May or may not have freezing that lead to falls    Cognition  Rivastigmine exelon patch 9.5mg at 9am  Stressed about use of a [employer] phone over the previous apple phone he has used. He has some slowing in cognitive processing. He has reduced voice volume and has decreased hearing as well.  He has a new phone now that may help with communication.   There are some POA changes being made.     Hallucinations/delusions/other - not having hallucinations  Quetiapine seroquel 25mg at 9pm    Bowel function - better. Watching diet.   Polyethylene glycol miralax - not using it - it is as needed    Other - headaches; has general achiness in legs  Gabapentin neurontin 300mg  2 - 2 - 3    Nasal   Fluticasone flonase nasal spray    Low blood pressure ? No recent readings.     Not clear if he is having anxiety - has some shortness of breath from time to time.      3am 6am 9am noon 3pm 6pm 9pm midnight                       Carbidopa/levodopa sinemet 25/100 2 2 2 2 2 2   2   Fluticasone flonase 50mcg/act spray     spray              Gabapentin neurontin 300mg   2   2   3       Ibuprofen advil/motrin  "200mg   As needed               Polyethylene glycol miralax           As needed       Quetiapine seroquel 25mg             1     Rivastigmine exelon 9.5mg/24 hr patch    1                               Plan:    11/2 Paulette appointment for audiology     11/6 Dr Howe appointment    Needs battery voltage review as he had needed battery replacements every 2 years for his parkinson. Last battery was put in 2019 which was put in by Dr. Rothman on 2/4/2019    Keep sinemet dose the same at 2 tabs 7/day    Wondering if someone can check his device if he is in the [facility] building on 11/2/2020    Needs blood pressure monitoring to see if this is a factor in his feeling weak.    Keep the rivastigmine exelon 9.5mg per day    Continuing Quetiapine seroquel 25mg at 9pm    Not sure if there are mood issues  Last ecg was 2018; would  Have to have his device turned off temporarily.     Proxy access to [employer]  Username [email or web address]  Password provided    Pharmacy consultation with Rachel Mena, PHarmacist patient and daughter in law  Ordered    Will reduce the gabapentin from 2-2-3 to 1-1-3  I think he is getting it scheduled at 6am, 12noon and 6pm  Will see if this makes him less tired/weak    I have reviewed the note as documented above.  This accurately captures the substance of my conversation with the patient.    Patient contact time  25  minutes. Over 50% of this visit was spent in patient care and care coordination.     Time of visit 1236pm - 102pm    Blaine Boo MD      ------------------------------------------------------------------------------------------------------------------------------------------------------------------------    Video-Visit Details    The patient has been notified of following:     \"After a review of the patient s situation, this visit was changed from an in-person visit to a video visit to reduce the risk of COVID-19 exposure.   The patient is being evaluated via a billable video " "visit.\"    \"This video visit will be conducted via a call between you and your physician/provider. We have found that certain health care needs can be provided without the need for an in-person physical exam.  This service lets us provide the care you need with a video conversation.  If a prescription is necessary we can send it directly to your pharmacy.  If lab work is needed we can place an order for that and you can then stop by our lab to have the test done at a later time.    If during the course of the call the physician/provider feels a video visit is not appropriate, you will not be charged for this service.\"     Patient has given verbal consent for Video visit? Yes    Patient would like the video invitation sent by:     Type of service:  Video Visit    Video Start Time:     Video End Time (time video stopped):     Duration:  minutes - see above    Originating Location (pt. Location):     Distant Location (provider location):  [facility] NEUROLOGY [facility]     Mode of Communication:  Video Conference via [employer] (and if not possible then doximity)      Blaine Boo MD      --------------------------------------------------------------------------------------------------------------    Norberto Wu is a 75 year old male who is being evaluated via a billable video visit.      Charts reviewed  Consult from  Images reviewed        I have reviewed and updated the patient's Past Medical History, Social History, Family History and Medication List.    ALLERGIES  Hydromorphone, Comtan, Hydrocodone, Hydrocodone-acetaminophen, Melatonin, Ropinirole, Adhesive tape, Camphor, and Liquid adhesive    Lasts visit details if there was a last visit:         Medications                                                                                                                                                                                                              14 Review of systems  are " "negative except for   Patient Active Problem List   Diagnosis     Parkinson disease (H)     Peripheral neuropathy     Somnolence     Family history of Parkinson's disease     Snores     REM sleep behavior disorder     Prostate cancer (H)     Wears glasses     Insomnia     Constipation     History of MRI of brain and brain stem     PFO (patent foramen ovale)     Cerebellar stroke syndrome     H/O echocardiogram     Restless leg syndrome     S/P brain surgery     Hamstring tendonitis at origin     Parkinson's disease (H)     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  Stayed in the hospital 3 days due to severe nausea & \"retching\" .      Comtan Diarrhea     Hydrocodone      Other reaction(s): Headache, Nausea Only     Hydrocodone-Acetaminophen Hives and Nausea     Melatonin Other (See Comments)     No benefit.     Ropinirole Fatigue     Fatigue no benefit.      Adhesive Tape Rash     Camphor Rash     Other reaction(s): Rash     Liquid Adhesive Rash     Past Surgical History:   Procedure Laterality Date     COLONOSCOPY N/A 3/23/2017    Procedure: COLONOSCOPY;  Surgeon: Salbador Paulson MD;  Location: [facility]     PROSTATE SURGERY  3 yrs ago    prostate cancer; Dr. Valladares     REPLACE DEEP BRAIN STIMULATION GENERATOR / BATTERY Right 1/3/2017    Procedure: REPLACE DEEP BRAIN STIMULATION GENERATOR / BATTERY;  Surgeon: Anupam Fair MD;  [facility]     REPLACE DEEP BRAIN STIMULATION GENERATOR / BATTERY Right 2/4/2019    Procedure: Right Deep Brain Stimulatory Battery Replacement;  Surgeon: Marco Rothman MD;  Location: [facility]     Right Gpi DBS Lead Implantation  5/20/2014     Rt " Chest DBS Generator Placement Activa SC  5/29/2014     Past Medical History:   Diagnosis Date     Cerebellar stroke syndrome 8/21/2013    Few small foci of chronic infarct in the right cerebellar hemisphere, unchanged as well as a new focus of now chronic infarct in the left cerebellar hemisphere since the last study.      Dyslipidemia      Hyperthyroidism      Insomnia 6/17/2011     Parkinson disease (H) 6/15/2011     Peripheral neuropathy 6/15/2011     PFO (patent foramen ovale) 8/21/2013    Interpretation Summary 1. Normal LV size and systolic function. Normal diastolic function 2.  Normal RV size and function 3. No significant valvular abnormalities 4.  Small right-to-left shunt visualized with intravenous agitated saline  contrast study, suggestive of PFO. PatientHeight: 72 in PatientWeight: 205 lbs SystolicPressure: 100 mmHg DiastolicPressure: 63 mmHg BSA 2.2 m^2   Procedure Echoc     PONV (postoperative nausea and vomiting)      Prostate cancer (H) 6/17/2011    s/p radical prostectomy     Social History     Socioeconomic History     Marital status:      Spouse name: Not on file     Number of children: Not on file     Years of education: Not on file     Highest education level: Not on file   Occupational History     Not on file   Social Needs     Financial resource strain: Not on file     Food insecurity     Worry: Not on file     Inability: Not on file     Transportation needs     Medical: Not on file     Non-medical: Not on file   Tobacco Use     Smoking status: Never Smoker     Smokeless tobacco: Never Used   Substance and Sexual Activity     Alcohol use: Yes     Alcohol/week: 1.0 standard drinks     Comment: EVERY OTHER DAY     Drug use: No     Sexual activity: Never   Lifestyle     Physical activity     Days per week: Not on file     Minutes per session: Not on file     Stress: Not on file   Relationships     Social connections     Talks on phone: Not on file     Gets together: Not on file      Attends Rastafarian service: Not on file     Active member of club or organization: Not on file     Attends meetings of clubs or organizations: Not on file     Relationship status: Not on file     Intimate partner violence     Fear of current or ex partner: Not on file     Emotionally abused: Not on file     Physically abused: Not on file     Forced sexual activity: Not on file   Other Topics Concern     Parent/sibling w/ CABG, MI or angioplasty before 65F 55M? No   Social History Narrative    Son lives in [place] and he has 2 kids    Wife Mayelin     Family History   Problem Relation Age of Onset     Parkinsonism Father      Other - See Comments Son         lives in [place]     Skin Cancer No family hx of         no skin cancer     Current Outpatient Medications   Medication Sig Dispense Refill     carbidopa-levodopa (SINEMET)  MG tablet TAKE TWO TABLETS BY MOUTH SEVEN TIMES A DAY (3AM, 6AM, 9AM, NOON, 3PM, 6PM AND MIDNIGHT) 434 tablet PRN     fluticasone (FLONASE) 50 MCG/ACT nasal spray Spray 1-2 sprays into both nostrils daily INHALE 1-2 SPRAYS IN EACH NOSTRIL ONCE DAILY 48 g 3     gabapentin (NEURONTIN) 300 MG capsule TAKE TWO CAPSULES (600MG) BY MOUTH TWICE DAILY (AT 6AM AND 12PM);AND TAKE THREE CAPSULES (900MG) ONCE DAILY (AT 6PM) 217 capsule PRN     ibuprofen (ADVIL/MOTRIN) 200 MG tablet TAKE 1 TABLET BY MOUTH AT BEDTIME;AND TAKE ONE TABLET BY MOUTH EVERY 6 HOURS AS NEEDED 28 tablet 11     polyethylene glycol (MIRALAX/GLYCOLAX) packet Change to as needed: 1 packet by mouth in liquid daily @ 6-7pm (as needed) 7 packet 11     QUEtiapine (SEROQUEL) 25 MG tablet Take 1 tablet (25 mg) by mouth At Bedtime TAKE 1 TABLET BY MOUTH AT BEDTIME 31 tablet PRN     rivastigmine (EXELON) 9.5 MG/24HR 24 hr patch Place 1 patch onto the skin daily 90 patch 3

## 2020-10-27 ENCOUNTER — VIRTUAL VISIT (OUTPATIENT)
Dept: NEUROLOGY | Facility: CLINIC | Age: 75
End: 2020-10-27
Payer: COMMERCIAL

## 2020-10-27 DIAGNOSIS — J00 ACUTE RHINITIS: ICD-10-CM

## 2020-10-27 DIAGNOSIS — G25.81 RESTLESS LEGS SYNDROME (RLS): ICD-10-CM

## 2020-10-27 DIAGNOSIS — G20.A1 PARKINSON DISEASE (H): ICD-10-CM

## 2020-10-27 DIAGNOSIS — G20.A1 PARKINSON'S DISEASE (H): Primary | ICD-10-CM

## 2020-10-27 DIAGNOSIS — K59.00 CONSTIPATION, UNSPECIFIED CONSTIPATION TYPE: ICD-10-CM

## 2020-10-27 PROCEDURE — 99214 OFFICE O/P EST MOD 30 MIN: CPT | Mod: 95 | Performed by: PSYCHIATRY & NEUROLOGY

## 2020-10-27 RX ORDER — GABAPENTIN 300 MG/1
CAPSULE ORAL
Qty: 450 CAPSULE | Refills: 3 | Status: SHIPPED | OUTPATIENT
Start: 2020-10-27 | End: 2021-06-02

## 2020-10-27 RX ORDER — QUETIAPINE FUMARATE 25 MG/1
TABLET, FILM COATED ORAL AT BEDTIME
Qty: 90 TABLET | Refills: 3 | COMMUNITY
Start: 2020-10-27 | End: 2021-12-10

## 2020-10-27 RX ORDER — FLUTICASONE PROPIONATE 50 MCG
SPRAY, SUSPENSION (ML) NASAL
Qty: 48 G | Refills: 3 | COMMUNITY
Start: 2020-10-27 | End: 2021-01-15

## 2020-10-27 RX ORDER — POLYETHYLENE GLYCOL 3350 17 G/17G
POWDER, FOR SOLUTION ORAL
Qty: 7 PACKET | Refills: 11 | COMMUNITY
Start: 2020-10-27 | End: 2020-12-15

## 2020-10-27 NOTE — PATIENT INSTRUCTIONS
Assessment:  (G20) Parkinson's disease (H)  (primary encounter diagnosis)  DBS right battery 2/4/2019 (prior replacement 1/2017 and 5/2014)  Carbidopa/levodopa 25/100 2 tabs 7/day  Using walker all the time.  He has falls that are related to loss of balance. No warning.  May or may not have freezing that lead to falls    Cognition  Rivastigmine exelon patch 9.5mg at 9am  Stressed about use of a [employer] phone over the previous apple phone he has used. He has some slowing in cognitive processing. He has reduced voice volume and has decreased hearing as well.  He has a new phone now that may help with communication.   There are some POA changes being made.     Hallucinations/delusions/other - not having hallucinations  Quetiapine seroquel 25mg at 9pm    Bowel function - better. Watching diet.   Polyethylene glycol miralax - not using it - it is as needed    Other - headaches; has general achiness in legs  Gabapentin neurontin 300mg  2 - 2 - 3    Nasal   Fluticasone flonase nasal spray    Low blood pressure ? No recent readings.     Not clear if he is having anxiety - has some shortness of breath from time to time.      3am 6am 9am noon 3pm 6pm 9pm midnight                       Carbidopa/levodopa sinemet 25/100 2 2 2 2 2 2   2   Fluticasone flonase 50mcg/act spray     spray              Gabapentin neurontin 300mg   2   2   3       Ibuprofen advil/motrin 200mg   As needed               Polyethylene glycol miralax           As needed       Quetiapine seroquel 25mg             1     Rivastigmine exelon 9.5mg/24 hr patch    1                               Plan:    11/2 Paulette appointment for audiology     11/6 Dr Howe appointment    Needs battery voltage review as he had needed battery replacements every 2 years for his parkinson. Last battery was put in 2019 which was put in by Dr. Rothman on 2/4/2019    Keep sinemet dose the same at 2 tabs 7/day    Wondering if someone can check his device if he is in the [facility]  building on 11/2/2020    Needs blood pressure monitoring to see if this is a factor in his feeling weak.    Keep the rivastigmine exelon 9.5mg per day    Continuing Quetiapine seroquel 25mg at 9pm    Not sure if there are mood issues  Last ecg was 2018; would  Have to have his device turned off temporarily.     Proxy access to Lottie Wu  Username [email or web address]  Password provided    Pharmacy consultation with Rachel Mena, PHarmacist patient and daughter in law  Ordered    Will reduce the gabapentin from 2-2-3 to 1-1-3  I think he is getting it scheduled at 6am, 12noon and 6pm  Will see if this makes him less tired/weak

## 2020-10-27 NOTE — PROGRESS NOTES
"Norberto Wu is a 75 year old male who is being evaluated via a billable video visit.      The patient has been notified of following:     \"This video visit will be conducted via a call between you and your physician/provider. We have found that certain health care needs can be provided without the need for an in-person physical exam.  This service lets us provide the care you need with a video conversation.  If a prescription is necessary we can send it directly to your pharmacy.  If lab work is needed we can place an order for that and you can then stop by our lab to have the test done at a later time.    Video visits are billed at different rates depending on your insurance coverage.  Please reach out to your insurance provider with any questions.    If during the course of the call the physician/provider feels a video visit is not appropriate, you will not be charged for this service.\"    Patient has given verbal consent for Video visit? yes  How would you like to obtain your AVS? mychart  If you are dropped from the video visit, the video invite should be resent to: cell  Will anyone else be joining your video visit? no        Video-Visit Details    Type of service:  Video Visit      Originating Location (pt. Location): Other tbd    Distant Location (provider location):  [facility] NEUROLOGY [facility]     Platform used for Video Visit: chapo Trejo, EMT        "

## 2020-10-27 NOTE — LETTER
10/27/2020       RE: Norberto West  [address]  [facility] [address]     Dear Colleague,    Thank you for referring your patient, Norberto West, to the [facility] NEUROLOGY CLINIC [place] [facility]. Please see a copy of my visit note below.        VIDEO VISIT - am well not working so used zoom    Date of Visit: October 27, 2020  Name: Norberto West  Date of Birth 1945  [address]    [facility] [address]  [email or web address]  Lottie west daughter in law [phone] (M)   [phone] (H)   [phone] (W)     send link via email [email or web address].   follow up- last seen in 11/2019-scheduled per daughter getting weaker, decline in walking [phone]-     [facility] AT [address] [address]     Assessment:  (G20) Parkinson's disease (H)  (primary encounter diagnosis)  DBS right battery 2/4/2019 (prior replacement 1/2017 and 5/2014)  Carbidopa/levodopa 25/100 2 tabs 7/day  Using walker all the time.  He has falls that are related to loss of balance. No warning.  May or may not have freezing that lead to falls    Cognition  Rivastigmine exelon patch 9.5mg at 9am  Stressed about use of a [employer] phone over the previous apple phone he has used. He has some slowing in cognitive processing. He has reduced voice volume and has decreased hearing as well.  He has a new phone now that may help with communication.   There are some POA changes being made.     Hallucinations/delusions/other - not having hallucinations  Quetiapine seroquel 25mg at 9pm    Bowel function - better. Watching diet.   Polyethylene glycol miralax - not using it - it is as needed    Other - headaches; has general achiness in legs  Gabapentin neurontin 300mg  2 - 2 - 3    Nasal   Fluticasone flonase nasal spray    Low blood pressure ? No recent readings.     Not  clear if he is having anxiety - has some shortness of breath from time to time.      3am 6am 9am noon 3pm 6pm 9pm midnight                       Carbidopa/levodopa sinemet 25/100 2 2 2 2 2 2   2   Fluticasone flonase 50mcg/act spray     spray              Gabapentin neurontin 300mg   2   2   3       Ibuprofen advil/motrin 200mg   As needed               Polyethylene glycol miralax           As needed       Quetiapine seroquel 25mg             1     Rivastigmine exelon 9.5mg/24 hr patch    1                               Plan:    11/2 Paulette appointment for audiology     11/6 Dr Howe appointment    Needs battery voltage review as he had needed battery replacements every 2 years for his parkinson. Last battery was put in 2019 which was put in by Dr. Rothman on 2/4/2019    Keep sinemet dose the same at 2 tabs 7/day    Wondering if someone can check his device if [facility] [place] [facility] [place]    Needs blood pressure monitoring to see if this is a factor in his feeling weak.    Keep the rivastigmine exelon 9.5mg per day    Continuing Quetiapine seroquel 25mg at 9pm    Not sure if there are mood issues  Last ecg was 2018; would  Have to have his device turned off temporarily.     Proxy access to Lottiered Wu  Username luis [email or web address]  Password provided    Pharmacy consultation with Rachel Mena, PHarmacist patient and daughter in law  Ordered    Will reduce the gabapentin from 2-2-3 to 1-1-3  I think he is getting it scheduled at 6am, 12noon and 6pm  Will see if this makes him less tired/weak    I have reviewed the note as documented above.  This accurately captures the substance of my conversation with the patient.    Patient contact time  25  minutes. Over 50% of this visit was spent in patient care and care coordination.     Time of visit 1236pm - 102pm    Blaine Boo  "MD      ------------------------------------------------------------------------------------------------------------------------------------------------------------------------    Video-Visit Details    The patient has been notified of following:     \"After a review of the patient s situation, this visit was changed from an in-person visit to a video visit to reduce the risk of COVID-19 exposure.   The patient is being evaluated via a billable video visit.\"    \"This video visit will be conducted via a call between you and your physician/provider. We have found that certain health care needs can be provided without the need for an in-person physical exam.  This service lets us provide the care you need with a video conversation.  If a prescription is necessary we can send it directly to your pharmacy.  If lab work is needed we can place an order for that and you can then stop by our lab to have the test done at a later time.    If during the course of the call the physician/provider feels a video visit is not appropriate, you will not be charged for this service.\"     Patient has given verbal consent for Video visit? Yes    Patient would like the video invitation sent by:     Type of service:  Video Visit    Video Start Time:     Video End Time (time video stopped):     Duration:  minutes - see above    Originating Location (pt. Location):     Distant Location (provider location):  [facility] NEUROLOGY [facility]     Mode of Communication:  Video Conference via [email or web address] (and if not possible then doximity)      Blaine oBo MD      --------------------------------------------------------------------------------------------------------------    Norberto Wu is a 75 year old male who is being evaluated via a billable video visit.      Charts reviewed  Consult from  Images reviewed        I have reviewed and updated the patient's Past Medical History, Social History, Family History and Medication " "List.    ALLERGIES  Hydromorphone, Comtan, Hydrocodone, Hydrocodone-acetaminophen, Melatonin, Ropinirole, Adhesive tape, Camphor, and Liquid adhesive    Lasts visit details if there was a last visit:         Medications                                                                                                                                                                                                              14 Review of systems  are negative except for   Patient Active Problem List   Diagnosis     Parkinson disease (H)     Peripheral neuropathy     Somnolence     Family history of Parkinson's disease     Snores     REM sleep behavior disorder     Prostate cancer (H)     Wears glasses     Insomnia     Constipation     History of MRI of brain and brain stem     PFO (patent foramen ovale)     Cerebellar stroke syndrome     H/O echocardiogram     Restless leg syndrome     S/P brain surgery     Hamstring tendonitis at origin     Parkinson's disease (H)     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  Stayed in the hospital 3 days due to severe nausea & \"retching\" .      Comtan Diarrhea     Hydrocodone      Other reaction(s): Headache, Nausea Only     Hydrocodone-Acetaminophen Hives and Nausea     Melatonin Other (See Comments)     No benefit.     Ropinirole Fatigue     Fatigue no benefit.      Adhesive Tape Rash     Camphor Rash     Other reaction(s): Rash     Liquid Adhesive Rash     Past Surgical History:   Procedure Laterality Date     COLONOSCOPY N/A 3/23/2017    Procedure: COLONOSCOPY;  Surgeon: Salbador Paulson MD;  Location:  GI     PROSTATE SURGERY  3 yrs ago    " prostate cancer; Dr. Valladares     REPLACE DEEP BRAIN STIMULATION GENERATOR / BATTERY Right 1/3/2017    Procedure: REPLACE DEEP BRAIN STIMULATION GENERATOR / BATTERY;  Surgeon: Anupam Fair MD;  Location: [facility] OR     REPLACE DEEP BRAIN STIMULATION GENERATOR / BATTERY Right 2/4/2019    Procedure: Right Deep Brain Stimulatory Battery Replacement;  Surgeon: Marco Rothman MD;  Location: [facility] OR     Right Gpi DBS Lead Implantation  5/20/2014     Rt Chest DBS Generator Placement Activa SC  5/29/2014     Past Medical History:   Diagnosis Date     Cerebellar stroke syndrome 8/21/2013    Few small foci of chronic infarct in the right cerebellar hemisphere, unchanged as well as a new focus of now chronic infarct in the left cerebellar hemisphere since the last study.      Dyslipidemia      Hyperthyroidism      Insomnia 6/17/2011     Parkinson disease (H) 6/15/2011     Peripheral neuropathy 6/15/2011     PFO (patent foramen ovale) 8/21/2013    Interpretation Summary 1. Normal LV size and systolic function. Normal diastolic function 2.  Normal RV size and function 3. No significant valvular abnormalities 4.  Small right-to-left shunt visualized with intravenous agitated saline  contrast study, suggestive of PFO. PatientHeight: 72 in PatientWeight: 205 lbs SystolicPressure: 100 mmHg DiastolicPressure: 63 mmHg BSA 2.2 m^2   Procedure Echoc     PONV (postoperative nausea and vomiting)      Prostate cancer (H) 6/17/2011    s/p radical prostectomy     Social History     Socioeconomic History     Marital status:      Spouse name: Not on file     Number of children: Not on file     Years of education: Not on file     Highest education level: Not on file   Occupational History     Not on file   Social Needs     Financial resource strain: Not on file     Food insecurity     Worry: Not on file     Inability: Not on file     Transportation needs     Medical: Not on file     Non-medical: Not on file   Tobacco Use      Smoking status: Never Smoker     Smokeless tobacco: Never Used   Substance and Sexual Activity     Alcohol use: Yes     Alcohol/week: 1.0 standard drinks     Comment: EVERY OTHER DAY     Drug use: No     Sexual activity: Never   Lifestyle     Physical activity     Days per week: Not on file     Minutes per session: Not on file     Stress: Not on file   Relationships     Social connections     Talks on phone: Not on file     Gets together: Not on file     Attends [employer] service: Not on file     Active member of club or organization: Not on file     Attends meetings of clubs or organizations: Not on file     Relationship status: Not on file     Intimate partner violence     Fear of current or ex partner: Not on file     Emotionally abused: Not on file     Physically abused: Not on file     Forced sexual activity: Not on file   Other Topics Concern     Parent/sibling w/ CABG, MI or angioplasty before 65F 55M? No   Social History Narrative    Son lives in [place] and he has 2 kids    Wife Mayelin     Family History   Problem Relation Age of Onset     Parkinsonism Father      Other - See Comments Son         lives in [place]     Skin Cancer No family hx of         no skin cancer     Current Outpatient Medications   Medication Sig Dispense Refill     carbidopa-levodopa (SINEMET)  MG tablet TAKE TWO TABLETS BY MOUTH SEVEN TIMES A DAY (3AM, 6AM, 9AM, NOON, 3PM, 6PM AND MIDNIGHT) 434 tablet PRN     fluticasone (FLONASE) 50 MCG/ACT nasal spray Spray 1-2 sprays into both nostrils daily INHALE 1-2 SPRAYS IN EACH NOSTRIL ONCE DAILY 48 g 3     gabapentin (NEURONTIN) 300 MG capsule TAKE TWO CAPSULES (600MG) BY MOUTH TWICE DAILY (AT 6AM AND 12PM);AND TAKE THREE CAPSULES (900MG) ONCE DAILY (AT 6PM) 217 capsule PRN     ibuprofen (ADVIL/MOTRIN) 200 MG tablet TAKE 1 TABLET BY MOUTH AT BEDTIME;AND TAKE ONE TABLET BY MOUTH EVERY 6 HOURS AS NEEDED 28 tablet 11     polyethylene glycol (MIRALAX/GLYCOLAX) packet Change to as needed:  "1 packet by mouth in liquid daily @ 6-7pm (as needed) 7 packet 11     QUEtiapine (SEROQUEL) 25 MG tablet Take 1 tablet (25 mg) by mouth At Bedtime TAKE 1 TABLET BY MOUTH AT BEDTIME 31 tablet PRN     rivastigmine (EXELON) 9.5 MG/24HR 24 hr patch Place 1 patch onto the skin daily 90 patch 3       Norberto Wu is a 75 year old male who is being evaluated via a billable video visit.      The patient has been notified of following:     \"This video visit will be conducted via a call between you and your physician/provider. We have found that certain health care needs can be provided without the need for an in-person physical exam.  This service lets us provide the care you need with a video conversation.  If a prescription is necessary we can send it directly to your pharmacy.  If lab work is needed we can place an order for that and you can then stop by our lab to have the test done at a later time.    Video visits are billed at different rates depending on your insurance coverage.  Please reach out to your insurance provider with any questions.    If during the course of the call the physician/provider feels a video visit is not appropriate, you will not be charged for this service.\"    Patient has given verbal consent for Video visit? yes  How would you like to obtain your AVS? mychart  If you are dropped from the video visit, the video invite should be resent to: cell  Will anyone else be joining your video visit? no        Video-Visit Details    Type of service:  Video Visit      Originating Location (pt. Location): Other tbd    Distant Location (provider location):  [facility] NEUROLOGY [facility]     Platform used for Video Visit: chapo Trejo, EMT            Again, thank you for allowing me to participate in the care of your patient.      Sincerely,    Blaine Boo MD      "

## 2020-10-28 ENCOUNTER — MYC MEDICAL ADVICE (OUTPATIENT)
Dept: NEUROLOGY | Facility: CLINIC | Age: 75
End: 2020-10-28

## 2020-10-29 ENCOUNTER — TELEPHONE (OUTPATIENT)
Dept: NEUROLOGY | Facility: CLINIC | Age: 75
End: 2020-10-29

## 2020-10-29 NOTE — TELEPHONE ENCOUNTER
"Lottie states the Norberto will not be able to check his Deep Brain Stimulation on his own. \"He has really been going down hill these last couple of months.\"    Patient is falling a lot. He told Dr. Boo about this. He is exhausted just getting out of bed, feeling weaker.     She will send me a MyChart with a date and time for me to call to check the battery when she is there.      "

## 2020-10-29 NOTE — TELEPHONE ENCOUNTER
MTM referral from: [facility] visit (referral by provider)    MTM referral outreach attempt #2 on October 29, 2020 at 1:02 PM      Outcome: Patient not reachable after several attempts, will route to MTM Pharmacist/Provider as an FYI. Thank you for the referral.    Mendoza Mohamud, MTM coordinator

## 2020-11-02 ENCOUNTER — OFFICE VISIT (OUTPATIENT)
Dept: OTOLARYNGOLOGY | Facility: CLINIC | Age: 75
End: 2020-11-02
Payer: COMMERCIAL

## 2020-11-02 ENCOUNTER — OFFICE VISIT (OUTPATIENT)
Dept: AUDIOLOGY | Facility: CLINIC | Age: 75
End: 2020-11-02
Payer: COMMERCIAL

## 2020-11-02 DIAGNOSIS — H61.21 IMPACTED CERUMEN OF RIGHT EAR: Primary | ICD-10-CM

## 2020-11-02 DIAGNOSIS — R94.6 THYROID FUNCTION TEST ABNORMAL: ICD-10-CM

## 2020-11-02 DIAGNOSIS — G20.C PRIMARY PARKINSONISM (H): ICD-10-CM

## 2020-11-02 DIAGNOSIS — H90.6 MIXED CONDUCTIVE AND SENSORINEURAL HEARING LOSS OF BOTH EARS: ICD-10-CM

## 2020-11-02 DIAGNOSIS — H90.3 SENSORY HEARING LOSS, BILATERAL: Primary | ICD-10-CM

## 2020-11-02 LAB
ALBUMIN SERPL-MCNC: 3.7 G/DL (ref 3.4–5)
ALP SERPL-CCNC: 117 U/L (ref 40–150)
ALT SERPL W P-5'-P-CCNC: 7 U/L (ref 0–70)
ANION GAP SERPL CALCULATED.3IONS-SCNC: 2 MMOL/L (ref 3–14)
AST SERPL W P-5'-P-CCNC: 9 U/L (ref 0–45)
BASOPHILS # BLD AUTO: 0.1 10E9/L (ref 0–0.2)
BASOPHILS NFR BLD AUTO: 1.2 %
BILIRUB SERPL-MCNC: 0.6 MG/DL (ref 0.2–1.3)
BUN SERPL-MCNC: 30 MG/DL (ref 7–30)
CALCIUM SERPL-MCNC: 8.5 MG/DL (ref 8.5–10.1)
CHLORIDE SERPL-SCNC: 110 MMOL/L (ref 94–109)
CO2 SERPL-SCNC: 30 MMOL/L (ref 20–32)
CREAT SERPL-MCNC: 0.7 MG/DL (ref 0.66–1.25)
DIFFERENTIAL METHOD BLD: NORMAL
EOSINOPHIL # BLD AUTO: 0.2 10E9/L (ref 0–0.7)
EOSINOPHIL NFR BLD AUTO: 2.9 %
ERYTHROCYTE [DISTWIDTH] IN BLOOD BY AUTOMATED COUNT: 13.2 % (ref 10–15)
GFR SERPL CREATININE-BSD FRML MDRD: >90 ML/MIN/{1.73_M2}
GLUCOSE SERPL-MCNC: 96 MG/DL (ref 70–99)
HCT VFR BLD AUTO: 46.7 % (ref 40–53)
HGB BLD-MCNC: 15.1 G/DL (ref 13.3–17.7)
IMM GRANULOCYTES # BLD: 0 10E9/L (ref 0–0.4)
IMM GRANULOCYTES NFR BLD: 0.6 %
LYMPHOCYTES # BLD AUTO: 1.7 10E9/L (ref 0.8–5.3)
LYMPHOCYTES NFR BLD AUTO: 25.1 %
MCH RBC QN AUTO: 29.6 PG (ref 26.5–33)
MCHC RBC AUTO-ENTMCNC: 32.3 G/DL (ref 31.5–36.5)
MCV RBC AUTO: 92 FL (ref 78–100)
MONOCYTES # BLD AUTO: 0.5 10E9/L (ref 0–1.3)
MONOCYTES NFR BLD AUTO: 8.2 %
NEUTROPHILS # BLD AUTO: 4.1 10E9/L (ref 1.6–8.3)
NEUTROPHILS NFR BLD AUTO: 62 %
NRBC # BLD AUTO: 0 10*3/UL
NRBC BLD AUTO-RTO: 0 /100
PLATELET # BLD AUTO: 209 10E9/L (ref 150–450)
POTASSIUM SERPL-SCNC: 4.4 MMOL/L (ref 3.4–5.3)
PROT SERPL-MCNC: 6.8 G/DL (ref 6.8–8.8)
RBC # BLD AUTO: 5.1 10E12/L (ref 4.4–5.9)
SODIUM SERPL-SCNC: 142 MMOL/L (ref 133–144)
T4 FREE SERPL-MCNC: 0.96 NG/DL (ref 0.76–1.46)
TSH SERPL DL<=0.005 MIU/L-ACNC: 0.12 MU/L (ref 0.4–4)
WBC # BLD AUTO: 6.6 10E9/L (ref 4–11)

## 2020-11-02 PROCEDURE — 85025 COMPLETE CBC W/AUTO DIFF WBC: CPT | Performed by: PATHOLOGY

## 2020-11-02 PROCEDURE — 80053 COMPREHEN METABOLIC PANEL: CPT | Performed by: PATHOLOGY

## 2020-11-02 PROCEDURE — 36415 COLL VENOUS BLD VENIPUNCTURE: CPT | Performed by: PATHOLOGY

## 2020-11-02 PROCEDURE — 92550 TYMPANOMETRY & REFLEX THRESH: CPT | Mod: 52 | Performed by: AUDIOLOGIST

## 2020-11-02 PROCEDURE — 84439 ASSAY OF FREE THYROXINE: CPT | Performed by: PATHOLOGY

## 2020-11-02 PROCEDURE — 80050 GENERAL HEALTH PANEL: CPT | Performed by: PATHOLOGY

## 2020-11-02 PROCEDURE — 99207 PR CDG-PROCEDURE CHARGE ONLY: CPT | Performed by: OTOLARYNGOLOGY

## 2020-11-02 PROCEDURE — 92557 COMPREHENSIVE HEARING TEST: CPT | Performed by: AUDIOLOGIST

## 2020-11-02 PROCEDURE — 69210 REMOVE IMPACTED EAR WAX UNI: CPT | Performed by: OTOLARYNGOLOGY

## 2020-11-02 NOTE — PROGRESS NOTES
HISTORY OF PRESENT ILLNESS:  Norberto is here to see us today.  He has cerumen impaction in the right ear.      PHYSICAL EXAMINATION:  The patient was examined under the microscope.  The patient has cerumen pushed down onto the tympanic membrane which is cleaned using suction and alligator forceps.  Tympanic membrane otherwise normal.      ASSESSMENT AND PLAN:  Follow up as needed.

## 2020-11-02 NOTE — Clinical Note
Thanks for the referral. I am recommending a hearing aid consultation, and also follow up with ENT, as he has a T-tube in the left ear and has not seen ENT since 2017 for this. Can you put in the referral for ENT? He also reported  frequent falls to me- I assume you are aware of this already with his Parkinson's, but just wanted to let you know in case not.  See my note for details.    Thanks,  Jose Miguel

## 2020-11-02 NOTE — PROGRESS NOTES
AUDIOLOGY REPORT    SUBJECTIVE:  Norberto Wu is a 75 year old male who was seen in the Audiology Clinic at the [facility] for audiologic evaluation, referred by Norberto Howe M.D. The patient has been seen previously in this clinic on 3/14/2017 for assessment and results indicated right mild sloping to severe sensorineural hearing loss, and left mild sloping to profound mixed hearing loss. Norberto has a history of PE tubes, with a current T-tube in the left ear. The patient reports increased difficulty hearing, more so in the left ear. He reports itchiness in the left ear, and issues with wax buildup. He reports imbalance and falls due to Parkinson's, and uses a walker to ambulate. He denies any injuries beyond bruised knees from his falls. The patient denies tinnitus, aural fullness, pain, or drainage. The patient notes difficulty with communication in a variety of listening situations.     OBJECTIVE:  Abuse Screening:  Do you feel unsafe at home or work/school? No  Do you feel threatened by someone? No  Does anyone try to keep you from having contact with others, or doing things outside of your home? No  Physical signs of abuse present? No     Fall Risk Screen:  1. Have you fallen two or more times in the past year? Yes  2. Have you fallen and had an injury in the past year? No    Timed Up and Go Score (in seconds): not tested  Is patient a fall risk? Yes  Referral initiated: Message sent to referring physician  Fall Risk Assessment Completed by Audiology    Otoscopic exam indicates T-tube in the left ear and nearly occluding cerumen in the right ear. Patient was seen in ENT clinic for an ear cleaning prior to hearing test.    Pure Tone Thresholds assessed using conventional audiometry with good  reliability from 250-8000 Hz bilaterally using insert earphones and circumaural headphones     RIGHT:  Normal sloping to profound sensorineural hearing loss    LEFT:    Normal sloping  to profound, primarily  sensorineural hearing loss (20 dB air bone gap noted at 4 kHz). 15-20 dB asymmetry noted from 0.75-1 kHz.    Tympanogram:    RIGHT: normal eardrum mobility    LEFT:   large ear canal volume consistent with patent T-tube    Reflexes (reported by stimulus ear):  RIGHT: Ipsilateral is present at normal levels  RIGHT: Contralateral: Did not test   LEFT:   Ipsilateral: Did not test  LEFT:   Contralateral: Did not test      Speech Reception Threshold:    RIGHT: 15 dB HL    LEFT:   15 dB HL  Word Recognition Score:     RIGHT: 80% at 85 dB HL using NU-6 recorded word list.    LEFT:   76% at 90 dB HL using NU-6 recorded word list.      ASSESSMENT: Normal sloping to profound, primarily sensorineural hearing loss bilaterally. Compared to patient's previous audiogram dated 3/14/2017, left air conduction thresholds have improved from 0.25-6 kHz, and right air conduction thresholds have worsened from 6-8 kHz. Today s results were discussed with the patient in detail.     PLAN:  Patient was counseled regarding hearing loss and impact on communication. Patient is a good candidate for amplification at this time.  Handout on good communication strategies was given to patient. It is recommended that the patient follow up with ENT regarding T-tube, and for medical clearance to pursue hearing aids. Schedule a hearing aid consultation if desired. Please call this clinic with questions regarding these results or recommendations.        Ayaan Aquino.  Licensed Audiologist  [place] # [id number]

## 2020-11-02 NOTE — LETTER
11/2/2020       RE: Norberto Wu  [address]  [place] [facility] [address]     Dear Colleague,    Thank you for referring your patient, Norberto Wu, to the [facility] EAR NOSE AND THROAT CLINIC [place] [facility]. Please see a copy of my visit note below.    HISTORY OF PRESENT ILLNESS:  Norberto is here to see us today.  He has cerumen impaction in the right ear.      PHYSICAL EXAMINATION:  The patient was examined under the microscope.  The patient has cerumen pushed down onto the tympanic membrane which is cleaned using suction and alligator forceps.  Tympanic membrane otherwise normal.      ASSESSMENT AND PLAN:  Follow up as needed.           Again, thank you for allowing me to participate in the care of your patient.      Sincerely,    Chase Horne MD

## 2020-11-03 NOTE — TELEPHONE ENCOUNTER
FUTURE VISIT INFORMATION      FUTURE VISIT INFORMATION:    Date: 12/17/2020    Time: 8AM    Location: [facility]  REFERRAL INFORMATION:    Referring provider:      Referring providers clinic:      Reason for visit/diagnosis  T-tube, and medical clearance for hearing aids (HEV 11/2)    RECORDS REQUESTED FROM:       Clinic name Comments Records Status Imaging Status   ealth  Audiology and E[facility] [place]  11/2/2020 audio and note from Jose Miguel Marx  11/2/2020 ear cleaning with Dr Horne   3/14/2017 note from Dr Nissen Epic    [facility] Internal Med [place] 10/21/2020 note from Dr Norberto Howe [facility]    Imaging 2/17/2020 CT Head   12/31/2018 CT Head [facility] PACS

## 2020-11-07 ENCOUNTER — TELEPHONE (OUTPATIENT)
Dept: INTERNAL MEDICINE | Facility: CLINIC | Age: 75
End: 2020-11-07

## 2020-11-07 NOTE — TELEPHONE ENCOUNTER
Dear Marj;    Please see results letter I sent to Norberto and please call his daughter in law with this information    MOHIT Espinosa        Dear Norberto;      Still abnormal thyroid testing and I recommend you have an appointment with the endocrinology doctors. I placed a referral yesterday and you can call [phone] to schedule this appointment. I will have my nurse Marj give your daughter-in-law a call regarding this.    MOHIT Howe MD    Left message for Valentina to call back.  Marj Thurston RN 3:02 PM on 11/9/2020.

## 2020-11-09 ENCOUNTER — TELEPHONE (OUTPATIENT)
Dept: ENDOCRINOLOGY | Facility: CLINIC | Age: 75
End: 2020-11-09

## 2020-11-12 NOTE — TELEPHONE ENCOUNTER
[facility] Call Center    Phone Message    May a detailed message be left on voicemail: yes     Reason for Call: Other: Lottie Wu is calling about information for a letter, she missed a call from Marj 11/9/20 please review and follow up with the patients daughter to go over this matter thank you.     Action Taken: Message routed to:  [facility] pcc    Travel Screening: Not Applicable

## 2020-11-12 NOTE — TELEPHONE ENCOUNTER
Called Lottie and left  about patient's still abnormal thyroid testing and Dr. Howe placed an Endocrine referral for this.  Call to schedule endocrine at [phone].      Christopher Tan CMA ([facility]) at 4:09 PM on 11/12/2020

## 2020-11-13 ENCOUNTER — TELEPHONE (OUTPATIENT)
Dept: NEUROLOGY | Facility: CLINIC | Age: 75
End: 2020-11-13

## 2020-11-13 NOTE — TELEPHONE ENCOUNTER
Lottie has proxy access but cannot remember his password and he cannot remember anything about how to log in.   We changed his password and disabled 2 factor authentication per her request.    Sent Lottie a MyChart with instructions on how to check voltage.

## 2020-11-13 NOTE — TELEPHONE ENCOUNTER
[facility] Call Center    Phone Message    May a detailed message be left on voicemail: yes     Reason for Call: Other: Patients daughter in law Lottie calling to speak with Christina in regards to a battery check.      Please advise and call Lottie back at your earliest convenience     Action Taken: Other: [facility] NEUROLOGY    Travel Screening: Not Applicable

## 2020-11-24 ENCOUNTER — TELEPHONE (OUTPATIENT)
Dept: OTOLARYNGOLOGY | Facility: CLINIC | Age: 75
End: 2020-11-24

## 2020-11-24 NOTE — TELEPHONE ENCOUNTER
LVM that patient should see Dr. Nissen instead of Dr. Romo as they are already established with Dr. Nissen. Gave new date and time. Also let them know the next available for same day is Feb for Dr. Nissen and Hearing aid appt. Gave call center number

## 2020-12-08 DIAGNOSIS — G20.A1 PARKINSON DISEASE (H): ICD-10-CM

## 2020-12-08 NOTE — TELEPHONE ENCOUNTER
Rx Authorization:    Requested Medication/ Dose rivastigmine (EXELON) 9.5 MG/24HR 24 hr patch    Date last refill ordered: 11/5/2019    Quantity ordered: 90 patch    # refills: 3    Date of last clinic visit with ordering provider: 10/27/2020    Date of next clinic visit with ordering provider: no f/u scheduled    All pertinent protocol data (lab date/result):     Include pertinent information from patients message:

## 2020-12-09 RX ORDER — RIVASTIGMINE 9.5 MG/24H
1 PATCH, EXTENDED RELEASE TRANSDERMAL DAILY
Qty: 90 PATCH | Refills: 3 | Status: SHIPPED | OUTPATIENT
Start: 2020-12-09 | End: 2021-06-02

## 2020-12-09 NOTE — PROGRESS NOTES
"Outcome for 12/09/20 11:47 AM :Left Voicemail for patient to call back- soj     Norberto Wu is a 75 year old male who is being evaluated via a billable video visit.      The patient has been notified of following:     \"This video visit will be conducted via a call between you and your physician/provider. We have found that certain health care needs can be provided without the need for an in-person physical exam.  This service lets us provide the care you need with a video conversation.  If a prescription is necessary we can send it directly to your pharmacy.  If lab work is needed we can place an order for that and you can then stop by our lab to have the test done at a later time.    Video visits are billed at different rates depending on your insurance coverage.  Please reach out to your insurance provider with any questions.    If during the course of the call the physician/provider feels a video visit is not appropriate, you will not be charged for this service.\"    Patient has given verbal consent for Video visit? Yes  How would you like to obtain your AVS? MyChart  If you are dropped from the video visit, the video invite should be resent to: Text to cell phone: [phone]  Will anyone else be joining your video visit?         Video-Visit Details     Type of service:  Video Visit    Video Start Time: 1049 AM  Video End Time: 1109 AM    Originating Location (pt. Location): Assisted Living    Distant Location (provider location):  [facility] [place]     Platform used for Video Visit: [facility]      Endocrinology Note    Chief Complaint: follow up subclinical Hyperthyroidism    HPI: Norberto Wu is a 75 year old year old man with history of Parkinson's disease s/p DBS who has virtual visit for follow-up of subclinical hyperthyroidism. Last seen  12/2018.    He was treated with methimazole 5mg daily without side effects for subclinical hyperthyroidism secondary to warm nodule " on left thyroid from 9450-9442.     He stopped taking medication almost 2 year ago because he did not want to take a lot of medication. After stopping methimazole, he did not feel any difference.     His Parkinson's disease seems to be getting worse particularly chronic constipation, difficulty getting up and walk, imbalance, and deepening of his voice. He fell very often. Since COVID pandemic, he has not gone out from assisted living facility very often.  He has no paresthesias, burning sensations, heat intolerance, neck swelling, dysphagia, palpitations, dyspnea, chest pain, diaphoresis, abdominal pain, diarrhea, irritability/agitation. He does not sleep well because he needs to take Sinemet every 3-4 hours.    Past Medical History:   Diagnosis Date     Cerebellar stroke syndrome 8/21/2013    Few small foci of chronic infarct in the right cerebellar hemisphere, unchanged as well as a new focus of now chronic infarct in the left cerebellar hemisphere since the last study.      Dyslipidemia      Hyperthyroidism      Insomnia 6/17/2011     Parkinson disease (H) 6/15/2011     Peripheral neuropathy 6/15/2011     PFO (patent foramen ovale) 8/21/2013    Interpretation Summary 1. Normal LV size and systolic function. Normal diastolic function 2.  Normal RV size and function 3. No significant valvular abnormalities 4.  Small right-to-left shunt visualized with intravenous agitated saline  contrast study, suggestive of PFO. PatientHeight: 72 in PatientWeight: 205 lbs SystolicPressure: 100 mmHg DiastolicPressure: 63 mmHg BSA 2.2 m^2   Procedure Echoc     PONV (postoperative nausea and vomiting)      Prostate cancer (H) 6/17/2011    s/p radical prostectomy       Past Surgical History:   Procedure Laterality Date     COLONOSCOPY N/A 3/23/2017    Procedure: COLONOSCOPY;  Surgeon: Salbador Paulson MD;  Location:  GI     PROSTATE SURGERY  3 yrs ago    prostate cancer; Dr. Valladares     REPLACE DEEP BRAIN STIMULATION  "GENERATOR / BATTERY Right 1/3/2017    Procedure: REPLACE DEEP BRAIN STIMULATION GENERATOR / BATTERY;  Surgeon: Anupam Fair MD;  Location: [facility] OR     REPLACE DEEP BRAIN STIMULATION GENERATOR / BATTERY Right 2/4/2019    Procedure: Right Deep Brain Stimulatory Battery Replacement;  Surgeon: Marco Rothman MD;  Location: [facility] OR     Right Gpi DBS Lead Implantation  5/20/2014     Rt Chest DBS Generator Placement Activa SC  5/29/2014       Current Outpatient Medications   Medication Sig Dispense Refill     carbidopa-levodopa (SINEMET)  MG tablet TAKE TWO TABLETS BY MOUTH SEVEN TIMES A DAY (3AM, 6AM, 9AM, NOON, 3PM, 6PM AND MIDNIGHT) 434 tablet PRN     fluticasone (FLONASE) 50 MCG/ACT nasal spray 1-2 sprays in each nostril daily @ 9am 48 g 3     gabapentin (NEURONTIN) 300 MG capsule Reduce to 1 x 300mg CAPSULE BY MOUTH TWICE DAILY @6am and 12noon; AND 3 x 300mg capsules (900MG) DAILY @6pm: 1-1-3 for 5/day (had been on 7/day) 450 capsule 3     ibuprofen (ADVIL/MOTRIN) 200 MG tablet TAKE 1 TABLET BY MOUTH AT BEDTIME;AND TAKE ONE TABLET BY MOUTH EVERY 6 HOURS AS NEEDED 28 tablet 11     polyethylene glycol (MIRALAX) 17 g packet 1 packet by mouth in liquid daily as needed 7 packet 11     QUEtiapine (SEROQUEL) 25 MG tablet 25mg TABLET BY MOUTH AT 9pm 90 tablet 3     rivastigmine (EXELON) 9.5 MG/24HR 24 hr patch Place 1 patch onto the skin daily 90 patch 3          Allergies   Allergen Reactions     Hydromorphone Nausea and Nausea and Vomiting     Used post DBS surgery.  Stayed in the hospital 3 days due to severe nausea & \"retching\" .      Comtan Diarrhea     Hydrocodone      Other reaction(s): Headache, Nausea Only     Hydrocodone-Acetaminophen Hives and Nausea     Melatonin Other (See Comments)     No benefit.     Ropinirole Fatigue     Fatigue no benefit.      Adhesive Tape Rash     Camphor Rash     Other reaction(s): Rash     Liquid Adhesive Rash       Family History   Problem Relation Age of Onset     " Parkinsonism Father      Other - See Comments Son         lives in [place]     Skin Cancer No family hx of         no skin cancer       Social History     Social History     Marital Status:      Spouse Name: N/A     Number of Children: N/A     Years of Education: N/A     Social History Main Topics     Smoking status: Never Smoker      Smokeless tobacco: Never Used     Alcohol Use: Yes      Comment: Rare     Drug Use: No     Sexual Activity: Not Asked     Other Topics Concern     None     Social History Narrative    Son lives in [place] and he has 2 kids    Wife Mayelin           Review of Systems   Constitutional: no fevers, night sweats, or chills; no weight or appetite changes; no recent illnesses, increased diaphoresis, or fatigue  HEENT: no headaches, hearing changes, +deepening of the voice    Neck: no new neck swelling or masses  Cardiac: no chest pain, palpitations, lightheadedness, or exertional dyspnea  Resp: no dyspnea  GI: +severe constipation; no dysphagia, nausea, vomiting, diarrhea, or abdominal pain  : no difficulty urinating or dysuria  MSK: no new myalgias or arthralgias  Extremities: +frozen feet, difficulty getting up and ambulate, +fall frequently  Endo: no polydipsia or polyuria; no heat or cold intolerance intolerance  Neuro: some difficulty with walking when starting to ambulate, numbness; no paresthesias of extremities  Derm: no skin or nail changes; no hair loss or changes  Psych: +slow cognitive function; + depressed mood or increased irritibility    Physical Exam  General: pt appears mildly slowed but well; no acute distress  Psych: pt is appropriately conversational with restricted affect    Lab and images  Thyroid uptake and scan 2/17/2015  The uptake at 24 hours was measured at 20.2%.  The normal range at 24 hours is from 10 to 30%.  Uptake on right: 8.7%,  Uptake on Left: 11.5%.     Total computer estimated weight of the gland: 52.5 gm,  Right gland weight: 26.6 gm,  Left  gland weight: 25.9 gm.     Images of the gland demonstrates normal appearance of the right and left lobes. There is a warm nodule in left thyroid lobe without supression of the rest of the gland.      IMPRESSION  1. 20.2% uptake at 24 hours is within normal limits.  2. A warm nodule in the left thyroid lobe.        Assessment and Plan:    Norberto Wu is a 75 year old year old man with a PMHx of Parkinson's disease s/p DBS who presents for follow-up of subclinical hyperthyroidism.     #Subclinical Hyperthyroidism: diagnosed in 2015 with TSH suppression with normal free T4 with negative anti-TPO and TSI. Previous thyroid uptake and scan showed warm nodule on left thyroid.   - pt was taken methimazole 5 mg daily with normalization of TSH from 2070-9584.  - he stopped taking medication himself because he does not want to take a lot of medication.  - his subclinical hyperthyroidism recurred after stopping methimazole  - even though he has not had overt hyperthyroidism, I would recommend to treat low dose methimazole to normalization TFT to prevent risk of Afib. Patient is not interested in starting medication at this time.      #Parkinson's Disease s/p deep brain stimulation  - appears to suffer from constipation, difficulty ambulation and deepening voice  - Continue to follow-up with neurologist  #fall risk  Recommend him see palliative care  RTC PRN  Brandin Mccarty MD  Division of Diabetes and Endocrinology  Department of Medicine  [phone]

## 2020-12-10 RX ORDER — RIVASTIGMINE 9.5 MG/24H
PATCH, EXTENDED RELEASE TRANSDERMAL
Refills: 97
Start: 2020-12-10

## 2020-12-11 ENCOUNTER — VIRTUAL VISIT (OUTPATIENT)
Dept: ENDOCRINOLOGY | Facility: CLINIC | Age: 75
End: 2020-12-11
Attending: INTERNAL MEDICINE
Payer: COMMERCIAL

## 2020-12-11 DIAGNOSIS — E05.90 SUBCLINICAL HYPERTHYROIDISM: Primary | ICD-10-CM

## 2020-12-11 PROCEDURE — 99214 OFFICE O/P EST MOD 30 MIN: CPT | Mod: 95 | Performed by: INTERNAL MEDICINE

## 2020-12-11 NOTE — PATIENT INSTRUCTIONS
1. You were seen in the ENT Clinic today by Dr. Nissen.  If you have any questions or concerns after your appointment, please call   - Option 1: ENT Clinic: [phone]   - Option 2: Maribel (Dr. Nissen's  Nurse): [phone]  2.   Plan to return to clinic as needed    3. Hearing Aid Evaluation    Maribel Hanson LPN  [facility] - Otolaryngology

## 2020-12-11 NOTE — LETTER
"12/11/2020       RE: Norberto Wu  [address]  [place] [address]     Dear Colleague,    Thank you for referring your patient, Norberto Wu, to the [facility]CRIN[facility] [place] at [facility]. Please see a copy of my visit note below.    Outcome for 12/09/20 11:47 AM :Left Voicemail for patient to call back- soj     Norberto Wu is a 75 year old male who is being evaluated via a billable video visit.      The patient has been notified of following:     \"This video visit will be conducted via a call between you and your physician/provider. We have found that certain health care needs can be provided without the need for an in-person physical exam.  This service lets us provide the care you need with a video conversation.  If a prescription is necessary we can send it directly to your pharmacy.  If lab work is needed we can place an order for that and you can then stop by our lab to have the test done at a later time.    Video visits are billed at different rates depending on your insurance coverage.  Please reach out to your insurance provider with any questions.    If during the course of the call the physician/provider feels a video visit is not appropriate, you will not be charged for this service.\"    Patient has given verbal consent for Video visit? Yes  How would you like to obtain your AVS? MyChart  If you are dropped from the video visit, the video invite should be resent to: Text to cell phone: [phone]  Will anyone else be joining your video visit?         Video-Visit Details     Type of service:  Video Visit    Video Start Time: 1049 AM  Video End Time: 1109 AM    Originating Location (pt. Location): Assisted Living    Distant Location (provider location):  [facility] [place]     Platform used for Video Visit: [facility]      Endocrinology Note    Chief Complaint: follow up subclinical " Hyperthyroidism    HPI: Norberto Wu is a 75 year old year old man with history of Parkinson's disease s/p DBS who has virtual visit for follow-up of subclinical hyperthyroidism. Last seen  12/2018.    He was treated with methimazole 5mg daily without side effects for subclinical hyperthyroidism secondary to warm nodule on left thyroid from 0470-3294.     He stopped taking medication almost 2 year ago because he did not want to take a lot of medication. After stopping methimazole, he did not feel any difference.     His Parkinson's disease seems to be getting worse particularly chronic constipation, difficulty getting up and walk, imbalance, and deepening of his voice. He fell very often. Since COVID pandemic, he has not gone out from assisted living facility very often.  He has no paresthesias, burning sensations, heat intolerance, neck swelling, dysphagia, palpitations, dyspnea, chest pain, diaphoresis, abdominal pain, diarrhea, irritability/agitation. He does not sleep well because he needs to take Sinemet every 3-4 hours.    Past Medical History:   Diagnosis Date     Cerebellar stroke syndrome 8/21/2013    Few small foci of chronic infarct in the right cerebellar hemisphere, unchanged as well as a new focus of now chronic infarct in the left cerebellar hemisphere since the last study.      Dyslipidemia      Hyperthyroidism      Insomnia 6/17/2011     Parkinson disease (H) 6/15/2011     Peripheral neuropathy 6/15/2011     PFO (patent foramen ovale) 8/21/2013    Interpretation Summary 1. Normal LV size and systolic function. Normal diastolic function 2.  Normal RV size and function 3. No significant valvular abnormalities 4.  Small right-to-left shunt visualized with intravenous agitated saline  contrast study, suggestive of PFO. PatientHeight: 72 in PatientWeight: 205 lbs SystolicPressure: 100 mmHg DiastolicPressure: 63 mmHg BSA 2.2 m^2   Procedure Echoc     PONV (postoperative nausea and vomiting)       "Prostate cancer (H) 6/17/2011    s/p radical prostectomy       Past Surgical History:   Procedure Laterality Date     COLONOSCOPY N/A 3/23/2017    Procedure: COLONOSCOPY;  Surgeon: Salbador Paulson MD;  Location: [facility] GI     PROSTATE SURGERY  3 yrs ago    prostate cancer; Dr. Valladares     REPLACE DEEP BRAIN STIMULATION GENERATOR / BATTERY Right 1/3/2017    Procedure: REPLACE DEEP BRAIN STIMULATION GENERATOR / BATTERY;  Surgeon: Anupam Fair MD;  Location: [facility] OR     REPLACE DEEP BRAIN STIMULATION GENERATOR / BATTERY Right 2/4/2019    Procedure: Right Deep Brain Stimulatory Battery Replacement;  Surgeon: Marco Rothman MD;  Location: [facility] OR     Right Gpi DBS Lead Implantation  5/20/2014     Rt Chest DBS Generator Placement Activa SC  5/29/2014       Current Outpatient Medications   Medication Sig Dispense Refill     carbidopa-levodopa (SINEMET)  MG tablet TAKE TWO TABLETS BY MOUTH SEVEN TIMES A DAY (3AM, 6AM, 9AM, NOON, 3PM, 6PM AND MIDNIGHT) 434 tablet PRN     fluticasone (FLONASE) 50 MCG/ACT nasal spray 1-2 sprays in each nostril daily @ 9am 48 g 3     gabapentin (NEURONTIN) 300 MG capsule Reduce to 1 x 300mg CAPSULE BY MOUTH TWICE DAILY @6am and 12noon; AND 3 x 300mg capsules (900MG) DAILY @6pm: 1-1-3 for 5/day (had been on 7/day) 450 capsule 3     ibuprofen (ADVIL/MOTRIN) 200 MG tablet TAKE 1 TABLET BY MOUTH AT BEDTIME;AND TAKE ONE TABLET BY MOUTH EVERY 6 HOURS AS NEEDED 28 tablet 11     polyethylene glycol (MIRALAX) 17 g packet 1 packet by mouth in liquid daily as needed 7 packet 11     QUEtiapine (SEROQUEL) 25 MG tablet 25mg TABLET BY MOUTH AT 9pm 90 tablet 3     rivastigmine (EXELON) 9.5 MG/24HR 24 hr patch Place 1 patch onto the skin daily 90 patch 3          Allergies   Allergen Reactions     Hydromorphone Nausea and Nausea and Vomiting     Used post DBS surgery.  Stayed in the hospital 3 days due to severe nausea & \"retching\" .      Comtan Diarrhea     Hydrocodone      Other " reaction(s): Headache, Nausea Only     Hydrocodone-Acetaminophen Hives and Nausea     Melatonin Other (See Comments)     No benefit.     Ropinirole Fatigue     Fatigue no benefit.      Adhesive Tape Rash     Camphor Rash     Other reaction(s): Rash     Liquid Adhesive Rash       Family History   Problem Relation Age of Onset     Parkinsonism Father      Other - See Comments Son         lives in [place]     Skin Cancer No family hx of         no skin cancer       Social History     Social History     Marital Status:      Spouse Name: N/A     Number of Children: N/A     Years of Education: N/A     Social History Main Topics     Smoking status: Never Smoker      Smokeless tobacco: Never Used     Alcohol Use: Yes      Comment: Rare     Drug Use: No     Sexual Activity: Not Asked     Other Topics Concern     None     Social History Narrative    Son lives in [place] and he has 2 kids    Wife Mayelin           Review of Systems   Constitutional: no fevers, night sweats, or chills; no weight or appetite changes; no recent illnesses, increased diaphoresis, or fatigue  HEENT: no headaches, hearing changes, +deepening of the voice    Neck: no new neck swelling or masses  Cardiac: no chest pain, palpitations, lightheadedness, or exertional dyspnea  Resp: no dyspnea  GI: +severe constipation; no dysphagia, nausea, vomiting, diarrhea, or abdominal pain  : no difficulty urinating or dysuria  MSK: no new myalgias or arthralgias  Extremities: +frozen feet, difficulty getting up and ambulate, +fall frequently  Endo: no polydipsia or polyuria; no heat or cold intolerance intolerance  Neuro: some difficulty with walking when starting to ambulate, numbness; no paresthesias of extremities  Derm: no skin or nail changes; no hair loss or changes  Psych: +slow cognitive function; + depressed mood or increased irritibility    Physical Exam  General: pt appears mildly slowed but well; no acute distress  Psych: pt is appropriately  conversational with restricted affect    Lab and images  Thyroid uptake and scan 2/17/2015  The uptake at 24 hours was measured at 20.2%.  The normal range at 24 hours is from 10 to 30%.  Uptake on right: 8.7%,  Uptake on Left: 11.5%.     Total computer estimated weight of the gland: 52.5 gm,  Right gland weight: 26.6 gm,  Left gland weight: 25.9 gm.     Images of the gland demonstrates normal appearance of the right and left lobes. There is a warm nodule in left thyroid lobe without supression of the rest of the gland.      IMPRESSION  1. 20.2% uptake at 24 hours is within normal limits.  2. A warm nodule in the left thyroid lobe.        Assessment and Plan:    Norberto Wu is a 75 year old year old man with a PMHx of Parkinson's disease s/p DBS who presents for follow-up of subclinical hyperthyroidism.     #Subclinical Hyperthyroidism: diagnosed in 2015 with TSH suppression with normal free T4 with negative anti-TPO and TSI. Previous thyroid uptake and scan showed warm nodule on left thyroid.   - pt was taken methimazole 5 mg daily with normalization of TSH from 5270-4359.  - he stopped taking medication himself because he does not want to take a lot of medication.  - his subclinical hyperthyroidism recurred after stopping methimazole  - even though he has not had overt hyperthyroidism, I would recommend to treat low dose methimazole to normalization TFT to prevent risk of Afib. Patient is not interested in starting medication at this time.      #Parkinson's Disease s/p deep brain stimulation  - appears to suffer from constipation, difficulty ambulation and deepening voice  - Continue to follow-up with neurologist  #fall risk  Recommend him see palliative care  RTC PRN  Brandin Mccarty MD  Division of Diabetes and Endocrinology  Department of Medicine  [phone]

## 2020-12-15 ENCOUNTER — OFFICE VISIT (OUTPATIENT)
Dept: OTOLARYNGOLOGY | Facility: CLINIC | Age: 75
End: 2020-12-15
Payer: COMMERCIAL

## 2020-12-15 VITALS
WEIGHT: 185 LBS | DIASTOLIC BLOOD PRESSURE: 80 MMHG | HEART RATE: 70 BPM | TEMPERATURE: 98.1 F | OXYGEN SATURATION: 95 % | SYSTOLIC BLOOD PRESSURE: 133 MMHG | BODY MASS INDEX: 26.17 KG/M2

## 2020-12-15 DIAGNOSIS — H93.13 TINNITUS, BILATERAL: ICD-10-CM

## 2020-12-15 DIAGNOSIS — H90.3 BILATERAL SENSORINEURAL HEARING LOSS: Primary | ICD-10-CM

## 2020-12-15 PROCEDURE — 92504 EAR MICROSCOPY EXAMINATION: CPT | Performed by: OTOLARYNGOLOGY

## 2020-12-15 PROCEDURE — 99203 OFFICE O/P NEW LOW 30 MIN: CPT | Mod: 25 | Performed by: OTOLARYNGOLOGY

## 2020-12-15 ASSESSMENT — PAIN SCALES - GENERAL: PAINLEVEL: NO PAIN (0)

## 2020-12-15 NOTE — PROGRESS NOTES
"Dear Norberto Barrientos:    I had the pleasure of seeing Norberto Wu in followup today at the [facility].    CHIEF COMPLAINT: Hearing loss    HISTORY OF PRESENT ILLNESS: Patient is a 75-year-old in today with his daughter-in-law.  They have noticed problems with hearing over the past several months.  He feels his hearing is equal and symmetrical.  He does have a tube in his left ear, denies any pain or drainage.  Tube was placed in 2017.  Denies any dizziness or balance concerns, does have some balance issues secondary to Parkinson's but uses a walking stick just to prevent falls.  Denies any vertigo or episodic issues.  Further denies any dysphagia, hoarseness, facial paresthesias.    MEDICATIONS: Please refer to the detailed medication reconciliation performed by my nurse today, which I have reviewed and signed.     ALLERGIES:    Allergies   Allergen Reactions     Hydromorphone Nausea and Nausea and Vomiting     Used post DBS surgery.  Stayed in the hospital 3 days due to severe nausea & \"retching\" .      Comtan Diarrhea     Hydrocodone      Other reaction(s): Headache, Nausea Only     Hydrocodone-Acetaminophen Hives and Nausea     Melatonin Other (See Comments)     No benefit.     Ropinirole Fatigue     Fatigue no benefit.      Adhesive Tape Rash     Camphor Rash     Other reaction(s): Rash     Liquid Adhesive Rash       HABITS: Social History    Substance and Sexual Activity      Alcohol use: Yes        Alcohol/week: 1.0 standard drinks        Comment: EVERY OTHER DAY     History   Smoking Status     Never Smoker   Smokeless Tobacco     Never Used         PAST MEDICAL HISTORY:  Please see today's intake form (for the remainder of the PMH) which I reviewed and signed.  Past Medical History:   Diagnosis Date     Cerebellar stroke syndrome 8/21/2013    Few small foci of chronic infarct in the right cerebellar hemisphere, unchanged as well as a new focus of now chronic " infarct in the left cerebellar hemisphere since the last study.      Dyslipidemia      Hyperthyroidism      Insomnia 6/17/2011     Parkinson disease (H) 6/15/2011     Peripheral neuropathy 6/15/2011     PFO (patent foramen ovale) 8/21/2013    Interpretation Summary 1. Normal LV size and systolic function. Normal diastolic function 2.  Normal RV size and function 3. No significant valvular abnormalities 4.  Small right-to-left shunt visualized with intravenous agitated saline  contrast study, suggestive of PFO. PatientHeight: 72 in PatientWeight: 205 lbs SystolicPressure: 100 mmHg DiastolicPressure: 63 mmHg BSA 2.2 m^2   Procedure Echoc     PONV (postoperative nausea and vomiting)      Prostate cancer (H) 6/17/2011    s/p radical prostectomy       FAMILY HISTORY/SOCIAL HISTORY:    Family History   Problem Relation Age of Onset     Parkinsonism Father      Other - See Comments Son         lives in [place]     Skin Cancer No family hx of         no skin cancer      Social History     Socioeconomic History     Marital status:      Spouse name: Not on file     Number of children: Not on file     Years of education: Not on file     Highest education level: Not on file   Occupational History     Not on file   Social Needs     Financial resource strain: Not on file     Food insecurity     Worry: Not on file     Inability: Not on file     Transportation needs     Medical: Not on file     Non-medical: Not on file   Tobacco Use     Smoking status: Never Smoker     Smokeless tobacco: Never Used   Substance and Sexual Activity     Alcohol use: Yes     Alcohol/week: 1.0 standard drinks     Comment: EVERY OTHER DAY     Drug use: No     Sexual activity: Never   Lifestyle     Physical activity     Days per week: Not on file     Minutes per session: Not on file     Stress: Not on file   Relationships     Social connections     Talks on phone: Not on file     Gets together: Not on file     Attends Gnosticism service: Not on  file     Active member of club or organization: Not on file     Attends meetings of clubs or organizations: Not on file     Relationship status: Not on file     Intimate partner violence     Fear of current or ex partner: Not on file     Emotionally abused: Not on file     Physically abused: Not on file     Forced sexual activity: Not on file   Other Topics Concern     Parent/sibling w/ CABG, MI or angioplasty before 65F 55M? No   Social History Narrative    Son lives in [place] and he has 2 kids    Wife Mayelin       REVIEW OF SYSTEMS: [unfilled]    The remainder of the 10 point ROS is negative    PHYSICIAL EXAMINATION:  Constitutional: The patient was well-groomed and in no acute distress.   Skin: Warm and pink.  Psychiatric: The patient's affect was calm, cooperative, and appropriate.   Respiratory: Breathing comfortably without stridor or exertion of accessory muscles.  Eyes: Pupils were equal and reactive. Extraocular movement intact.   Head: Normocephalic and atraumatic. No lesions or scars.  Ears: Both ears examined under the microscope.  The right side was cleaned of mild cerumen shows tympanic membrane middle ear to be normal.  Left ear again cleaned of mild cerumen and shows a tube to be in place and patent inferiorly.  Nose: Sinuses were nontender. Anterior rhinoscopy revealed midline septum and absence of purulence or polyps.  Oral Cavity: Normal tongue, floor of mouth, buccal mucosa, and palate. No abnormal lymph tissue in the oropharynx.   Neck: The parotid is soft without masses. Supple with normal laryngeal and tracheal landmarks.   Lymphatic: There is no palpable lymphadenopathy or other masses in the neck.   Neurologic: Alert and oriented x 3. Cranial nerves III-XI within normal limits. Voice quality normal.  Cerebellar Function Tests:  Grossly normal    Audiogram: Audiogram performed shows a bilateral high-frequency sensorineural hearing loss, fairly symmetrical.  Discrimination of 80% on the right  and 76% on the left.    IMPRESSION AND PLAN:   1. Bilateral high-frequency sensorineural hearing loss: Discussed this in detail with him.  It has increased since last test in 2017 but looks fairly symmetrical.  Discussed option for hearing aid which he is very interested and will pursue at his discretion in place of location.  Recommend monitor the hearing with follow-up audiogram in 1 year, sooner with any change or problems.  2. Bilateral tinnitus: Not problematic, no treatment needed, monitor.    Patient will follow up in 1 year to monitor.    Thank you very much for the opportunity to participate in the care of your patient.    Rick L Nissen MD

## 2020-12-15 NOTE — NURSING NOTE
Chief Complaint   Patient presents with     RECHECK     T-tube. medical clearance for Hearing aids     Blood pressure 133/80, pulse 70, temperature 98.1  F (36.7  C), weight 83.9 kg (185 lb), SpO2 95 %.    Brandyn Regalado LPN

## 2020-12-15 NOTE — LETTER
"12/15/2020       RE: Norberto Wu  [address]  [place] [address]     Dear Colleague,    Thank you for referring your patient, Norberto Wu, to the [facility] [place] [facility]. Please see a copy of my visit note below.    Dear Norberto Barrientos:    I had the pleasure of seeing Norberto Wu in followup today at the [facility].    CHIEF COMPLAINT: Hearing loss    HISTORY OF PRESENT ILLNESS: Patient is a 75-year-old in today with his daughter-in-law.  They have noticed problems with hearing over the past several months.  He feels his hearing is equal and symmetrical.  He does have a tube in his left ear, denies any pain or drainage.  Tube was placed in 2017.  Denies any dizziness or balance concerns, does have some balance issues secondary to Parkinson's but uses a walking stick just to prevent falls.  Denies any vertigo or episodic issues.  Further denies any dysphagia, hoarseness, facial paresthesias.    MEDICATIONS: Please refer to the detailed medication reconciliation performed by my nurse today, which I have reviewed and signed.     ALLERGIES:    Allergies   Allergen Reactions     Hydromorphone Nausea and Nausea and Vomiting     Used post DBS surgery.  Stayed in the hospital 3 days due to severe nausea & \"retching\" .      Comtan Diarrhea     Hydrocodone      Other reaction(s): Headache, Nausea Only     Hydrocodone-Acetaminophen Hives and Nausea     Melatonin Other (See Comments)     No benefit.     Ropinirole Fatigue     Fatigue no benefit.      Adhesive Tape Rash     Camphor Rash     Other reaction(s): Rash     Liquid Adhesive Rash       HABITS: Social History    Substance and Sexual Activity      Alcohol use: Yes        Alcohol/week: 1.0 standard drinks        Comment: EVERY OTHER DAY     History   Smoking Status     Never Smoker   Smokeless Tobacco     Never " Used         PAST MEDICAL HISTORY:  Please see today's intake form (for the remainder of the PMH) which I reviewed and signed.  Past Medical History:   Diagnosis Date     Cerebellar stroke syndrome 8/21/2013    Few small foci of chronic infarct in the right cerebellar hemisphere, unchanged as well as a new focus of now chronic infarct in the left cerebellar hemisphere since the last study.      Dyslipidemia      Hyperthyroidism      Insomnia 6/17/2011     Parkinson disease (H) 6/15/2011     Peripheral neuropathy 6/15/2011     PFO (patent foramen ovale) 8/21/2013    Interpretation Summary 1. Normal LV size and systolic function. Normal diastolic function 2.  Normal RV size and function 3. No significant valvular abnormalities 4.  Small right-to-left shunt visualized with intravenous agitated saline  contrast study, suggestive of PFO. PatientHeight: 72 in PatientWeight: 205 lbs SystolicPressure: 100 mmHg DiastolicPressure: 63 mmHg BSA 2.2 m^2   Procedure Echoc     PONV (postoperative nausea and vomiting)      Prostate cancer (H) 6/17/2011    s/p radical prostectomy       FAMILY HISTORY/SOCIAL HISTORY:    Family History   Problem Relation Age of Onset     Parkinsonism Father      Other - See Comments Son         lives in [place]     Skin Cancer No family hx of         no skin cancer      Social History     Socioeconomic History     Marital status:      Spouse name: Not on file     Number of children: Not on file     Years of education: Not on file     Highest education level: Not on file   Occupational History     Not on file   Social Needs     Financial resource strain: Not on file     Food insecurity     Worry: Not on file     Inability: Not on file     Transportation needs     Medical: Not on file     Non-medical: Not on file   Tobacco Use     Smoking status: Never Smoker     Smokeless tobacco: Never Used   Substance and Sexual Activity     Alcohol use: Yes     Alcohol/week: 1.0 standard drinks     Comment:  EVERY OTHER DAY     Drug use: No     Sexual activity: Never   Lifestyle     Physical activity     Days per week: Not on file     Minutes per session: Not on file     Stress: Not on file   Relationships     Social connections     Talks on phone: Not on file     Gets together: Not on file     Attends Islam service: Not on file     Active member of club or organization: Not on file     Attends meetings of clubs or organizations: Not on file     Relationship status: Not on file     Intimate partner violence     Fear of current or ex partner: Not on file     Emotionally abused: Not on file     Physically abused: Not on file     Forced sexual activity: Not on file   Other Topics Concern     Parent/sibling w/ CABG, MI or angioplasty before 65F 55M? No   Social History Narrative    Son lives in [place] and he has 2 kids    Wife Mayelin       REVIEW OF SYSTEMS: [unfilled]    The remainder of the 10 point ROS is negative    PHYSICIAL EXAMINATION:  Constitutional: The patient was well-groomed and in no acute distress.   Skin: Warm and pink.  Psychiatric: The patient's affect was calm, cooperative, and appropriate.   Respiratory: Breathing comfortably without stridor or exertion of accessory muscles.  Eyes: Pupils were equal and reactive. Extraocular movement intact.   Head: Normocephalic and atraumatic. No lesions or scars.  Ears: Both ears examined under the microscope.  The right side was cleaned of mild cerumen shows tympanic membrane middle ear to be normal.  Left ear again cleaned of mild cerumen and shows a tube to be in place and patent inferiorly.  Nose: Sinuses were nontender. Anterior rhinoscopy revealed midline septum and absence of purulence or polyps.  Oral Cavity: Normal tongue, floor of mouth, buccal mucosa, and palate. No abnormal lymph tissue in the oropharynx.   Neck: The parotid is soft without masses. Supple with normal laryngeal and tracheal landmarks.   Lymphatic: There is no palpable lymphadenopathy or  other masses in the neck.   Neurologic: Alert and oriented x 3. Cranial nerves III-XI within normal limits. Voice quality normal.  Cerebellar Function Tests:  Grossly normal    Audiogram: Audiogram performed shows a bilateral high-frequency sensorineural hearing loss, fairly symmetrical.  Discrimination of 80% on the right and 76% on the left.    IMPRESSION AND PLAN:   1. Bilateral high-frequency sensorineural hearing loss: Discussed this in detail with him.  It has increased since last test in 2017 but looks fairly symmetrical.  Discussed option for hearing aid which he is very interested and will pursue at his discretion in place of location.  Recommend monitor the hearing with follow-up audiogram in 1 year, sooner with any change or problems.  2. Bilateral tinnitus: Not problematic, no treatment needed, monitor.    Patient will follow up in 1 year to monitor.    Thank you very much for the opportunity to participate in the care of your patient.    Rick L Nissen MD

## 2020-12-17 ENCOUNTER — OFFICE VISIT (OUTPATIENT)
Dept: AUDIOLOGY | Facility: CLINIC | Age: 75
End: 2020-12-17
Payer: COMMERCIAL

## 2020-12-17 ENCOUNTER — PRE VISIT (OUTPATIENT)
Dept: OTOLARYNGOLOGY | Facility: CLINIC | Age: 75
End: 2020-12-17

## 2020-12-17 DIAGNOSIS — H90.6 MIXED CONDUCTIVE AND SENSORINEURAL HEARING LOSS OF BOTH EARS: Primary | ICD-10-CM

## 2020-12-17 DIAGNOSIS — H90.3 SENSORINEURAL HEARING LOSS, ASYMMETRICAL: ICD-10-CM

## 2020-12-17 PROCEDURE — 92591 PR HEARING AID EXAM BINAURAL: CPT | Performed by: AUDIOLOGIST

## 2020-12-17 NOTE — PROGRESS NOTES
AUDIOLOGY REPORT    SUBJECTIVE: Norberto Wu is a 75 year old male was seen in the Audiology Clinic at  [facility] on 12/17/20 to discuss concerns with hearing and functional communication difficulties. The patient was accompanied by their family member. Norberto has been seen previously on 11/2/2020, and results revealed a bilateral asymmetric sensorineural hearing loss.  The patient was medically evaluated and determined to be cleared for binaural hearing aids by Rick Nissen, MD. Norberto notes difficulty with communication in a variety of listening situations.    Patient does have benefits through Epic hearing. This was reviewed with the patient who expressed that he would like to stay at this clinic for hearing aids. He signed a Self-pay waiver today.    Audiologist, Dr. Lourdes Patel, was also present for today's appointment.    OBJECTIVE:  Patient is a hearing aid candidate. Patient would like to move forward with a hearing aid evaluation today. Therefore, the patient was presented with different options for amplification to help aid in communication. Discussed styles, levels of technology and monaural vs. binaural fitting.     He has a diagnosis of Parkinson Disease and has a small tremor. He reports better dexterity with his left hand. Rechargeable devices would be of interest. He reports he watches TV during the day and so would like a TV streamer. He mostly spends his time in one-on-one situations. He lives in assisted living with nursing staff who check in every 3 hours and so will have help with the hearing aids if needed.    The hearing aid(s) mutually chosen were:  Binaural: ReSound Quattro 5  COLOR: Red  BATTERY SIZE: rechargeable  EARMOLD/TIPS: closed dome  CANAL/ LENGTH: 3    ASSESSMENT:   Reviewed purchase information and warranty information with patient. The 45 day trial period was explained to patient. The patient was given a copy of the  [place] Department of Health consumer brochure on purchasing hearing instruments. Patient risk factors have been provided to the patient in writing prior to the sale of the hearing aid per FDA regulation. The risk factors are also available in the User Instructional Booklet to be presented on the day of the hearing aid fitting. Hearing aid(s) ordered. Hearing aid evaluation completed.    PLAN: Norberto is scheduled to return in 2-3 weeks for a hearing aid fitting and programming. Purchase agreement will be completed on that date. Please contact this clinic with any questions or concerns.      Tamara Wang, Araseli  Audiologist  [place] License  #[id number]

## 2021-01-07 ENCOUNTER — OFFICE VISIT (OUTPATIENT)
Dept: AUDIOLOGY | Facility: CLINIC | Age: 76
End: 2021-01-07

## 2021-01-07 DIAGNOSIS — H90.3 SENSORINEURAL HEARING LOSS, ASYMMETRICAL: Primary | ICD-10-CM

## 2021-01-07 PROCEDURE — V5160 DISPENSING FEE BINAURAL: HCPCS | Performed by: AUDIOLOGIST

## 2021-01-07 PROCEDURE — V5261 HEARING AID, DIGIT, BIN, BTE: HCPCS | Performed by: AUDIOLOGIST

## 2021-01-07 PROCEDURE — V5011 HEARING AID FITTING/CHECKING: HCPCS | Performed by: AUDIOLOGIST

## 2021-01-07 PROCEDURE — V5020 CONFORMITY EVALUATION: HCPCS | Performed by: AUDIOLOGIST

## 2021-01-07 NOTE — PROGRESS NOTES
AUDIOLOGY REPORT    SUBJECTIVE: Norberto Wu is a 75 year old male who was seen in the Audiology Clinic at the [facility] for a fitting of binaural Phonak Ryan M50R hearing aids with slim tubes. Previous results have revealed a bilateral asymmetric sensorineural hearing loss. The patient is a new user of hearing aids. He was accompanied to today's appointment by a family member.  Patient signed a self-pay waiver for hearing aids at the hearing aid consultation appointment.  OBJECTIVE: The hearing aid conformity evaluation was completed.The hearing aids were placed and they provided a good fit. Real-ear-probe-microphone measurements were completed on the Linkfluence system and were a good match to NAL-NL1 target with soft sounds audible, moderate sounds comfortable, and loud sounds below discomfort. UCLs are verified through maximum power output measures and demonstrate appropriate limiting of loud inputs. Norberto was oriented to proper hearing aid use, care, cleaning (no water, dry brush), batteries (size: BATTERY SIZE: rechargeable, insertion/removal, toxicity, low-battery signal), aid insertion/removal, user booklet, warranty information, storage cases, and other hearing aid details.  The patient confirmed understanding of hearing aid use and care, and showed proper insertion of hearing aid and batteries while in the office today. He was able to easily remove the hearing aids and put them in the . Placement of the hearing aids was practiced for about 30 minutes. He was able to get the devices on his ears but it was reviewed that he may need assistance in doing so. He does live in an assisted living facility and so his family member expressed that if he needed help putting them on it could be requested.  Patient reported some soft and high frequency sounds were a little loud, hearing aids were set to 90% and will auto-acclimatize to 100% over 3 weeks. Norberto reported  "good volume and sound quality today. He was counseled regarding adapting to new hearing aids.  Hearing aids were programmed as follows:  Program 1:AutoSense    EAR(S) FIT: Bilateral  HEARING AID MODEL NAME: Bebo Davis M50R  HEARING AID STYLE: Behind-the-ear  EARMOLDS/TIP: #2 slim tube with large closed dome  SERIAL NUMBERS: Right: [id number] Left: [id number]  WARRANTY END DATE: 3/21/2026  The patient requested the TV streamer as his \"perk\". He was provided the TV connector today with instructions for how to pair it at home. His family member expressed understanding and stated she would hook it up for him.    Patient was counseled regarding using a mask and wearing hearing aids. It was discussed that the patient should be careful when removing the mask and always check the ears to be sure the hearing aids are still in place following mask removal.  It is recommended that the patient strongly consider the use of an \"ear saver\" which anchors the mask behind the neck if using a mask that typically goes behind the ears, or use a mask that ties behind the head. The patient expressed understanding.    ASSESSMENT: Bilateral hearing aids were fit today. Verification measures were performed. Norberto signed the Hearing Aid Purchase Agreement and was given a copy, as well as details on his hearing aids. Patient was counseled that exact out of pocket amounts cannot be determined for hearing aid claims being sent to insurance. Any insurance coverage information presented to the patient is an estimate only, and is not a guarantee of payment. Patient has been advised to check with their own insurance.    PLAN:Norberto will return for follow-up in 2-3 weeks for a hearing aid review appointment. Patient is self-pay for the hearing aids. Please call this clinic with questions regarding today s appointment.    Araseli Vann  Audiologist  [place] License  #[id number]          "

## 2021-01-12 DIAGNOSIS — J00 ACUTE RHINITIS: ICD-10-CM

## 2021-01-15 NOTE — TELEPHONE ENCOUNTER
Fluticasone Propionate 50 MCG/ACT Suspension      Historical entry  Last Office Visit : 11/6/20  Future Office visit:  None scheduled    Routing refill request to provider for review/approval because:  Medication is reported/historical

## 2021-01-16 RX ORDER — FLUTICASONE PROPIONATE 50 MCG
1-2 SPRAY, SUSPENSION (ML) NASAL DAILY
Qty: 16 G | Refills: 11 | Status: SHIPPED | OUTPATIENT
Start: 2021-01-16 | End: 2021-06-02

## 2021-02-25 ENCOUNTER — TELEPHONE (OUTPATIENT)
Dept: NEUROLOGY | Facility: CLINIC | Age: 76
End: 2021-02-25

## 2021-02-27 ENCOUNTER — MYC MEDICAL ADVICE (OUTPATIENT)
Dept: NEUROLOGY | Facility: CLINIC | Age: 76
End: 2021-02-27

## 2021-03-15 ENCOUNTER — VIRTUAL VISIT (OUTPATIENT)
Dept: NEUROSURGERY | Facility: CLINIC | Age: 76
End: 2021-03-15
Payer: COMMERCIAL

## 2021-03-15 DIAGNOSIS — C61 PROSTATE CANCER (H): ICD-10-CM

## 2021-03-15 DIAGNOSIS — Z45.42 END OF BATTERY LIFE OF DEEP BRAIN STIMULATOR: ICD-10-CM

## 2021-03-15 DIAGNOSIS — Z96.89 STATUS POST DEEP BRAIN STIMULATOR PLACEMENT: Primary | ICD-10-CM

## 2021-03-15 DIAGNOSIS — F02.80 DEMENTIA ASSOCIATED WITH OTHER UNDERLYING DISEASE WITHOUT BEHAVIORAL DISTURBANCE (H): ICD-10-CM

## 2021-03-15 DIAGNOSIS — F29 PSYCHOSIS, UNSPECIFIED PSYCHOSIS TYPE (H): ICD-10-CM

## 2021-03-15 DIAGNOSIS — G20.C PRIMARY PARKINSONISM (H): ICD-10-CM

## 2021-03-15 PROCEDURE — 99215 OFFICE O/P EST HI 40 MIN: CPT | Mod: 95 | Performed by: NEUROLOGICAL SURGERY

## 2021-03-15 NOTE — LETTER
"3/15/2021      RE: Norberto Wu  [address]  [facility] [address]       Norberto is a 75 year old who is being evaluated via a billable video visit.      How would you like to obtain your AVS? Apolinarhart  If the video visit is dropped, the invitation should be resent by: [phone]      Video-Visit Details    Type of service:  Video Visit    Video Start Time: 4:00 PM  Video End Time:4:19 PM    Originating Location (pt. Location): Home    Distant Location (provider location):  [facility] NEUROSURGERY [facility]     Platform used for Video Visit: Amartus      Additional provider notes: insert own note template here      Mr. Norberto Wu  complains of    Chief Complaint   Patient presents with     Consult     DEPLETED DEEP BRAIN STIMULATOR GENERATOR/BATTERY OVER THE RIGHT CHEST WALL         I have reviewed and updated the patient's Past Medical History, Social History, Family History and Medication List.    ALLERGIES       Allergies   Allergen Reactions     Hydromorphone Nausea and Nausea and Vomiting     Used post DBS surgery.  Stayed in the hospital 3 days due to severe nausea & \"retching\" .      Comtan Diarrhea     Hydrocodone      Other reaction(s): Headache, Nausea Only     Hydrocodone-Acetaminophen Hives and Nausea     Melatonin Other (See Comments)     No benefit.     Ropinirole Fatigue     Fatigue no benefit.      Adhesive Tape Rash     Camphor Rash     Other reaction(s): Rash     Liquid Adhesive Rash         ASSESSMENT  75 year old male  Parkinson's disease  S/p right side deep brain stimulator placement, target right globus pallidus internus, in 5/2014  S/p replacement of deep brain stimulator generator/battery over the right chest wall on 1/3/2017, 2/4/2019  Depleted deep brain stimulator generator/battery over the right chest wall      I have reviewed the note as documented above.  This accurately captures the substance of my conversation with the patient.  The following " were discussed in detail.      Mr. Norberto Wu is a 75 year old male with the history of Parkinson's disease who previously underwent right side DBS placement back in 5/2014.  The electrode is in the right globus pallidus internus.  His generator/battery was last replaced by me back in 1/3/2017 and by Dr. Marco Rothman back in 2/4/2019.  He has a Medtronic Activa SC, model #21112.  He was seen in the neurosurgery video clinic due to his generator/battery needing replacement.  It was interrogated today and it shows the battery power to be at 2.56 Volts.  It is at LEONARDA, Early Replacement Indicator.  He states that he is in his usual state of health with no recent illness.  He is doing well.  He takes ibuprofen at night but does not take any aspirin, Plavix or Coumadin or any other blood thinners or antiplatelet therapies.  His current device has lasted him about 2 years.  We did discuss replacement with an Activa RC, rechargeable system last time but due to patient's memory issues and dealing with a rechargeable system, the decision was made to stay with a non-rechargeable system.  Therefore, we will stay with the same system.    He was diagnosed with Parkinson's Disease in 11/2008, but symptoms began around 17-22 years ago with decreased sense of smell and drooling. His tremors began in 2007 on his left hand and leg.     We discussed the surgical procedure for replacing the DBS generator/battery over the right chest wall.  Risks, benefits, alternative therapies were discussed with the patient, including but not limited to infection and bleeding.  This surgical procedure will be performed under MAC with local anesthetic.  The thinned part of the wound/pocket may be corrected with mobilization of the tissue under the incision.  The pocket may need to be enlarged to minimize the stress on the closure.  Surgical procedure was discussed in detail.  All questions were answered, and he expressed  understanding.      PLAN  Replacement of deep brain stimulator generator/battery over the right chest wall  Patient will need PAC visit.  Preop labs.  Negative Covid test within 4 days of the surgery      Video-Visit Details    Type of service:  Video Visit    Video Start Time: 4:00 PM  Video End Time: 4:19 PM  Video -Visit Time: 19 minutes    Originating Location (pt. Location): Home    Distant Location (provider location):  [facility] NEUROSURGERY     Platform used for Video Visit: Maurice Fair MD, PhD    19 minutes were spent face to face/on video with the patient of which more than 50% of the time was spent counseling and discussing the above issues regarding diagnosis, differential, treatment options, and steps for further evaluation.  10 minutes were spent reviewing patient's chart.  15 minutes were spent on documentation for this encounter, including ordering surgery case request.  44 minutes total were spent on this encounter.

## 2021-03-15 NOTE — PROGRESS NOTES
"Norberto is a 75 year old who is being evaluated via a billable video visit.      How would you like to obtain your AVS? Mychart  If the video visit is dropped, the invitation should be resent by: [phone]      Video Start Time: 4:00 PM  Video-Visit Details    Type of service:  Video Visit    Video End Time:4:19 PM    Originating Location (pt. Location): Home    Distant Location (provider location):  [facility] NEUROSURGERY [facility]     Platform used for Video Visit: [facility] [place]      Additional provider notes: insert own note template here      Mr. Norberto Wu  complains of    Chief Complaint   Patient presents with     Consult     DEPLETED DEEP BRAIN STIMULATOR GENERATOR/BATTERY OVER THE RIGHT CHEST WALL         I have reviewed and updated the patient's Past Medical History, Social History, Family History and Medication List.    ALLERGIES       Allergies   Allergen Reactions     Hydromorphone Nausea and Nausea and Vomiting     Used post DBS surgery.  Stayed in the hospital 3 days due to severe nausea & \"retching\" .      Comtan Diarrhea     Hydrocodone      Other reaction(s): Headache, Nausea Only     Hydrocodone-Acetaminophen Hives and Nausea     Melatonin Other (See Comments)     No benefit.     Ropinirole Fatigue     Fatigue no benefit.      Adhesive Tape Rash     Camphor Rash     Other reaction(s): Rash     Liquid Adhesive Rash         ASSESSMENT  75 year old male  Parkinson's disease  S/p right side deep brain stimulator placement, target right globus pallidus internus, in 5/2014  S/p replacement of deep brain stimulator generator/battery over the right chest wall on 1/3/2017, 2/4/2019  Depleted deep brain stimulator generator/battery over the right chest wall      I have reviewed the note as documented above.  This accurately captures the substance of my conversation with the patient.  The following were discussed in detail.      Mr. Norberto Wu is a 75 year old male with the history of " Parkinson's disease who previously underwent right side DBS placement back in 5/2014.  The electrode is in the right globus pallidus internus.  His generator/battery was last replaced by me back in 1/3/2017 and by Dr. Marco Rothman back in 2/4/2019.  He has a Medtronic Activa SC, model #61853.  He was seen in the neurosurgery video clinic due to his generator/battery needing replacement.  It was interrogated today and it shows the battery power to be at 2.56 Volts.  It is at LEONARDA, Early Replacement Indicator.  He states that he is in his usual state of health with no recent illness.  He is doing well.  He takes ibuprofen at night but does not take any aspirin, Plavix or Coumadin or any other blood thinners or antiplatelet therapies.  His current device has lasted him about 2 years.  We did discuss replacement with an Activa RC, rechargeable system last time but due to patient's memory issues and dealing with a rechargeable system, the decision was made to stay with a non-rechargeable system.  Therefore, we will stay with the same system.    He was diagnosed with Parkinson's Disease in 11/2008, but symptoms began around 17-22 years ago with decreased sense of smell and drooling. His tremors began in 2007 on his left hand and leg.     We discussed the surgical procedure for replacing the DBS generator/battery over the right chest wall.  Risks, benefits, alternative therapies were discussed with the patient, including but not limited to infection and bleeding.  This surgical procedure will be performed under MAC with local anesthetic.  The thinned part of the wound/pocket may be corrected with mobilization of the tissue under the incision.  The pocket may need to be enlarged to minimize the stress on the closure.  Surgical procedure was discussed in detail.  All questions were answered, and he expressed understanding.      PLAN  Replacement of deep brain stimulator generator/battery over the right chest wall  Patient  will need PAC visit.  Preop labs.  Negative Covid test within 4 days of the surgery      Video-Visit Details    Type of service:  Video Visit    Video Start Time: 4:00 PM  Video End Time: 4:19 PM  Video -Visit Time: 19 minutes    Originating Location (pt. Location): Home    Distant Location (provider location):  [facility] NEUROSURGERY     Platform used for Video Visit: Maurice Fair MD, PhD      19 minutes were spent face to face/on video with the patient of which more than 50% of the time was spent counseling and discussing the above issues regarding diagnosis, differential, treatment options, and steps for further evaluation.  10 minutes were spent reviewing patient's chart.  15 minutes were spent on documentation for this encounter, including ordering surgery case request.  44 minutes total were spent on this encounter.

## 2021-03-16 ENCOUNTER — TELEPHONE (OUTPATIENT)
Dept: NEUROSURGERY | Facility: CLINIC | Age: 76
End: 2021-03-16

## 2021-03-26 NOTE — NURSING NOTE
Chief Complaint   Patient presents with     RECHECK     F/U HYPERTHYROID     Rosemary Boswell, ABDULKADIR  Endocrinology & Diabetes 3G     RK: Dr. Adrianna Rhodes, peds ID, who is taking care of his infection. She said she changed her linezolid back to vancomycin, and it will be delivered to him tomorrow. He can be safely discharged.

## 2021-03-31 DIAGNOSIS — Z01.818 PREOPERATIVE EVALUATION TO RULE OUT SURGICAL CONTRAINDICATION: Primary | ICD-10-CM

## 2021-03-31 DIAGNOSIS — Z11.59 ENCOUNTER FOR SCREENING FOR OTHER VIRAL DISEASES: ICD-10-CM

## 2021-03-31 PROBLEM — Z96.89 STATUS POST DEEP BRAIN STIMULATOR PLACEMENT: Status: ACTIVE | Noted: 2021-03-31

## 2021-03-31 PROBLEM — Z45.42 END OF BATTERY LIFE OF DEEP BRAIN STIMULATOR: Status: ACTIVE | Noted: 2021-03-31

## 2021-03-31 PROBLEM — G20.C PRIMARY PARKINSONISM (H): Status: ACTIVE | Noted: 2021-03-31

## 2021-03-31 NOTE — TELEPHONE ENCOUNTER
Patient is scheduled for surgery with Dr. Fair      Spoke or left message with: Patients daughter     Date of Surgery: 04/16/21    Location [facility] OR    H&P: Scheduled with PAC 04/09/21    Post op: 05/03/21    Additional imaging/appointments: COVID test     Surgery packet sent: The nurse will mychart     Additional comments: The patient is aware they need a PRE-OP/PAC appt at least a couple of weeks before surgery date. We went over the patient needing a  and someone to stay with them for 24 hours after the surgery. The patient was given my direct number for any questions [phone]

## 2021-04-01 ENCOUNTER — RECORDS - HEALTHEAST (OUTPATIENT)
Dept: LAB | Facility: CLINIC | Age: 76
End: 2021-04-01

## 2021-04-01 LAB
SARS-COV-2 PCR COMMENT: NORMAL
SARS-COV-2 RNA SPEC QL NAA+PROBE: NEGATIVE
SARS-COV-2 VIRUS SPECIMEN SOURCE: NORMAL

## 2021-04-06 NOTE — TELEPHONE ENCOUNTER
FUTURE VISIT INFORMATION      SURGERY INFORMATION:    Date: 21    Location: [facility] OR    Surgeon:  Manjula    Anesthesia Type:  combined MAC with local    Procedure: Replacement of deep brain stimulator generator/battery over right chest wall    Consult: 3.15.21    RECORDS REQUESTED FROM:       Primary Care Provider: Dr. Howe    Most recent EKG+ Tracin18    Most recent ECHO: 8.13    Most recent Cardiac Stress Test: 13 (exercise stress echo)    Most recent Sleep Study:  14

## 2021-04-09 ENCOUNTER — ANESTHESIA EVENT (OUTPATIENT)
Dept: SURGERY | Facility: CLINIC | Age: 76
End: 2021-04-09

## 2021-04-09 ENCOUNTER — PRE VISIT (OUTPATIENT)
Dept: SURGERY | Facility: CLINIC | Age: 76
End: 2021-04-09

## 2021-04-09 ENCOUNTER — VIRTUAL VISIT (OUTPATIENT)
Dept: SURGERY | Facility: CLINIC | Age: 76
End: 2021-04-09
Payer: COMMERCIAL

## 2021-04-09 DIAGNOSIS — Z01.818 PRE-OP EXAMINATION: Primary | ICD-10-CM

## 2021-04-09 PROCEDURE — 99204 OFFICE O/P NEW MOD 45 MIN: CPT | Mod: 95 | Performed by: NURSE PRACTITIONER

## 2021-04-09 ASSESSMENT — LIFESTYLE VARIABLES: TOBACCO_USE: 0

## 2021-04-09 NOTE — PROGRESS NOTES
Norberto is a 75 year old who is being evaluated via a billable video visit.      How would you like to obtain your AVS? MyChart    Will anyone else be joining your video visit? No      HPI         Review of Systems                      Physical Exam     MIREYA Marsh LPN

## 2021-04-09 NOTE — PATIENT INSTRUCTIONS
Preparing for Your Surgery      Name:  Norberto Wu   MRN:  [id number]   :  1945   Today's Date:  2021       Arriving for surgery:  Surgery date:  21  Arrival time:  11:30 am    Restrictions due to COVID 19:  One consistent visitor per patient is allowed.  The visitor will be allowed in the pre-op area.  Visitors are asked to leave the building during the surgery.  No ill visitors.  All visitors must wear face mask.     parking is available for anyone with mobility limitations or disabilities.  ([facility]  24 hours/ 7 days a week; [facility]  7 am- 3:30 pm, Mon- Fri)    Please come to:     [facility] [address]  [address] [place] [address]    - ? parking is available in front of the hospital      -    Please proceed to [address] on the 3rd floor. [phone]?     - ?If you are in need of directions, wheelchair or escort please stop at the Information Desk in the lobby.  Inform the information person that you are here for surgery; a wheelchair and escort to [address] will be provided.?     What can I eat or drink?  -  You may eat and drink normally for up to 8 hours before your surgery. (Until 5:30 am)  -  You may have clear liquids until 2 hours before surgery. (Until 11:30 pm)    Examples of clear liquids:  Water  Clear broth  Juices (apple, white grape, white cranberry  and cider) without pulp  Noncarbonated, powder based beverages  (lemonade and Bhaskar-Aid)  Sodas (Sprite, 7-Up, ginger ale and seltzer)  Coffee or tea (without milk or cream)  Gatorade    -  No Alcohol for at least 24 hours before surgery     Which medicines can I take?    Hold Aspirin for 7 days before surgery.   Hold Multivitamins for 7 days before surgery.  Hold Supplements for 7 days before surgery.    **Hold Ibuprofen (Advil, Motrin) for 1 day before surgery--unless otherwise directed by surgeon.**    Hold Naproxen (Aleve) for 4 days before  surgery.    -  PLEASE TAKE these medications the day of surgery:  Carbidopa-Levodopa (Sinemet)  Fluticasone (Flonase) nasal spray  Gabapentin (Neurontin)  Rivastigmine (Exelon) patch      How do I prepare myself?  - Please take 2 showers before surgery using Scrubcare or Hibiclens soap.    Use this soap only from the neck to your toes.     Leave the soap on your skin for one minute--then rinse thoroughly.      You may use your own shampoo and conditioner; no other hair products.   - Please remove all jewelry and body piercings.  - No lotions, deodorants or fragrance.   - Bring your ID and insurance card.    - All patients are required to have a Covid-19 test within 4 days of surgery/procedure.      -Patients will be contacted by the [facility] scheduling team within 1 week of surgery to make an appointment.      - Patients may call the Scheduling team at [phone] if they have not been scheduled within 4 days of  surgery.      ALL PATIENTS GOING HOME THE SAME DAY OF SURGERY ARE REQUIRED TO HAVE A RESPONSIBLE ADULT TO DRIVE AND BE IN ATTENDANCE WITH THEM FOR 24 HOURS FOLLOWING SURGERY.    IF THE RESPONSIBLE ADULT IS REQUIRED FOR POST OP TEACHING THE POST OP RN WILL ASK THEM TO COME BACK TO THE RECOVERY AREA.    Questions or Concerns:    - For any questions regarding the day of surgery or your hospital stay, please contact the Pre Admission Nursing Office at [phone].       - If you have health changes between today and your surgery please call your surgeon.       For questions after surgery please call your surgeons office.

## 2021-04-09 NOTE — ANESTHESIA PREPROCEDURE EVALUATION
Anesthesia Pre-Procedure Evaluation    Patient: Norberto Wu   MRN: [id number] : 1945        Preoperative Diagnosis: * No surgery found *   Procedure :      Past Medical History:   Diagnosis Date     Cerebellar stroke syndrome 2013    Few small foci of chronic infarct in the right cerebellar hemisphere, unchanged as well as a new focus of now chronic infarct in the left cerebellar hemisphere since the last study.      Dyslipidemia      Hyperthyroidism      Insomnia 2011     Parkinson disease (H) 6/15/2011     Peripheral neuropathy 6/15/2011     PFO (patent foramen ovale) 2013    Interpretation Summary 1. Normal LV size and systolic function. Normal diastolic function 2.  Normal RV size and function 3. No significant valvular abnormalities 4.  Small right-to-left shunt visualized with intravenous agitated saline  contrast study, suggestive of PFO. PatientHeight: 72 in PatientWeight: 205 lbs SystolicPressure: 100 mmHg DiastolicPressure: 63 mmHg BSA 2.2 m^2   Procedure Echoc     PONV (postoperative nausea and vomiting)      Prostate cancer (H) 2011    s/p radical prostectomy      Past Surgical History:   Procedure Laterality Date     COLONOSCOPY N/A 3/23/2017    Procedure: COLONOSCOPY;  Surgeon: Salbador Paulson MD;  Location: [facility]     PROSTATE SURGERY  3 yrs ago    prostate cancer; Dr. Valladares     REPLACE DEEP BRAIN STIMULATION GENERATOR / BATTERY Right 1/3/2017    Procedure: REPLACE DEEP BRAIN STIMULATION GENERATOR / BATTERY;  Surgeon: Anupam Fair MD;  Location: [facility]     REPLACE DEEP BRAIN STIMULATION GENERATOR / BATTERY Right 2019    Procedure: Right Deep Brain Stimulatory Battery Replacement;  Surgeon: Marco Rothman MD;  Location: [facility]     Right Gpi DBS Lead Implantation  2014     Rt Chest DBS Generator Placement Activa SC  2014      Allergies   Allergen Reactions     Hydromorphone Nausea and Nausea and Vomiting     Used post DBS surgery.   "Stayed in the hospital 3 days due to severe nausea & \"retching\" .      Comtan Diarrhea     Hydrocodone      Other reaction(s): Headache, Nausea Only     Hydrocodone-Acetaminophen Hives and Nausea     Melatonin Other (See Comments)     No benefit.     Ropinirole Fatigue     Fatigue no benefit.      Adhesive Tape Rash     Camphor Rash     Other reaction(s): Rash     Liquid Adhesive Rash      Social History     Tobacco Use     Smoking status: Never Smoker     Smokeless tobacco: Never Used   Substance Use Topics     Alcohol use: Yes     Alcohol/week: 1.0 standard drinks     Comment: EVERY OTHER DAY      Wt Readings from Last 1 Encounters:   12/15/20 83.9 kg (185 lb)        Anesthesia Evaluation   Pt has had prior anesthetic.     History of anesthetic complications  - PONV.  Reports severe PONV from dilaudid. .    ROS/MED HX  ENT/Pulmonary:     (+) FORTINO risk factors, snores loudly, hypertension,  (-) tobacco use   Neurologic: Comment: RLS    (+) peripheral neuropathy, - b/l feet. CVA (REcords indicate a remote h/o cerebellar stroke .  Patient and daughter-in-law deny this. ), without deficits, Parkinson's disease, features: Noticed Tremors with right side.  Slow speech. progressive freezing with gait instability.  Now using a walker.  ,  TIA: before 2013.   Cardiovascular: Comment: Small PFO.  Echocardiogram .    (+) -----Previous cardiac testing   Echo: Date: 2013 Results:    Stress Test: Date: 2013 Results:    ECG Reviewed: Date: 2018 Results:    Cath: Date: Results:      METS/Exercise Tolerance:  Comment: Previously was able to walk a mile until COVID.  METS<4.   Hematologic:  - neg hematologic  ROS     Musculoskeletal:   (+) arthritis (hips),     GI/Hepatic:  - neg GI/hepatic ROS     Renal/Genitourinary:  - neg Renal ROS     Endo:  - neg endo ROS     Psychiatric/Substance Use:     (+) psychiatric history depression  (-) alcohol abuse history and chronic opioid use history   Infectious Disease: Comment: Completed COVID " vaccine series.       Malignancy:   (+) Malignancy, History of Prostate.Prostate CA Remission status post Surgery.        Other:      (+) , other significant disability (Uses a walker to get around. )             OUTSIDE LABS:  CBC:   Lab Results   Component Value Date    WBC 6.6 2020    WBC 6.3 2020    HGB 15.1 2020    HGB 14.1 2020    HCT 46.7 2020    HCT 44.4 2020     2020     2020     BMP:   Lab Results   Component Value Date     2020     2020    POTASSIUM 4.4 2020    POTASSIUM 4.3 2020    CHLORIDE 110 (H) 2020    CHLORIDE 108 2020    CO2 30 2020    CO2 28 2020    BUN 30 2020    BUN 20 2020    CR 0.70 2020    CR 0.64 (L) 2020    GLC 96 2020    GLC 88 2020     COAGS:   Lab Results   Component Value Date    PTT 27 2019    INR 1.11 2019     POC:   Lab Results   Component Value Date    BGM 76 2019     HEPATIC:   Lab Results   Component Value Date    ALBUMIN 3.7 2020    PROTTOTAL 6.8 2020    ALT 7 2020    AST 9 2020    ALKPHOS 117 2020    BILITOTAL 0.6 2020     OTHER:   Lab Results   Component Value Date    A1C 5.7 2009    NAVDEEP 8.5 2020    MAG 2.4 (H) 10/05/2018    LIPASE 68 (L) 2018    TSH 0.12 (L) 2020    T4 0.96 2020    T3 138 2015    CRP <5.0 2009    SED 5 2009             PAC Discussion and Assessment    ASA Classification: 3    Anesthetic techniques and relevant risks discussed: MAC with GA as backup                  PAC Resident/NP Anesthesia Assessment: Type of service:  Video Visit    Patient verbally consented to video service today: YES    Two identifiers used:  yes (name and )    Video Start Time: 15:35  Video End Time (time video stopped): 15:59    Originating Location (pt. Location): Home    Distant Location (provider location):  home    Mode of  Communication:  Video Conference via [employer]    Please note, because this was a virtual visit, a full physical could not be completed.  On the DOS, the OOD of anesthesia will complete the appropriate components of the physical exam.    --    Norberto Wu is a 75-year-old male scheduled for Replacement of deep brain stimulator generator/battery over right chest wall - Right with Anupam Fair MD on 4/16/2021 at [facility] under MAC with local.    Mr. Wu was seen in neurology consultation with Dr. Fair on 3/15/21 for evaluation of DEPLETED DEEP BRAIN STIMULATOR GENERATOR/BATTERY OVER THE RIGHT CHEST WALL.  Mr. Wu has a history of Parkinson's disease and is status post  right side deep brain stimulator placement, target right globus pallidus internus (5/2014) and status post replacement of deep brain stimulator generator/battery over the right chest wall on 1/3/2017 and  2/4/2019.    Mr. Wu presents to PAC virtually with his daughter-in-law who is one of his care takers.  He resides in [facility] living [place]. He has nursing care (medication administration)  and meals provided.     Diagnosis  End of battery life of deep brain stimulator  Status post deep brain stimulator placement  Primary Parkinsonism (H)    Procedures  EKG 2018  Sinus rhythm  Nonspecific ST abnormality  Abnormal ECG  Unconfirmed report - interpretation of this ECG is computer generated - see medical record for final interpretation    Exercise Stress echocardiogram 2013  Interpretation Summary   Normal exercise stress echocardiogram without evidence of infarct or    ischemia. Normal resting LV function with EF of approximately 60-65%; normal    response to exercise with increase to approximately 70-75%. No stress induced    regional wall motion abnormalities. Good exercise capacity. No ECG evidence    of ischemia. No subjective symptoms of ischemia.No significant valvular    disease noted on routine  screening color flow Doppler and pulsed Doppler    examination.   PatientHeight: 72 in   PatientWeight: 205 lbs   SystolicPressure: 124 mmHg   DiastolicPressure: 80 mmHg   HeartRate: 70 bpm   BSA 2.2 m^2         Echocardiogram complete with bubble 2013  Interpretation Summary   1. Normal LV size and systolic function. Normal diastolic function  2.     Normal RV size and function  3. No significant valvular abnormalities  4.    Small right-to-left shunt visualized with intravenous agitated saline    contrast study, suggestive of PFO.     CT Head 2/17/20                                                                 Impression:  1. No acute intracranial pathology.  2. Stable positioning of the right frontal approach deep brain  stimulator.     I have personally reviewed the examination and initial interpretation  and I agree with the findings.     UMAIR BAHENA MD    He has the following specific operative considerations:   - FORTINO # of risks 3/8 = intermediate  - VTE risk:  1.8%  - Risk of PONV score = 2.  If > 2, anti-emetic intervention recommended.      #  Cardiology   - Denies known coronary artery disease.  Denies cardiac symptoms. METS:  <4 (He lives in an assisted living facility and prior to COVID, he would walk a mile outside). LVEF 60-65% (2013)CRI : Cerebrovascular Disease (TIA or CVA).  0.9 % risk of major adverse cardiac event.        - Small right-to-left shunt visualized with intravenous agitated saline contrast study, suggestive of PFO (echo, 2013)    #  Pulmonary   - no smoking hx  - denies pulmonary symptoms.     #  Urology  - h/o prostate cancer s/p prostatectomy    #  Neuro   - Parkinson's disease s/p DBS.  More recently has noticed symptoms of freezing gait with some instability.  Now using a walker.  Dr. Fair evaluated his DBS and showed LEONARDA, Early Replacement Indicator. Above procedure planned.  He is taking carbidopa-Levodopa and Rivastigmine. Recommend fall precautions.    - Peripheral  neuropathy in feet treated with gabapentin  - Mood disorder treated with Seroquel.   - Records indicate Cerebellar stroke syndrome (Care Everywhere).  Both patient and daughter-in-law deny this.     #  ID  - COVID-19 testing per surgeon's office  - Received both COVID vaccines    #   Anesthesia considerations   -  Refer to PAC assessment in anesthesia records      Arrival time, NPO, shower and medication instructions provided by nursing staff today.    Patient was discussed with Dr Abdi. He is scheduled for labs on 4/14/2021.    **For further details of assessment, testing, and physical exam please see H and P completed on same date.          Reviewed and Signed by PAC Mid-Level Provider/Resident  Mid-Level Provider/Resident: Duyen DAVID CNP  Date: 4/9/2021                                 FRANKIE Harrell CNP

## 2021-04-10 NOTE — H&P
Pre-Operative H & P     CC:  Preoperative exam to assess for increased cardiopulmonary risk while undergoing surgery and anesthesia.    Date of Encounter: 2021  Primary Care Physician:  Norberto Howe  Type of service:  Video Visit    Patient verbally consented to video service today: YES    Two identifiers used:  yes (name and )    Video Start Time: 15:35  Video End Time (time video stopped): 15:59    Originating Location (pt. Location): Home    Distant Location (provider location):  home    Mode of Communication:  Video Conference via Versa    Please note, because this was a virtual visit, a full physical could not be completed.  On the DOS, the OOD of anesthesia will complete the appropriate components of the physical exam.    HPI  Norberto Wu is a 75 year old male who presents for pre-operative H & P in preparation for  Replacement of deep brain stimulator generator/battery over right chest wall - Right with Anupam Fair MD on 2021 at [facility] under MAC with local.    Mr. Wu was seen in neurology consultation with Dr. Fair on 3/15/21 for evaluation of DEPLETED DEEP BRAIN STIMULATOR GENERATOR/BATTERY OVER THE RIGHT CHEST WALL.  Mr. Wu has a history of Parkinson's disease and is status post  right side deep brain stimulator placement, target right globus pallidus internus (2014) and status post replacement of deep brain stimulator generator/battery over the right chest wall on 1/3/2017 and  2019.    Mr. Wu presents to PAC virtually with his daughter-in-law who is one of his care takers.  He resides in [facility]. He has nursing care (medication administration)  and meals provided.     Diagnosis  End of battery life of deep brain stimulator  Status post deep brain stimulator placement  Primary Parkinsonism (H)     History is obtained from the patient, electronic health record and patient's daughter-in-law.     Past  Medical History  Past Medical History:   Diagnosis Date     Cerebellar stroke syndrome 8/21/2013    Few small foci of chronic infarct in the right cerebellar hemisphere, unchanged as well as a new focus of now chronic infarct in the left cerebellar hemisphere since the last study.      Dyslipidemia      Hyperthyroidism      Insomnia 6/17/2011     Parkinson disease (H) 6/15/2011     Peripheral neuropathy 6/15/2011     PFO (patent foramen ovale) 8/21/2013    Interpretation Summary 1. Normal LV size and systolic function. Normal diastolic function 2.  Normal RV size and function 3. No significant valvular abnormalities 4.  Small right-to-left shunt visualized with intravenous agitated saline  contrast study, suggestive of PFO. PatientHeight: 72 in PatientWeight: 205 lbs SystolicPressure: 100 mmHg DiastolicPressure: 63 mmHg BSA 2.2 m^2   Procedure Echoc     PONV (postoperative nausea and vomiting)      Prostate cancer (H) 6/17/2011    s/p radical prostectomy       Past Surgical History  Past Surgical History:   Procedure Laterality Date     COLONOSCOPY N/A 3/23/2017    Procedure: COLONOSCOPY;  Surgeon: Salbador Paulson MD;  Location: [facility] GI     PROSTATE SURGERY  3 yrs ago    prostate cancer; Dr. Valladares     REPLACE DEEP BRAIN STIMULATION GENERATOR / BATTERY Right 1/3/2017    Procedure: REPLACE DEEP BRAIN STIMULATION GENERATOR / BATTERY;  Surgeon: Anupam Fair MD;  Location: [facility] OR     REPLACE DEEP BRAIN STIMULATION GENERATOR / BATTERY Right 2/4/2019    Procedure: Right Deep Brain Stimulatory Battery Replacement;  Surgeon: Marco Rothman MD;  Location: [facility] OR     Right Gpi DBS Lead Implantation  5/20/2014     Rt Chest DBS Generator Placement Activa SC  5/29/2014       Hx of Blood transfusions/reactions: denies     Hx of abnormal bleeding or anti-platelet use: denies    Menstrual history: No LMP for male patient.:    Steroid use in the last year: denies    Personal or FH with difficulty with Anesthesia:   "SEVERE PONV WITH DILAUDID    Prior to Admission Medications  Current Outpatient Medications   Medication Sig Dispense Refill     carbidopa-levodopa (SINEMET)  MG tablet TAKE TWO TABLETS BY MOUTH SEVEN TIMES A DAY (3AM, 6AM, 9AM, NOON, 3PM, 6PM AND MIDNIGHT) (Patient taking differently: 2 tablets 7 times daily TAKE TWO TABLETS BY MOUTH SEVEN TIMES A DAY (3AM, 6AM, 9AM, NOON, 3PM, 6PM AND MIDNIGHT)) 434 tablet PRN     fluticasone (FLONASE) 50 MCG/ACT nasal spray Spray 1-2 sprays into both nostrils daily (Patient taking differently: Spray 1-2 sprays into both nostrils 2 times daily ) 16 g 11     gabapentin (NEURONTIN) 300 MG capsule Reduce to 1 x 300mg CAPSULE BY MOUTH TWICE DAILY @6am and 12noon; AND 3 x 300mg capsules (900MG) DAILY @6pm: 1-1-3 for 5/day (had been on 7/day) (Patient taking differently: 2 times daily Reduce to 1 x 300mg CAPSULE BY MOUTH TWICE DAILY @6am and 12noon; AND 3 x 300mg capsules (900MG) DAILY @6pm: 1-1-3 for 5/day (had been on 7/day)) 450 capsule 3     ibuprofen (ADVIL/MOTRIN) 200 MG tablet TAKE 1 TABLET BY MOUTH AT BEDTIME;AND TAKE ONE TABLET BY MOUTH EVERY 6 HOURS AS NEEDED (Patient taking differently: At Bedtime ) 28 tablet 11     QUEtiapine (SEROQUEL) 25 MG tablet At Bedtime 25mg TABLET BY MOUTH AT 9pm 90 tablet 3     rivastigmine (EXELON) 9.5 MG/24HR 24 hr patch Place 1 patch onto the skin daily (Patient taking differently: Place 1 patch onto the skin every 24 hours ) 90 patch 3       Allergies  Allergies   Allergen Reactions     Hydromorphone Nausea and Nausea and Vomiting     Used post DBS surgery.  Stayed in the hospital 3 days due to severe nausea & \"retching\" .      Comtan Diarrhea     Hydrocodone      Other reaction(s): Headache, Nausea Only     Hydrocodone-Acetaminophen Hives and Nausea     Melatonin Other (See Comments)     No benefit.     Ropinirole Fatigue     Fatigue no benefit.      Adhesive Tape Rash     Camphor Rash     Other reaction(s): Rash     Liquid Adhesive Rash "       Social History  Social History     Socioeconomic History     Marital status:      Spouse name: Not on file     Number of children: Not on file     Years of education: Not on file     Highest education level: Not on file   Occupational History     Not on file   Social Needs     Financial resource strain: Not on file     Food insecurity     Worry: Not on file     Inability: Not on file     Transportation needs     Medical: Not on file     Non-medical: Not on file   Tobacco Use     Smoking status: Never Smoker     Smokeless tobacco: Never Used   Substance and Sexual Activity     Alcohol use: Yes     Alcohol/week: 1.0 standard drinks     Comment: EVERY OTHER DAY     Drug use: No     Sexual activity: Never   Lifestyle     Physical activity     Days per week: Not on file     Minutes per session: Not on file     Stress: Not on file   Relationships     Social connections     Talks on phone: Not on file     Gets together: Not on file     Attends Islam service: Not on file     Active member of club or organization: Not on file     Attends meetings of clubs or organizations: Not on file     Relationship status: Not on file     Intimate partner violence     Fear of current or ex partner: Not on file     Emotionally abused: Not on file     Physically abused: Not on file     Forced sexual activity: Not on file   Other Topics Concern     Parent/sibling w/ CABG, MI or angioplasty before 65F 55M? No   Social History Narrative    Son lives in [place] and he has 2 kids    Wife Mayelin       Family History  Family History   Problem Relation Age of Onset     Parkinsonism Father      Other - See Comments Son         lives in [place]     Skin Cancer No family hx of         no skin cancer       ROS/MED HX  ENT/Pulmonary:   (+) FORTINO risk factors, snores loudly, hypertension,  (-) tobacco use   Neurologic: Comment: RLS    (+) peripheral neuropathy, - b/l feet. CVA (REcords indicate a remote h/o cerebellar stroke .  Patient  and daughter-in-law deny this. ), without deficits, Parkinson's disease, features: Noticed Tremors with right side.  Slow speech. progressive freezing with gait instability.  Now using a walker.  ,  TIA: before 2013.   Cardiovascular: Comment: Small PFO.  Echocardiogram, (2013).     H/O HLD, no longer on medication.    METS/Exercise Tolerance:  Comment: Previously was able to walk a mile until COVID.  METS<4.   Hematologic:  - neg hematologic  ROS     Musculoskeletal:   (+) arthritis (hips),     GI/Hepatic:  - neg GI/hepatic ROS     Renal/Genitourinary:  - neg Renal ROS     Endo:  - neg endo ROS  H/o hypothyroidism, no longer on medication.    Psychiatric/Substance Use:     (+) psychiatric history depression  (-) alcohol abuse history and chronic opioid use history   Infectious Disease: Comment: Completed COVID vaccine series.       Malignancy:   (+) Malignancy, History of Prostate.Prostate CA Remission status post Surgery.        Other:      (+) , other significant disability (Uses a walker to get around. )          No vital signs taken since this is a virtual visit.       Physical Exam  Constitutional: Awake, alert, cooperative, no apparent distress, and appears stated age.  HENT: Normocephalic   Respiratory: Regular respiratory rate.  Effort is non-labored, quiet, easy breathing.   Musculoskeletal:  Limited ROM of neck.   Neurologic: Awake, alert, oriented to name, place and time. Slow speech.   Neuropsychiatric: Calm, cooperative. Normal affect.     **Please note, because this was a virtual visit due to COVID -19 pandemic, a full physical could not be completed.  On the DOS, the OOD of anesthesia will complete the appropriate components of the physical exam. Please refer to the physical examination documented by the anesthesiologist in the anesthesia record on the day of surgery. **      Labs: (personally reviewed)  Lab Results   Component Value Date    WBC 6.6 11/02/2020     Lab Results   Component Value Date     RBC 5.10 11/02/2020     Lab Results   Component Value Date    HGB 15.1 11/02/2020     Lab Results   Component Value Date    HCT 46.7 11/02/2020     Lab Results   Component Value Date    MCV 92 11/02/2020     Lab Results   Component Value Date    MCH 29.6 11/02/2020     Lab Results   Component Value Date    MCHC 32.3 11/02/2020     Lab Results   Component Value Date    RDW 13.2 11/02/2020     Lab Results   Component Value Date     11/02/2020       Last Comprehensive Metabolic Panel:  Sodium   Date Value Ref Range Status   11/02/2020 142 133 - 144 mmol/L Final     Potassium   Date Value Ref Range Status   11/02/2020 4.4 3.4 - 5.3 mmol/L Final     Chloride   Date Value Ref Range Status   11/02/2020 110 (H) 94 - 109 mmol/L Final     Carbon Dioxide   Date Value Ref Range Status   11/02/2020 30 20 - 32 mmol/L Final     Anion Gap   Date Value Ref Range Status   11/02/2020 2 (L) 3 - 14 mmol/L Final     Glucose   Date Value Ref Range Status   11/02/2020 96 70 - 99 mg/dL Final     Urea Nitrogen   Date Value Ref Range Status   11/02/2020 30 7 - 30 mg/dL Final     Creatinine   Date Value Ref Range Status   11/02/2020 0.70 0.66 - 1.25 mg/dL Final     GFR Estimate   Date Value Ref Range Status   11/02/2020 >90 >60 mL/min/[1.73_m2] Final     Comment:     Non  GFR Calc  Starting 12/18/2018, serum creatinine based estimated GFR (eGFR) will be   calculated using the Chronic Kidney Disease Epidemiology Collaboration   (CKD-EPI) equation.       Calcium   Date Value Ref Range Status   11/02/2020 8.5 8.5 - 10.1 mg/dL Final     Procedures  EKG 2018  Sinus rhythm  Nonspecific ST abnormality  Abnormal ECG  Unconfirmed report - interpretation of this ECG is computer generated - see medical record for final interpretation      Exercise Stress echocardiogram 2013  Interpretation Summary   Normal exercise stress echocardiogram without evidence of infarct or    ischemia. Normal resting LV function with EF of  approximately 60-65%; normal    response to exercise with increase to approximately 70-75%. No stress induced    regional wall motion abnormalities. Good exercise capacity. No ECG evidence    of ischemia. No subjective symptoms of ischemia.No significant valvular    disease noted on routine screening color flow Doppler and pulsed Doppler    examination.   PatientHeight: 72 in   PatientWeight: 205 lbs   SystolicPressure: 124 mmHg   DiastolicPressure: 80 mmHg   HeartRate: 70 bpm   BSA 2.2 m^2       Echocardiogram complete with bubble 2013  Interpretation Summary   1. Normal LV size and systolic function. Normal diastolic function  2.     Normal RV size and function  3. No significant valvular abnormalities  4.    Small right-to-left shunt visualized with intravenous agitated saline    contrast study, suggestive of PFO.     CT Head 2/17/20                                                                 Impression:  1. No acute intracranial pathology.  2. Stable positioning of the right frontal approach deep brain  stimulator.     I have personally reviewed the examination and initial interpretation  and I agree with the findings.     UMAIR BAHENA MD      LABS/DIAGNOSTIC STUDIES:   All labs and imaging personally reviewed     Outside records reviewed from: [facility]    ASSESSMENT and PLAN  Norberto Wu is a 75 year old male scheduled to undergo  Replacement of deep brain stimulator generator/battery over right chest wall - Right with Anupam Fair MD on 4/16/2021 at [facility] under MAC with local.    He has the following specific operative considerations:   - FORTINO # of risks 3/8 = intermediate  - VTE risk:  1.8%  - Risk of PONV score = 2.  If > 2, anti-emetic intervention recommended.      #  Cardiology   - Denies known coronary artery disease.  Denies cardiac symptoms. METS:  <4 (He lives in an assisted living facility and prior to COVID, he would walk a mile outside). LVEF 60-65% (Echo 2013).   RCRI : Cerebrovascular Disease (TIA or CVA).  0.9 % risk of major adverse cardiac event.       - Small right-to-left shunt visualized with intravenous agitated saline contrast study, suggestive of PFO (echo, 2013)    #  Pulmonary   - no smoking hx  - denies pulmonary symptoms.     #  Urology  - h/o prostate cancer s/p prostatectomy    #  Neuro   - Parkinson's disease s/p DBS.  More recently has noticed symptoms of freezing gait with instability.  Now using a walker.  Dr. Fair evaluated his DBS and showed LEONARDA, Early Replacement Indicator. Above procedure planned.  He is taking carbidopa-Levodopa and Rivastigmine. Recommend fall precautions.    - Peripheral neuropathy in feet treated with gabapentin  - Mood disorder treated with Seroquel.   - Records indicate Cerebellar stroke syndrome (Care Everywhere).  Both patient and daughter-in-law deny this.     #  ID  - COVID-19 testing per surgeon's office  - Received both COVID vaccines    #   Anesthesia considerations   -  Refer to PAC assessment in anesthesia records      Arrival time, NPO, shower and medication instructions provided by nursing staff today.    Patient was discussed with Dr Abdi. He is scheduled for labs on 4/14/2021.    FRANKIE Harrell CNP  Preoperative Assessment Center  [facility]  Phone: [phone]  Fax: [phone]

## 2021-04-11 ENCOUNTER — HEALTH MAINTENANCE LETTER (OUTPATIENT)
Age: 76
End: 2021-04-11

## 2021-04-14 DIAGNOSIS — Z11.59 ENCOUNTER FOR SCREENING FOR OTHER VIRAL DISEASES: ICD-10-CM

## 2021-04-14 DIAGNOSIS — Z01.818 PREOPERATIVE EVALUATION TO RULE OUT SURGICAL CONTRAINDICATION: ICD-10-CM

## 2021-04-14 LAB
ALBUMIN UR-MCNC: 30 MG/DL
ANION GAP SERPL CALCULATED.3IONS-SCNC: 4 MMOL/L (ref 3–14)
APPEARANCE UR: ABNORMAL
BILIRUB UR QL STRIP: ABNORMAL
BUN SERPL-MCNC: 28 MG/DL (ref 7–30)
CALCIUM SERPL-MCNC: 8.5 MG/DL (ref 8.5–10.1)
CHLORIDE SERPL-SCNC: 110 MMOL/L (ref 94–109)
CO2 SERPL-SCNC: 30 MMOL/L (ref 20–32)
COLOR UR AUTO: YELLOW
CREAT SERPL-MCNC: 0.76 MG/DL (ref 0.66–1.25)
ERYTHROCYTE [DISTWIDTH] IN BLOOD BY AUTOMATED COUNT: 13.3 % (ref 10–15)
GFR SERPL CREATININE-BSD FRML MDRD: >90 ML/MIN/{1.73_M2}
GLUCOSE SERPL-MCNC: 91 MG/DL (ref 70–99)
GLUCOSE UR STRIP-MCNC: NEGATIVE MG/DL
HCT VFR BLD AUTO: 46.7 % (ref 40–53)
HGB BLD-MCNC: 15 G/DL (ref 13.3–17.7)
HGB UR QL STRIP: NEGATIVE
HYALINE CASTS #/AREA URNS LPF: 34 /LPF (ref 0–2)
KETONES UR STRIP-MCNC: 20 MG/DL
LABORATORY COMMENT REPORT: NORMAL
LEUKOCYTE ESTERASE UR QL STRIP: NEGATIVE
MCH RBC QN AUTO: 28.7 PG (ref 26.5–33)
MCHC RBC AUTO-ENTMCNC: 32.1 G/DL (ref 31.5–36.5)
MCV RBC AUTO: 89 FL (ref 78–100)
MUCOUS THREADS #/AREA URNS LPF: PRESENT /LPF
NITRATE UR QL: NEGATIVE
PH UR STRIP: 5 PH (ref 5–7)
PLATELET # BLD AUTO: 226 10E9/L (ref 150–450)
POTASSIUM SERPL-SCNC: 4.3 MMOL/L (ref 3.4–5.3)
RBC # BLD AUTO: 5.23 10E12/L (ref 4.4–5.9)
RBC #/AREA URNS AUTO: 2 /HPF (ref 0–2)
SARS-COV-2 RNA RESP QL NAA+PROBE: NEGATIVE
SARS-COV-2 RNA RESP QL NAA+PROBE: NORMAL
SODIUM SERPL-SCNC: 144 MMOL/L (ref 133–144)
SOURCE: ABNORMAL
SP GR UR STRIP: 1.03 (ref 1–1.03)
SPECIMEN SOURCE: NORMAL
SPECIMEN SOURCE: NORMAL
UROBILINOGEN UR STRIP-MCNC: 0 MG/DL (ref 0–2)
WBC # BLD AUTO: 6.9 10E9/L (ref 4–11)
WBC #/AREA URNS AUTO: 4 /HPF (ref 0–5)

## 2021-04-14 PROCEDURE — 85027 COMPLETE CBC AUTOMATED: CPT | Performed by: PATHOLOGY

## 2021-04-14 PROCEDURE — U0005 INFEC AGEN DETEC AMPLI PROBE: HCPCS | Performed by: PATHOLOGY

## 2021-04-14 PROCEDURE — 36415 COLL VENOUS BLD VENIPUNCTURE: CPT | Performed by: PATHOLOGY

## 2021-04-14 PROCEDURE — 80048 BASIC METABOLIC PNL TOTAL CA: CPT | Performed by: PATHOLOGY

## 2021-04-14 PROCEDURE — 81001 URINALYSIS AUTO W/SCOPE: CPT | Performed by: PATHOLOGY

## 2021-04-14 PROCEDURE — U0003 INFECTIOUS AGENT DETECTION BY NUCLEIC ACID (DNA OR RNA); SEVERE ACUTE RESPIRATORY SYNDROME CORONAVIRUS 2 (SARS-COV-2) (CORONAVIRUS DISEASE [COVID-19]), AMPLIFIED PROBE TECHNIQUE, MAKING USE OF HIGH THROUGHPUT TECHNOLOGIES AS DESCRIBED BY CMS-2020-01-R: HCPCS | Performed by: PATHOLOGY

## 2021-04-15 ENCOUNTER — ANESTHESIA EVENT (OUTPATIENT)
Dept: SURGERY | Facility: CLINIC | Age: 76
End: 2021-04-15
Payer: COMMERCIAL

## 2021-04-15 ASSESSMENT — LIFESTYLE VARIABLES: TOBACCO_USE: 0

## 2021-04-15 NOTE — ANESTHESIA PREPROCEDURE EVALUATION
Anesthesia Pre-Procedure Evaluation    Patient: Norberto Wu   MRN: [id number] : 1945        Preoperative Diagnosis: * No surgery found *   Procedure :      Past Medical History:   Diagnosis Date     Cerebellar stroke syndrome 2013    Few small foci of chronic infarct in the right cerebellar hemisphere, unchanged as well as a new focus of now chronic infarct in the left cerebellar hemisphere since the last study.      Dyslipidemia      Hyperthyroidism      Insomnia 2011     Parkinson disease (H) 6/15/2011     Peripheral neuropathy 6/15/2011     PFO (patent foramen ovale) 2013    Interpretation Summary 1. Normal LV size and systolic function. Normal diastolic function 2.  Normal RV size and function 3. No significant valvular abnormalities 4.  Small right-to-left shunt visualized with intravenous agitated saline  contrast study, suggestive of PFO. PatientHeight: 72 in PatientWeight: 205 lbs SystolicPressure: 100 mmHg DiastolicPressure: 63 mmHg BSA 2.2 m^2   Procedure Echoc     PONV (postoperative nausea and vomiting)      Prostate cancer (H) 2011    s/p radical prostectomy      Past Surgical History:   Procedure Laterality Date     COLONOSCOPY N/A 3/23/2017    Procedure: COLONOSCOPY;  Surgeon: Salbador Paulson MD;  Location: [facility]     PROSTATE SURGERY  3 yrs ago    prostate cancer; Dr. Valladares     REPLACE DEEP BRAIN STIMULATION GENERATOR / BATTERY Right 1/3/2017    Procedure: REPLACE DEEP BRAIN STIMULATION GENERATOR / BATTERY;  Surgeon: Anuapm Fair MD;  Location: [facility]     REPLACE DEEP BRAIN STIMULATION GENERATOR / BATTERY Right 2019    Procedure: Right Deep Brain Stimulatory Battery Replacement;  Surgeon: Marco Rothman MD;  Location: [facility]     Right Gpi DBS Lead Implantation  2014     Rt Chest DBS Generator Placement Activa SC  2014      Allergies   Allergen Reactions     Hydromorphone Nausea and Nausea and Vomiting     Used post DBS surgery.   "Stayed in the hospital 3 days due to severe nausea & \"retching\" .      Comtan Diarrhea     Hydrocodone      Other reaction(s): Headache, Nausea Only     Hydrocodone-Acetaminophen Hives and Nausea     Melatonin Other (See Comments)     No benefit.     Ropinirole Fatigue     Fatigue no benefit.      Adhesive Tape Rash     Camphor Rash     Other reaction(s): Rash     Liquid Adhesive Rash      Social History     Tobacco Use     Smoking status: Never Smoker     Smokeless tobacco: Never Used   Substance Use Topics     Alcohol use: Yes     Alcohol/week: 1.0 standard drinks     Comment: EVERY OTHER DAY      Wt Readings from Last 1 Encounters:   12/15/20 83.9 kg (185 lb)        Anesthesia Evaluation   Pt has had prior anesthetic.     History of anesthetic complications  - PONV.  Reports severe PONV from dilaudid. .    ROS/MED HX  ENT/Pulmonary:     (+) FORTINO risk factors, snores loudly, hypertension,  (-) tobacco use   Neurologic: Comment: RLS    (+) peripheral neuropathy, - b/l feet. CVA (REcords indicate a remote h/o cerebellar stroke .  Patient and daughter-in-law deny this. ), without deficits, Parkinson's disease, features: Noticed Tremors with right side.  Slow speech. progressive freezing with gait instability.  Now using a walker.  ,  TIA: before 2013.   Cardiovascular: Comment: Small PFO.  Echocardiogram .    (+) -----Previous cardiac testing   Echo: Date: 2013 Results:    Stress Test: Date: 2013 Results:    ECG Reviewed: Date: 2018 Results:    Cath: Date: Results:      METS/Exercise Tolerance:  Comment: Previously was able to walk a mile until COVID.  METS<4.   Hematologic:  - neg hematologic  ROS     Musculoskeletal:   (+) arthritis (hips),     GI/Hepatic:  - neg GI/hepatic ROS     Renal/Genitourinary:  - neg Renal ROS     Endo:  - neg endo ROS   (+) thyroid problem,     Psychiatric/Substance Use:     (+) psychiatric history depression  (-) alcohol abuse history and chronic opioid use history   Infectious Disease: " Comment: Completed COVID vaccine series.       Malignancy:   (+) Malignancy, History of Prostate.Prostate CA Remission status post Surgery.        Other:      (+) , other significant disability (Uses a walker to get around. )               OUTSIDE LABS:  CBC:   Lab Results   Component Value Date    WBC 6.9 04/14/2021    WBC 6.6 11/02/2020    HGB 15.0 04/14/2021    HGB 15.1 11/02/2020    HCT 46.7 04/14/2021    HCT 46.7 11/02/2020     04/14/2021     11/02/2020     BMP:   Lab Results   Component Value Date     04/14/2021     11/02/2020    POTASSIUM 4.3 04/14/2021    POTASSIUM 4.4 11/02/2020    CHLORIDE 110 (H) 04/14/2021    CHLORIDE 110 (H) 11/02/2020    CO2 30 04/14/2021    CO2 30 11/02/2020    BUN 28 04/14/2021    BUN 30 11/02/2020    CR 0.76 04/14/2021    CR 0.70 11/02/2020    GLC 91 04/14/2021    GLC 96 11/02/2020     COAGS:   Lab Results   Component Value Date    PTT 27 02/04/2019    INR 1.11 02/04/2019     POC:   Lab Results   Component Value Date    BGM 76 02/04/2019     HEPATIC:   Lab Results   Component Value Date    ALBUMIN 3.7 11/02/2020    PROTTOTAL 6.8 11/02/2020    ALT 7 11/02/2020    AST 9 11/02/2020    ALKPHOS 117 11/02/2020    BILITOTAL 0.6 11/02/2020     OTHER:   Lab Results   Component Value Date    A1C 5.7 04/30/2009    NAVDEEP 8.5 04/14/2021    MAG 2.4 (H) 10/05/2018    LIPASE 68 (L) 12/31/2018    TSH 0.12 (L) 11/02/2020    T4 0.96 11/02/2020    T3 138 02/13/2015    CRP <5.0 04/30/2009    SED 5 04/30/2009       Anesthesia Plan    ASA Status:  3      Anesthesia Type: MAC.     - Reason for MAC: straight local not clinically adequate   Induction: Intravenous.   Maintenance: TIVA.   Techniques and Equipment:     - Lines/Monitors: 2nd IV     Consents    Anesthesia Plan(s) and associated risks, benefits, and realistic alternatives discussed. Questions answered and patient/representative(s) expressed understanding.     - Discussed with:  Patient      - Extended Intubation/Ventilatory  Support Discussed: No.      - Patient is DNR/DNI Status: No    Use of blood products discussed: No .     Postoperative Care    Pain management: Multi-modal analgesia, IV analgesics.   PONV prophylaxis: Ondansetron (or other 5HT-3), Dexamethasone or Solumedrol     Comments:                PAC Discussion and Assessment    ASA Classification: 3    Anesthetic techniques and relevant risks discussed: MAC with GA as backup                  PAC Resident/NP Anesthesia Assessment: Type of service:  Video Visit    Patient verbally consented to video service today: YES    Two identifiers used:  yes (name and )    Video Start Time: 15:35  Video End Time (time video stopped): 15:59    Originating Location (pt. Location): Home    Distant Location (provider location):  home    Mode of Communication:  Video Conference via [employer]    Please note, because this was a virtual visit, a full physical could not be completed.  On the DOS, the OOD of anesthesia will complete the appropriate components of the physical exam.    --    Norberto Wu is a 75-year-old male scheduled for Replacement of deep brain stimulator generator/battery over right chest wall - Right with Anupam Fair MD on 2021 at [facility] under MAC with local.    Mr. Wu was seen in neurology consultation with Dr. Fair on 3/15/21 for evaluation of DEPLETED DEEP BRAIN STIMULATOR GENERATOR/BATTERY OVER THE RIGHT CHEST WALL.  Mr. Wu has a history of Parkinson's disease and is status post  right side deep brain stimulator placement, target right globus pallidus internus (2014) and status post replacement of deep brain stimulator generator/battery over the right chest wall on 1/3/2017 and  2019.    Mr. Wu presents to PAC virtually with his daughter-in-law who is one of his care takers.  He resides in [facility]. He has nursing care (medication administration)  and meals provided.     Diagnosis  End of  battery life of deep brain stimulator  Status post deep brain stimulator placement  Primary Parkinsonism (H)    Procedures  EKG 2018  Sinus rhythm  Nonspecific ST abnormality  Abnormal ECG  Unconfirmed report - interpretation of this ECG is computer generated - see medical record for final interpretation    Exercise Stress echocardiogram 2013  Interpretation Summary   Normal exercise stress echocardiogram without evidence of infarct or    ischemia. Normal resting LV function with EF of approximately 60-65%; normal    response to exercise with increase to approximately 70-75%. No stress induced    regional wall motion abnormalities. Good exercise capacity. No ECG evidence    of ischemia. No subjective symptoms of ischemia.No significant valvular    disease noted on routine screening color flow Doppler and pulsed Doppler    examination.   PatientHeight: 72 in   PatientWeight: 205 lbs   SystolicPressure: 124 mmHg   DiastolicPressure: 80 mmHg   HeartRate: 70 bpm   BSA 2.2 m^2         Echocardiogram complete with bubble 2013  Interpretation Summary   1. Normal LV size and systolic function. Normal diastolic function  2.     Normal RV size and function  3. No significant valvular abnormalities  4.    Small right-to-left shunt visualized with intravenous agitated saline    contrast study, suggestive of PFO.     CT Head 2/17/20                                                                 Impression:  1. No acute intracranial pathology.  2. Stable positioning of the right frontal approach deep brain  stimulator.     I have personally reviewed the examination and initial interpretation  and I agree with the findings.     UMAIR BAHENA MD    He has the following specific operative considerations:   - FORTINO # of risks 3/8 = intermediate  - VTE risk:  1.8%  - Risk of PONV score = 2.  If > 2, anti-emetic intervention recommended.      #  Cardiology   - Denies known coronary artery disease.  Denies cardiac symptoms. METS:   <4 (He lives in an assisted living facility and prior to COVID, he would walk a mile outside). LVEF 60-65% (2013)CRI : Cerebrovascular Disease (TIA or CVA).  0.9 % risk of major adverse cardiac event.        - Small right-to-left shunt visualized with intravenous agitated saline contrast study, suggestive of PFO (echo, 2013)    #  Pulmonary   - no smoking hx  - denies pulmonary symptoms.     #  Urology  - h/o prostate cancer s/p prostatectomy    #  Neuro   - Parkinson's disease s/p DBS.  More recently has noticed symptoms of freezing gait with some instability.  Now using a walker.  Dr. Fair evaluated his DBS and showed LEONARDA, Early Replacement Indicator. Above procedure planned.  He is taking carbidopa-Levodopa and Rivastigmine. Recommend fall precautions.    - Peripheral neuropathy in feet treated with gabapentin  - Mood disorder treated with Seroquel.   - Records indicate Cerebellar stroke syndrome (Care Everywhere).  Both patient and daughter-in-law deny this.     #  ID  - COVID-19 testing per surgeon's office  - Received both COVID vaccines    #   Anesthesia considerations   -  Refer to PAC assessment in anesthesia records      Arrival time, NPO, shower and medication instructions provided by nursing staff today.    Patient was discussed with Dr Abdi. He is scheduled for labs on 4/14/2021.    **For further details of assessment, testing, and physical exam please see H and P completed on same date.          Reviewed and Signed by PAC Mid-Level Provider/Resident  Mid-Level Provider/Resident: Duyen DAVID CNP  Date: 4/9/2021                                 Anupam Zhou MD     Anesthesia attending    75 year old male who  has a past medical history of Cerebellar stroke syndrome (8/21/2013), Dyslipidemia, Hyperthyroidism, Insomnia (6/17/2011), Parkinson disease (H) (6/15/2011), Peripheral neuropathy (6/15/2011), PFO (patent foramen ovale) (8/21/2013), PONV (postoperative nausea and vomiting),  and Prostate cancer (H) (6/17/2011). to OR for Procedure(s):  Replacement of deep brain stimulator generator/battery over right chest wall    Hemoglobin   Date Value Ref Range Status   04/14/2021 15.0 13.3 - 17.7 g/dL Final     Potassium   Date Value Ref Range Status   04/14/2021 4.3 3.4 - 5.3 mmol/L Final     NPO status adequate.    Plan for MAC, standard monitors, large bore IVs.  Plan discussed with the anesthesia and surgery teams.  Consent in chart.    Yamila Torres MD

## 2021-04-16 ENCOUNTER — ANESTHESIA (OUTPATIENT)
Dept: SURGERY | Facility: CLINIC | Age: 76
End: 2021-04-16
Payer: COMMERCIAL

## 2021-04-16 ENCOUNTER — HOSPITAL ENCOUNTER (OUTPATIENT)
Facility: CLINIC | Age: 76
Discharge: HOME | End: 2021-04-16
Attending: NEUROLOGICAL SURGERY | Admitting: NEUROLOGICAL SURGERY
Payer: COMMERCIAL

## 2021-04-16 VITALS
HEIGHT: 72 IN | BODY MASS INDEX: 25.38 KG/M2 | TEMPERATURE: 98.9 F | DIASTOLIC BLOOD PRESSURE: 96 MMHG | OXYGEN SATURATION: 97 % | WEIGHT: 187.39 LBS | RESPIRATION RATE: 16 BRPM | HEART RATE: 101 BPM | SYSTOLIC BLOOD PRESSURE: 162 MMHG

## 2021-04-16 DIAGNOSIS — Z45.42 END OF BATTERY LIFE OF DEEP BRAIN STIMULATOR: ICD-10-CM

## 2021-04-16 DIAGNOSIS — G20.C PRIMARY PARKINSONISM (H): ICD-10-CM

## 2021-04-16 DIAGNOSIS — Z96.89 STATUS POST DEEP BRAIN STIMULATOR PLACEMENT: ICD-10-CM

## 2021-04-16 LAB
APTT PPP: 26 SEC (ref 22–37)
GLUCOSE BLDC GLUCOMTR-MCNC: 91 MG/DL (ref 70–99)
INR PPP: 1.05 (ref 0.86–1.14)

## 2021-04-16 PROCEDURE — 250N000011 HC RX IP 250 OP 636: Performed by: NURSE ANESTHETIST, CERTIFIED REGISTERED

## 2021-04-16 PROCEDURE — 360N000076 HC SURGERY LEVEL 3, PER MIN: Performed by: NEUROLOGICAL SURGERY

## 2021-04-16 PROCEDURE — 85610 PROTHROMBIN TIME: CPT | Mod: GZ | Performed by: NEUROLOGICAL SURGERY

## 2021-04-16 PROCEDURE — C1767 GENERATOR, NEURO NON-RECHARG: HCPCS | Performed by: NEUROLOGICAL SURGERY

## 2021-04-16 PROCEDURE — 710N000012 HC RECOVERY PHASE 2, PER MINUTE: Performed by: NEUROLOGICAL SURGERY

## 2021-04-16 PROCEDURE — 272N000001 HC OR GENERAL SUPPLY STERILE: Performed by: NEUROLOGICAL SURGERY

## 2021-04-16 PROCEDURE — 93010 ELECTROCARDIOGRAM REPORT: CPT | Mod: 59 | Performed by: INTERNAL MEDICINE

## 2021-04-16 PROCEDURE — 250N000011 HC RX IP 250 OP 636: Performed by: NEUROLOGICAL SURGERY

## 2021-04-16 PROCEDURE — 82962 GLUCOSE BLOOD TEST: CPT

## 2021-04-16 PROCEDURE — 999N000141 HC STATISTIC PRE-PROCEDURE NURSING ASSESSMENT: Performed by: NEUROLOGICAL SURGERY

## 2021-04-16 PROCEDURE — 36415 COLL VENOUS BLD VENIPUNCTURE: CPT | Performed by: NEUROLOGICAL SURGERY

## 2021-04-16 PROCEDURE — 250N000009 HC RX 250: Performed by: NEUROLOGICAL SURGERY

## 2021-04-16 PROCEDURE — 999N000054 HC STATISTIC EKG NON-CHARGEABLE

## 2021-04-16 PROCEDURE — 258N000003 HC RX IP 258 OP 636: Performed by: NURSE ANESTHETIST, CERTIFIED REGISTERED

## 2021-04-16 PROCEDURE — 250N000009 HC RX 250: Performed by: NURSE ANESTHETIST, CERTIFIED REGISTERED

## 2021-04-16 PROCEDURE — 370N000017 HC ANESTHESIA TECHNICAL FEE, PER MIN: Performed by: NEUROLOGICAL SURGERY

## 2021-04-16 PROCEDURE — 85730 THROMBOPLASTIN TIME PARTIAL: CPT | Performed by: NEUROLOGICAL SURGERY

## 2021-04-16 DEVICE — NEUROSTIMULATOR MEDT ACTIVA SC 37603: Type: IMPLANTABLE DEVICE | Site: CHEST | Status: FUNCTIONAL

## 2021-04-16 RX ORDER — PROPOFOL 10 MG/ML
INJECTION, EMULSION INTRAVENOUS PRN
Status: DISCONTINUED | OUTPATIENT
Start: 2021-04-16 | End: 2021-04-16

## 2021-04-16 RX ORDER — LIDOCAINE 40 MG/G
CREAM TOPICAL
Status: DISCONTINUED | OUTPATIENT
Start: 2021-04-16 | End: 2021-04-16 | Stop reason: HOSPADM

## 2021-04-16 RX ORDER — SODIUM CHLORIDE, SODIUM LACTATE, POTASSIUM CHLORIDE, CALCIUM CHLORIDE 600; 310; 30; 20 MG/100ML; MG/100ML; MG/100ML; MG/100ML
INJECTION, SOLUTION INTRAVENOUS CONTINUOUS
Status: DISCONTINUED | OUTPATIENT
Start: 2021-04-16 | End: 2021-04-16 | Stop reason: HOSPADM

## 2021-04-16 RX ORDER — NALOXONE HYDROCHLORIDE 0.4 MG/ML
0.2 INJECTION, SOLUTION INTRAMUSCULAR; INTRAVENOUS; SUBCUTANEOUS
Status: CANCELLED | OUTPATIENT
Start: 2021-04-16

## 2021-04-16 RX ORDER — CEFAZOLIN SODIUM 2 G/100ML
2 INJECTION, SOLUTION INTRAVENOUS
Status: DISCONTINUED | OUTPATIENT
Start: 2021-04-16 | End: 2021-04-16 | Stop reason: HOSPADM

## 2021-04-16 RX ORDER — SODIUM CHLORIDE, SODIUM LACTATE, POTASSIUM CHLORIDE, CALCIUM CHLORIDE 600; 310; 30; 20 MG/100ML; MG/100ML; MG/100ML; MG/100ML
INJECTION, SOLUTION INTRAVENOUS CONTINUOUS PRN
Status: DISCONTINUED | OUTPATIENT
Start: 2021-04-16 | End: 2021-04-16

## 2021-04-16 RX ORDER — FENTANYL CITRATE 50 UG/ML
25-50 INJECTION, SOLUTION INTRAMUSCULAR; INTRAVENOUS
Status: CANCELLED | OUTPATIENT
Start: 2021-04-16

## 2021-04-16 RX ORDER — LIDOCAINE HYDROCHLORIDE AND EPINEPHRINE 10; 10 MG/ML; UG/ML
INJECTION, SOLUTION INFILTRATION; PERINEURAL PRN
Status: DISCONTINUED | OUTPATIENT
Start: 2021-04-16 | End: 2021-04-16 | Stop reason: HOSPADM

## 2021-04-16 RX ORDER — OXYCODONE AND ACETAMINOPHEN 5; 325 MG/1; MG/1
1 TABLET ORAL EVERY 6 HOURS PRN
Qty: 12 TABLET | Refills: 0 | Status: SHIPPED | OUTPATIENT
Start: 2021-04-16 | End: 2021-04-19

## 2021-04-16 RX ORDER — CEFAZOLIN SODIUM 2 G/100ML
2 INJECTION, SOLUTION INTRAVENOUS SEE ADMIN INSTRUCTIONS
Status: DISCONTINUED | OUTPATIENT
Start: 2021-04-16 | End: 2021-04-16 | Stop reason: HOSPADM

## 2021-04-16 RX ORDER — MEPERIDINE HYDROCHLORIDE 25 MG/ML
12.5 INJECTION INTRAMUSCULAR; INTRAVENOUS; SUBCUTANEOUS
Status: CANCELLED | OUTPATIENT
Start: 2021-04-16

## 2021-04-16 RX ORDER — NALOXONE HYDROCHLORIDE 0.4 MG/ML
0.4 INJECTION, SOLUTION INTRAMUSCULAR; INTRAVENOUS; SUBCUTANEOUS
Status: CANCELLED | OUTPATIENT
Start: 2021-04-16

## 2021-04-16 RX ORDER — ONDANSETRON 2 MG/ML
4 INJECTION INTRAMUSCULAR; INTRAVENOUS EVERY 30 MIN PRN
Status: CANCELLED | OUTPATIENT
Start: 2021-04-16

## 2021-04-16 RX ORDER — PROPOFOL 10 MG/ML
INJECTION, EMULSION INTRAVENOUS CONTINUOUS PRN
Status: DISCONTINUED | OUTPATIENT
Start: 2021-04-16 | End: 2021-04-16

## 2021-04-16 RX ORDER — SODIUM CHLORIDE, SODIUM LACTATE, POTASSIUM CHLORIDE, CALCIUM CHLORIDE 600; 310; 30; 20 MG/100ML; MG/100ML; MG/100ML; MG/100ML
INJECTION, SOLUTION INTRAVENOUS CONTINUOUS
Status: CANCELLED | OUTPATIENT
Start: 2021-04-16

## 2021-04-16 RX ORDER — LIDOCAINE HYDROCHLORIDE 20 MG/ML
INJECTION, SOLUTION INFILTRATION; PERINEURAL PRN
Status: DISCONTINUED | OUTPATIENT
Start: 2021-04-16 | End: 2021-04-16

## 2021-04-16 RX ORDER — CEPHALEXIN 500 MG/1
500 CAPSULE ORAL 3 TIMES DAILY
Qty: 21 CAPSULE | Refills: 0 | Status: SHIPPED | OUTPATIENT
Start: 2021-04-16 | End: 2021-04-23

## 2021-04-16 RX ORDER — ONDANSETRON 4 MG/1
4 TABLET, ORALLY DISINTEGRATING ORAL EVERY 30 MIN PRN
Status: CANCELLED | OUTPATIENT
Start: 2021-04-16

## 2021-04-16 RX ADMIN — LIDOCAINE HYDROCHLORIDE 40 MG: 20 INJECTION, SOLUTION INFILTRATION; PERINEURAL at 13:49

## 2021-04-16 RX ADMIN — PHENYLEPHRINE HYDROCHLORIDE 100 MCG: 10 INJECTION INTRAVENOUS at 14:47

## 2021-04-16 RX ADMIN — PHENYLEPHRINE HYDROCHLORIDE 100 MCG: 10 INJECTION INTRAVENOUS at 14:34

## 2021-04-16 RX ADMIN — SODIUM CHLORIDE, POTASSIUM CHLORIDE, SODIUM LACTATE AND CALCIUM CHLORIDE: 600; 310; 30; 20 INJECTION, SOLUTION INTRAVENOUS at 13:40

## 2021-04-16 RX ADMIN — PROPOFOL 50 MCG/KG/MIN: 10 INJECTION, EMULSION INTRAVENOUS at 13:49

## 2021-04-16 RX ADMIN — PROPOFOL 30 MG: 10 INJECTION, EMULSION INTRAVENOUS at 14:11

## 2021-04-16 RX ADMIN — PROPOFOL 20 MG: 10 INJECTION, EMULSION INTRAVENOUS at 14:40

## 2021-04-16 RX ADMIN — CEFAZOLIN 2 G: 10 INJECTION, POWDER, FOR SOLUTION INTRAVENOUS at 13:55

## 2021-04-16 RX ADMIN — PROPOFOL 20 MG: 10 INJECTION, EMULSION INTRAVENOUS at 14:18

## 2021-04-16 RX ADMIN — PHENYLEPHRINE HYDROCHLORIDE 100 MCG: 10 INJECTION INTRAVENOUS at 14:40

## 2021-04-16 ASSESSMENT — MIFFLIN-ST. JEOR: SCORE: 1633

## 2021-04-16 ASSESSMENT — VISUAL ACUITY: OU: BASELINE

## 2021-04-16 NOTE — BRIEF OP NOTE
[facility]    Brief Operative Note    Pre-operative diagnosis: End of battery life of deep brain stimulator [Z45.42]  Status post deep brain stimulator placement [Z96.89]  Primary Parkinsonism (H) [G20]  Post-operative diagnosis Same as pre-operative diagnosis    Procedure: Procedure(s):  Replacement of deep brain stimulator generator/battery over right chest wall  Surgeon: Surgeon(s) and Role:     * Anupam Fair MD - Primary     * Ladan Ellsworth MD - Resident - Assisting  Anesthesia: Combined MAC with Local   Estimated blood loss: 2 mL  Drains: None  Specimens: * No specimens in log *  Findings:   Impedances wnl, therapy on.  Complications: None.  Implants:   Implant Name Type Inv. Item Serial No.  Lot No. LRB No. Used Action   NEUROSTIMULATOR MEDT ACTIVA SC [id number] Neurology device NEUROSTIMULATOR MEDT ACTIVA SC [id number] [employer]URO  Right 1 Implanted   NEUROSTIMULATOR MEDT ACTIVA SC [id number] Neurology device NEUROSTIMULATOR MEDT ACTIVA SC [id number] [employer]  Right 1 Explanted         Skin: Monocryl and exofin    Ladan Ellsworth MD  Neurosurgery Resident, PGY-2    Please contact neurosurgery resident on call with questions.    Dial * * *[phone], enter [phone] when prompted.

## 2021-04-16 NOTE — ANESTHESIA CARE TRANSFER NOTE
Patient: Norberto Wu    Procedure(s):  Replacement of deep brain stimulator generator/battery over right chest wall    Diagnosis: End of battery life of deep brain stimulator [Z45.42]  Status post deep brain stimulator placement [Z96.89]  Primary Parkinsonism (H) [G20]  Diagnosis Additional Information: No value filed.    Anesthesia Type:   MAC     Note:    Oropharynx: oropharynx clear of all foreign objects and spontaneously breathing  Level of Consciousness: awake  Oxygen Supplementation: nasal cannula  Level of Supplemental Oxygen (L/min / FiO2): 4  Independent Airway: airway patency satisfactory and stable  Dentition: dentition unchanged  Vital Signs Stable: post-procedure vital signs reviewed and stable  Report to RN Given: handoff report given  Patient transferred to: Phase II    Handoff Report: Identifed the Patient, Identified the Reponsible Provider, Reviewed the pertinent medical history, Discussed the surgical course, Reviewed Intra-OP anesthesia mangement and issues during anesthesia, Set expectations for post-procedure period and Allowed opportunity for questions and acknowledgement of understanding      Vitals: (Last set prior to Anesthesia Care Transfer)  CRNA VITALS  4/16/2021 1435 - 4/16/2021 1509      4/16/2021             Pulse:  93    SpO2:  98 %        Electronically Signed By: FRANKIE Mejia CRNA  April 16, 2021  3:09 PM

## 2021-04-16 NOTE — OR NURSING
Daughter Lottie is patient's caregiver and will be in the area after the surgery. Patient and daughter both request that daughter be present in phase II to assist in patient getting dressed (due to mobility issues) and for discharge instructions.

## 2021-04-16 NOTE — OR NURSING
"Paged MD Fair at 1320: \"3C 20: Bryce, D- Daughter does not have active POA. Did you feel that the patient was not able to consent for himself? Thanks! Zully APPIAH RN [id number] or [phone]\"     Spoke with MD Fair, patient is able to consent, patient will sign consent.   "

## 2021-04-16 NOTE — DISCHARGE INSTRUCTIONS
Same-Day Surgery   Adult Discharge Orders & Instructions     For 24 hours after surgery:  1. Get plenty of rest.  A responsible adult must stay with you for at least 24 hours after you leave the hospital.   2. Pain medication can slow your reflexes. Do not drive or use heavy equipment.  If you have weakness or tingling, don't drive or use heavy equipment until this feeling goes away.  3. Mixing alcohol and pain medication can cause dizziness and slow your breathing. It can even be fatal. Do not drink alcohol while taking pain medication.  4. Avoid strenuous or risky activities.  Ask for help when climbing stairs.   5. You may feel lightheaded.  If so, sit for a few minutes before standing.  Have someone help you get up.   6. If you have nausea (feel sick to your stomach), drink only clear liquids such as apple juice, ginger ale, broth or 7-Up.  Rest may also help.  Be sure to drink enough fluids.  Move to a regular diet as you feel able. Take pain medications with a small amount of solid food, such as toast or crackers, to avoid nausea.   7. A slight fever is normal. Call the doctor if your fever is over 100 F (37.7 C) (taken under the tongue) or lasts longer than 24 hours.  8. You may have a dry mouth, muscle aches, trouble sleeping or a sore throat.  These symptoms should go away after 24 hours.  9. Do not make important or legal decisions.   Pain Management:      1. Take pain medication (if prescribed) for pain as directed by your physician.        2. WARNING: If the pain medication you have been prescribed contains Tylenol  (acetaminophen), DO NOT take additional doses of Tylenol (acetaminophen).     Call your doctor for any of the followin.  Signs of infection (fever, growing tenderness at the surgery site, severe pain, a large amount of drainage or bleeding, foul-smelling drainage, redness, swelling).    2.  It has been over 8 to 10 hours since surgery and you are still not able to urinate (pee).    3.   Headache for over 24 hours.      To contact a doctor, call __Dr Fair's office at [phone] at the Neurosurgery Clinic from 8 am till 5 pm -- ____ or:      [phone] and ask for the Resident On Call for:          _______neurosurgery____ (answered 24 hours a day)      Emergency Department:  [facility]: [phone]  [facility]: [phone]               Rev. 10/2014

## 2021-04-19 ENCOUNTER — PATIENT OUTREACH (OUTPATIENT)
Dept: NEUROSURGERY | Facility: CLINIC | Age: 76
End: 2021-04-19

## 2021-04-19 LAB — INTERPRETATION ECG - MUSE: NORMAL

## 2021-04-19 NOTE — PROGRESS NOTES
Pt s/p DBS battery replacement 4/16/21 by Dr. Fair. Left message checking on pt's postop status and requested that he call me back on my direct line at his earliest convenience.

## 2021-04-22 NOTE — OP NOTE
PATIENT NAME: NORBERTO YUEN  YOB: 1945  MRN:   [id number]  ACCOUNT:  [id number]      DATE OF PROCEDURE:  04/16/2021    PREOPERATIVE DIAGNOSIS:  1.  Parkinson's disease  2.  Status post right side deep brain stimulator placement, target right globus pallidus internus, in 5/2014  3.  Status post replacement of deep brain stimulator generator/battery over the right chest wall on 1/3/2017, 2/4/2019  4.  Depleted deep brain stimulator generator/battery over the right chest wall    POSTOPERATIVE DIAGNOSIS:  1.  Parkinson's disease  2.  Status post right side deep brain stimulator placement, target right globus pallidus internus, in 5/2014  3.  Status post replacement of deep brain stimulator generator/battery over the right chest wall on 1/3/2017, 2/4/2019  4.  Depleted deep brain stimulator generator/battery over the right chest wall    PROCEDURES PERFORMED:  1.  Replacement of deep brain stimulator generator/battery, model Activa SC, over the right chest wall with connection to one electrode array.  2.  Revision of the right chest wall generator/battery pocket.  3.  Electrical interrogation and analysis of the right side deep brain stimulator system.    ATTENDING SURGEON:  Anupam Fair MD, PhD.    ASSISTANT SURGEON:  Ladan Ellsworth MD    ANESTHESIA:  Monitored anesthesia care and local anesthetic.    ESTIMATED BLOOD LOSS:  2 mL.    COMPLICATIONS:  None.    FINDINGS:  No sign of infection.  No sign of hardware breakage or damage.  With new generator/battery connected, all the impedance values were within normal.  No problems were found.    EXPLANTS:  Old Medtronic Model Activa SC generator/battery, Model #33397, S/N [id number].    IMPLANTS:  Medtronic Model Activa SC generator/battery, Model #46155, S/N [id number].    INDICATIONS FOR PROCEDURE:  Mr. Norberto Yuen is a 75 year old male with the history of Parkinson's disease who previously underwent right side DBS placement back in 5/2014.  The  electrode is in the right globus pallidus internus.  His generator/battery was last replaced by me back in 1/3/2017 and by Dr. Marco Rothman back in 2/4/2019.  He has a Medtronic Activa SC, model #19234.  His device was interrogated on 3/15/2021and it showed that the battery power was at 2.56 Volts.  It was at LEONARDA, Early Replacement Indicator.  He states that he is in his usual state of health with no recent illness.  He is doing well.  He takes ibuprofen at night but does not take any aspirin, Plavix or Coumadin or any other blood thinners or antiplatelet therapies.  His current device has lasted him about 2 years.  We did discuss replacement with an Activa RC, rechargeable system last time but due to patient's memory issues and dealing with a rechargeable system, the decision was made to stay with a non-rechargeable system.  Therefore, we will stay with the same system.  He was diagnosed with Parkinson's Disease in 11/2008, but symptoms began around 17-22 years ago with decreased sense of smell and drooling. His tremors began in 2007 on his left hand and leg.  We discussed the surgical procedure for replacing the DBS generator/battery over the right chest wall.  Risks, benefits, alternative therapies were discussed with the patient, including but not limited to infection and bleeding.  This surgical procedure will be performed under MAC with local anesthetic.  The thinned part of the wound/pocket may be corrected with mobilization of the tissue under the incision.  The pocket may need to be enlarged to minimize the stress on the closure.  Surgical procedure was discussed in detail.  All questions were answered, and he expressed understanding.    DESCRIPTION OF PROCEDURE:  Informed consent was obtained from the patient and his right chest was marked, specifically the previous incision.  He was brought to the operating theater and was laid in a supine position.  His right arm was tucked.  All pressure points were  padded appropriately.  Monitored anesthesia care was induced and maintained throughout the entire case.  His scar and previous incision over the right chest wall was prepped and draped in the usual sterile fashion.  Time out was performed confirming the patient's identity, procedure to be performed, site and side of the surgery and the administration of prophylactic preoperative antibiotic.  Lidocaine 1% with 1:100,000 epinephrine was injected in the subcutaneous space at the intended incision site, which was the previously well healed incision.  Total of 18 mL was used.    We turned our attention to the right side.  We used a #10 blade scalpel to make the skin incision, going over the previously well healed scar.  We carried our dissection through the dermal layer until we reached the subcutaneous fat and then the generator/battery capsule.  The monopolar cautery was used to dissect the subcutaneous tissue and #10 blade scalpel to open the capsule, taking great care not to damage the hardware.  We gently opened up the fibrous capsule surrounding the generator/battery to expose it.  Care was taken to open the capsule while lifting the capsule itself away from the generator/battery, taking great care not to come in contact with the metal casing or the wire.  The generator/battery was mobile and we were able to remove it easily, taking care to attend to the location of the wire.  The scar surrounding and anchoring the extension wire was carefully dissected and wire freed.  The extension wire was examined and was found to be without any damage or erosion or defect.  There were no obvious signs of infection.    We then performed blunt and sharp dissection within the pocket after the generator/battery had been removed in order to revise the chest wall pocket for the new generator/battery, so as to reduce the tension on the wound closure.  Therefore, the pocket was generously enlarged.  We also dissected free and  undermined the superior aspect of the pocket so that the closure would have less tension after closure and more subcutaneous tissue would be available.  The entire cavity was copiously irrigated with normal saline and meticulous hemostasis was obtained and the pocket was dried.      The old generator/battery, Activa SC, was disconnected from the extension wire and taken off the field.  The connector contacts were cleaned.  The new generator/battery, Medtronic model Activa SC, was connected to the extension wire.  Therefore, one electrode array was connected to generator/battery and secured.  And then the new generator/battery was placed back into the pocket along with the excess extension wire being placed posteriorly and around the periphery of the generator/battery.  The generator/battery was secured to the new position within the pocket using two 2-0 Ethibond sutures.    After the replacement, one electrode array was connected to the generator/battery.  The right side DBS system was tested and interrogated electrically and was found to be working well and impedance values were noted to be within normal.  No problems were found.    After this, we turned our attention to closing.  We reapproximated the fibrous capsule of the generator/battery using 3-0 Vicryl sutures in a simple interrupted fashion.  The subcutaneous fat layer was then reapproximated using 3-0 Vicryl sutures in an inverted interrupted fashion to provide padding to the skin for the closure.  The dermal layer was then reapproximated using 3-0 Vicryl sutures in an inverted interrupted fashion.  The skin was then reapproximated using a 4-0 Monocryl suture in a running subcuticular fashion.  The incision was then cleaned with wet and dry sponges followed by ChloraPrep and then Exofin skin glue was applied.    At the end of the entire operation, the drapes were taken down and the patient was gently reversed from the monitored anesthesia care.  He was  then transferred back onto his hospital [facility] for transport to the postanesthesia care unit for ongoing recovery.  At the end of the case, all counts including needle, sponge and instrument counts were correct x 2.  There were no complications.    IEnzo M.D., Ph.D., Neurosurgery Attending, was present and scrubbed for the entire case and performed the key and critical portions of the case.      ENZO JEREZ MD, PHD

## 2021-04-30 ENCOUNTER — RECORDS - HEALTHEAST (OUTPATIENT)
Dept: LAB | Facility: CLINIC | Age: 76
End: 2021-04-30

## 2021-05-03 ENCOUNTER — OFFICE VISIT (OUTPATIENT)
Dept: NEUROSURGERY | Facility: CLINIC | Age: 76
End: 2021-05-03
Payer: COMMERCIAL

## 2021-05-03 VITALS
DIASTOLIC BLOOD PRESSURE: 75 MMHG | SYSTOLIC BLOOD PRESSURE: 112 MMHG | HEIGHT: 72 IN | OXYGEN SATURATION: 96 % | BODY MASS INDEX: 26.14 KG/M2 | WEIGHT: 193 LBS | RESPIRATION RATE: 16 BRPM | HEART RATE: 78 BPM

## 2021-05-03 DIAGNOSIS — Z45.42 END OF BATTERY LIFE OF DEEP BRAIN STIMULATOR: Primary | ICD-10-CM

## 2021-05-03 DIAGNOSIS — Z96.89 STATUS POST DEEP BRAIN STIMULATOR PLACEMENT: ICD-10-CM

## 2021-05-03 DIAGNOSIS — G20.C PRIMARY PARKINSONISM (H): ICD-10-CM

## 2021-05-03 PROCEDURE — 99024 POSTOP FOLLOW-UP VISIT: CPT | Performed by: NEUROLOGICAL SURGERY

## 2021-05-03 ASSESSMENT — MIFFLIN-ST. JEOR: SCORE: 1658.44

## 2021-05-03 ASSESSMENT — PAIN SCALES - GENERAL: PAINLEVEL: NO PAIN (0)

## 2021-05-03 NOTE — PROGRESS NOTES
NEUROSURGERY    HISTORY AND PHYSICAL EXAM    Chief Complaint   Patient presents with     Surgical Followup     [facility] RETURN 2 WK POST OP, S/P REPLACEMENT OF DBS GENERATOR/BATTERY OVER THE RIGHT CHEST WALL ON 4/16/2021       HISTORY OF PRESENT ILLNESS  Mr. Norberto Wu returns today for his follow-up after the right side DBS generator/battery replacement on 4/16/2021.  Patient reports that he is doing well and there are no complaints.  He reports no pain.  He denies any fevers, chills, nausea, vomiting, dizziness, weakness, numbness or seizure like activity.  He reports that the incision is looking good and there is no redness, no drainage and no fluid collection.  Overall, he is quite happy with the recent surgery.        In summary, Mr. Wu is a 75 year old male with the history of Parkinson's disease who previously underwent right side DBS placement back in 5/2014.  The electrode is in the right globus pallidus internus.  His generator/battery was last replaced by me back in 1/3/2017 and by Dr. Marco Rothman back in 2/4/2019.  He has a Medtronic Activa SC, model #09229.  His device was interrogated on 3/15/2021and it showed that the battery power was at 2.56 Volts.  It was at LEONARDA, Early Replacement Indicator.  He states that he is in his usual state of health with no recent illness.  He is doing well.  He takes ibuprofen at night but does not take any aspirin, Plavix or Coumadin or any other blood thinners or antiplatelet therapies.  His current device has lasted him about 2 years.  We did discuss replacement with an Activa RC, rechargeable system last time but due to patient's memory issues and dealing with a rechargeable system, the decision was made to stay with a non-rechargeable system.  Therefore, we will stay with the same system.  He was diagnosed with Parkinson's Disease in 11/2008, but symptoms began around 17-22 years ago with decreased sense of smell and drooling. His tremors began in 2007 on his  left hand and leg.       Past Medical History:   Diagnosis Date     Cerebellar stroke syndrome 8/21/2013    Few small foci of chronic infarct in the right cerebellar hemisphere, unchanged as well as a new focus of now chronic infarct in the left cerebellar hemisphere since the last study.      Dyslipidemia      Hyperthyroidism      Insomnia 6/17/2011     Parkinson disease (H) 6/15/2011     Peripheral neuropathy 6/15/2011     PFO (patent foramen ovale) 8/21/2013    Interpretation Summary 1. Normal LV size and systolic function. Normal diastolic function 2.  Normal RV size and function 3. No significant valvular abnormalities 4.  Small right-to-left shunt visualized with intravenous agitated saline  contrast study, suggestive of PFO. PatientHeight: 72 in PatientWeight: 205 lbs SystolicPressure: 100 mmHg DiastolicPressure: 63 mmHg BSA 2.2 m^2   Procedure Echoc     PONV (postoperative nausea and vomiting)      Prostate cancer (H) 6/17/2011    s/p radical prostectomy       Past Surgical History:   Procedure Laterality Date     COLONOSCOPY N/A 3/23/2017    Procedure: COLONOSCOPY;  Surgeon: Salbador Paulson MD;  Location: [facility]     PROSTATE SURGERY  3 yrs ago    prostate cancer; Dr. Valladares     REPLACE DEEP BRAIN STIMULATION GENERATOR / BATTERY Right 1/3/2017    Procedure: REPLACE DEEP BRAIN STIMULATION GENERATOR / BATTERY;  Surgeon: Anupam Fair MD;  Location: [facility] OR     REPLACE DEEP BRAIN STIMULATION GENERATOR / BATTERY Right 2/4/2019    Procedure: Right Deep Brain Stimulatory Battery Replacement;  Surgeon: Marco Rothman MD;  Location: [facility] OR     REPLACE DEEP BRAIN STIMULATION GENERATOR / BATTERY Right 4/16/2021    Procedure: Replacement of deep brain stimulator generator/battery over right chest wall;  Surgeon: Anupam Fair MD;  Location: [facility] OR     Right Gpi DBS Lead Implantation  5/20/2014     Rt Chest DBS Generator Placement Activa SC  5/29/2014       Social History     Socioeconomic  History     Marital status:      Spouse name: Not on file     Number of children: Not on file     Years of education: Not on file     Highest education level: Not on file   Occupational History     Not on file   Social Needs     Financial resource strain: Not on file     Food insecurity     Worry: Not on file     Inability: Not on file     Transportation needs     Medical: Not on file     Non-medical: Not on file   Tobacco Use     Smoking status: Never Smoker     Smokeless tobacco: Never Used   Substance and Sexual Activity     Alcohol use: Yes     Alcohol/week: 1.0 standard drinks     Comment: EVERY OTHER DAY     Drug use: No     Sexual activity: Never   Lifestyle     Physical activity     Days per week: Not on file     Minutes per session: Not on file     Stress: Not on file   Relationships     Social connections     Talks on phone: Not on file     Gets together: Not on file     Attends [employer] service: Not on file     Active member of club or organization: Not on file     Attends meetings of clubs or organizations: Not on file     Relationship status: Not on file     Intimate partner violence     Fear of current or ex partner: Not on file     Emotionally abused: Not on file     Physically abused: Not on file     Forced sexual activity: Not on file   Other Topics Concern     Parent/sibling w/ CABG, MI or angioplasty before 65F 55M? No   Social History Narrative    Son lives in [place] and he has 2 kids    Wife Mayelin       Family History   Problem Relation Age of Onset     Parkinsonism Father      Other - See Comments Son         lives in [place]     Skin Cancer No family hx of         no skin cancer       Current Outpatient Medications   Medication     carbidopa-levodopa (SINEMET)  MG tablet     fluticasone (FLONASE) 50 MCG/ACT nasal spray     gabapentin (NEURONTIN) 300 MG capsule     ibuprofen (ADVIL/MOTRIN) 200 MG tablet     QUEtiapine (SEROQUEL) 25 MG tablet     rivastigmine (EXELON) 9.5  "MG/24HR 24 hr patch     No current facility-administered medications for this visit.        Allergies   Allergen Reactions     Hydromorphone Nausea and Nausea and Vomiting     Used post DBS surgery.  Stayed in the hospital 3 days due to severe nausea & \"retching\" .      Comtan Diarrhea     Hydrocodone      Other reaction(s): Headache, Nausea Only     Hydrocodone-Acetaminophen Hives and Nausea     Melatonin Other (See Comments)     No benefit.     Ropinirole Fatigue     Fatigue no benefit.      Adhesive Tape Rash     Camphor Rash     Other reaction(s): Rash     Liquid Adhesive Rash         REVIEW OF SYSTEMS:  General: Negative for chills/sweats/fever. difficulty sleeping, headache, recent fatigue, or weight gain/loss.  Eyes: Negative for blurred vision, crossed eyes, double vision, recent eye infections, vision flashes, or vision halos.  Ears/Nose/Mouth/Throat: Negative for bleeding gums, difficulty swallowing, earache, ear discharge, hearing loss, hoarseness, nosebleeds, tinnitus, or sinus problems.  Respiratory:POSITIVE for obstructive sleep apnea - snores loudly. Negative for chronic cough, coughing blood, night sweats, shortness of breath, Tuberculosis, or wheezing.  Cardiovascular: POSITIVE for hypertension, small PFO. Negative for chest pain, dyspnea at night, heart murmur, palpitations, pacemaker, pacemaker, poor circulation, swollen legs/feet, or varicose veins.  Gastrointestinal: Negative for melena, hematochezia, chronic diarrhea, heartburn, Hepatitis A/B/C, increasing constipation, Liver Disease, nausea, or vomiting.   Genitourinary: Negative for Urinary retention, genital discharge, urinary incontinence, prostate problems, urgency, or UTI.   Neurological: POSITIVE for Parkinson's disease, peripheral neuropathy, prior CVA - remote history of cerebellar stroke and memory problems.  Negative for syncope, headaches, or seizures.  Psychological: POSITIVE for depression. Negative for anxiety, panic attacks, or " restlessness.  Skin: Negative for chronic skin itching, color changes in hand/feet when cold, poor scarring, non-healing ulcers, skin rashes/hives, unusual moles.  Musculoskeletal: POSITIVE for arthritis in the hips and uses walker to get around. Negative for arthritis, joint swelling in hands/wrists/hips/knees/joints, muscle tenderness in arms/legs, or osteoporosis.  Endocrine: Negative for excessive thirst/hunger, intolerance for warm rooms, loss of libido, multiple broken bones, rapid weight gain/loss, galactorrhea, or thyroid issues.  Hematologic/Lymphatic: Negative for easy skin bruising, significant fatigue, prolonged bleeding, tender glands/lymph nodes.  Allergies: Negative for asthma or hay fever.      PHYSICAL EXAM  /75   Pulse 78   Resp 16   Ht 1.829 m (6')   Wt 87.5 kg (193 lb)   SpO2 96%   BMI 26.18 kg/m      General: Awake, alert, oriented. Well nourished, well developed, he is not in any acute distress.  HEENT: Head normocephalic, atraumatic. No carotid bruit. Neck supple. Good range of motion. No palpable thyroid mass.  Extremity: Warm with no clubbing or cyanosis. No lower extremity edema.    Incisions: Right chest wall incision is healing well.  No redness.  No drainage.  No fluid collection.    Neurological  Awake and alert.  Speech is soft and slowed.   Face symmetric.  Uses a walker.    Motor: full strength throughout.  Sensation: decreased in lower extremities      ASSESSMENT   75 year old male  Parkinson's disease  S/p right side deep brain stimulator placement, target right globus pallidus internus, in 5/2014  S/p replacement of deep brain stimulator generator/battery over the right chest wall on 1/3/2017, 2/4/2019  Depleted deep brain stimulator generator/battery over the right chest wall  S/p replacement of deep brain stimulator generator/battery over the right chest wall on 4/16/2021    Patient is recovering well from the recent DBS generator/battery replacement surgery.  His  incision is healing well with no signs of infection.  He is doing well overall.  There are no issues.       PLAN:  1.  Follow up with neurosurgery as needed.      5 minutes were spent face to face with the patient of which more than 50% of the time was spent counseling and discussing the above issues regarding diagnosis, differential, treatment options, and steps for further evaluation, as well as wound check/care.  2 minutes were spent reviewing patient's chart.  10 minutes were spent on documentation for this encounter.  17 minutes total were spent on this encounter.

## 2021-05-03 NOTE — NURSING NOTE
Chief Complaint   Patient presents with     Surgical Followup     [facility] RETURN 2 WK POST OP       Tom Trejo EMT

## 2021-05-03 NOTE — LETTER
5/3/2021       RE: Norberto Wu  [address]  [facility] [address]     Dear Colleague,    Thank you for referring your patient, Norberto Wu, to the [facility] NEUROSURGERY CLINIC [place] at [facility]. Please see a copy of my visit note below.    NEUROSURGERY    HISTORY AND PHYSICAL EXAM    Chief Complaint   Patient presents with     Surgical Followup     [facility] RETURN 2 WK POST OP, S/P REPLACEMENT OF DBS GENERATOR/BATTERY OVER THE RIGHT CHEST WALL ON 4/16/2021       HISTORY OF PRESENT ILLNESS  Mr. Norberto Wu returns today for his follow-up after the right side DBS generator/battery replacement on 4/16/2021.  Patient reports that he is doing well and there are no complaints.  He reports no pain.  He denies any fevers, chills, nausea, vomiting, dizziness, weakness, numbness or seizure like activity.  He reports that the incision is looking good and there is no redness, no drainage and no fluid collection.  Overall, he is quite happy with the recent surgery.        In summary, Mr. Wu is a 75 year old male with the history of Parkinson's disease who previously underwent right side DBS placement back in 5/2014.  The electrode is in the right globus pallidus internus.  His generator/battery was last replaced by me back in 1/3/2017 and by Dr. Macro Rothman back in 2/4/2019.  He has a Medtronic Activa SC, model #04439.  His device was interrogated on 3/15/2021and it showed that the battery power was at 2.56 Volts.  It was at LEONARDA, Early Replacement Indicator.  He states that he is in his usual state of health with no recent illness.  He is doing well.  He takes ibuprofen at night but does not take any aspirin, Plavix or Coumadin or any other blood thinners or antiplatelet therapies.  His current device has lasted him about 2 years.  We did discuss replacement with an Activa RC, rechargeable system last time but due to patient's memory  issues and dealing with a rechargeable system, the decision was made to stay with a non-rechargeable system.  Therefore, we will stay with the same system.  He was diagnosed with Parkinson's Disease in 11/2008, but symptoms began around 17-22 years ago with decreased sense of smell and drooling. His tremors began in 2007 on his left hand and leg.       Past Medical History:   Diagnosis Date     Cerebellar stroke syndrome 8/21/2013    Few small foci of chronic infarct in the right cerebellar hemisphere, unchanged as well as a new focus of now chronic infarct in the left cerebellar hemisphere since the last study.      Dyslipidemia      Hyperthyroidism      Insomnia 6/17/2011     Parkinson disease (H) 6/15/2011     Peripheral neuropathy 6/15/2011     PFO (patent foramen ovale) 8/21/2013    Interpretation Summary 1. Normal LV size and systolic function. Normal diastolic function 2.  Normal RV size and function 3. No significant valvular abnormalities 4.  Small right-to-left shunt visualized with intravenous agitated saline  contrast study, suggestive of PFO. PatientHeight: 72 in PatientWeight: 205 lbs SystolicPressure: 100 mmHg DiastolicPressure: 63 mmHg BSA 2.2 m^2   Procedure Echoc     PONV (postoperative nausea and vomiting)      Prostate cancer (H) 6/17/2011    s/p radical prostectomy       Past Surgical History:   Procedure Laterality Date     COLONOSCOPY N/A 3/23/2017    Procedure: COLONOSCOPY;  Surgeon: Salbador Paulson MD;  Location:  GI     PROSTATE SURGERY  3 yrs ago    prostate cancer; Dr. Valladaers     REPLACE DEEP BRAIN STIMULATION GENERATOR / BATTERY Right 1/3/2017    Procedure: REPLACE DEEP BRAIN STIMULATION GENERATOR / BATTERY;  Surgeon: Anupam Fair MD;  Location:  OR     REPLACE DEEP BRAIN STIMULATION GENERATOR / BATTERY Right 2/4/2019    Procedure: Right Deep Brain Stimulatory Battery Replacement;  Surgeon: Marco Rothman MD;  Location: [facility] OR     REPLACE DEEP BRAIN STIMULATION  GENERATOR / BATTERY Right 4/16/2021    Procedure: Replacement of deep brain stimulator generator/battery over right chest wall;  Surgeon: Anupam Fair MD;  Location: [facility] OR     Right Gpi DBS Lead Implantation  5/20/2014     Rt Chest DBS Generator Placement Activa SC  5/29/2014       Social History     Socioeconomic History     Marital status:      Spouse name: Not on file     Number of children: Not on file     Years of education: Not on file     Highest education level: Not on file   Occupational History     Not on file   Social Needs     Financial resource strain: Not on file     Food insecurity     Worry: Not on file     Inability: Not on file     Transportation needs     Medical: Not on file     Non-medical: Not on file   Tobacco Use     Smoking status: Never Smoker     Smokeless tobacco: Never Used   Substance and Sexual Activity     Alcohol use: Yes     Alcohol/week: 1.0 standard drinks     Comment: EVERY OTHER DAY     Drug use: No     Sexual activity: Never   Lifestyle     Physical activity     Days per week: Not on file     Minutes per session: Not on file     Stress: Not on file   Relationships     Social connections     Talks on phone: Not on file     Gets together: Not on file     Attends Sabianist service: Not on file     Active member of club or organization: Not on file     Attends meetings of clubs or organizations: Not on file     Relationship status: Not on file     Intimate partner violence     Fear of current or ex partner: Not on file     Emotionally abused: Not on file     Physically abused: Not on file     Forced sexual activity: Not on file   Other Topics Concern     Parent/sibling w/ CABG, MI or angioplasty before 65F 55M? No   Social History Narrative    Son lives in [place] and he has 2 kids    Wife Mayelin       Family History   Problem Relation Age of Onset     Parkinsonism Father      Other - See Comments Son         lives in [place]     Skin Cancer No family hx of        "  no skin cancer       Current Outpatient Medications   Medication     carbidopa-levodopa (SINEMET)  MG tablet     fluticasone (FLONASE) 50 MCG/ACT nasal spray     gabapentin (NEURONTIN) 300 MG capsule     ibuprofen (ADVIL/MOTRIN) 200 MG tablet     QUEtiapine (SEROQUEL) 25 MG tablet     rivastigmine (EXELON) 9.5 MG/24HR 24 hr patch     No current facility-administered medications for this visit.        Allergies   Allergen Reactions     Hydromorphone Nausea and Nausea and Vomiting     Used post DBS surgery.  Stayed in the hospital 3 days due to severe nausea & \"retching\" .      Comtan Diarrhea     Hydrocodone      Other reaction(s): Headache, Nausea Only     Hydrocodone-Acetaminophen Hives and Nausea     Melatonin Other (See Comments)     No benefit.     Ropinirole Fatigue     Fatigue no benefit.      Adhesive Tape Rash     Camphor Rash     Other reaction(s): Rash     Liquid Adhesive Rash         REVIEW OF SYSTEMS:  General: Negative for chills/sweats/fever. difficulty sleeping, headache, recent fatigue, or weight gain/loss.  Eyes: Negative for blurred vision, crossed eyes, double vision, recent eye infections, vision flashes, or vision halos.  Ears/Nose/Mouth/Throat: Negative for bleeding gums, difficulty swallowing, earache, ear discharge, hearing loss, hoarseness, nosebleeds, tinnitus, or sinus problems.  Respiratory:POSITIVE for obstructive sleep apnea - snores loudly. Negative for chronic cough, coughing blood, night sweats, shortness of breath, Tuberculosis, or wheezing.  Cardiovascular: POSITIVE for hypertension, small PFO. Negative for chest pain, dyspnea at night, heart murmur, palpitations, pacemaker, pacemaker, poor circulation, swollen legs/feet, or varicose veins.  Gastrointestinal: Negative for melena, hematochezia, chronic diarrhea, heartburn, Hepatitis A/B/C, increasing constipation, Liver Disease, nausea, or vomiting.   Genitourinary: Negative for Urinary retention, genital discharge, urinary " incontinence, prostate problems, urgency, or UTI.   Neurological: POSITIVE for Parkinson's disease, peripheral neuropathy, prior CVA - remote history of cerebellar stroke and memory problems.  Negative for syncope, headaches, or seizures.  Psychological: POSITIVE for depression. Negative for anxiety, panic attacks, or restlessness.  Skin: Negative for chronic skin itching, color changes in hand/feet when cold, poor scarring, non-healing ulcers, skin rashes/hives, unusual moles.  Musculoskeletal: POSITIVE for arthritis in the hips and uses walker to get around. Negative for arthritis, joint swelling in hands/wrists/hips/knees/joints, muscle tenderness in arms/legs, or osteoporosis.  Endocrine: Negative for excessive thirst/hunger, intolerance for warm rooms, loss of libido, multiple broken bones, rapid weight gain/loss, galactorrhea, or thyroid issues.  Hematologic/Lymphatic: Negative for easy skin bruising, significant fatigue, prolonged bleeding, tender glands/lymph nodes.  Allergies: Negative for asthma or hay fever.      PHYSICAL EXAM  /75   Pulse 78   Resp 16   Ht 1.829 m (6')   Wt 87.5 kg (193 lb)   SpO2 96%   BMI 26.18 kg/m      General: Awake, alert, oriented. Well nourished, well developed, he is not in any acute distress.  HEENT: Head normocephalic, atraumatic. No carotid bruit. Neck supple. Good range of motion. No palpable thyroid mass.  Extremity: Warm with no clubbing or cyanosis. No lower extremity edema.    Incisions: Right chest wall incision is healing well.  No redness.  No drainage.  No fluid collection.    Neurological  Awake and alert.  Speech is soft and slowed.   Face symmetric.  Uses a walker.    Motor: full strength throughout.  Sensation: decreased in lower extremities      ASSESSMENT   75 year old male  Parkinson's disease  S/p right side deep brain stimulator placement, target right globus pallidus internus, in 5/2014  S/p replacement of deep brain stimulator generator/battery  over the right chest wall on 1/3/2017, 2/4/2019  Depleted deep brain stimulator generator/battery over the right chest wall  S/p replacement of deep brain stimulator generator/battery over the right chest wall on 4/16/2021    Patient is recovering well from the recent DBS generator/battery replacement surgery.  His incision is healing well with no signs of infection.  He is doing well overall.  There are no issues.       PLAN:  1.  Follow up with neurosurgery as needed.    5 minutes were spent face to face with the patient of which more than 50% of the time was spent counseling and discussing the above issues regarding diagnosis, differential, treatment options, and steps for further evaluation, as well as wound check/care.  2 minutes were spent reviewing patient's chart.  10 minutes were spent on documentation for this encounter.  17 minutes total were spent on this encounter.    Again, thank you for allowing me to participate in the care of your patient.      Sincerely,    Anupam Fair MD

## 2021-05-18 ENCOUNTER — MYC MEDICAL ADVICE (OUTPATIENT)
Dept: NEUROLOGY | Facility: CLINIC | Age: 76
End: 2021-05-18

## 2021-05-25 ENCOUNTER — RECORDS - HEALTHEAST (OUTPATIENT)
Dept: LAB | Facility: CLINIC | Age: 76
End: 2021-05-25

## 2021-06-02 RX ORDER — RIVASTIGMINE 9.5 MG/24H
1 PATCH, EXTENDED RELEASE TRANSDERMAL DAILY
Qty: 90 PATCH | Refills: 3 | COMMUNITY
Start: 2021-06-02

## 2021-06-02 RX ORDER — CARBIDOPA AND LEVODOPA 25; 100 MG/1; MG/1
TABLET ORAL
Qty: 434 TABLET | COMMUNITY
Start: 2021-06-02 | End: 2021-12-10

## 2021-06-02 RX ORDER — FLUTICASONE PROPIONATE 50 MCG
1-2 SPRAY, SUSPENSION (ML) NASAL 2 TIMES DAILY
COMMUNITY
Start: 2021-06-02 | End: 2022-01-01

## 2021-06-02 RX ORDER — GABAPENTIN 300 MG/1
CAPSULE ORAL
Qty: 450 CAPSULE | Refills: 3 | COMMUNITY
Start: 2021-06-02 | End: 2021-09-01

## 2021-06-02 RX ORDER — GABAPENTIN 300 MG/1
CAPSULE ORAL
Qty: 450 CAPSULE | Refills: 3 | COMMUNITY
Start: 2021-06-02 | End: 2021-06-02

## 2021-06-02 NOTE — PROGRESS NOTES
VIDEO VISIT    Date of Visit: June 8, 2021  Name: Norberto West  Date of Birth 1945  Date of Birth 1945  [address]   [facility] [place] [address]  [email or web address]  Lottie wets daughter in law [phone] (M)   [phone] (H)   [phone] (W)      [email or web address].   follow up- last seen in 11/2019-scheduled per daughter getting weaker, decline in walking [phone]-      [facility] [place]  [address] [address]     Assessment:  (G20) Parkinson's disease (H)  (primary encounter diagnosis)  DBS right battery 2/4/2019 (prior replacement 1/2017 and 5/2014)  Carbidopa/levodopa 25/100 2 tabs 7/day  At 3am, 6am, 9am 12noon, 3pm, 6pm and midnight    Gait/Med related complications/Falls     Exercise/Therapy     Starts getting pain down his right leg and down his shin down his right calf to the bottom of his leg and toes  He does not have cramping or dystonic problems with his leg  He has more of tremor.     Cognitive/Driving  Cognition  Rivastigmine exelon patch 9.5mg at 9am    Mood  Gabapentin neurontin 300mg at 6am, 12pm and 3 x 300mg at 6pm    Hallucinations/delusions   Quetiapine seroquel 25mg at 9pm  He has had some hallucinations    Sleep   Impacted by pain  Dreams at night    Bladder     GI/Constipation/GERD  Polyethylene glycol miralax - not using it - it is as needed    ENDO     Cardio/heart     Vision     Heme     Other:      3am 6am 9am noon 3pm 6pm 9pm midnight                       Carbidopa/levodopa sinemet 25/100 2 2 2 2 2 2   2   Fluticasone flonase 50mcg/act spray     spray              Gabapentin neurontin 300mg   1   1   3       Ibuprofen advil/motrin 200mg   As needed               Polyethylene glycol miralax           As needed       Quetiapine seroquel 25mg             1     Rivastigmine exelon 9.5mg/24 hr patch     1              Trolamine aspercreme   As needed              Plan:    Right foot/leg pain is his major issue    May want to optimize his dose of ibuprofen if he is only taking 200mg every 6hours as needed  He also does not have acetaminophen (tylenol)    Palliative care to explore management options for his pain  I dont think he is having sciatic pain in the right leg, ie not sure epidural steroid injections will help but cannot exclude this.     The pain seems to be associated with the right foot tremor but he may have simultaneous dyskinesias of his upper body.     Therefore wondering if it is off related pain but given the dyskinesias not sure how much can push up his sinemet     However he has pain to touch on the right foot arch when seated per his daughter in law. Patient reports elli is no pain when he walks on his foot. He may have some cramping but not clear it is visible on examination.     He may want to see a podiatrist that he has seen in the past for examination or/and xrays.     Consider pharmacy involvement.     The other issue is whether to consider botox in the foot if there is dystonic features - consult placed for Dr. Bucio of PMR    Parkinson wise we could try   1. Magnesium - we often use this for cramping  2. More sinemet  3. Low dose dopamine agonist  4. Reluctant to use baclofen as it is sedating  5. He is already on gabapentin 300mg at 6am, 300mg at noon and 3 x 300 at 6pm   6. He is using topical aspercreme  7. We don't prescribe narcotics in our group - not sure is the long term answer  8. Not sure about medical marijuana  9. He is on 25mg of seroquel - had been on more but was tired so dose was reduced to 25mg. Consider going back up on it again.     Medical Decision Making:  #  Chronic progressive medical conditions addressed  Yes - cognitive parkinson and pain  Review and/or interpretation of unique test or documentation from a provider outside of neurology no   Independent historian provided additional details  Yes Lottie    Prescription  "drug management and review of potential side effects and/or monitoring for side effects  yes   Health impacted by social determinants of health  Yes - in facility    I have reviewed the note as documented above.  This accurately captures the substance of my conversation with the patient and total time spent preparing for visit, executing visit and completing visit on the day of the visit:  40 minutes.     Start time of video visit 4pm  End of video visit : 447pm    Blaine Boo MD      ------------------------------------------------------------------------------------------------------------------------------------------------------------------------    Video-Visit Details    The patient has been notified of following:     \"After a review of the patient s situation, this visit was changed from an in-person visit to a video visit to reduce the risk of COVID-19 exposure.   The patient is being evaluated via a billable video visit.\"    \"This video visit will be conducted via a call between you and your physician/provider. We have found that certain health care needs can be provided without the need for an in-person physical exam.  This service lets us provide the care you need with a video conversation.  If a prescription is necessary we can send it directly to your pharmacy.  If lab work is needed we can place an order for that and you can then stop by our lab to have the test done at a later time.    If during the course of the call the physician/provider feels a video visit is not appropriate, you will not be charged for this service.\"     Patient has given verbal consent for Video visit? Yes    Patient would like the video invitation sent by:     Type of service:  Video Visit    Video Start Time:     Video End Time (time video stopped):     Duration:  minutes - see above    Originating Location (pt. Location):     Distant Location (provider location):  [facility] NEUROLOGY [facility]     Mode of " "Communication:  Video Conference via [employer] (and if not possible then doximity)      Blaine Boo MD      --------------------------------------------------------------------------------------------------------------    Norberto Wu is a 75 year old male who is being evaluated via a billable video visit.      Charts reviewed  Consult from  Images reviewed        I have reviewed and updated the patient's Past Medical History, Social History, Family History and Medication List.    ALLERGIES  Hydromorphone, Comtan, Hydrocodone, Hydrocodone-acetaminophen, Melatonin, Ropinirole, Adhesive tape, Camphor, and Liquid adhesive    Lasts visit details if there was a last visit:       14 Review of systems  are negative except for   Patient Active Problem List   Diagnosis     Parkinson disease (H)     Peripheral neuropathy     Somnolence     Family history of Parkinson's disease     Snores     REM sleep behavior disorder     Prostate cancer (H)     Wears glasses     Insomnia     Constipation     History of MRI of brain and brain stem     PFO (patent foramen ovale)     Cerebellar stroke syndrome     H/O echocardiogram     Restless leg syndrome     S/P brain surgery     Hamstring tendonitis at origin     Parkinson's disease (H)     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     Acute serous otitis media     Active advance directive     Peripheral nerve disease     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  Stayed in the hospital 3 days due to severe nausea & \"retching\" .      " Comtan Diarrhea     Hydrocodone      Other reaction(s): Headache, Nausea Only     Hydrocodone-Acetaminophen Hives and Nausea     Melatonin Other (See Comments)     No benefit.     Ropinirole Fatigue     Fatigue no benefit.      Adhesive Tape Rash     Camphor Rash     Other reaction(s): Rash     Liquid Adhesive Rash     Past Surgical History:   Procedure Laterality Date     COLONOSCOPY N/A 3/23/2017    Procedure: COLONOSCOPY;  Surgeon: Salbador Paulson MD;  Location: [facility] GI     PROSTATE SURGERY  3 yrs ago    prostate cancer; Dr. Valladares     REPLACE DEEP BRAIN STIMULATION GENERATOR / BATTERY Right 1/3/2017    Procedure: REPLACE DEEP BRAIN STIMULATION GENERATOR / BATTERY;  Surgeon: Anupam Fair MD;  Location: [facility] OR     REPLACE DEEP BRAIN STIMULATION GENERATOR / BATTERY Right 2/4/2019    Procedure: Right Deep Brain Stimulatory Battery Replacement;  Surgeon: Marco Rothman MD;  Location: [facility] OR     REPLACE DEEP BRAIN STIMULATION GENERATOR / BATTERY Right 4/16/2021    Procedure: Replacement of deep brain stimulator generator/battery over right chest wall;  Surgeon: Anupam Fair MD;  Location: [facility] OR     Right Gpi DBS Lead Implantation  5/20/2014     Rt Chest DBS Generator Placement Activa SC  5/29/2014     Past Medical History:   Diagnosis Date     Cerebellar stroke syndrome 8/21/2013    Few small foci of chronic infarct in the right cerebellar hemisphere, unchanged as well as a new focus of now chronic infarct in the left cerebellar hemisphere since the last study.      Dyslipidemia      Hyperthyroidism      Insomnia 6/17/2011     Parkinson disease (H) 6/15/2011     Peripheral neuropathy 6/15/2011     PFO (patent foramen ovale) 8/21/2013    Interpretation Summary 1. Normal LV size and systolic function. Normal diastolic function 2.  Normal RV size and function 3. No significant valvular abnormalities 4.  Small right-to-left shunt visualized with intravenous agitated saline  contrast study,  suggestive of PFO. PatientHeight: 72 in PatientWeight: 205 lbs SystolicPressure: 100 mmHg DiastolicPressure: 63 mmHg BSA 2.2 m^2   Procedure Echoc     PONV (postoperative nausea and vomiting)      Prostate cancer (H) 6/17/2011    s/p radical prostectomy     Social History     Socioeconomic History     Marital status:      Spouse name: Not on file     Number of children: Not on file     Years of education: Not on file     Highest education level: Not on file   Occupational History     Not on file   Social Needs     Financial resource strain: Not on file     Food insecurity     Worry: Not on file     Inability: Not on file     Transportation needs     Medical: Not on file     Non-medical: Not on file   Tobacco Use     Smoking status: Never Smoker     Smokeless tobacco: Never Used   Substance and Sexual Activity     Alcohol use: Yes     Alcohol/week: 1.0 standard drinks     Comment: EVERY OTHER DAY     Drug use: No     Sexual activity: Never   Lifestyle     Physical activity     Days per week: Not on file     Minutes per session: Not on file     Stress: Not on file   Relationships     Social connections     Talks on phone: Not on file     Gets together: Not on file     Attends Church service: Not on file     Active member of club or organization: Not on file     Attends meetings of clubs or organizations: Not on file     Relationship status: Not on file     Intimate partner violence     Fear of current or ex partner: Not on file     Emotionally abused: Not on file     Physically abused: Not on file     Forced sexual activity: Not on file   Other Topics Concern     Parent/sibling w/ CABG, MI or angioplasty before 65F 55M? No   Social History Narrative    Son lives in [place] and he has 2 kids    Wife Mayelin     Family History   Problem Relation Age of Onset     Parkinsonism Father      Other - See Comments Son         lives in [place]     Skin Cancer No family hx of         no skin cancer     Current  Outpatient Medications   Medication Sig Dispense Refill     carbidopa-levodopa (SINEMET)  MG tablet TAKE TWO TABLETS BY MOUTH SEVEN TIMES A DAY (3AM, 6AM, 9AM, NOON, 3PM, 6PM AND MIDNIGHT) 434 tablet PRN     fluticasone (FLONASE) 50 MCG/ACT nasal spray Spray 1-2 sprays into both nostrils 2 times daily       gabapentin (NEURONTIN) 300 MG capsule Reduce to 1 x 300mg CAPSULE BY MOUTH TWICE DAILY @6am and 12noon; AND 3 x 300mg capsules (900MG) DAILY @6pm: 1-1-3 for 5/day (had been on 7/day) 450 capsule 3     ibuprofen (ADVIL/MOTRIN) 200 MG tablet TAKE 1 TABLET BY MOUTH AT BEDTIME;AND TAKE ONE TABLET BY MOUTH EVERY 6 HOURS AS NEEDED (Patient taking differently: At Bedtime ) 28 tablet 11     QUEtiapine (SEROQUEL) 25 MG tablet At Bedtime 25mg TABLET BY MOUTH AT 9pm 90 tablet 3     rivastigmine (EXELON) 9.5 MG/24HR 24 hr patch Place 1 patch onto the skin daily 90 patch 3         Medications

## 2021-06-07 PROBLEM — Z15.03 GENETIC SUSCEPTIBILITY TO MALIGNANT NEOPLASM OF PROSTATE: Status: ACTIVE | Noted: 2017-12-08

## 2021-06-08 ENCOUNTER — VIRTUAL VISIT (OUTPATIENT)
Dept: NEUROLOGY | Facility: CLINIC | Age: 76
End: 2021-06-08
Payer: COMMERCIAL

## 2021-06-08 DIAGNOSIS — G25.81 RESTLESS LEGS SYNDROME (RLS): ICD-10-CM

## 2021-06-08 DIAGNOSIS — M79.661 PAIN OF RIGHT LOWER LEG: ICD-10-CM

## 2021-06-08 DIAGNOSIS — M79.671 RIGHT FOOT PAIN: ICD-10-CM

## 2021-06-08 DIAGNOSIS — J00 ACUTE RHINITIS: ICD-10-CM

## 2021-06-08 DIAGNOSIS — G20.A1 PARKINSON'S DISEASE (H): Primary | ICD-10-CM

## 2021-06-08 DIAGNOSIS — G24.9 DYSTONIC MOVEMENTS: ICD-10-CM

## 2021-06-08 DIAGNOSIS — G20.A1 PARKINSON DISEASE (H): ICD-10-CM

## 2021-06-08 PROCEDURE — 99215 OFFICE O/P EST HI 40 MIN: CPT | Mod: 95 | Performed by: PSYCHIATRY & NEUROLOGY

## 2021-06-08 RX ORDER — IBUPROFEN 200 MG
200 TABLET ORAL EVERY 6 HOURS PRN
COMMUNITY
Start: 2021-06-08

## 2021-06-08 NOTE — PATIENT INSTRUCTIONS
Assessment:  (G20) Parkinson's disease (H)  (primary encounter diagnosis)  DBS right battery 2/4/2019 (prior replacement 1/2017 and 5/2014)  Carbidopa/levodopa 25/100 2 tabs 7/day  At 3am, 6am, 9am 12noon, 3pm, 6pm and midnight    Gait/Med related complications/Falls     Exercise/Therapy     Starts getting pain down his right leg and down his shin down his right calf to the bottom of his leg and toes  He does not have cramping or dystonic problems with his leg  He has more of tremor.     Cognitive/Driving  Cognition  Rivastigmine exelon patch 9.5mg at 9am    Mood  Gabapentin neurontin 300mg at 6am, 12pm and 3 x 300mg at 6pm    Hallucinations/delusions   Quetiapine seroquel 25mg at 9pm  He has had some hallucinations    Sleep   Impacted by pain  Dreams at night    Bladder     GI/Constipation/GERD  Polyethylene glycol miralax - not using it - it is as needed    ENDO     Cardio/heart     Vision     Heme     Other:      3am 6am 9am noon 3pm 6pm 9pm midnight                       Carbidopa/levodopa sinemet 25/100 2 2 2 2 2 2   2   Fluticasone flonase 50mcg/act spray     spray              Gabapentin neurontin 300mg   1   1   3       Ibuprofen advil/motrin 200mg   As needed               Polyethylene glycol miralax           As needed       Quetiapine seroquel 25mg             1     Rivastigmine exelon 9.5mg/24 hr patch     1              Trolamine aspercreme   As needed             Plan:    Right foot/leg pain is his major issue    May want to optimize his dose of ibuprofen if he is only taking 200mg every 6hours as needed  He also does not have acetaminophen (tylenol)    Palliative care to explore management options for his pain  I dont think he is having sciatic pain in the right leg, ie not sure epidural steroid injections will help but cannot exclude this.     The pain seems to be associated with the right foot tremor but he may have simultaneous dyskinesias of his upper body.     Therefore wondering if it is off  related pain but given the dyskinesias not sure how much can push up his sinemet     However he has pain to touch on the right foot arch when seated per his daughter in law. Patient reports elli is no pain when he walks on his foot. He may have some cramping but not clear it is visible on examination.     He may want to see a podiatrist that he has seen in the past for examination or/and xrays.     Consider pharmacy involvement.     The other issue is whether to consider botox in the foot if there is dystonic features - consult placed for Dr. Bucio of PMR    Parkinson wise we could try   1. Magnesium - we often use this for cramping  2. More sinemet  3. Low dose dopamine agonist  4. Reluctant to use baclofen as it is sedating  5. He is already on gabapentin 300mg at 6am, 300mg at noon and 3 x 300 at 6pm   6. He is using topical aspercreme  7. We don't prescribe narcotics in our group - not sure is the long term answer  8. Not sure about medical marijuana  9. He is on 25mg of seroquel - had been on more but was tired so dose was reduced to 25mg. Consider going back up on it again.

## 2021-06-08 NOTE — LETTER
6/8/2021       RE: Norberto West  [address]  [facility] [place] [facility] [address]     Dear Colleague,    Thank you for referring your patient, Norberto West, to the [facility] [place] [facility]. Please see a copy of my visit note below.        VIDEO VISIT    Date of Visit: June 8, 2021  Name: Norberto West  Date of Birth 1945  Date of Birth 1945  [address]    [facility] [place] [facility] [address]  [email or web address]  Lottie west daughter in law [phone] (M)   [phone] (H)   [phone] (W)      [email or web address].   follow up- last seen in 11/2019-scheduled per daughter getting weaker, decline in walking [phone]      [facility] AT [address] [address]     Assessment:  (G20) Parkinson's disease (H)  (primary encounter diagnosis)  DBS right battery 2/4/2019 (prior replacement 1/2017 and 5/2014)  Carbidopa/levodopa 25/100 2 tabs 7/day  At 3am, 6am, 9am 12noon, 3pm, 6pm and midnight    Gait/Med related complications/Falls     Exercise/Therapy     Starts getting pain down his right leg and down his shin down his right calf to the bottom of his leg and toes  He does not have cramping or dystonic problems with his leg  He has more of tremor.     Cognitive/Driving  Cognition  Rivastigmine exelon patch 9.5mg at 9am    Mood  Gabapentin neurontin 300mg at 6am, 12pm and 3 x 300mg at 6pm    Hallucinations/delusions   Quetiapine seroquel 25mg at 9pm  He has had some hallucinations    Sleep   Impacted by pain  Dreams at night    Bladder     GI/Constipation/GERD  Polyethylene glycol miralax - not using it - it is as needed    ENDO     Cardio/heart     Vision     Heme     Other:      3am 6am 9am noon 3pm 6pm 9pm midnight                       Carbidopa/levodopa sinemet 25/100 2 2 2 2 2 2   2   Fluticasone flonase 50mcg/act  spray     spray              Gabapentin neurontin 300mg   1   1   3       Ibuprofen advil/motrin 200mg   As needed               Polyethylene glycol miralax           As needed       Quetiapine seroquel 25mg             1     Rivastigmine exelon 9.5mg/24 hr patch     1              Trolamine aspercreme   As needed             Plan:    Right foot/leg pain is his major issue    May want to optimize his dose of ibuprofen if he is only taking 200mg every 6hours as needed  He also does not have acetaminophen (tylenol)    Palliative care to explore management options for his pain  I dont think he is having sciatic pain in the right leg, ie not sure epidural steroid injections will help but cannot exclude this.     The pain seems to be associated with the right foot tremor but he may have simultaneous dyskinesias of his upper body.     Therefore wondering if it is off related pain but given the dyskinesias not sure how much can push up his sinemet     However he has pain to touch on the right foot arch when seated per his daughter in law. Patient reports elli is no pain when he walks on his foot. He may have some cramping but not clear it is visible on examination.     He may want to see a podiatrist that he has seen in the past for examination or/and xrays.     Consider pharmacy involvement.     The other issue is whether to consider botox in the foot if there is dystonic features - consult placed for Dr. Bucio of PMR    Parkinson wise we could try   1. Magnesium - we often use this for cramping  2. More sinemet  3. Low dose dopamine agonist  4. Reluctant to use baclofen as it is sedating  5. He is already on gabapentin 300mg at 6am, 300mg at noon and 3 x 300 at 6pm   6. He is using topical aspercreme  7. We don't prescribe narcotics in our group - not sure is the long term answer  8. Not sure about medical marijuana  9. He is on 25mg of seroquel - had been on more but was tired so dose was reduced to 25mg. Consider  "going back up on it again.     Medical Decision Making:  #  Chronic progressive medical conditions addressed  Yes - cognitive parkinson and pain  Review and/or interpretation of unique test or documentation from a provider outside of neurology no   Independent historian provided additional details  Yes Lottie    Prescription drug management and review of potential side effects and/or monitoring for side effects  yes   Health impacted by social determinants of health  Yes - in facility    I have reviewed the note as documented above.  This accurately captures the substance of my conversation with the patient and total time spent preparing for visit, executing visit and completing visit on the day of the visit:  40 minutes.     Start time of video visit 4pm  End of video visit : 447pm    Blaine Boo MD      ------------------------------------------------------------------------------------------------------------------------------------------------------------------------    Video-Visit Details    The patient has been notified of following:     \"After a review of the patient s situation, this visit was changed from an in-person visit to a video visit to reduce the risk of COVID-19 exposure.   The patient is being evaluated via a billable video visit.\"    \"This video visit will be conducted via a call between you and your physician/provider. We have found that certain health care needs can be provided without the need for an in-person physical exam.  This service lets us provide the care you need with a video conversation.  If a prescription is necessary we can send it directly to your pharmacy.  If lab work is needed we can place an order for that and you can then stop by our lab to have the test done at a later time.    If during the course of the call the physician/provider feels a video visit is not appropriate, you will not be charged for this service.\"     Patient has given verbal consent for Video visit? " Yes    Patient would like the video invitation sent by:     Type of service:  Video Visit    Video Start Time:     Video End Time (time video stopped):     Duration:  minutes - see above    Originating Location (pt. Location):     Distant Location (provider location):  [facility] [place]     Mode of Communication:  Video Conference via [employer] (and if not possible then doximity)      Blaine Boo MD      --------------------------------------------------------------------------------------------------------------    Norberto Wu is a 75 year old male who is being evaluated via a billable video visit.      Charts reviewed  Consult from  Images reviewed        I have reviewed and updated the patient's Past Medical History, Social History, Family History and Medication List.    ALLERGIES  Hydromorphone, Comtan, Hydrocodone, Hydrocodone-acetaminophen, Melatonin, Ropinirole, Adhesive tape, Camphor, and Liquid adhesive    Lasts visit details if there was a last visit:       14 Review of systems  are negative except for   Patient Active Problem List   Diagnosis     Parkinson disease (H)     Peripheral neuropathy     Somnolence     Family history of Parkinson's disease     Snores     REM sleep behavior disorder     Prostate cancer (H)     Wears glasses     Insomnia     Constipation     History of MRI of brain and brain stem     PFO (patent foramen ovale)     Cerebellar stroke syndrome     H/O echocardiogram     Restless leg syndrome     S/P brain surgery     Hamstring tendonitis at origin     Parkinson's disease (H)     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  Stayed in the hospital 3 days due to severe nausea & \"retching\" .      Comtan Diarrhea     Hydrocodone      Other reaction(s): Headache, Nausea Only     Hydrocodone-Acetaminophen Hives and Nausea     Melatonin Other (See Comments)     No benefit.     Ropinirole Fatigue     Fatigue no benefit.      Adhesive Tape Rash     Camphor Rash     Other reaction(s): Rash     Liquid Adhesive Rash     Past Surgical History:   Procedure Laterality Date     COLONOSCOPY N/A 3/23/2017    Procedure: COLONOSCOPY;  Surgeon: Salbador Paulson MD;  Location: [facility]     PROSTATE SURGERY  3 yrs ago    prostate cancer; Dr. Valladares     REPLACE DEEP BRAIN STIMULATION GENERATOR / BATTERY Right 1/3/2017    Procedure: REPLACE DEEP BRAIN STIMULATION GENERATOR / BATTERY;  Surgeon: Anupam Fair MD;  Location: [facility]     REPLACE DEEP BRAIN STIMULATION GENERATOR / BATTERY Right 2/4/2019    Procedure: Right Deep Brain Stimulatory Battery Replacement;  Surgeon: Marco Rothman MD;  Location: [facility]     REPLACE DEEP BRAIN STIMULATION GENERATOR / BATTERY Right 4/16/2021    Procedure: Replacement of deep brain stimulator generator/battery over right chest wall;  Surgeon: Anupam Fair MD;  Location: [facility] OR     Right Gpi DBS Lead Implantation  5/20/2014     Rt Chest DBS Generator Placement Activa SC  5/29/2014     Past Medical History:   Diagnosis Date     Cerebellar stroke syndrome 8/21/2013    Few small foci of chronic infarct in the right cerebellar hemisphere, unchanged as well as a new focus of now chronic infarct in the left cerebellar hemisphere since the last study.      Dyslipidemia      Hyperthyroidism      Insomnia 6/17/2011     Parkinson disease (H) 6/15/2011     " Peripheral neuropathy 6/15/2011     PFO (patent foramen ovale) 8/21/2013    Interpretation Summary 1. Normal LV size and systolic function. Normal diastolic function 2.  Normal RV size and function 3. No significant valvular abnormalities 4.  Small right-to-left shunt visualized with intravenous agitated saline  contrast study, suggestive of PFO. PatientHeight: 72 in PatientWeight: 205 lbs SystolicPressure: 100 mmHg DiastolicPressure: 63 mmHg BSA 2.2 m^2   Procedure Echoc     PONV (postoperative nausea and vomiting)      Prostate cancer (H) 6/17/2011    s/p radical prostectomy     Social History     Socioeconomic History     Marital status:      Spouse name: Not on file     Number of children: Not on file     Years of education: Not on file     Highest education level: Not on file   Occupational History     Not on file   Social Needs     Financial resource strain: Not on file     Food insecurity     Worry: Not on file     Inability: Not on file     Transportation needs     Medical: Not on file     Non-medical: Not on file   Tobacco Use     Smoking status: Never Smoker     Smokeless tobacco: Never Used   Substance and Sexual Activity     Alcohol use: Yes     Alcohol/week: 1.0 standard drinks     Comment: EVERY OTHER DAY     Drug use: No     Sexual activity: Never   Lifestyle     Physical activity     Days per week: Not on file     Minutes per session: Not on file     Stress: Not on file   Relationships     Social connections     Talks on phone: Not on file     Gets together: Not on file     Attends Yazidi service: Not on file     Active member of club or organization: Not on file     Attends meetings of clubs or organizations: Not on file     Relationship status: Not on file     Intimate partner violence     Fear of current or ex partner: Not on file     Emotionally abused: Not on file     Physically abused: Not on file     Forced sexual activity: Not on file   Other Topics Concern     Parent/sibling w/  CABG, MI or angioplasty before 65F 55M? No   Social History Narrative    Son lives in [place] and he has 2 kids    Wife Mayelin     Family History   Problem Relation Age of Onset     Parkinsonism Father      Other - See Comments Son         lives in [place]     Skin Cancer No family hx of         no skin cancer     Current Outpatient Medications   Medication Sig Dispense Refill     carbidopa-levodopa (SINEMET)  MG tablet TAKE TWO TABLETS BY MOUTH SEVEN TIMES A DAY (3AM, 6AM, 9AM, NOON, 3PM, 6PM AND MIDNIGHT) 434 tablet PRN     fluticasone (FLONASE) 50 MCG/ACT nasal spray Spray 1-2 sprays into both nostrils 2 times daily       gabapentin (NEURONTIN) 300 MG capsule Reduce to 1 x 300mg CAPSULE BY MOUTH TWICE DAILY @6am and 12noon; AND 3 x 300mg capsules (900MG) DAILY @6pm: 1-1-3 for 5/day (had been on 7/day) 450 capsule 3     ibuprofen (ADVIL/MOTRIN) 200 MG tablet TAKE 1 TABLET BY MOUTH AT BEDTIME;AND TAKE ONE TABLET BY MOUTH EVERY 6 HOURS AS NEEDED (Patient taking differently: At Bedtime ) 28 tablet 11     QUEtiapine (SEROQUEL) 25 MG tablet At Bedtime 25mg TABLET BY MOUTH AT 9pm 90 tablet 3     rivastigmine (EXELON) 9.5 MG/24HR 24 hr patch Place 1 patch onto the skin daily 90 patch 3         Medications                                                                                                                                                                                                                    LVM x3 no response or additonal #'s to try.    Tom Trejo, EMT        Again, thank you for allowing me to participate in the care of your patient.      Sincerely,    Blaine Boo MD

## 2021-06-09 ENCOUNTER — MYC MEDICAL ADVICE (OUTPATIENT)
Dept: INTERNAL MEDICINE | Facility: CLINIC | Age: 76
End: 2021-06-09

## 2021-06-09 DIAGNOSIS — R52 PAIN: Primary | ICD-10-CM

## 2021-06-10 ENCOUNTER — TELEPHONE (OUTPATIENT)
Dept: INTERNAL MEDICINE | Facility: CLINIC | Age: 76
End: 2021-06-10

## 2021-06-10 ENCOUNTER — TELEPHONE (OUTPATIENT)
Dept: NEUROLOGY | Facility: CLINIC | Age: 76
End: 2021-06-10

## 2021-06-10 NOTE — TELEPHONE ENCOUNTER
MTM referral from: [facility] visit (referral by provider)    MTM referral outreach attempt #2 on Kaylie 10, 2021 at 10:39 AM      Outcome: Patient not reachable after several attempts, will route to MTM Pharmacist/Provider as an FYI. Thank you for the referral.    Mendoza Mohamud, MTM coordinator

## 2021-06-10 NOTE — TELEPHONE ENCOUNTER
Health Call Center    Phone Message    May a detailed message be left on voicemail: yes     Reason for Call: Symptoms or Concerns     If patient has red-flag symptoms, warm transfer to triage line    Current symptom or concern: Extreme leg pain and referral for palliative care     Symptoms have been present for:  Over 1 month(s)    Has patient previously been seen for this? Yes    By Ema    Date: 6/8/21    Are there any new or worsening symptoms? Yes: Pain is worsening. Per Lottie, caregiver and Dr. Boo, looking for pain management and palliative care for patient       Action Taken: Message routed to:  [facility]    Travel Screening: Not Applicable

## 2021-06-11 ENCOUNTER — TELEPHONE (OUTPATIENT)
Dept: INTERNAL MEDICINE | Facility: CLINIC | Age: 76
End: 2021-06-11

## 2021-06-11 NOTE — TELEPHONE ENCOUNTER
Duplicate request.   See mychart encounter.     Palliative care referral already placed by Dr. Boo.      Christopher Tan CMA ([facility]) at 9:43 AM on 6/11/2021

## 2021-06-11 NOTE — TELEPHONE ENCOUNTER
(1) Signed Palliative care referral     (2) He needs to be evaluated by ANYONE for pain. If severe/worse he needs to go TODAY to urgent care or ED. Otherwise, he can schedule with anyone or me in the [facility]    MOHIT Howe

## 2021-06-11 NOTE — TELEPHONE ENCOUNTER
;     Please see Dr. Boo's note. Please see if we can add him on to my schedule or if more urgent anyone's schedule     Thanks, kale Howe    Previous Messages    ----- Message -----   From: Blaine Boo MD   Sent: 6/8/2021   4:55 PM CDT   To: Norberto Howe MD, Rachel eMna, [facility], *   Subject: right leg pain                                   Norberto has been having right leg/foot pain that occurs mid to later in the day and is affecting his sleep   1. He probably needs Dr. Howe to try a bit more tylenol or/and acetaminophen   2. I did put in for palliative care to see if they have other thoughts   3. At some point if it is off dystonic foot pain botox could help   4. He has not seen podiatry and may have some localized right arch pain but it is better standing so a fracture may be less likely and may be more parkinsonian based.   5. I have a few ideas in my note but not sure where to start other than simple analgesic remedies           Appt with Dr Howe at 1:30pm 06/14/2021 in person.  Marj Thurston RN 10:12 AM on 6/11/2021.

## 2021-06-14 ENCOUNTER — ANCILLARY PROCEDURE (OUTPATIENT)
Dept: GENERAL RADIOLOGY | Facility: CLINIC | Age: 76
End: 2021-06-14
Attending: INTERNAL MEDICINE
Payer: COMMERCIAL

## 2021-06-14 ENCOUNTER — ANCILLARY PROCEDURE (OUTPATIENT)
Dept: ULTRASOUND IMAGING | Facility: CLINIC | Age: 76
End: 2021-06-14
Attending: INTERNAL MEDICINE
Payer: COMMERCIAL

## 2021-06-14 ENCOUNTER — OFFICE VISIT (OUTPATIENT)
Dept: INTERNAL MEDICINE | Facility: CLINIC | Age: 76
End: 2021-06-14
Payer: COMMERCIAL

## 2021-06-14 VITALS
SYSTOLIC BLOOD PRESSURE: 139 MMHG | BODY MASS INDEX: 24.89 KG/M2 | HEART RATE: 82 BPM | DIASTOLIC BLOOD PRESSURE: 87 MMHG | WEIGHT: 183.5 LBS | OXYGEN SATURATION: 97 %

## 2021-06-14 DIAGNOSIS — M79.604 PAIN OF RIGHT LOWER EXTREMITY: ICD-10-CM

## 2021-06-14 DIAGNOSIS — M79.604 PAIN OF RIGHT LOWER EXTREMITY: Primary | ICD-10-CM

## 2021-06-14 LAB
ALBUMIN SERPL-MCNC: 3.8 G/DL (ref 3.4–5)
ALP SERPL-CCNC: 110 U/L (ref 40–150)
ALT SERPL W P-5'-P-CCNC: 8 U/L (ref 0–70)
ANION GAP SERPL CALCULATED.3IONS-SCNC: 2 MMOL/L (ref 3–14)
AST SERPL W P-5'-P-CCNC: 11 U/L (ref 0–45)
BASOPHILS # BLD AUTO: 0.1 10E9/L (ref 0–0.2)
BASOPHILS NFR BLD AUTO: 1 %
BILIRUB SERPL-MCNC: 0.8 MG/DL (ref 0.2–1.3)
BUN SERPL-MCNC: 19 MG/DL (ref 7–30)
CALCIUM SERPL-MCNC: 8.3 MG/DL (ref 8.5–10.1)
CHLORIDE SERPL-SCNC: 112 MMOL/L (ref 94–109)
CO2 SERPL-SCNC: 26 MMOL/L (ref 20–32)
CREAT SERPL-MCNC: 0.62 MG/DL (ref 0.66–1.25)
DIFFERENTIAL METHOD BLD: NORMAL
EOSINOPHIL # BLD AUTO: 0.3 10E9/L (ref 0–0.7)
EOSINOPHIL NFR BLD AUTO: 3.8 %
ERYTHROCYTE [DISTWIDTH] IN BLOOD BY AUTOMATED COUNT: 13.5 % (ref 10–15)
GFR SERPL CREATININE-BSD FRML MDRD: >90 ML/MIN/{1.73_M2}
GLUCOSE SERPL-MCNC: 87 MG/DL (ref 70–99)
HCT VFR BLD AUTO: 46.3 % (ref 40–53)
HGB BLD-MCNC: 14.9 G/DL (ref 13.3–17.7)
IMM GRANULOCYTES # BLD: 0 10E9/L (ref 0–0.4)
IMM GRANULOCYTES NFR BLD: 0.4 %
LYMPHOCYTES # BLD AUTO: 1.7 10E9/L (ref 0.8–5.3)
LYMPHOCYTES NFR BLD AUTO: 23.7 %
MCH RBC QN AUTO: 29 PG (ref 26.5–33)
MCHC RBC AUTO-ENTMCNC: 32.2 G/DL (ref 31.5–36.5)
MCV RBC AUTO: 90 FL (ref 78–100)
MONOCYTES # BLD AUTO: 0.5 10E9/L (ref 0–1.3)
MONOCYTES NFR BLD AUTO: 7.1 %
NEUTROPHILS # BLD AUTO: 4.6 10E9/L (ref 1.6–8.3)
NEUTROPHILS NFR BLD AUTO: 64 %
NRBC # BLD AUTO: 0 10*3/UL
NRBC BLD AUTO-RTO: 0 /100
PLATELET # BLD AUTO: 234 10E9/L (ref 150–450)
POTASSIUM SERPL-SCNC: 3.8 MMOL/L (ref 3.4–5.3)
PROT SERPL-MCNC: 7 G/DL (ref 6.8–8.8)
RBC # BLD AUTO: 5.13 10E12/L (ref 4.4–5.9)
SODIUM SERPL-SCNC: 141 MMOL/L (ref 133–144)
WBC # BLD AUTO: 7.1 10E9/L (ref 4–11)

## 2021-06-14 PROCEDURE — 36415 COLL VENOUS BLD VENIPUNCTURE: CPT | Performed by: PATHOLOGY

## 2021-06-14 PROCEDURE — 93971 EXTREMITY STUDY: CPT | Mod: RT | Performed by: RADIOLOGY

## 2021-06-14 PROCEDURE — 73590 X-RAY EXAM OF LOWER LEG: CPT | Mod: RT | Performed by: RADIOLOGY

## 2021-06-14 PROCEDURE — 85025 COMPLETE CBC W/AUTO DIFF WBC: CPT | Performed by: PATHOLOGY

## 2021-06-14 PROCEDURE — 73562 X-RAY EXAM OF KNEE 3: CPT | Mod: RT | Performed by: RADIOLOGY

## 2021-06-14 PROCEDURE — 80053 COMPREHEN METABOLIC PANEL: CPT | Performed by: PATHOLOGY

## 2021-06-14 PROCEDURE — 99215 OFFICE O/P EST HI 40 MIN: CPT | Mod: 24 | Performed by: INTERNAL MEDICINE

## 2021-06-14 PROCEDURE — 73600 X-RAY EXAM OF ANKLE: CPT | Mod: RT | Performed by: RADIOLOGY

## 2021-06-14 ASSESSMENT — PAIN SCALES - GENERAL: PAINLEVEL: EXTREME PAIN (9)

## 2021-06-14 NOTE — PROGRESS NOTES
HPI:    Pt. With h/o Parkinson's followed by Dr. Boo, neurology comes in with a month of R leg pain. His daughter-in-law is present with him today. He resides in a care facility. He denies any injury to this leg. He states pain is most of the R leg mostly from the knee down and R calf. He is try ing to massage his leg and this help (to he point of bruising his leg). He does not feel this is an excerebration of leg movements from Parkinson's. He remains on Sinemet. He had a virtual Neurology visit with Dr. Boo  6/8/2021. He states that neither NSAIDS nor Tylenol have been that effective in reducing his pain sxs. Apparently he did receive one oxycodone and  This was very helpful. He states pain 2/10/ today. He does have trouble sleeping. He denies any back pain. No radicular sxs. No bowel/bladder complaints. No new medications. No other HEENT, cardiopulmonary, abdominal, , neurological, systemic, psychiatric, lymphatic, endocrine, vascular complaints.      Past Medical History:   Diagnosis Date     Cerebellar stroke syndrome 8/21/2013    Few small foci of chronic infarct in the right cerebellar hemisphere, unchanged as well as a new focus of now chronic infarct in the left cerebellar hemisphere since the last study.      Dyslipidemia      Hyperthyroidism      Insomnia 6/17/2011     Parkinson disease (H) 6/15/2011     Peripheral neuropathy 6/15/2011     PFO (patent foramen ovale) 8/21/2013    Interpretation Summary 1. Normal LV size and systolic function. Normal diastolic function 2.  Normal RV size and function 3. No significant valvular abnormalities 4.  Small right-to-left shunt visualized with intravenous agitated saline  contrast study, suggestive of PFO. PatientHeight: 72 in PatientWeight: 205 lbs SystolicPressure: 100 mmHg DiastolicPressure: 63 mmHg BSA 2.2 m^2   Procedure Echoc     PONV (postoperative nausea and vomiting)      Prostate cancer (H) 6/17/2011    s/p radical prostectomy     Past Surgical  History:   Procedure Laterality Date     COLONOSCOPY N/A 3/23/2017    Procedure: COLONOSCOPY;  Surgeon: Salbador Paulson MD;  Location: [facility]     PROSTATE SURGERY  3 yrs ago    prostate cancer; Dr. Valladares     REPLACE DEEP BRAIN STIMULATION GENERATOR / BATTERY Right 1/3/2017    Procedure: REPLACE DEEP BRAIN STIMULATION GENERATOR / BATTERY;  Surgeon: Anupam Fair MD;  Location: [facility]     REPLACE DEEP BRAIN STIMULATION GENERATOR / BATTERY Right 2/4/2019    Procedure: Right Deep Brain Stimulatory Battery Replacement;  Surgeon: Marco Rothman MD;  Location: [facility]     REPLACE DEEP BRAIN STIMULATION GENERATOR / BATTERY Right 4/16/2021    Procedure: Replacement of deep brain stimulator generator/battery over right chest wall;  Surgeon: Anupam Fair MD;  Location: [facility] OR     Right Gpi DBS Lead Implantation  5/20/2014     Rt Chest DBS Generator Placement Activa SC  5/29/2014     PE:    Vitals noted, gen, nad, cooperative, alert. He uses a walker. He can sit and get from a chair w/o problems. He has excellent motor strength B LE both proximal and distal. Neck supple nl rom, lungs with good air movement, RRR, S1, S2, no MRG, abdomen, no acute findings. He has a resting R leg tremor that can be significant at times? and keeps moving his R leg and squeezing/massaging his R leg. Slight swelling. No skin breakdown.     A/P:    1. He will continue to follow with Dr. Boo, Neurology and remains on his same medications.   2. R leg pain. Ultrasound and Plain X-ray imaging. Labs today. May have him visit with orthopedics as well. Unusual fatigue/cramp/pain from constant leg movement from Parkinson's. Does not seem to be radicular in nature. Laboratory tests are unremarkable    40 minutes spent on the date of the encounter doing chart review, history and exam, documentation and further activities as noted above

## 2021-06-14 NOTE — NURSING NOTE
Chief Complaint   Patient presents with     Pain     The patient presents with pain and to follow up with Dr. Howe.        Juanita Colalzo, ABDULKADIR, EMT at 1:54 PM on 6/14/2021.

## 2021-06-15 ENCOUNTER — TELEPHONE (OUTPATIENT)
Dept: INTERNAL MEDICINE | Facility: CLINIC | Age: 76
End: 2021-06-15

## 2021-06-15 ENCOUNTER — VIRTUAL VISIT (OUTPATIENT)
Dept: PHARMACY | Facility: CLINIC | Age: 76
End: 2021-06-15
Payer: COMMERCIAL

## 2021-06-15 DIAGNOSIS — G20.A1 PARKINSON DISEASE (H): Primary | ICD-10-CM

## 2021-06-15 DIAGNOSIS — F02.80 DEMENTIA ASSOCIATED WITH OTHER UNDERLYING DISEASE WITHOUT BEHAVIORAL DISTURBANCE (H): ICD-10-CM

## 2021-06-15 DIAGNOSIS — M79.661 PAIN OF RIGHT LOWER LEG: ICD-10-CM

## 2021-06-15 DIAGNOSIS — G25.81 RESTLESS LEGS SYNDROME: ICD-10-CM

## 2021-06-15 DIAGNOSIS — M79.604 PAIN OF RIGHT LOWER EXTREMITY: Primary | ICD-10-CM

## 2021-06-15 PROCEDURE — 99607 MTMS BY PHARM ADDL 15 MIN: CPT | Performed by: PHARMACIST

## 2021-06-15 PROCEDURE — 99605 MTMS BY PHARM NP 15 MIN: CPT | Performed by: PHARMACIST

## 2021-06-15 RX ORDER — OXYCODONE AND ACETAMINOPHEN 5; 325 MG/1; MG/1
TABLET ORAL
Qty: 12 TABLET | Refills: 0 | COMMUNITY
Start: 2021-06-15 | End: 2021-06-17

## 2021-06-15 NOTE — PROGRESS NOTES
Medication Therapy Management (MTM) Encounter    ASSESSMENT:                            Medication Adherence/Access: No issues identified    Parkinson's Disease/dementia/RLS: Parkinson's disease seems to be progressing but with pain as the patient's primary complaint, changes to carbidopa-levodopa may not help and could worsen his hallucinations. Increased dosage of gabapentin may help with pain and RLS but would need to monitor for somnolence/confusion.     Leg pain: Patient working closely with his PCP on this. Encouraged close monitoring of increased confusion with oxycodone but given severity of patient's pain, this may be the best option for now. Also could consider increase in gabapentin for pain relief and RLS. Unclear if patient is having cramping pain but could check a magnesium level and treat if low. Discussed the role of palliative care vs pain management clinic -- palliative care does not typically prescribe pain medications and is focused on improving quality of life.    PLAN:                            1. Plan to work with Dr. Howe on pain management for now. We discussed the role of palliative care versus pain management clinic.  2. Could consider increasing Gabapentin for pain relief and restlessness.  3. Could consider checking a magnesium level - low magnesium can cause muscle cramping.    Follow-up: Return in about 1 year (around 6/15/2022) for Medication Therapy Management.    SUBJECTIVE/OBJECTIVE:                          Norberto Wu is a 75 year old male called for a follow-up visit. He was referred to me from Dr. Boo.  Visit conducted with daughter-in-law, Lottie, as patient is cognitively impaired and living in memory care. Today's visit is a follow-up MTM visit from 3/25/19     Reason for visit: patient in severe pain    Allergies/ADRs: Reviewed in chart  Tobacco: He reports that he has never smoked. He has never used smokeless tobacco.  Alcohol: Less than 1 beverages / week  Past  "Medical History: Reviewed in chart    Medication Adherence/Access: no issues reported    Parkinson's Disease/dementia/RLS:  Current medications include: Carbidopa-levodopa  mg 2 tablets 7 times/day, gabapentin 300 mg AM/300 mg noon/900 mg at 6 pm, quetiapine 25 mg nightly, and rivastigmine 9.5 mg patch daily. Lottie states Parkinson's disease symptoms are worsening- he is primarily concerned about his severe leg pain. The \"shaking\" is worse but medication only helps so much. He is falling and lands on his knees at times. Can't sit still - has to get up and walk around as he feels better and in less pain when moving.    Leg pain: Currently taking ibuprofen as needed and recently was prescribed oxycodone-acetaminophen 5-325 mg, which he has not been able to start yet as the pharmacy hasn't received the order. Patient will be following up with his PCP next Monday after starting the oxycodone. Ibuprofen hasn't helped at all. Aspercream used to help but has not been helping lately. The pain is described as \"severe\" and doesn't seem to be a tightness or cramping primarily. He feels \"tight\" when he gets up from a chair. Daughter-in-law states patient is miserable with the pain and would rather take a pain medication and have side effects than live with the pain.     Of note, daughter-in-law asked about the palliative care referral    Today's Vitals: There were no vitals taken for this visit.  ----------------    I spent 16 minutes with this patient today. All changes were made via collaborative practice agreement with Dr. Grace. A copy of the visit note was provided to the patient's referring provider.    The patient was sent via [employer] a summary of these recommendations.     Rachel Mena, Pharm.D.  Medication Therapy Management Pharmacist  Phone: [phone]    Telemedicine Visit Details  Type of service:  Telephone visit  Start Time: 10:39 AM  End Time: 10:55 AM  Originating Location (patient location): " Home  Distant Location (provider location):  [facility] NEUROLOGY [facility]

## 2021-06-15 NOTE — TELEPHONE ENCOUNTER
Dear Marj;    I saw Norberto yesterday with his daughter in law    (1) placed historical order for limited supply or Percocet    (2) orthopedic referral for leg pain    Please help coordinate    Jesús, MOHIT  Shyam    Daughter in law called and plan care left on her phone. Clinic number given for questions or concerns.  Marj Thurston RN 9:14 AM on 6/15/2021.      Please sign Percocet RX.  Do still want pt to come in on 06/21/2021?  Marj Thurston RN 10:22 AM on 6/17/2021.

## 2021-06-15 NOTE — PATIENT INSTRUCTIONS
Recommendations from today's MTM visit:                                                      1. Plan to work with Dr. Howe on pain management for now. We discussed the role of palliative care versus pain management clinic.  2. Could consider increasing Gabapentin for pain relief and restlessness.  3. Could consider checking a magnesium level - low magnesium can cause muscle cramping.    Follow-up: Return in about 1 year (around 6/15/2022) for Medication Therapy Management.    It was great to speak with you today.  I value your experience and would be very thankful for your time with providing feedback on our clinic survey. You may receive a survey via email or text message in the next few days.     To schedule another MTM appointment, please call the clinic directly or you may call the MTM scheduling line at [phone] or toll-free at [phone].     My Clinical Pharmacist's contact information:                                                      Please feel free to contact me with any questions or concerns you have.      Rachel Mena, Pharm.D.  Medication Therapy Management Pharmacist  Phone: [phone]

## 2021-06-17 ENCOUNTER — TELEPHONE (OUTPATIENT)
Dept: INTERNAL MEDICINE | Facility: CLINIC | Age: 76
End: 2021-06-17

## 2021-06-17 DIAGNOSIS — M79.604 PAIN OF RIGHT LOWER EXTREMITY: Primary | ICD-10-CM

## 2021-06-17 RX ORDER — OXYCODONE AND ACETAMINOPHEN 5; 325 MG/1; MG/1
TABLET ORAL
Qty: 12 TABLET | Refills: 0 | Status: SHIPPED | OUTPATIENT
Start: 2021-06-17 | End: 2021-06-18

## 2021-06-17 NOTE — TELEPHONE ENCOUNTER
[facility] Call Center    Phone Message    May a detailed message be left on voicemail: no     Reason for Call: Medication Question or concern regarding medication   Prescription Clarification  Name of Medication: oxycodone  Prescribing Provider: Dr. Hwoe   Pharmacy: [address]    What on the order needs clarification? Pharmacy calling stating they received an order for medication with directions to give a half to one tab and states that patient lives at an assisted living facility and they are not permitted to give any range of orders so directions need to be one or the other. Please call to discuss or resubmit order thank you.           Action Taken: Message routed to:  [facility] pcc    Travel Screening: Not Applicable

## 2021-06-18 ENCOUNTER — TELEPHONE (OUTPATIENT)
Dept: INTERNAL MEDICINE | Facility: CLINIC | Age: 76
End: 2021-06-18

## 2021-06-18 DIAGNOSIS — M79.604 PAIN OF RIGHT LOWER EXTREMITY: ICD-10-CM

## 2021-06-18 RX ORDER — OXYCODONE AND ACETAMINOPHEN 5; 325 MG/1; MG/1
TABLET ORAL
Qty: 12 TABLET | Refills: 0 | Status: SHIPPED | OUTPATIENT
Start: 2021-06-18 | End: 2021-07-06

## 2021-06-18 NOTE — TELEPHONE ENCOUNTER
[facility] Call Center    Phone Message    May a detailed message be left on voicemail: yes     Reason for Call: Medication Question or concern regarding medication   Prescription Clarification  Name of Medication: oxyCODONE-acetaminophen (PERCOCET) 5-325 MG tablet  Prescribing Provider: Shyam    Pharmacy: [facility] - [place], [place] - [address] [place] [address]   What on the order needs clarification? Per Pharmacy, cannot process range order meaning   1/2 to one tablet. Pharmacy would need either or. Telephone order from health care facility stated 1/2 tablet twice per day so pharmacy needs script rewritten to reflect this dosage. Thank you       Action Taken: Message routed to:  [facility] pcc    Travel Screening: Not Applicable

## 2021-06-18 NOTE — TELEPHONE ENCOUNTER
[facility] Call Center    Phone Message    May a detailed message be left on voicemail: yes     Reason for Call: Other: Christine the Rn from pt's facility is calling stating they need an order faxed for the 1/2 tablet twice a day for pain.      Please fax this today to [phone]    Action Taken: Message routed to:  [facility]    Travel Screening: Not Applicable

## 2021-06-18 NOTE — TELEPHONE ENCOUNTER
Nursing home needs clarification on Rx and written directions changed on Rx to state one or the other for directions such as: Take 1/2 a tablet in AM PRN and 1/2 tablet in PM PRN OR Take 1 tablet in AM prn and 1 tablet in PM prn. Or if there are other directions that pcp would like Rx to state. Jeimy Valles LPN 6/18/2021 9:20 AM

## 2021-06-23 NOTE — TELEPHONE ENCOUNTER
Faxed signed referral  Adwoa Reaves, EMT at 11:31 AM on 6/23/2021.  [facility]  [facility]  [place]  [phone]

## 2021-07-06 DIAGNOSIS — M79.604 PAIN OF RIGHT LOWER EXTREMITY: ICD-10-CM

## 2021-07-07 RX ORDER — OXYCODONE AND ACETAMINOPHEN 5; 325 MG/1; MG/1
TABLET ORAL
Qty: 12 TABLET | Refills: 0 | Status: SHIPPED | OUTPATIENT
Start: 2021-07-07 | End: 2021-07-26

## 2021-07-20 ENCOUNTER — TELEPHONE (OUTPATIENT)
Dept: NEUROLOGY | Facility: CLINIC | Age: 76
End: 2021-07-20

## 2021-07-20 NOTE — TELEPHONE ENCOUNTER
2nd attempt to schedule a Neurology consult with Dr Bucio sent patient a My Chart message.    Val Adkins Procedure   Neurology & Neurosurgery Specialties  [facility]   [phone]

## 2021-07-23 DIAGNOSIS — M79.604 PAIN OF RIGHT LOWER EXTREMITY: ICD-10-CM

## 2021-07-23 NOTE — TELEPHONE ENCOUNTER
Teri Streeter called stating that patient is out of this medication and needs refilled before 3 pm today before the pharmacy closes. Advised this may not be done today as we just received the request at 12:37

## 2021-07-26 RX ORDER — OXYCODONE AND ACETAMINOPHEN 5; 325 MG/1; MG/1
TABLET ORAL
Qty: 12 TABLET | Refills: 0 | Status: SHIPPED | OUTPATIENT
Start: 2021-07-26 | End: 2021-08-10

## 2021-07-27 ENCOUNTER — TELEPHONE (OUTPATIENT)
Dept: NEUROLOGY | Facility: CLINIC | Age: 76
End: 2021-07-27

## 2021-07-27 NOTE — TELEPHONE ENCOUNTER
Sent out 3rd attempt referral letter for [place] [facility] [place].    Val Adkins Procedure   Neurology & Neurosurgery Specialties  [facility]   [phone]

## 2021-08-03 ENCOUNTER — MEDICAL CORRESPONDENCE (OUTPATIENT)
Dept: INTERNAL MEDICINE | Facility: CLINIC | Age: 76
End: 2021-08-03

## 2021-08-03 ENCOUNTER — OFFICE VISIT (OUTPATIENT)
Dept: INTERNAL MEDICINE | Facility: CLINIC | Age: 76
End: 2021-08-03
Payer: COMMERCIAL

## 2021-08-03 VITALS
RESPIRATION RATE: 16 BRPM | BODY MASS INDEX: 24.09 KG/M2 | SYSTOLIC BLOOD PRESSURE: 100 MMHG | DIASTOLIC BLOOD PRESSURE: 63 MMHG | HEART RATE: 70 BPM | OXYGEN SATURATION: 97 % | WEIGHT: 177.6 LBS

## 2021-08-03 DIAGNOSIS — M79.604 PAIN OF RIGHT LOWER EXTREMITY: Primary | ICD-10-CM

## 2021-08-03 PROCEDURE — 99214 OFFICE O/P EST MOD 30 MIN: CPT | Performed by: INTERNAL MEDICINE

## 2021-08-03 ASSESSMENT — PAIN SCALES - GENERAL: PAINLEVEL: MODERATE PAIN (5)

## 2021-08-03 NOTE — PROGRESS NOTES
HPI:    Last visit with me 6/14/2021 and additional details in that note. Pt. Is present with his daughter-in-law today. He has some chronic (unchanged mainly night time SOB). He states he lies down and gets SOB and then anxious. His wt. Has dropped a little but he states he continues to eat. He states if he takes 1/2 five mg Percocet BID that his R LE sxs. Are less. No other HEENT, cardiopulmonary, abdominal, , systemic complaints.     Past Medical History:   Diagnosis Date     Cerebellar stroke syndrome 8/21/2013    Few small foci of chronic infarct in the right cerebellar hemisphere, unchanged as well as a new focus of now chronic infarct in the left cerebellar hemisphere since the last study.      Dyslipidemia      Hyperthyroidism      Insomnia 6/17/2011     Parkinson disease (H) 6/15/2011     Peripheral neuropathy 6/15/2011     PFO (patent foramen ovale) 8/21/2013    Interpretation Summary 1. Normal LV size and systolic function. Normal diastolic function 2.  Normal RV size and function 3. No significant valvular abnormalities 4.  Small right-to-left shunt visualized with intravenous agitated saline  contrast study, suggestive of PFO. PatientHeight: 72 in PatientWeight: 205 lbs SystolicPressure: 100 mmHg DiastolicPressure: 63 mmHg BSA 2.2 m^2   Procedure Echoc     PONV (postoperative nausea and vomiting)      Prostate cancer (H) 6/17/2011    s/p radical prostectomy     Past Surgical History:   Procedure Laterality Date     COLONOSCOPY N/A 3/23/2017    Procedure: COLONOSCOPY;  Surgeon: Salbador Paulson MD;  Location:  GI     PROSTATE SURGERY  3 yrs ago    prostate cancer; Dr. Valladares     REPLACE DEEP BRAIN STIMULATION GENERATOR / BATTERY Right 1/3/2017    Procedure: REPLACE DEEP BRAIN STIMULATION GENERATOR / BATTERY;  Surgeon: Anupam Fair MD;  Location:  OR     REPLACE DEEP BRAIN STIMULATION GENERATOR / BATTERY Right 2/4/2019    Procedure: Right Deep Brain Stimulatory Battery Replacement;   Surgeon: Marco Rothman MD;  Location: [facility] OR     REPLACE DEEP BRAIN STIMULATION GENERATOR / BATTERY Right 4/16/2021    Procedure: Replacement of deep brain stimulator generator/battery over right chest wall;  Surgeon: Anupam Fair MD;  Location: [facility] OR     Right Gpi DBS Lead Implantation  5/20/2014     Rt Chest DBS Generator Placement Activa SC  5/29/2014     PE:    Vitals noted, gen, nad, cooperative, alert, neck supple, nl rom, lungs with good air movement and clear, RRR, S1, S2, abdomen, no acute findings. He has resting tremor more R UE.     A/P:    1. Parkinson's disease followed by Dr. Boo. Also see [facility]TM [facility] from [facility] 6/15/2021 regarding medications  2. He has completed the Moderna COVID vaccination series.   3. R leg pain; labs, plain X-rays and U/S imaging done 6/14/2021 and unremarkable. He does take low dose Percocet and this seems to be beneficial. He also takes Gabapentin  4. He takes Seroquel at night time  5. SOB for now both Norberto and his daughter-in-law want to defer any further cardiac or pulmonary evaluation for SOB (anxiety related?)       30 minutes spent on the date of the encounter doing chart review, history and exam, documentation and further activities as noted above

## 2021-08-03 NOTE — NURSING NOTE
Norberto Wu is a 75 year old male patient that presents today in clinic for the following:    Chief Complaint   Patient presents with     Shortness of Breath     Recheck Medication       The patient's allergies and medications were reviewed as noted. A set of vitals were recorded as noted without incident. The patient does not have any other questions for the provider.    Aki Peñaloza, EMT at 4:16 PM on 8/3/2021

## 2021-08-10 DIAGNOSIS — M79.604 PAIN OF RIGHT LOWER EXTREMITY: ICD-10-CM

## 2021-08-10 RX ORDER — OXYCODONE AND ACETAMINOPHEN 5; 325 MG/1; MG/1
TABLET ORAL
Qty: 30 TABLET | Refills: 0 | Status: SHIPPED | OUTPATIENT
Start: 2021-08-10 | End: 2021-09-28

## 2021-08-14 ENCOUNTER — TELEPHONE (OUTPATIENT)
Dept: NEUROLOGY | Facility: CLINIC | Age: 76
End: 2021-08-14

## 2021-08-15 ENCOUNTER — MEDICAL CORRESPONDENCE (OUTPATIENT)
Dept: HEALTH INFORMATION MANAGEMENT | Facility: CLINIC | Age: 76
End: 2021-08-15

## 2021-08-26 DIAGNOSIS — G25.81 RESTLESS LEGS SYNDROME (RLS): ICD-10-CM

## 2021-08-31 NOTE — TELEPHONE ENCOUNTER
Rx Authorization:  Requested Medication/ Dose Gabapentin 300 MG Capsule  Date last refill ordered: 6/2/21  Quantity ordered: 450 caps  # refills: 3  Date of last clinic visit with ordering provider: 10/21/20  Date of next clinic visit with ordering provider:   All pertinent protocol data (lab date/result):   Include pertinent information from patients message:

## 2021-09-01 RX ORDER — GABAPENTIN 300 MG/1
CAPSULE ORAL
Qty: 140 CAPSULE | Refills: 10 | Status: SHIPPED | OUTPATIENT
Start: 2021-09-01 | End: 2021-12-10

## 2021-09-15 ENCOUNTER — LAB REQUISITION (OUTPATIENT)
Dept: LAB | Facility: CLINIC | Age: 76
End: 2021-09-15
Payer: COMMERCIAL

## 2021-09-15 DIAGNOSIS — Z11.59 ENCOUNTER FOR SCREENING FOR OTHER VIRAL DISEASES: ICD-10-CM

## 2021-09-16 PROCEDURE — U0005 INFEC AGEN DETEC AMPLI PROBE: HCPCS | Mod: ORL

## 2021-09-16 PROCEDURE — U0003 INFECTIOUS AGENT DETECTION BY NUCLEIC ACID (DNA OR RNA); SEVERE ACUTE RESPIRATORY SYNDROME CORONAVIRUS 2 (SARS-COV-2) (CORONAVIRUS DISEASE [COVID-19]), AMPLIFIED PROBE TECHNIQUE, MAKING USE OF HIGH THROUGHPUT TECHNOLOGIES AS DESCRIBED BY CMS-2020-01-R: HCPCS | Mod: ORL | Performed by: INTERNAL MEDICINE

## 2021-09-17 LAB — SARS-COV-2 RNA RESP QL NAA+PROBE: NEGATIVE

## 2021-09-22 DIAGNOSIS — M79.604 PAIN OF RIGHT LOWER EXTREMITY: ICD-10-CM

## 2021-09-26 ENCOUNTER — HEALTH MAINTENANCE LETTER (OUTPATIENT)
Age: 76
End: 2021-09-26

## 2021-09-28 RX ORDER — OXYCODONE AND ACETAMINOPHEN 5; 325 MG/1; MG/1
TABLET ORAL
Qty: 30 TABLET | Refills: 0 | Status: SHIPPED | OUTPATIENT
Start: 2021-09-28 | End: 2021-10-25

## 2021-09-28 NOTE — TELEPHONE ENCOUNTER
[facility] Call Center    Phone Message    May a detailed message be left on voicemail: yes     Reason for Call: Medication Refill Request    Has the patient contacted the pharmacy for the refill? Yes     Name of medication being requested:   * oxyCODONE-acetaminophen (PERCOCET) 5-325 MG tablet    Provider who prescribed the medication: Shyam    Pharmacy: [address]    Date medication is needed: 9/28/2021         Action Taken: Message routed to:  [facility] Med Refill    Travel Screening: Not Applicable

## 2021-10-20 DIAGNOSIS — M79.604 PAIN OF RIGHT LOWER EXTREMITY: ICD-10-CM

## 2021-10-25 RX ORDER — OXYCODONE AND ACETAMINOPHEN 5; 325 MG/1; MG/1
TABLET ORAL
Qty: 30 TABLET | Refills: 0 | Status: SHIPPED | OUTPATIENT
Start: 2021-10-25 | End: 2021-11-24

## 2021-11-22 DIAGNOSIS — M79.604 PAIN OF RIGHT LOWER EXTREMITY: ICD-10-CM

## 2021-11-24 RX ORDER — OXYCODONE AND ACETAMINOPHEN 5; 325 MG/1; MG/1
TABLET ORAL
Qty: 30 TABLET | Refills: 0 | Status: SHIPPED | OUTPATIENT
Start: 2021-11-24

## 2021-11-28 NOTE — PROGRESS NOTES
VIDEO VISIT    Date of Visit: December 10, 2021  Name: Norberto West  Date of Birth 1945    [address]    [facility] [address]  [email or web address]  Lottie west daughter in law [phone] (M)   [phone] (H)   [phone] (W)     [phone]   [email or web address]   follow up- last seen in 11/2019-scheduled per daughter getting weaker, decline in walking [phone]-      [facility] AT [place] [id number] [facility] [id number]     Parkinson wise we could try   1. Magnesium - we often use this for cramping  2. More sinemet  3. Low dose dopamine agonist  4. Reluctant to use baclofen as it is sedating  5. He is already on gabapentin 300mg at 6am, 300mg at noon and 3 x 300 at 6pm   6. He is using topical aspercreme  7. We don't prescribe narcotics in our group - not sure is the long term answer  8. Not sure about medical marijuana  9. He is on 25mg of seroquel - had been on more but was tired so dose was reduced to 25mg. Consider going back up on it again.       Assessment:  (G20) Parkinson's disease (H)  (primary encounter diagnosis)  DBS right battery 2/4/2019 (prior replacement 1/2017 and 5/2014)  Carbidopa/levodopa 25/100 2 tabs 7/day  At 3am, 6am, 9am 12noon, 3pm, 6pm and midnight     Review of diagnosis    Parkinson    Avoidance of dopamine blockers   Not taking    Motor complication review     Review of Impulse control disorders     Review of surgical or medication options     Gait/Balance/Falls   He is falling  He trips  Gets up and moving around    Exercise/Therapy performed/offered     Cognitive/Driving   Rivastigmine exelon patch 9.5mg at 9am     Mood  Gabapentin neurontin 300mg at 6am, 12pm and 3 x 300mg at 6pm     Hallucinations/delusions   Quetiapine seroquel 25mg at 9pm  He has had some hallucinations     Sleep   Impacted by pain  Dreams at night     Bladder      GI/Constipation/GERD  Polyethylene  glycol miralax - not using it - it is as needed     ENDO      Cardio/heart      Vision      Heme      Other:    Right foot/leg pain is his major issue     May want to optimize his dose of ibuprofen if he is only taking 200mg every 6hours as needed  He also does not have acetaminophen (tylenol)     Palliative care to explore management options for his pain  I dont think he is having sciatic pain in the right leg, ie not sure epidural steroid injections will help but cannot exclude this.      The pain seems to be associated with the right foot tremor but he may have simultaneous dyskinesias of his upper body.    The pain seems to be associated with the right foot tremor but he may have simultaneous dyskinesias of his upper body.      Therefore wondering if it is off related pain but given the dyskinesias not sure how much can push up his sinemet      However he has pain to touch on the right foot arch when seated per his daughter in law. Patient reports elli is no pain when he walks on his foot. He may have some cramping but not clear it is visible on examination.      He may want to see a podiatrist that he has seen in the past for examination or/and xrays.        3a 6a 9a noon 3p 6p 9p 12a                       Carbidopa/levodopa sinemet 25/100 2 2 2 2 2 2   2   Fluticasone flonase 50mcg/act spray   spray        Gabapentin neurontin 300mg   1   1   3       Ibuprofen advil/motrin 200mg   prn               Oxycodone-acetaminophen percocet 5-325  prn         Polyethylene glycol miralax          prn       Quetiapine seroquel 25mg             1     Rivastigmine exelon 9.5mg/24 hr patch     1              Trolamine aspercreme   prn                   Plan:    Discussion about hospice as he is still eating but has continued decline with loss of his weight and function.  He is falling on a regular basis and t his may be associated with good and bad cognitive days and other factors.     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     Son was not in this visit which was with his daughter in law Lottie and the patient  Need to look in  which is not available in his facility.  Need a visit to get the son involved as daughter in law has been involved in care  Hospice discussion and memory unit begun.    Need followup visit and discussion.      ordered.                   Documentation of a Face-to-Face Physician Encounter December 10, 2021    Norberto DRAGAN Bryce  1945  [id number]    I certify that this patient is under my care and that I, or a nurse practitioner or physician's assistant working with me, had a face-to-face encounter that meets the physician face-to-face encounter requirements with this patient on: 12/10/2021.    The encounter with the patient was in whole, or in part, for the following medical condition, which is the primary reason for home health care:  Patient Active Problem List   Diagnosis     Parkinson disease (H)     Peripheral neuropathy     Somnolence     Family history of Parkinson's disease     Snores     REM sleep behavior disorder     Prostate cancer (H)     Wears glasses     Insomnia     Constipation     History of MRI of brain and brain stem     PFO (patent foramen ovale)     Cerebellar stroke syndrome     H/O echocardiogram     Restless leg syndrome     S/P brain surgery     Hamstring tendonitis at origin     Parkinson's disease (H)     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     Acute serous otitis media     Active advance directive     Peripheral nerve disease     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     Malignant neoplasm of prostate (H)     Restless legs syndrome     Psychosis, unspecified psychosis type (H)     Dementia associated with other underlying disease without behavioral disturbance  (H)     End of battery life of deep brain stimulator     Status post deep brain stimulator placement     Primary parkinsonism (H)       I certify that, based on my findings, the following services are medically necessary home health services: Nursing, Occupational Therapy, Physical Therapy, Speech Language Therapy and Social Work    My clinical findings support the need for the above services because: parkinson/dementia    Further, I certify that my clinical findings support that this patient is homebound (i.e. absences from home require considerable and taxing effort and are for medical reasons or [employer] services or infrequently or of short duration when for other reasons) because: parkinson      Physician signature ___________________________________   December 10, 2021  Physician name: Blaine Boo MD    Fax ([phone]) or scan/email ([email or web address]) this completed document to [facility] within 24 hours of the referral date.  Questions: [phone].          Medical Decision Making:  #  Chronic progressive medical conditions addressed  Cognitive, parkinson  Review and/or interpretation of unique test or documentation from a provider outside of neurology  -    Independent historian provided additional details  yes   Prescription drug management and review of potential side effects and/or monitoring for side effects  yes   Health impacted by social determinants of health  Yes - facility    I have reviewed the note as documented above.  This accurately captures the substance of my conversation with the patient and total time spent preparing for visit, executing visit and completing visit on the day of the visit:  40 minutes. Face to face: 345pm-412pm    Blaine Boo MD      ------------------------------------------------------------------------------------------------------------------------------------------------------------------------    Video-Visit Details    The patient has been  "notified of following:     \"After a review of the patient s situation, this visit was changed from an in-person visit to a video visit to reduce the risk of COVID-19 exposure.   The patient is being evaluated via a billable video visit.\"    \"This video visit will be conducted via a call between you and your physician/provider. We have found that certain health care needs can be provided without the need for an in-person physical exam.  This service lets us provide the care you need with a video conversation.  If a prescription is necessary we can send it directly to your pharmacy.  If lab work is needed we can place an order for that and you can then stop by our lab to have the test done at a later time.    If during the course of the call the physician/provider feels a video visit is not appropriate, you will not be charged for this service.\"     Patient has given verbal consent for Video visit? Yes    Patient would like the video invitation sent by:     Type of service:  Video Visit    Video Start Time:     Video End Time (time video stopped):     Duration:  minutes - see above    Originating Location (pt. Location):     Distant Location (provider location):  [facility] NEUROLOGY [facility]     Mode of Communication:  Video Conference via "[employer] (and if not possible then doximity)      Blaine Boo MD      --------------------------------------------------------------------------------------------------------------    Norberto Wu is a 76 year old male who is being evaluated via a billable video visit.      Charts reviewed  Consult from  Images reviewed        I have reviewed and updated the patient's Past Medical History, Social History, Family History and Medication List.    ALLERGIES  Hydromorphone, Comtan, Hydrocodone, Hydrocodone-acetaminophen, Melatonin, Ropinirole, Adhesive tape, Camphor, and Liquid adhesive    Lasts visit details if there was a last visit:       14 Review of systems  " "are negative except for   Patient Active Problem List   Diagnosis     Parkinson disease (H)     Peripheral neuropathy     Somnolence     Family history of Parkinson's disease     Snores     REM sleep behavior disorder     Prostate cancer (H)     Wears glasses     Insomnia     Constipation     History of MRI of brain and brain stem     PFO (patent foramen ovale)     Cerebellar stroke syndrome     H/O echocardiogram     Restless leg syndrome     S/P brain surgery     Hamstring tendonitis at origin     Parkinson's disease (H)     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  Stayed in the hospital 3 days due to severe nausea & \"retching\" .      Comtan Diarrhea     Hydrocodone      Other reaction(s): Headache, Nausea Only     Hydrocodone-Acetaminophen Hives and Nausea     Melatonin Other (See Comments)     No benefit.     Ropinirole Fatigue     Fatigue no benefit.      Adhesive Tape Rash     Camphor Rash     Other reaction(s): Rash     Liquid Adhesive Rash     Past Surgical History:   Procedure Laterality Date     COLONOSCOPY N/A 3/23/2017    Procedure: COLONOSCOPY;  Surgeon: Salbador Paulson MD;  Location:  GI     PROSTATE SURGERY  3 yrs ago    prostate cancer; Dr. Valladares     REPLACE DEEP BRAIN STIMULATION GENERATOR / BATTERY Right 1/3/2017    Procedure: REPLACE DEEP BRAIN STIMULATION GENERATOR / BATTERY;  Surgeon: Anupam Fair " MD Prema;  Location: [facility] OR     REPLACE DEEP BRAIN STIMULATION GENERATOR / BATTERY Right 2/4/2019    Procedure: Right Deep Brain Stimulatory Battery Replacement;  Surgeon: Marco Rothman MD;  Location: [facility] OR     REPLACE DEEP BRAIN STIMULATION GENERATOR / BATTERY Right 4/16/2021    Procedure: Replacement of deep brain stimulator generator/battery over right chest wall;  Surgeon: Anupam Fair MD;  Location: [facility] OR     Right Gpi DBS Lead Implantation  5/20/2014     Rt Chest DBS Generator Placement Activa SC  5/29/2014     Past Medical History:   Diagnosis Date     Cerebellar stroke syndrome 8/21/2013    Few small foci of chronic infarct in the right cerebellar hemisphere, unchanged as well as a new focus of now chronic infarct in the left cerebellar hemisphere since the last study.      Dyslipidemia      Hyperthyroidism      Insomnia 6/17/2011     Parkinson disease (H) 6/15/2011     Peripheral neuropathy 6/15/2011     PFO (patent foramen ovale) 8/21/2013    Interpretation Summary 1. Normal LV size and systolic function. Normal diastolic function 2.  Normal RV size and function 3. No significant valvular abnormalities 4.  Small right-to-left shunt visualized with intravenous agitated saline  contrast study, suggestive of PFO. PatientHeight: 72 in PatientWeight: 205 lbs SystolicPressure: 100 mmHg DiastolicPressure: 63 mmHg BSA 2.2 m^2   Procedure Echoc     PONV (postoperative nausea and vomiting)      Prostate cancer (H) 6/17/2011    s/p radical prostectomy     Social History     Socioeconomic History     Marital status:      Spouse name: Not on file     Number of children: Not on file     Years of education: Not on file     Highest education level: Not on file   Occupational History     Not on file   Tobacco Use     Smoking status: Never Smoker     Smokeless tobacco: Never Used   Substance and Sexual Activity     Alcohol use: Yes     Alcohol/week: 1.0 standard drink     Comment: EVERY OTHER DAY      Drug use: No     Sexual activity: Never   Other Topics Concern     Parent/sibling w/ CABG, MI or angioplasty before 65F 55M? No   Social History Narrative    Son lives in [place] and he has 2 kids    Wife Mayelin     Social Determinants of Health     Financial Resource Strain: Not on file   Food Insecurity: Not on file   Transportation Needs: Not on file   Physical Activity: Not on file   Stress: Not on file   Social Connections: Not on file   Intimate Partner Violence: Not on file   Housing Stability: Not on file     Family History   Problem Relation Age of Onset     Parkinsonism Father      Other - See Comments Son         lives in [place]     Skin Cancer No family hx of         no skin cancer     Current Outpatient Medications   Medication Sig Dispense Refill     carbidopa-levodopa (SINEMET)  MG tablet TAKE TWO TABLETS BY MOUTH SEVEN TIMES A DAY (3AM, 6AM, 9AM, NOON, 3PM, 6PM AND MIDNIGHT) 434 tablet PRN     fluticasone (FLONASE) 50 MCG/ACT nasal spray Spray 1-2 sprays into both nostrils 2 times daily       gabapentin (NEURONTIN) 300 MG capsule 1 CAPSULE ORALLY 2 TIMES DAILY;3 CAPSULES (900MG) ORALLY DAILY AT 6PM 140 capsule 10     ibuprofen (ADVIL/MOTRIN) 200 MG tablet Take 200 mg by mouth every 6 hours as needed        oxyCODONE-acetaminophen (PERCOCET) 5-325 MG tablet 1/2 TABLET ORALLY 2 TIMES DAILY AS NEEDED *MAX 2 TABS/DAY* (MAX 4GM TYLENOL OR ACETAMINOPHEN/24 HRS) 30 tablet 0     QUEtiapine (SEROQUEL) 25 MG tablet At Bedtime 25mg TABLET BY MOUTH AT 9pm 90 tablet 3     rivastigmine (EXELON) 9.5 MG/24HR 24 hr patch Place 1 patch onto the skin daily 90 patch 3     Trolamine Salicylate (ASPERCREME EX) Apply topically as needed            Answers for HPI/ROS submitted by the patient on 12/10/2021  General Symptoms: Yes  Skin Symptoms: No  HENT Symptoms: No  EYE SYMPTOMS: No  HEART SYMPTOMS: Yes  LUNG SYMPTOMS: Yes  INTESTINAL SYMPTOMS: No  URINARY SYMPTOMS: No  REPRODUCTIVE SYMPTOMS: No  SKELETAL  SYMPTOMS: Yes  BLOOD SYMPTOMS: No  NERVOUS SYSTEM SYMPTOMS: Yes  MENTAL HEALTH SYMPTOMS: Yes  Fever: No  Loss of appetite: No  Weight loss: No  Weight gain: No  Fatigue: Yes  Night sweats: No  Chills: No  Increased stress: Yes  Excessive hunger: No  Excessive thirst: No  Feeling hot or cold when others believe the temperature is normal: No  Loss of height: No  Post-operative complications: No  Surgical site pain: No  Hallucinations: No  Change in or Loss of Energy: No  Hyperactivity: No  Confusion: Yes  Chest pain or pressure: No  Fast or irregular heartbeat: No  Pain in legs with walking: Yes  Trouble breathing while lying down: No  Fingers or toes appear blue: No  High blood pressure: No  Low blood pressure: No  Fainting: No  Murmurs: No  Pacemaker: No  Varicose veins: No  Edema or swelling: No  Wake up at night with shortness of breath: No  Light-headedness: No  Exercise intolerance: No  Cough: No  Sputum or phlegm: No  Coughing up blood: No  Difficulty breating or shortness of breath: Yes  Snoring: No  Wheezing: No  Difficulty breathing on exertion: No  Nighttime Cough: No  Difficulty breathing when lying flat: No  Back pain: No  Muscle aches: Yes  Neck pain: No  Swollen joints: No  Joint pain: No  Bone pain: No  Muscle cramps: Yes  Muscle weakness: Yes  Joint stiffness: No  Bone fracture: No  Trouble with coordination: Yes  Dizziness or trouble with balance: Yes  Fainting or black-out spells: No  Memory loss: Yes  Headache: No  Seizures: No  Speech problems: No  Tingling: Yes  Tremor: Yes  Weakness: Yes  Difficulty walking: Yes  Paralysis: No  Numbness: Yes  Nervous or Anxious: Yes  Depression: Yes  Trouble sleeping: Yes  Trouble thinking or concentrating: Yes  Mood changes: No  Panic attacks: Yes

## 2021-12-10 ENCOUNTER — VIRTUAL VISIT (OUTPATIENT)
Dept: NEUROLOGY | Facility: CLINIC | Age: 76
End: 2021-12-10
Payer: COMMERCIAL

## 2021-12-10 DIAGNOSIS — G25.81 RESTLESS LEGS SYNDROME (RLS): ICD-10-CM

## 2021-12-10 DIAGNOSIS — G20.A1 PARKINSON'S DISEASE (H): Primary | ICD-10-CM

## 2021-12-10 DIAGNOSIS — G20.A1 PARKINSON DISEASE (H): ICD-10-CM

## 2021-12-10 PROCEDURE — 99215 OFFICE O/P EST HI 40 MIN: CPT | Mod: 95 | Performed by: PSYCHIATRY & NEUROLOGY

## 2021-12-10 RX ORDER — QUETIAPINE FUMARATE 25 MG/1
TABLET, FILM COATED ORAL
Qty: 90 TABLET | Refills: 3 | COMMUNITY
Start: 2021-12-10

## 2021-12-10 RX ORDER — GABAPENTIN 300 MG/1
CAPSULE ORAL
Qty: 140 CAPSULE | Refills: 10 | COMMUNITY
Start: 2021-12-10

## 2021-12-10 RX ORDER — CARBIDOPA AND LEVODOPA 25; 100 MG/1; MG/1
TABLET ORAL
Qty: 1260 TABLET | Refills: 3 | COMMUNITY
Start: 2021-12-10

## 2021-12-10 ASSESSMENT — ENCOUNTER SYMPTOMS
FEVER: 0
DYSPNEA ON EXERTION: 0
SNORES LOUDLY: 0
SYNCOPE: 0
JOINT SWELLING: 0
POLYPHAGIA: 0
BACK PAIN: 0
LIGHT-HEADEDNESS: 0
SHORTNESS OF BREATH: 1
MUSCLE CRAMPS: 1
DECREASED CONCENTRATION: 1
TREMORS: 1
POSTURAL DYSPNEA: 0
NERVOUS/ANXIOUS: 1
SEIZURES: 0
HALLUCINATIONS: 0
PANIC: 1
SLEEP DISTURBANCES DUE TO BREATHING: 0
WEAKNESS: 1
TINGLING: 1
ARTHRALGIAS: 0
NIGHT SWEATS: 0
DEPRESSION: 1
WEIGHT LOSS: 0
COUGH: 0
EXERCISE INTOLERANCE: 0
PARALYSIS: 0
HYPERTENSION: 0
MYALGIAS: 1
COUGH DISTURBING SLEEP: 0
PALPITATIONS: 0
DECREASED APPETITE: 0
CHILLS: 0
NUMBNESS: 1
HEMOPTYSIS: 0
MEMORY LOSS: 1
NECK PAIN: 0
POLYDIPSIA: 0
ALTERED TEMPERATURE REGULATION: 0
DISTURBANCES IN COORDINATION: 1
WEIGHT GAIN: 0
HYPOTENSION: 0
LOSS OF CONSCIOUSNESS: 0
SPUTUM PRODUCTION: 0
FATIGUE: 1
SPEECH CHANGE: 0
LEG PAIN: 1
ORTHOPNEA: 0
DIZZINESS: 1
MUSCLE WEAKNESS: 1
STIFFNESS: 0
WHEEZING: 0
INCREASED ENERGY: 0
HEADACHES: 0
INSOMNIA: 1

## 2021-12-10 NOTE — PROGRESS NOTES
Norberto is a 76 year old who is being evaluated via a billable video visit.      How would you like to obtain your AVS? Mychart  If the video visit is dropped, the invitation should be resent by: [phone]      Video Start Time:   Video-Visit Details    Type of service:  Video Visit    Video End Time:    Originating Location (pt. Location): Home    Distant Location (provider location):  [facility] NEUROLOGY [facility]     Platform used for Video Visit: [id number]

## 2021-12-10 NOTE — PATIENT INSTRUCTIONS
Assessment:  (G20) Parkinson's disease (H)  (primary encounter diagnosis)  DBS right battery 2/4/2019 (prior replacement 1/2017 and 5/2014)  Carbidopa/levodopa 25/100 2 tabs 7/day  At 3am, 6am, 9am 12noon, 3pm, 6pm and midnight     Review of diagnosis    Parkinson    Avoidance of dopamine blockers   Not taking    Motor complication review     Review of Impulse control disorders     Review of surgical or medication options     Gait/Balance/Falls   He is falling  He trips  Gets up and moving around    Exercise/Therapy performed/offered     Cognitive/Driving   Rivastigmine exelon patch 9.5mg at 9am     Mood  Gabapentin neurontin 300mg at 6am, 12pm and 3 x 300mg at 6pm     Hallucinations/delusions   Quetiapine seroquel 25mg at 9pm  He has had some hallucinations     Sleep   Impacted by pain  Dreams at night     Bladder      GI/Constipation/GERD  Polyethylene glycol miralax - not using it - it is as needed     ENDO      Cardio/heart      Vision      Heme      Other:    Right foot/leg pain is his major issue     May want to optimize his dose of ibuprofen if he is only taking 200mg every 6hours as needed  He also does not have acetaminophen (tylenol)     Palliative care to explore management options for his pain  I dont think he is having sciatic pain in the right leg, ie not sure epidural steroid injections will help but cannot exclude this.      The pain seems to be associated with the right foot tremor but he may have simultaneous dyskinesias of his upper body.    The pain seems to be associated with the right foot tremor but he may have simultaneous dyskinesias of his upper body.      Therefore wondering if it is off related pain but given the dyskinesias not sure how much can push up his sinemet      However he has pain to touch on the right foot arch when seated per his daughter in law. Patient reports elli is no pain when he walks on his foot. He may have some cramping but not clear it is visible on  examination.      He may want to see a podiatrist that he has seen in the past for examination or/and xrays.        3a 6a 9a noon 3p 6p 9p 12a                       Carbidopa/levodopa sinemet 25/100 2 2 2 2 2 2   2   Fluticasone flonase 50mcg/act spray   spray        Gabapentin neurontin 300mg   1   1   3       Ibuprofen advil/motrin 200mg   prn               Oxycodone-acetaminophen percocet 5-325  prn         Polyethylene glycol miralax          prn       Quetiapine seroquel 25mg             1     Rivastigmine exelon 9.5mg/24 hr patch     1              Trolamine aspercreme   prn                   Plan:    Discussion about hospice as he is still eating but has continued decline with loss of his weight and function.  He is falling on a regular basis and t his may be associated with good and bad cognitive days and other factors.     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     Son was not in this visit which was with his daughter in law Lottie and the patient  Need to look in  which is not available in his facility.  Need a visit to get the son involved as daughter in law has been involved in care  Hospice discussion and memory unit begun.    Need followup visit and discussion.      ordered.

## 2021-12-10 NOTE — LETTER
12/10/2021       RE: Norberto West  [address]  [facility] [address]     Dear Colleague,    Thank you for referring your patient, Norberto West, to the [facility] NEUROLOGY CLINIC [place] at [facility]. Please see a copy of my visit note below.        VIDEO VISIT    Date of Visit: December 10, 2021  Name: Norberto West  Date of Birth 1945    [address]    [facility] [address]  [email or web address]  Lottie west daughter in law [phone] (M)   [phone] (H)   [phone] (W)     [phone]   [email or web address]   follow up- last seen in 11/2019-scheduled per daughter getting weaker, decline in walking [phone]-      [facility] AT [address] [address]     Parkinson wise we could try   1. Magnesium - we often use this for cramping  2. More sinemet  3. Low dose dopamine agonist  4. Reluctant to use baclofen as it is sedating  5. He is already on gabapentin 300mg at 6am, 300mg at noon and 3 x 300 at 6pm   6. He is using topical aspercreme  7. We don't prescribe narcotics in our group - not sure is the long term answer  8. Not sure about medical marijuana  9. He is on 25mg of seroquel - had been on more but was tired so dose was reduced to 25mg. Consider going back up on it again.       Assessment:  (G20) Parkinson's disease (H)  (primary encounter diagnosis)  DBS right battery 2/4/2019 (prior replacement 1/2017 and 5/2014)  Carbidopa/levodopa 25/100 2 tabs 7/day  At 3am, 6am, 9am 12noon, 3pm, 6pm and midnight     Review of diagnosis    Parkinson    Avoidance of dopamine blockers   Not taking    Motor complication review     Review of Impulse control disorders     Review of surgical or medication options     Gait/Balance/Falls   He is falling  He trips  Gets up and moving around    Exercise/Therapy performed/offered      Cognitive/Driving   Rivastigmine exelon patch 9.5mg at 9am     Mood  Gabapentin neurontin 300mg at 6am, 12pm and 3 x 300mg at 6pm     Hallucinations/delusions   Quetiapine seroquel 25mg at 9pm  He has had some hallucinations     Sleep   Impacted by pain  Dreams at night     Bladder      GI/Constipation/GERD  Polyethylene glycol miralax - not using it - it is as needed     ENDO      Cardio/heart      Vision      Heme      Other:    Right foot/leg pain is his major issue     May want to optimize his dose of ibuprofen if he is only taking 200mg every 6hours as needed  He also does not have acetaminophen (tylenol)     Palliative care to explore management options for his pain  I dont think he is having sciatic pain in the right leg, ie not sure epidural steroid injections will help but cannot exclude this.      The pain seems to be associated with the right foot tremor but he may have simultaneous dyskinesias of his upper body.    The pain seems to be associated with the right foot tremor but he may have simultaneous dyskinesias of his upper body.      Therefore wondering if it is off related pain but given the dyskinesias not sure how much can push up his sinemet      However he has pain to touch on the right foot arch when seated per his daughter in law. Patient reports elli is no pain when he walks on his foot. He may have some cramping but not clear it is visible on examination.      He may want to see a podiatrist that he has seen in the past for examination or/and xrays.        3a 6a 9a noon 3p 6p 9p 12a                       Carbidopa/levodopa sinemet 25/100 2 2 2 2 2 2   2   Fluticasone flonase 50mcg/act spray   spray        Gabapentin neurontin 300mg   1   1   3       Ibuprofen advil/motrin 200mg   prn               Oxycodone-acetaminophen percocet 5-325  prn         Polyethylene glycol miralax          prn       Quetiapine seroquel 25mg             1     Rivastigmine exelon 9.5mg/24 hr patch     1               Trolamine aspercreme   prn                   Plan:    Discussion about hospice as he is still eating but has continued decline with loss of his weight and function.  He is falling on a regular basis and t his may be associated with good and bad cognitive days and other factors.     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     Son was not in this visit which was with his daughter in law Lottie and the patient  Need to look in  which is not available in his facility.  Need a visit to get the son involved as daughter in law has been involved in care  Hospice discussion and memory unit begun.    Need followup visit and discussion.      ordered.                   Documentation of a Face-to-Face Physician Encounter December 10, 2021    Norberto Wu  1945  [id number]    I certify that this patient is under my care and that I, or a nurse practitioner or physician's assistant working with me, had a face-to-face encounter that meets the physician face-to-face encounter requirements with this patient on: 12/10/2021.    The encounter with the patient was in whole, or in part, for the following medical condition, which is the primary reason for home health care:  Patient Active Problem List   Diagnosis     Parkinson disease (H)     Peripheral neuropathy     Somnolence     Family history of Parkinson's disease     Snores     REM sleep behavior disorder     Prostate cancer (H)     Wears glasses     Insomnia     Constipation     History of MRI of brain and brain stem     PFO (patent foramen ovale)     Cerebellar stroke syndrome     H/O echocardiogram     Restless leg syndrome     S/P brain surgery     Hamstring tendonitis at origin     Parkinson's disease (H)     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     Acute serous  otitis media     Active advance directive     Peripheral nerve disease     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     Malignant neoplasm of prostate (H)     Restless legs syndrome     Psychosis, unspecified psychosis type (H)     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 absences from home require considerable and taxing effort and are for medical reasons or [facility] services or infrequently or of short duration when for other reasons) because: parkinson      Physician signature ___________________________________   December 10, 2021  Physician name: Blaine Boo MD    Fax ([phone]) or scan/email ([email or web address]) this completed document to [facility] within 24 hours of the referral date.  Questions: [phone].          Medical Decision Making:  #  Chronic progressive medical conditions addressed  Cognitive, parkinson  Review and/or interpretation of unique test or documentation from a provider outside of neurology  -    Independent historian provided additional details  yes   Prescription drug management and review of potential side effects and/or monitoring for side effects  yes   Health impacted by social determinants of health  Yes - facility    I have reviewed the note as documented above.  This accurately captures the substance of my conversation with the patient and total time spent preparing for visit, executing visit  "and completing visit on the day of the visit:  40 minutes. Face to face: 345pm-412pm    Blaine Boo MD      ------------------------------------------------------------------------------------------------------------------------------------------------------------------------    Video-Visit Details    The patient has been notified of following:     \"After a review of the patient s situation, this visit was changed from an in-person visit to a video visit to reduce the risk of COVID-19 exposure.   The patient is being evaluated via a billable video visit.\"    \"This video visit will be conducted via a call between you and your physician/provider. We have found that certain health care needs can be provided without the need for an in-person physical exam.  This service lets us provide the care you need with a video conversation.  If a prescription is necessary we can send it directly to your pharmacy.  If lab work is needed we can place an order for that and you can then stop by our lab to have the test done at a later time.    If during the course of the call the physician/provider feels a video visit is not appropriate, you will not be charged for this service.\"     Patient has given verbal consent for Video visit? Yes    Patient would like the video invitation sent by:     Type of service:  Video Visit    Video Start Time:     Video End Time (time video stopped):     Duration:  minutes - see above    Originating Location (pt. Location):     Distant Location (provider location):  [facility] NEUROLOGY [facility]     Mode of Communication:  Video Conference via Wisr (and if not possible then doximity)      Blaine Boo MD      --------------------------------------------------------------------------------------------------------------    Norberto Wu is a 76 year old male who is being evaluated via a billable video visit.      Charts reviewed  Consult from  Images reviewed        I have reviewed " "and updated the patient's Past Medical History, Social History, Family History and Medication List.    ALLERGIES  Hydromorphone, Comtan, Hydrocodone, Hydrocodone-acetaminophen, Melatonin, Ropinirole, Adhesive tape, Camphor, and Liquid adhesive    Lasts visit details if there was a last visit:       14 Review of systems  are negative except for   Patient Active Problem List   Diagnosis     Parkinson disease (H)     Peripheral neuropathy     Somnolence     Family history of Parkinson's disease     Snores     REM sleep behavior disorder     Prostate cancer (H)     Wears glasses     Insomnia     Constipation     History of MRI of brain and brain stem     PFO (patent foramen ovale)     Cerebellar stroke syndrome     H/O echocardiogram     Restless leg syndrome     S/P brain surgery     Hamstring tendonitis at origin     Parkinson's disease (H)     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  Stayed in the hospital 3 days due to severe nausea & \"retching\" .      Comtan Diarrhea     Hydrocodone      Other reaction(s): Headache, Nausea Only     Hydrocodone-Acetaminophen Hives and Nausea     Melatonin Other (See Comments)     No benefit.     Ropinirole Fatigue     Fatigue no benefit.      Adhesive Tape Rash     Camphor Rash     Other reaction(s): Rash     Liquid Adhesive Rash     Past Surgical History:   Procedure Laterality Date     COLONOSCOPY " N/A 3/23/2017    Procedure: COLONOSCOPY;  Surgeon: Salbador Paulson MD;  Location: [facility]     PROSTATE SURGERY  3 yrs ago    prostate cancer; Dr. Valladares     REPLACE DEEP BRAIN STIMULATION GENERATOR / BATTERY Right 1/3/2017    Procedure: REPLACE DEEP BRAIN STIMULATION GENERATOR / BATTERY;  Surgeon: Anupam Fair MD;  Location: [facility] OR     REPLACE DEEP BRAIN STIMULATION GENERATOR / BATTERY Right 2/4/2019    Procedure: Right Deep Brain Stimulatory Battery Replacement;  Surgeon: Marco Rothman MD;  Location: [facility] OR     REPLACE DEEP BRAIN STIMULATION GENERATOR / BATTERY Right 4/16/2021    Procedure: Replacement of deep brain stimulator generator/battery over right chest wall;  Surgeon: Anupam Fair MD;  Location: [facility] OR     Right Gpi DBS Lead Implantation  5/20/2014     Rt Chest DBS Generator Placement Activa SC  5/29/2014     Past Medical History:   Diagnosis Date     Cerebellar stroke syndrome 8/21/2013    Few small foci of chronic infarct in the right cerebellar hemisphere, unchanged as well as a new focus of now chronic infarct in the left cerebellar hemisphere since the last study.      Dyslipidemia      Hyperthyroidism      Insomnia 6/17/2011     Parkinson disease (H) 6/15/2011     Peripheral neuropathy 6/15/2011     PFO (patent foramen ovale) 8/21/2013    Interpretation Summary 1. Normal LV size and systolic function. Normal diastolic function 2.  Normal RV size and function 3. No significant valvular abnormalities 4.  Small right-to-left shunt visualized with intravenous agitated saline  contrast study, suggestive of PFO. PatientHeight: 72 in PatientWeight: 205 lbs SystolicPressure: 100 mmHg DiastolicPressure: 63 mmHg BSA 2.2 m^2   Procedure Echoc     PONV (postoperative nausea and vomiting)      Prostate cancer (H) 6/17/2011    s/p radical prostectomy     Social History     Socioeconomic History     Marital status:      Spouse name: Not on file     Number of children: Not on  file     Years of education: Not on file     Highest education level: Not on file   Occupational History     Not on file   Tobacco Use     Smoking status: Never Smoker     Smokeless tobacco: Never Used   Substance and Sexual Activity     Alcohol use: Yes     Alcohol/week: 1.0 standard drink     Comment: EVERY OTHER DAY     Drug use: No     Sexual activity: Never   Other Topics Concern     Parent/sibling w/ CABG, MI or angioplasty before 65F 55M? No   Social History Narrative    Son lives in [place] and he has 2 kids    Wife Mayelin     Social Determinants of Health     Financial Resource Strain: Not on file   Food Insecurity: Not on file   Transportation Needs: Not on file   Physical Activity: Not on file   Stress: Not on file   Social Connections: Not on file   Intimate Partner Violence: Not on file   Housing Stability: Not on file     Family History   Problem Relation Age of Onset     Parkinsonism Father      Other - See Comments Son         lives in [place]     Skin Cancer No family hx of         no skin cancer     Current Outpatient Medications   Medication Sig Dispense Refill     carbidopa-levodopa (SINEMET)  MG tablet TAKE TWO TABLETS BY MOUTH SEVEN TIMES A DAY (3AM, 6AM, 9AM, NOON, 3PM, 6PM AND MIDNIGHT) 434 tablet PRN     fluticasone (FLONASE) 50 MCG/ACT nasal spray Spray 1-2 sprays into both nostrils 2 times daily       gabapentin (NEURONTIN) 300 MG capsule 1 CAPSULE ORALLY 2 TIMES DAILY;3 CAPSULES (900MG) ORALLY DAILY AT 6PM 140 capsule 10     ibuprofen (ADVIL/MOTRIN) 200 MG tablet Take 200 mg by mouth every 6 hours as needed        oxyCODONE-acetaminophen (PERCOCET) 5-325 MG tablet 1/2 TABLET ORALLY 2 TIMES DAILY AS NEEDED *MAX 2 TABS/DAY* (MAX 4GM TYLENOL OR ACETAMINOPHEN/24 HRS) 30 tablet 0     QUEtiapine (SEROQUEL) 25 MG tablet At Bedtime 25mg TABLET BY MOUTH AT 9pm 90 tablet 3     rivastigmine (EXELON) 9.5 MG/24HR 24 hr patch Place 1 patch onto the skin daily 90 patch 3     Trolamine Salicylate  (ASPERCREME EX) Apply topically as needed            Again, thank you for allowing me to participate in the care of your patient.      Sincerely,    Blaine Boo MD

## 2021-12-16 DIAGNOSIS — G20.A1 PARKINSON'S DISEASE (H): Primary | ICD-10-CM

## 2021-12-17 ENCOUNTER — PATIENT OUTREACH (OUTPATIENT)
Dept: CARE COORDINATION | Facility: CLINIC | Age: 76
End: 2021-12-17
Payer: COMMERCIAL

## 2021-12-17 NOTE — PROGRESS NOTES
Social Work Intervention  [facility]    Data/Intervention:    Patient Name:  Norberto Wu  /Age:  1945 (76 year old)    Visit Type: telephone  Referral Source: Christina Dinh RN  Reason for Referral:  Hospice and family questions    Collaborated With:    -Claeb Wu, Pt's son  -[facility]    Psychosocial Information/Concerns:  Review of last conversation with Christina Dinh RN and current concerns. Caleb reports that his Dad just doesn't have the will to live anymore. It's too hard and he's too uncomfortable.   He's lost 31 lbs since May. He is falling frequently. Lives at [facility] assisted living and is on the waiting list to move into the Enhanced care there.     Intervention/Education/Resources Provided:  Provided support and information to Caleb. Discussed that Dr Boo wrote a hospice referral yesterday. Discussed agency choice and because [facility], he would like to use [facility]. I contacted hospice and they have the referral and plan to reach out to Caleb today or tomorrow. I contacted Caleb to let him know that.     Assessment/Plan:  Caleb's pleased that a hospice assessment/admission is in the works. Encouraged him to contact me if there are any barriers/concerns that come up.     Provided patient/family with contact information and availability.    JAKE Blood, [facility]    [facility]  [phone]

## 2021-12-28 ENCOUNTER — TELEPHONE (OUTPATIENT)
Dept: INTERNAL MEDICINE | Facility: CLINIC | Age: 76
End: 2021-12-28
Payer: COMMERCIAL

## 2021-12-28 NOTE — TELEPHONE ENCOUNTER
Spoke to nurse who had questions regarding gabapentin. Advised that medication was prescribed by Neurology. If nurse had further questions can call clinic back. Jeimy Valles LPN 12/28/2021 12:46 PM

## 2021-12-28 NOTE — TELEPHONE ENCOUNTER
[facility] Call Center    Phone Message    May a detailed message be left on voicemail: yes     Reason for Call: Other: Patient signed onto hospice on 12/26/21 with [facility]. Wilver from [facility] has questions about his medication list. Please call back and discuss.     Action Taken: Message routed to:  [facility]    Travel Screening: Not Applicable

## 2021-12-31 ENCOUNTER — TELEPHONE (OUTPATIENT)
Dept: NEUROLOGY | Facility: CLINIC | Age: 76
End: 2021-12-31
Payer: OTHER MISCELLANEOUS

## 2021-12-31 NOTE — TELEPHONE ENCOUNTER
Gabapentin 300 mg capsules was NOT canceled, the prescription was inadvertently discontinued when it was marked historical. I let Kristal at [facility] know this. She will re-activate his refills.

## 2021-12-31 NOTE — TELEPHONE ENCOUNTER
[facility] Call [facility]    Phone Message    May a detailed message be left on voicemail: yes     Reason for Call: Medication Question or concern regarding medication   Prescription Clarification  Name of Medication: gabapentin (NEURONTIN) 300 MG capsule  Prescribing Provider:    Pharmacy: [facility]   [facility] is calling requesting to speak to nurse/ care team concerning the cancellation of patients prescription Gabapentin in which they received on 12/10/21. Per pharmacy, nurses at pts facility were not aware that prescription was cancelled, wondering if someone can  call back pharmacy to clarify.           Action Taken: Message routed to:  [facility] neurolog[facility]    Travel Screening: Not Applicable

## 2022-01-01 ENCOUNTER — MEDICAL CORRESPONDENCE (OUTPATIENT)
Dept: HEALTH INFORMATION MANAGEMENT | Facility: CLINIC | Age: 77
End: 2022-01-01

## 2022-01-01 ENCOUNTER — MEDICAL CORRESPONDENCE (OUTPATIENT)
Dept: HEALTH INFORMATION MANAGEMENT | Facility: CLINIC | Age: 77
End: 2022-01-01
Payer: OTHER MISCELLANEOUS

## 2022-01-01 ENCOUNTER — TRANSFERRED RECORDS (OUTPATIENT)
Dept: HEALTH INFORMATION MANAGEMENT | Facility: CLINIC | Age: 77
End: 2022-01-01

## 2022-01-01 ENCOUNTER — MEDICAL CORRESPONDENCE (OUTPATIENT)
Dept: INTERNAL MEDICINE | Facility: CLINIC | Age: 77
End: 2022-01-01

## 2022-01-01 ENCOUNTER — TELEPHONE (OUTPATIENT)
Dept: INTERNAL MEDICINE | Facility: CLINIC | Age: 77
End: 2022-01-01

## 2022-01-01 ENCOUNTER — DOCUMENTATION ONLY (OUTPATIENT)
Dept: INTERNAL MEDICINE | Facility: CLINIC | Age: 77
End: 2022-01-01

## 2022-01-01 ENCOUNTER — LAB REQUISITION (OUTPATIENT)
Dept: LAB | Facility: CLINIC | Age: 77
End: 2022-01-01
Payer: COMMERCIAL

## 2022-01-01 ENCOUNTER — LAB REQUISITION (OUTPATIENT)
Dept: LAB | Facility: CLINIC | Age: 77
End: 2022-01-01
Payer: MEDICARE

## 2022-01-01 ENCOUNTER — TELEPHONE (OUTPATIENT)
Dept: INTERNAL MEDICINE | Facility: CLINIC | Age: 77
End: 2022-01-01
Payer: OTHER MISCELLANEOUS

## 2022-01-01 ENCOUNTER — HEALTH MAINTENANCE LETTER (OUTPATIENT)
Age: 77
End: 2022-01-01

## 2022-01-01 DIAGNOSIS — Z11.59 ENCOUNTER FOR SCREENING FOR OTHER VIRAL DISEASES: ICD-10-CM

## 2022-01-01 DIAGNOSIS — Z20.822 EXPOSURE TO 2019 NOVEL CORONAVIRUS: ICD-10-CM

## 2022-01-01 DIAGNOSIS — Z20.822 EXPOSURE TO 2019 NOVEL CORONAVIRUS: Primary | ICD-10-CM

## 2022-01-01 LAB
SARS-COV-2 RNA RESP QL NAA+PROBE: NEGATIVE
SARS-COV-2 RNA RESP QL NAA+PROBE: POSITIVE

## 2022-01-01 PROCEDURE — U0003 INFECTIOUS AGENT DETECTION BY NUCLEIC ACID (DNA OR RNA); SEVERE ACUTE RESPIRATORY SYNDROME CORONAVIRUS 2 (SARS-COV-2) (CORONAVIRUS DISEASE [COVID-19]), AMPLIFIED PROBE TECHNIQUE, MAKING USE OF HIGH THROUGHPUT TECHNOLOGIES AS DESCRIBED BY CMS-2020-01-R: HCPCS | Mod: ORL | Performed by: INTERNAL MEDICINE

## 2022-01-01 PROCEDURE — U0005 INFEC AGEN DETEC AMPLI PROBE: HCPCS | Mod: ORL | Performed by: INTERNAL MEDICINE

## 2022-01-05 ENCOUNTER — TELEPHONE (OUTPATIENT)
Dept: NEUROLOGY | Facility: CLINIC | Age: 77
End: 2022-01-05
Payer: OTHER MISCELLANEOUS

## 2022-01-05 NOTE — TELEPHONE ENCOUNTER
[facility] Call [facility]    Phone Message    May a detailed message be left on voicemail: yes     Reason for Call: Rian from [facility] called requesting a call back. [phone]. Pt is having more pain in his legs. Rian want to discuss medication options. Please advise.    Action Taken: Message routed to:  [facility] [place]    Travel Screening: Not Applicable                                                                      Reviews some Medication-hospice pt is having more pain in his leg. [phone] [facility]

## 2022-01-05 NOTE — TELEPHONE ENCOUNTER
Norberto entered hospice on 12/26/2021. Rian is the hospice nurse following Norberto. She had some questions for Dr. Boo regarding how he wants them to manage his medications I.e. through Ema, PCP? He is borderline for qualifying for hospice care. He is currently signed up due to weight loss and family reports of fast decline but they may discharge him from hospice. For this reason the hospice team does not want to make too many medication changes and request Dr. oBo's guidance on the right leg/foot pain. Norberto has reported it is more like cramping occurring more so at nighttime, moving makes it better. They started him on a small dose of oxycodone at bedtime but are reluctant to increase due to nausea/vomiting in the past. They are also concerned about falls as he is still living independently.    -If Dr. Boo does suggest magnesium how should it be prescribed?  -Allergy for ropinirole listed (fatgiue)

## 2022-01-15 ENCOUNTER — MEDICAL CORRESPONDENCE (OUTPATIENT)
Dept: HEALTH INFORMATION MANAGEMENT | Facility: CLINIC | Age: 77
End: 2022-01-15
Payer: OTHER MISCELLANEOUS

## 2022-03-18 NOTE — TELEPHONE ENCOUNTER
[facility] Call [facility]    Phone Message    May a detailed message be left on voicemail: no     Reason for Call: Other: Estefany from pt's care center called and wanted the pt's care team to know that the pt had a fall yesterday 3/17, has some scraps on his knees, did not hit head, and ring finger may be broken and his ring that is on that finger may need to be cut off     Action Taken: Message routed to:  [facility] ni

## 2022-05-31 NOTE — NURSING NOTE
Pre-op Teaching            Pre-op folder with specific written instructions    DBS battery replacement  2/4/19 at 1:25, arrive on 3C at 11:25  Dr. Rothman    Discussed pre-op routine and requirements to include:  surgical procedure, post-op recovery and expectations, need for H&P (he has an appointment with Dr. Howe today) , NPO prior to OR, pre-op antibacterial showers, pain control and importance of follow-up visits.  Surgery scheduling will coordinate OR time/date and update patient as appropriate.  3C will call with more instructions 24-48 hour pre-op.   Ample time was provided for patient questions and in-depth discussion of topics of heightened interest.  A four ounce bottle of antibacterial soap solution was given to patient as well as specific instructions for use.  Patient/family verbalized understanding of instructions.  Approximately 20 minutes spent with patient and family discussing and reviewing.     Ketoconazole Pregnancy And Lactation Text: This medication is Pregnancy Category C and it isn't know if it is safe during pregnancy. It is also excreted in breast milk and breast feeding isn't recommended.

## 2022-06-03 NOTE — TELEPHONE ENCOUNTER
Mr. Wu tested positive for COVID    Reviewed drug interactions for both Paxlovid and Molnupiravir. Paxlovid should not be given with Seroquel, therefore will try to prescribe Molnupiravir. Discussed with his son Caleb and he is the Health Care Power of  and would like his father treated for COVID. Called facility number [phone] and could only leave a message. Will call back tomorrow AM    MOHIT Howe MD

## 2022-06-04 NOTE — TELEPHONE ENCOUNTER
Called again at 11 AM and only got answering machine. I left my cell phone call back number to call be back.    Ordered Molnupiravir this encounter and sent to [facility]

## 2022-07-19 NOTE — TELEPHONE ENCOUNTER
[facility] Call [facility]    Phone Message    May a detailed message be left on voicemail: yes     Reason for Call: Other: Per call from Kia, reporting that patient had a fall on 7/18/22 at 7:15pm. Per Kia no injuries and please reach out to her if any questions.      Action Taken: Message routed to:  [facility]    Travel Screening: Not Applicable

## 2022-08-30 NOTE — TELEPHONE ENCOUNTER
Duplicate, daughter in law contacted by Marj yesterday.  Adwoa Reaves, EMT at 11:02 AM on 6/16/2021.     
[facility] Call Center    Phone Message    May a detailed message be left on voicemail: yes     Reason for Call: Medication Question or concern regarding medication   Prescription Clarification  Name of Medication: oxyCODONE-acetaminophen (PERCOCET) 5-325 MG tablet  Prescribing Provider: Dr. Howe   Pharmacy: [facility] - Ph: [phone] - Fax: [phone]     What on the order needs clarification? The pharmacy have not received the prescription and the nursing facility ([facility]) have not received the order for this medication. Please call Lottie if there's any questions.    They need this prescription by 3:30 today because patient is still in sever pain.      Action Taken: Message routed to:  [facility]    Travel Screening: Not Applicable                                                                        
Lottie patients daughter in law calling stating she has not heard from the clinic regarding this medication request and the pharmacy has not received a refill request. Lottie states a refill request does need to be at the pharmacy by 3pm or patient will not be able to get medication today either. Please call to discuss thank you. [phone]   
Rani Rizzo PA-C

## 2022-08-30 NOTE — PROGRESS NOTES
Type of Form Received: hospice order    Form Received (Date) 8/29/22   Form Filled out pending   Placed in provider folder Yes

## 2022-09-06 NOTE — PROGRESS NOTES
Type of Form Received: [facility] ORDERS    Form Received (Date) 9/2/22   Form Filled out YES 9/12/22   Placed in provider folder Yes

## 2022-09-07 NOTE — TELEPHONE ENCOUNTER
[facility] Call Center    Phone Message    May a detailed message be left on voicemail: yes     Reason for Call: Other: Per call from Kia, reported that patient had a fall on 9/2 at 5pm, was trying to self-transfer, scrape on right knee with no further injury.      Action Taken: Message routed to:  [facility]    Travel Screening: Not Applicable

## 2022-09-08 NOTE — PROGRESS NOTES
Type of Form Received: [facility] CHOICE ORDER    Form Received (Date) 9/8/22   Form Filled out YES 9/12/22   Placed in provider folder Yes

## 2022-09-09 NOTE — PROGRESS NOTES
Type of Form Received: [facility] CHOICE    Form Received (Date) 9/9/22   Form Filled out YES 9/12/22   Placed in provider folder Yes

## 2022-09-26 NOTE — TELEPHONE ENCOUNTER
[facility] Call Center    Phone Message    May a detailed message be left on voicemail: yes     Reason for Call: Other: Per call shun Adam, reported that patient had a fall today at 3:45pm, found on the floor but no injuries.      Action Taken: Message routed to:  [facility]    Travel Screening: Not Applicable

## 2022-09-27 NOTE — TELEPHONE ENCOUNTER
Noted.  Continue to monitor.      Christopher Tan CMA ([facility]) at 8:10 AM on 9/27/2022

## 2022-10-06 NOTE — PROGRESS NOTES
Type of Form Received: PHYSICIANS ORDER    Form Received (Date) 10/6/22   Form Filled out Yes 10/10/2022   Placed in provider folder YES

## 2022-10-17 NOTE — PROGRESS NOTES
Type of Form Received: PHYSICIAN ORDERS    Form Received (Date) 10/17/22   Form Filled out Yes 10/24/22   Placed in provider folder Yes

## 2022-10-19 NOTE — PROGRESS NOTES
Type of Form Received: ORDERS    Form Received (Date) 10/19/22   Form Filled out Yes 10/24/22   Placed in provider folder Yes

## 2022-10-24 NOTE — PROGRESS NOTES
Type of Form Received: ORDERS    Form Received (Date) 10/24/22   Form Filled out YES 10/31/22   Placed in provider folder Yes

## 2022-11-04 NOTE — PROGRESS NOTES
Type of Form Received: orders    Form Received (Date) 11/4/22   Form Filled out YES 11/13/22   Placed in provider folder Yes

## 2022-11-11 NOTE — PROGRESS NOTES
Type of Form Received: ORDERS    Form Received (Date) 11/11/22   Form Filled out No   Placed in provider folder Yes

## 2022-12-08 NOTE — PROGRESS NOTES
Type of Form Received: PHYSICIAN ORDERS    Form Received (Date) 12/8/22   Form Filled out Yes 12/12/22   Placed in provider folder Yes

## 2022-12-08 NOTE — PROGRESS NOTES
Type of Form Received: PHYSICIAN ORDER SHEET    Form Received (Date) 12/8/22   Form Filled out Yes 12/12/22   Placed in provider folder Yes

## 2022-12-13 NOTE — PROGRESS NOTES
Type of Form Received: orders    Form Received (Date) 12/13/22   Form Filled out YES 12/22/22   Placed in provider folder Yes

## 2022-12-14 NOTE — PROGRESS NOTES
Type of Form Received: oders    Form Received (Date) 12/14/22   Form Filled out YES 12/22/22   Placed in provider folder Yes

## 2022-12-15 NOTE — PROGRESS NOTES
Type of Form Received: PHYSICIAN ORDER SHEET    Form Received (Date) 12/15/22   Form Filled out YES 12/22/22   Placed in provider folder Yes

## 2022-12-26 NOTE — PROGRESS NOTES
Type of Form Received: physicians order    Form Received (Date) 12/26/22   Form Filled out YES 12/31/22   Placed in provider folder Yes

## 2023-01-01 ENCOUNTER — DOCUMENTATION ONLY (OUTPATIENT)
Dept: INTERNAL MEDICINE | Facility: CLINIC | Age: 78
End: 2023-01-01
Payer: OTHER MISCELLANEOUS

## 2023-01-01 ENCOUNTER — DOCUMENTATION ONLY (OUTPATIENT)
Dept: INTERNAL MEDICINE | Facility: CLINIC | Age: 78
End: 2023-01-01

## 2023-01-01 ENCOUNTER — MEDICAL CORRESPONDENCE (OUTPATIENT)
Dept: HEALTH INFORMATION MANAGEMENT | Facility: CLINIC | Age: 78
End: 2023-01-01

## 2023-01-01 ENCOUNTER — MEDICAL CORRESPONDENCE (OUTPATIENT)
Dept: HEALTH INFORMATION MANAGEMENT | Facility: CLINIC | Age: 78
End: 2023-01-01
Payer: OTHER MISCELLANEOUS

## 2023-01-02 NOTE — PROGRESS NOTES
Type of Form Received: orders    Form Received (Date) 1/2/23   Form Filled out No   Placed in provider folder Yes

## 2023-01-04 NOTE — PROGRESS NOTES
Type of Form Received: orders    Form Received (Date) 1/4/23   Form Filled out YES 1/7/23   Placed in provider folder Yes

## 2023-01-13 NOTE — ANESTHESIA POSTPROCEDURE EVALUATION
Patient (s)  given copy of dc instructions and 0 paper script(s) and 2 electronic scripts. Patient (s)  verbalized understanding of instructions and script (s). Patient given a current medication reconciliation form and verbalized understanding of their medications. Patient (s) verbalized understanding of the importance of discussing medications with  his or her physician or clinic they will be following up with. Patient alert and oriented and in no acute distress. Patient offered wheelchair from treatment area to hospital entrance, patient declined wheelchair. 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    Anesthesia Type:  MAC    Note:  Disposition: Outpatient   Postop Pain Control: Uneventful            Sign Out: Well controlled pain   PONV: No   Neuro/Psych: Uneventful            Sign Out: Acceptable/Baseline neuro status   Airway/Respiratory: Uneventful            Sign Out: Acceptable/Baseline resp. status   CV/Hemodynamics: Uneventful            Sign Out: Acceptable CV status   Other NRE: NONE   DID A NON-ROUTINE EVENT OCCUR? No         Last vitals:  Vitals:    04/16/21 1153 04/16/21 1525   BP: (!) 171/114 (!) (P) 166/93   Pulse: 85    Resp: 16    Temp: 37  C (98.6  F) (P) 37.3  C (99.1  F)   SpO2: 100%        Last vitals prior to Anesthesia Care Transfer:  [facility] VITALS  4/16/2021 1435 - 4/16/2021 1535      4/16/2021             Pulse:  93    SpO2:  98 %          Electronically Signed By: Yamila Torres MD  April 16, 2021  3:36 PM

## 2023-01-17 NOTE — PROGRESS NOTES
Type of Form Received: orders    Form Received (Date) 1/17/23   Form Filled out Yes 1/24/23   Placed in provider folder Yes

## 2023-01-23 NOTE — PROGRESS NOTES
Type of Form Received: order    Form Received (Date) 1/23/23   Form Filled out No   Placed in provider folder Yes

## 2023-01-24 NOTE — PROGRESS NOTES
/Type of Form Received: orders    Form Received (Date) 1/24/23   Form Filled out Yes 1/29/23   Placed in provider folder Yes

## 2023-01-29 ENCOUNTER — MEDICAL CORRESPONDENCE (OUTPATIENT)
Dept: HEALTH INFORMATION MANAGEMENT | Facility: CLINIC | Age: 78
End: 2023-01-29
Payer: MEDICARE

## 2023-01-30 ENCOUNTER — MEDICAL CORRESPONDENCE (OUTPATIENT)
Dept: HEALTH INFORMATION MANAGEMENT | Facility: CLINIC | Age: 78
End: 2023-01-30
Payer: MEDICARE

## 2023-02-03 ENCOUNTER — MEDICAL CORRESPONDENCE (OUTPATIENT)
Dept: HEALTH INFORMATION MANAGEMENT | Facility: CLINIC | Age: 78
End: 2023-02-03
Payer: MEDICARE

## 2023-02-09 ENCOUNTER — MEDICAL CORRESPONDENCE (OUTPATIENT)
Dept: HEALTH INFORMATION MANAGEMENT | Facility: CLINIC | Age: 78
End: 2023-02-09
Payer: MEDICARE

## 2023-11-15 ENCOUNTER — MEDICAL CORRESPONDENCE (OUTPATIENT)
Dept: HEALTH INFORMATION MANAGEMENT | Facility: CLINIC | Age: 78
End: 2023-11-15
Payer: MEDICARE

## (undated) DEVICE — PREP CHLORAPREP 26ML TINTED ORANGE  260815

## (undated) DEVICE — SOL NACL 0.9% IRRIG 1000ML BOTTLE 2F7124

## (undated) DEVICE — PREP SKIN SCRUB TRAY 4461A

## (undated) DEVICE — SUCTION MANIFOLD DORNOCH ULTRA CART UL-CL500

## (undated) DEVICE — SU MONOCRYL 4-0 PS-2 27" UND Y426H

## (undated) DEVICE — COVER CAMERA VIDEO LASER 9X96" 04-CC227

## (undated) DEVICE — SYR 10ML LL W/O NDL 302995

## (undated) DEVICE — SOL WATER IRRIG 1000ML BOTTLE 2F7114

## (undated) DEVICE — STRAP UNIVERSAL POSITIONING 2-PIECE 4X47X76" 91-287

## (undated) DEVICE — ESU GROUND PAD ADULT W/CORD E7507

## (undated) DEVICE — SU VICRYL 3-0 SH 8X18" UND J864D

## (undated) DEVICE — GLOVE PROTEXIS MICRO 7.5  2D73PM75

## (undated) DEVICE — DRSG TEGADERM 4X4 3/4" 1626W

## (undated) DEVICE — SYR EAR BULB 3OZ 0035830

## (undated) DEVICE — DECANTER VIAL 2006S

## (undated) DEVICE — DRSG TELFA 3"X8" NON25720

## (undated) DEVICE — SU ETHIBOND 2-0 SHDA 30" X563H

## (undated) DEVICE — BASIN SET SINGLE STERILE 13752-624

## (undated) DEVICE — PREP POVIDONE IODINE SCRUB 7.5% 4OZ APL82212

## (undated) DEVICE — DRAPE SHEET MED 44X70" 9355

## (undated) DEVICE — LINEN TOWEL PACK X6 WHITE 5487

## (undated) DEVICE — LINEN TOWEL PACK X5 5464

## (undated) DEVICE — DECANTER BAG 2002S

## (undated) DEVICE — DRAPE IOBAN INCISE 13X13" 6640EZ

## (undated) DEVICE — SUCTION MANIFOLD NEPTUNE 2 SYS 4 PORT 0702-020-000

## (undated) DEVICE — PREP CHLORAPREP CLEAR 3ML 260400

## (undated) DEVICE — PREP POVIDONE IODINE SOLUTION 10% 4OZ

## (undated) DEVICE — DRAPE LAP PEDS DISP 29492

## (undated) DEVICE — GLOVE PROTEXIS BLUE W/NEU-THERA 8.0  2D73EB80

## (undated) DEVICE — ESU ELEC BLADE 2.75" COATED/INSULATED E1455

## (undated) DEVICE — BLADE KNIFE SURG 10 371110

## (undated) DEVICE — Device

## (undated) DEVICE — LABEL MEDICATION SYSTEM 3303-P

## (undated) DEVICE — ADH SKIN CLOSURE PREMIERPRO EXOFIN 1.0ML 3470

## (undated) DEVICE — DRAPE SHEET REV FOLD 3/4 9349

## (undated) DEVICE — SOL ADH LIQUID BENZOIN SWAB 0.6ML C1544

## (undated) DEVICE — DRSG KERLIX 4 1/2"X4YDS ROLL 6715

## (undated) DEVICE — NDL BLUNT 18GA 1" W/O FILTER 305181

## (undated) DEVICE — BLADE ESU PLASMABLADE SPATULA TIP 4MM PS200-040

## (undated) DEVICE — SYR 30ML LL W/O NDL 302832

## (undated) DEVICE — PACK NEURO MINOR UMMC SNE32MNMU4

## (undated) DEVICE — ESU CORD BIPOLAR GREEN 10-4000

## (undated) DEVICE — DRSG STERI STRIP 1/2X4" R1547

## (undated) DEVICE — BASIN EMESIS STERILE  SSK9005A

## (undated) DEVICE — PAD CHUX UNDERPAD 23X24" 7136

## (undated) RX ORDER — LIDOCAINE HYDROCHLORIDE AND EPINEPHRINE 10; 10 MG/ML; UG/ML
INJECTION, SOLUTION INFILTRATION; PERINEURAL
Status: DISPENSED
Start: 2019-02-04

## (undated) RX ORDER — LIDOCAINE HYDROCHLORIDE AND EPINEPHRINE 10; 10 MG/ML; UG/ML
INJECTION, SOLUTION INFILTRATION; PERINEURAL
Status: DISPENSED
Start: 2021-04-16

## (undated) RX ORDER — FENTANYL CITRATE 50 UG/ML
INJECTION, SOLUTION INTRAMUSCULAR; INTRAVENOUS
Status: DISPENSED
Start: 2019-02-04

## (undated) RX ORDER — FENTANYL CITRATE 50 UG/ML
INJECTION, SOLUTION INTRAMUSCULAR; INTRAVENOUS
Status: DISPENSED
Start: 2017-03-23

## (undated) RX ORDER — FENTANYL CITRATE 50 UG/ML
INJECTION, SOLUTION INTRAMUSCULAR; INTRAVENOUS
Status: DISPENSED
Start: 2021-04-16

## (undated) RX ORDER — PROPOFOL 10 MG/ML
INJECTION, EMULSION INTRAVENOUS
Status: DISPENSED
Start: 2019-02-04

## (undated) RX ORDER — ACETAMINOPHEN 325 MG/1
TABLET ORAL
Status: DISPENSED
Start: 2019-02-04

## (undated) RX ORDER — LIDOCAINE HYDROCHLORIDE 20 MG/ML
INJECTION, SOLUTION EPIDURAL; INFILTRATION; INTRACAUDAL; PERINEURAL
Status: DISPENSED
Start: 2019-02-04

## (undated) RX ORDER — PHENYLEPHRINE HCL IN 0.9% NACL 1 MG/10 ML
SYRINGE (ML) INTRAVENOUS
Status: DISPENSED
Start: 2019-02-04

## (undated) RX ORDER — ONDANSETRON 2 MG/ML
INJECTION INTRAMUSCULAR; INTRAVENOUS
Status: DISPENSED
Start: 2019-02-04

## (undated) RX ORDER — CEFAZOLIN SODIUM 2 G/100ML
INJECTION, SOLUTION INTRAVENOUS
Status: DISPENSED
Start: 2021-04-16